# Patient Record
Sex: MALE | Race: WHITE | URBAN - METROPOLITAN AREA
[De-identification: names, ages, dates, MRNs, and addresses within clinical notes are randomized per-mention and may not be internally consistent; named-entity substitution may affect disease eponyms.]

---

## 2018-04-14 ENCOUNTER — INPATIENT (INPATIENT)
Facility: HOSPITAL | Age: 63
LOS: 5 days | Discharge: HOME | End: 2018-04-20
Attending: FAMILY MEDICINE | Admitting: FAMILY MEDICINE
Payer: COMMERCIAL

## 2018-04-14 VITALS — WEIGHT: 175.05 LBS | HEIGHT: 68 IN

## 2018-04-14 DIAGNOSIS — E78.5 HYPERLIPIDEMIA, UNSPECIFIED: ICD-10-CM

## 2018-04-14 DIAGNOSIS — I00 RHEUMATIC FEVER WITHOUT HEART INVOLVEMENT: ICD-10-CM

## 2018-04-14 DIAGNOSIS — E51.9 THIAMINE DEFICIENCY, UNSPECIFIED: ICD-10-CM

## 2018-04-14 DIAGNOSIS — F10.10 ALCOHOL ABUSE, UNCOMPLICATED: ICD-10-CM

## 2018-04-14 DIAGNOSIS — G25.81 RESTLESS LEGS SYNDROME: ICD-10-CM

## 2018-04-14 LAB
ALBUMIN SERPL ELPH-MCNC: 3.9 G/DL — SIGNIFICANT CHANGE UP (ref 3.3–5)
ALP SERPL-CCNC: 46 U/L — SIGNIFICANT CHANGE UP (ref 40–120)
ALT FLD-CCNC: 51 U/L — SIGNIFICANT CHANGE UP (ref 12–78)
ANION GAP SERPL CALC-SCNC: 13 MMOL/L — SIGNIFICANT CHANGE UP (ref 5–17)
APTT BLD: 35.1 SEC — SIGNIFICANT CHANGE UP (ref 27.5–37.4)
AST SERPL-CCNC: 95 U/L — HIGH (ref 15–37)
BASOPHILS # BLD AUTO: 0.03 K/UL — SIGNIFICANT CHANGE UP (ref 0–0.2)
BASOPHILS NFR BLD AUTO: 0.6 % — SIGNIFICANT CHANGE UP (ref 0–2)
BILIRUB SERPL-MCNC: 0.7 MG/DL — SIGNIFICANT CHANGE UP (ref 0.2–1.2)
BUN SERPL-MCNC: 10 MG/DL — SIGNIFICANT CHANGE UP (ref 7–23)
CALCIUM SERPL-MCNC: 8.6 MG/DL — SIGNIFICANT CHANGE UP (ref 8.5–10.1)
CHLORIDE SERPL-SCNC: 97 MMOL/L — SIGNIFICANT CHANGE UP (ref 96–108)
CO2 SERPL-SCNC: 26 MMOL/L — SIGNIFICANT CHANGE UP (ref 22–31)
CREAT SERPL-MCNC: 0.92 MG/DL — SIGNIFICANT CHANGE UP (ref 0.5–1.3)
EOSINOPHIL # BLD AUTO: 0.05 K/UL — SIGNIFICANT CHANGE UP (ref 0–0.5)
EOSINOPHIL NFR BLD AUTO: 1.1 % — SIGNIFICANT CHANGE UP (ref 0–6)
ETHANOL SERPL-MCNC: 199 MG/DL — HIGH (ref 0–10)
GLUCOSE SERPL-MCNC: 85 MG/DL — SIGNIFICANT CHANGE UP (ref 70–99)
HCT VFR BLD CALC: 42.1 % — SIGNIFICANT CHANGE UP (ref 39–50)
HGB BLD-MCNC: 15.3 G/DL — SIGNIFICANT CHANGE UP (ref 13–17)
IMM GRANULOCYTES NFR BLD AUTO: 0.4 % — SIGNIFICANT CHANGE UP (ref 0–1.5)
INR BLD: 1.08 RATIO — SIGNIFICANT CHANGE UP (ref 0.88–1.16)
LYMPHOCYTES # BLD AUTO: 1.65 K/UL — SIGNIFICANT CHANGE UP (ref 1–3.3)
LYMPHOCYTES # BLD AUTO: 34.7 % — SIGNIFICANT CHANGE UP (ref 13–44)
MCHC RBC-ENTMCNC: 33.4 PG — SIGNIFICANT CHANGE UP (ref 27–34)
MCHC RBC-ENTMCNC: 36.3 GM/DL — HIGH (ref 32–36)
MCV RBC AUTO: 91.9 FL — SIGNIFICANT CHANGE UP (ref 80–100)
MONOCYTES # BLD AUTO: 0.57 K/UL — SIGNIFICANT CHANGE UP (ref 0–0.9)
MONOCYTES NFR BLD AUTO: 12 % — SIGNIFICANT CHANGE UP (ref 2–14)
NEUTROPHILS # BLD AUTO: 2.43 K/UL — SIGNIFICANT CHANGE UP (ref 1.8–7.4)
NEUTROPHILS NFR BLD AUTO: 51.2 % — SIGNIFICANT CHANGE UP (ref 43–77)
NRBC # BLD: 0 /100 WBCS — SIGNIFICANT CHANGE UP (ref 0–0)
PLATELET # BLD AUTO: 107 K/UL — LOW (ref 150–400)
POTASSIUM SERPL-MCNC: 3.5 MMOL/L — SIGNIFICANT CHANGE UP (ref 3.5–5.3)
POTASSIUM SERPL-SCNC: 3.5 MMOL/L — SIGNIFICANT CHANGE UP (ref 3.5–5.3)
PROT SERPL-MCNC: 8.6 GM/DL — HIGH (ref 6–8.3)
PROTHROM AB SERPL-ACNC: 11.7 SEC — SIGNIFICANT CHANGE UP (ref 9.8–12.7)
RBC # BLD: 4.58 M/UL — SIGNIFICANT CHANGE UP (ref 4.2–5.8)
RBC # FLD: 14 % — SIGNIFICANT CHANGE UP (ref 10.3–14.5)
SODIUM SERPL-SCNC: 136 MMOL/L — SIGNIFICANT CHANGE UP (ref 135–145)
TROPONIN I SERPL-MCNC: <0.015 NG/ML — SIGNIFICANT CHANGE UP (ref 0.01–0.04)
WBC # BLD: 4.75 K/UL — SIGNIFICANT CHANGE UP (ref 3.8–10.5)
WBC # FLD AUTO: 4.75 K/UL — SIGNIFICANT CHANGE UP (ref 3.8–10.5)

## 2018-04-14 PROCEDURE — 71045 X-RAY EXAM CHEST 1 VIEW: CPT | Mod: 26

## 2018-04-14 PROCEDURE — 99285 EMERGENCY DEPT VISIT HI MDM: CPT

## 2018-04-14 RX ORDER — FOLIC ACID 0.8 MG
1 TABLET ORAL DAILY
Qty: 0 | Refills: 0 | Status: DISCONTINUED | OUTPATIENT
Start: 2018-04-14 | End: 2018-04-16

## 2018-04-14 RX ORDER — SODIUM CHLORIDE 9 MG/ML
3 INJECTION INTRAMUSCULAR; INTRAVENOUS; SUBCUTANEOUS ONCE
Qty: 0 | Refills: 0 | Status: COMPLETED | OUTPATIENT
Start: 2018-04-14 | End: 2018-04-14

## 2018-04-14 RX ORDER — PRAMIPEXOLE DIHYDROCHLORIDE 0.12 MG/1
0.12 TABLET ORAL DAILY
Qty: 0 | Refills: 0 | Status: DISCONTINUED | OUTPATIENT
Start: 2018-04-14 | End: 2018-04-18

## 2018-04-14 RX ORDER — METHOTREXATE 2.5 MG/1
70 TABLET ORAL
Qty: 0 | Refills: 0 | Status: DISCONTINUED | OUTPATIENT
Start: 2018-04-14 | End: 2018-04-16

## 2018-04-14 RX ORDER — ATORVASTATIN CALCIUM 80 MG/1
20 TABLET, FILM COATED ORAL AT BEDTIME
Qty: 0 | Refills: 0 | Status: DISCONTINUED | OUTPATIENT
Start: 2018-04-14 | End: 2018-04-20

## 2018-04-14 RX ORDER — THIAMINE MONONITRATE (VIT B1) 100 MG
100 TABLET ORAL DAILY
Qty: 0 | Refills: 0 | Status: DISCONTINUED | OUTPATIENT
Start: 2018-04-14 | End: 2018-04-16

## 2018-04-14 RX ORDER — ONDANSETRON 8 MG/1
8 TABLET, FILM COATED ORAL ONCE
Qty: 0 | Refills: 0 | Status: COMPLETED | OUTPATIENT
Start: 2018-04-14 | End: 2018-04-14

## 2018-04-14 RX ORDER — INFLUENZA VIRUS VACCINE 15; 15; 15; 15 UG/.5ML; UG/.5ML; UG/.5ML; UG/.5ML
0.5 SUSPENSION INTRAMUSCULAR ONCE
Qty: 0 | Refills: 0 | Status: COMPLETED | OUTPATIENT
Start: 2018-04-14 | End: 2018-04-14

## 2018-04-14 RX ORDER — HYDRALAZINE HCL 50 MG
5 TABLET ORAL ONCE
Qty: 0 | Refills: 0 | Status: COMPLETED | OUTPATIENT
Start: 2018-04-14 | End: 2018-04-14

## 2018-04-14 RX ORDER — ENOXAPARIN SODIUM 100 MG/ML
40 INJECTION SUBCUTANEOUS EVERY 24 HOURS
Qty: 0 | Refills: 0 | Status: DISCONTINUED | OUTPATIENT
Start: 2018-04-14 | End: 2018-04-20

## 2018-04-14 RX ORDER — HYDRALAZINE HCL 50 MG
5 TABLET ORAL EVERY 6 HOURS
Qty: 0 | Refills: 0 | Status: DISCONTINUED | OUTPATIENT
Start: 2018-04-14 | End: 2018-04-15

## 2018-04-14 RX ORDER — SODIUM CHLORIDE 9 MG/ML
1000 INJECTION INTRAMUSCULAR; INTRAVENOUS; SUBCUTANEOUS ONCE
Qty: 0 | Refills: 0 | Status: COMPLETED | OUTPATIENT
Start: 2018-04-14 | End: 2018-04-14

## 2018-04-14 RX ADMIN — SODIUM CHLORIDE 3 MILLILITER(S): 9 INJECTION INTRAMUSCULAR; INTRAVENOUS; SUBCUTANEOUS at 13:18

## 2018-04-14 RX ADMIN — Medication 100 MILLIGRAM(S): at 18:35

## 2018-04-14 RX ADMIN — Medication 50 MILLIGRAM(S): at 18:35

## 2018-04-14 RX ADMIN — Medication 2 MILLIGRAM(S): at 20:47

## 2018-04-14 RX ADMIN — ATORVASTATIN CALCIUM 20 MILLIGRAM(S): 80 TABLET, FILM COATED ORAL at 23:25

## 2018-04-14 RX ADMIN — Medication 0.1 MILLIGRAM(S): at 20:42

## 2018-04-14 RX ADMIN — Medication 2 MILLIGRAM(S): at 23:26

## 2018-04-14 RX ADMIN — Medication 1 MILLIGRAM(S): at 16:16

## 2018-04-14 RX ADMIN — ONDANSETRON 8 MILLIGRAM(S): 8 TABLET, FILM COATED ORAL at 15:48

## 2018-04-14 RX ADMIN — Medication 5 MILLIGRAM(S): at 19:48

## 2018-04-14 RX ADMIN — SODIUM CHLORIDE 1000 MILLILITER(S): 9 INJECTION INTRAMUSCULAR; INTRAVENOUS; SUBCUTANEOUS at 13:18

## 2018-04-14 NOTE — ED PROVIDER NOTE - OBJECTIVE STATEMENT
63 y/o M with a PMHx of EtOH abuse, RA, HLD, DVT RLE presents to the ED requesting eval for detox. Pt has been sober for 28 years, relapsed x2 years ago, has intermittently drank since then. Pt states he started drinking again x4-5 days ago, consuming what he believes x1 liter vodka and multiple beers. Pt BIB family for eval of possible withdrawal sx including resting tremors, NV. Pt states that he currently feels okay, not experiencing tremors, would like to speak with social for possible detox program and denies fever, cough, chills, CP, diarrhea or any other acute c/o at this time.

## 2018-04-14 NOTE — ED PROVIDER NOTE - MEDICAL DECISION MAKING DETAILS
61 y/o M requesting EtOH detox, recent relapse with PMHx of EtOH abuse, has been drinking heavily over the past x4-5 days, EtOH level here 199 here in ED with plans to receive social consult

## 2018-04-14 NOTE — H&P ADULT - NSHPPHYSICALEXAM_GEN_ALL_CORE
T(C): 36.9 (04-14-18 @ 18:20), Max: 37.1 (04-14-18 @ 18:01)  HR: 86 (04-14-18 @ 18:20) (78 - 89)  BP: 177/92 (04-14-18 @ 18:20) (116/96 - 181/107)  RR: 19 (04-14-18 @ 18:20) (17 - 19)  SpO2: 95% (04-14-18 @ 18:20) (95% - 97%)

## 2018-04-14 NOTE — ED ADULT NURSE NOTE - OBJECTIVE STATEMENT
c/o etoh withdrawal, last drink was vodka at 0930 this am (3 hours PTA). deneis pmhx seizures. drinks "almost daily". unable to keep food down x 3 days

## 2018-04-14 NOTE — ED STATDOCS - PROGRESS NOTE DETAILS
63 y/o M with a PMHx of EtOH abuse, RA, HLD s/p DVT in the R leg presents to the ED c/o evalv for detox. Pt has been sober for 28 years, relapsed two years ago. Pt states he started drinking 4-5 days ago of vodka and beer. Pt brought by family for eval of withdrawal (sx of tremors, nausea, vomiting, and diff breathing), last drink was this morning to prevent sx of withdrawal.  Pt states it takes about 2 days to go through withdrawal Sx. Will send pt to the main ED for evaluation.

## 2018-04-14 NOTE — ED ADULT TRIAGE NOTE - CHIEF COMPLAINT QUOTE
pt brought by family for eval of withdrawal (sx of tremors, nausea, vomiting, and diff breathing), last drink was this morning to prevent sx of withdrawal. states hx of ETOH abuse.

## 2018-04-14 NOTE — ED ADULT NURSE REASSESSMENT NOTE - NS ED NURSE REASSESS COMMENT FT1
BP improved. Vitals stable. Pt denies headache. CIWA 5 noted at this time. Will cont to monitor for safety and comfort.
Bp 175/115 noted at this time. No improvement of BP noted. Dr. Rockwell called and she left for today. Night senior called and made aware. 0.1mg Clonidine telephone ordered obtained and given to pt as per md order. Pt awake alert and oriented x4 pt resting comfortably in bed with no acute distress noted. Will cont to monitor for safety and comfort.
CIWA 8 noted at this time. pt c/o moderate headache. Ativan 2mg PO given as per md order. pt tolerated well. Will cont to monitor for safety and comfort.
Case discussed with social work for possible detox referral. Pending dispo. Will continue monitoring patient.
Dinner provided. Pt tolerated po intake. No improvement of BP noted. denies any pain or complaints at this time. Will cont to monitor for safety and comfort.
Report taken at the change of shift at bedside. Pt awake alert and oriented x4 resting comfortably in bed with no acute distress noted. HTN noted. Dr. Rockwell made aware. Plan of care updated to pt and family at bedside. c/o mild nausea. (+) mild tremors noted. Will cont to monitor for safety and comfort.
Dr. Rockwell made aware of HTN. Hydralzaine 5mg IV ordered and given as per md order. Will cont to monitor for safety and comfort.

## 2018-04-14 NOTE — H&P ADULT - NSHPREVIEWOFSYSTEMS_GEN_ALL_CORE
61 y/o male with PMHx of RA, HLD, and RLS that presents after an episode of nausea and vomiting after quitting alcohol.  Patient states that he was previously sober for 20 years but that since he has retired, he has resumed daily drinking 1 pt of vodka daily.  He states that his last drink was this morning.  Patient states that he has not had much appetite over the last 2 days and that he had 1 episode of vomiting yesterday.  Would like to seek treatment for ETOH dependence.

## 2018-04-14 NOTE — PROVIDER CONTACT NOTE (OTHER) - SITUATION
no improvement in BP noted s/p hydralazine given IVP as per Dr. Rockwell's order. Dr. Rockwell left, so night senior called and made aware. /115 at this time.

## 2018-04-14 NOTE — H&P ADULT - PROBLEM SELECTOR PLAN 1
- CIWA protocol and Librium Taper  - daily cbc, mg, phos, bmp  - aggressively replete electrolytes  - Social work consult

## 2018-04-14 NOTE — ED STATDOCS - NS_ ATTENDINGSCRIBEDETAILS _ED_A_ED_FT
I, Hernán Avila MD,  performed the initial face to face bedside interview with this patient regarding history of present illness, review of symptoms and relevant past medical, social and family history.  I completed an independent physical examination.    The history, relevant review of systems, past medical and surgical history, medical decision making, and physical examination was documented by the scribe in my presence and I attest to the accuracy of the documentation.

## 2018-04-15 LAB
ALBUMIN SERPL ELPH-MCNC: 3.4 G/DL — SIGNIFICANT CHANGE UP (ref 3.3–5)
ALP SERPL-CCNC: 40 U/L — SIGNIFICANT CHANGE UP (ref 40–120)
ALT FLD-CCNC: 47 U/L — SIGNIFICANT CHANGE UP (ref 12–78)
ANION GAP SERPL CALC-SCNC: 8 MMOL/L — SIGNIFICANT CHANGE UP (ref 5–17)
ANISOCYTOSIS BLD QL: SLIGHT — SIGNIFICANT CHANGE UP
AST SERPL-CCNC: 83 U/L — HIGH (ref 15–37)
BASOPHILS # BLD AUTO: 0.04 K/UL — SIGNIFICANT CHANGE UP (ref 0–0.2)
BASOPHILS NFR BLD AUTO: 1.1 % — SIGNIFICANT CHANGE UP (ref 0–2)
BILIRUB DIRECT SERPL-MCNC: 0.3 MG/DL — HIGH (ref 0–0.2)
BILIRUB INDIRECT FLD-MCNC: 0.5 MG/DL — SIGNIFICANT CHANGE UP (ref 0.2–1)
BILIRUB SERPL-MCNC: 0.8 MG/DL — SIGNIFICANT CHANGE UP (ref 0.2–1.2)
BUN SERPL-MCNC: 10 MG/DL — SIGNIFICANT CHANGE UP (ref 7–23)
CALCIUM SERPL-MCNC: 8.6 MG/DL — SIGNIFICANT CHANGE UP (ref 8.5–10.1)
CHLORIDE SERPL-SCNC: 98 MMOL/L — SIGNIFICANT CHANGE UP (ref 96–108)
CO2 SERPL-SCNC: 31 MMOL/L — SIGNIFICANT CHANGE UP (ref 22–31)
CREAT SERPL-MCNC: 0.91 MG/DL — SIGNIFICANT CHANGE UP (ref 0.5–1.3)
EOSINOPHIL # BLD AUTO: 0.07 K/UL — SIGNIFICANT CHANGE UP (ref 0–0.5)
EOSINOPHIL NFR BLD AUTO: 1.9 % — SIGNIFICANT CHANGE UP (ref 0–6)
GLUCOSE SERPL-MCNC: 96 MG/DL — SIGNIFICANT CHANGE UP (ref 70–99)
HCT VFR BLD CALC: 40.4 % — SIGNIFICANT CHANGE UP (ref 39–50)
HGB BLD-MCNC: 14.1 G/DL — SIGNIFICANT CHANGE UP (ref 13–17)
IMM GRANULOCYTES NFR BLD AUTO: 0.5 % — SIGNIFICANT CHANGE UP (ref 0–1.5)
LYMPHOCYTES # BLD AUTO: 1.45 K/UL — SIGNIFICANT CHANGE UP (ref 1–3.3)
LYMPHOCYTES # BLD AUTO: 39.2 % — SIGNIFICANT CHANGE UP (ref 13–44)
MACROCYTES BLD QL: SLIGHT — SIGNIFICANT CHANGE UP
MAGNESIUM SERPL-MCNC: 2.1 MG/DL — SIGNIFICANT CHANGE UP (ref 1.6–2.6)
MANUAL SMEAR VERIFICATION: SIGNIFICANT CHANGE UP
MCHC RBC-ENTMCNC: 33.2 PG — SIGNIFICANT CHANGE UP (ref 27–34)
MCHC RBC-ENTMCNC: 34.9 GM/DL — SIGNIFICANT CHANGE UP (ref 32–36)
MCV RBC AUTO: 95.1 FL — SIGNIFICANT CHANGE UP (ref 80–100)
MONOCYTES # BLD AUTO: 0.5 K/UL — SIGNIFICANT CHANGE UP (ref 0–0.9)
MONOCYTES NFR BLD AUTO: 13.5 % — SIGNIFICANT CHANGE UP (ref 2–14)
NEUTROPHILS # BLD AUTO: 1.62 K/UL — LOW (ref 1.8–7.4)
NEUTROPHILS NFR BLD AUTO: 43.8 % — SIGNIFICANT CHANGE UP (ref 43–77)
NRBC # BLD: 0 /100 WBCS — SIGNIFICANT CHANGE UP (ref 0–0)
PHOSPHATE SERPL-MCNC: 2.6 MG/DL — SIGNIFICANT CHANGE UP (ref 2.5–4.5)
PLAT MORPH BLD: NORMAL — SIGNIFICANT CHANGE UP
PLATELET # BLD AUTO: 69 K/UL — LOW (ref 150–400)
POLYCHROMASIA BLD QL SMEAR: SLIGHT — SIGNIFICANT CHANGE UP
POTASSIUM SERPL-MCNC: 3.5 MMOL/L — SIGNIFICANT CHANGE UP (ref 3.5–5.3)
POTASSIUM SERPL-SCNC: 3.5 MMOL/L — SIGNIFICANT CHANGE UP (ref 3.5–5.3)
PROT SERPL-MCNC: 7.5 GM/DL — SIGNIFICANT CHANGE UP (ref 6–8.3)
RBC # BLD: 4.25 M/UL — SIGNIFICANT CHANGE UP (ref 4.2–5.8)
RBC # FLD: 14.2 % — SIGNIFICANT CHANGE UP (ref 10.3–14.5)
RBC BLD AUTO: (no result)
SODIUM SERPL-SCNC: 137 MMOL/L — SIGNIFICANT CHANGE UP (ref 135–145)
WBC # BLD: 3.7 K/UL — LOW (ref 3.8–10.5)
WBC # FLD AUTO: 3.7 K/UL — LOW (ref 3.8–10.5)

## 2018-04-15 RX ADMIN — Medication 2 MILLIGRAM(S): at 22:20

## 2018-04-15 RX ADMIN — Medication 2 MILLIGRAM(S): at 17:59

## 2018-04-15 RX ADMIN — Medication 2 MILLIGRAM(S): at 10:28

## 2018-04-15 RX ADMIN — Medication 5 MILLIGRAM(S): at 02:58

## 2018-04-15 RX ADMIN — Medication 2 MILLIGRAM(S): at 23:21

## 2018-04-15 RX ADMIN — ATORVASTATIN CALCIUM 20 MILLIGRAM(S): 80 TABLET, FILM COATED ORAL at 20:38

## 2018-04-15 RX ADMIN — Medication 50 MILLIGRAM(S): at 00:30

## 2018-04-15 RX ADMIN — PRAMIPEXOLE DIHYDROCHLORIDE 0.12 MILLIGRAM(S): 0.12 TABLET ORAL at 00:39

## 2018-04-15 RX ADMIN — Medication 1 TABLET(S): at 12:06

## 2018-04-15 RX ADMIN — Medication 2 MILLIGRAM(S): at 20:38

## 2018-04-15 RX ADMIN — ENOXAPARIN SODIUM 40 MILLIGRAM(S): 100 INJECTION SUBCUTANEOUS at 05:38

## 2018-04-15 RX ADMIN — Medication 100 MILLIGRAM(S): at 12:06

## 2018-04-15 RX ADMIN — Medication 1 MILLIGRAM(S): at 12:06

## 2018-04-15 RX ADMIN — PRAMIPEXOLE DIHYDROCHLORIDE 0.12 MILLIGRAM(S): 0.12 TABLET ORAL at 12:06

## 2018-04-15 RX ADMIN — Medication 2 MILLIGRAM(S): at 03:53

## 2018-04-15 RX ADMIN — Medication 50 MILLIGRAM(S): at 05:39

## 2018-04-15 RX ADMIN — Medication 1 MILLIGRAM(S): at 05:39

## 2018-04-15 RX ADMIN — Medication 1 PATCH: at 10:29

## 2018-04-15 NOTE — PROGRESS NOTE ADULT - SUBJECTIVE AND OBJECTIVE BOX
HOSPITAL COURSE AND ASSESSMENT  63 y/o male with PMHx of RA, HLD, and RLS that presents after an episode of nausea and vomiting after quitting alcohol.  Patient states that he was previously sober for 20 years but that since he has retired, he has resumed daily drinking 1 pt of vodka daily.  He states that his last drink was this morning.  Patient states that he has not had much appetite over the last 2 days and that he had 1 episode of vomiting yesterday.  Would like to seek treatment for ETOH dependence.    Pt was admitted to the medical floor for assisted detox.     Anticipate discharge in 1-2 days depending on CIWA scoring.        *Acute Alcohol Intoxication with Alcohol Dependence and acute alcohol withdrawal and presumed thiamine deficiency  -ETOH 199 on arrival, last drink 1 day ago  -previous sober for up to 28 years with AA, started drinking with son's destination wedding  -no history of prior withdrawal seizure (nausea only in AMs which he starts drinking to help with)  -CIWA for symptoms triggered psychomotor agitation. Hold standing taper  -Clonidine for hemodynamic instability  -MVI, thiamine, folate  -Encourage cessation. Pt reports he may be interested in hearing about outpatient rehab.    *RA on chronic immunosuppresion  -MTX, folic acid q week    *RLS  -pramipexole      ----------------------------------------------------------------------------------------------  CHIEF COMPLAINT:  etoh dependence    SUBJECTIVE:     tolerating PO. OOB. feels ok  per nursing, tried to leave last night    REVIEW OF SYSTEMS:  All other review of systems is negative unless indicated above    Vital Signs Last 24 Hrs  T(C): 37.1 (15 Apr 2018 11:31), Max: 37.3 (14 Apr 2018 19:22)  T(F): 98.7 (15 Apr 2018 11:31), Max: 99.1 (14 Apr 2018 19:22)  HR: 88 (15 Apr 2018 11:31) (78 - 109)  BP: 165/104 (15 Apr 2018 11:31) (116/96 - 181/107)  BP(mean): --  RR: 18 (15 Apr 2018 11:31) (17 - 19)  SpO2: 96% (15 Apr 2018 11:31) (95% - 97%)  CAPILLARY BLOOD GLUCOSE          PHYSICAL EXAM:  Constitutional: NAD, NCAT  HEENT: EOMI, Normal Hearing, MMM, fair dentition, red eyes  Neck: trachea midline, No JVD  Respiratory: Breath sounds are clear, unlabored respiration  Cardiovascular: S1 and S2, regular rate   Gastrointestinal: Bowel Sounds present, soft, nontender, nondistended  Extremities: No peripheral edema  Vascular: 2+ radial pulse  Neurological: awake, alert, follows commands, sensation grossly intact  Psych: appropriate affect,  insight  Musculoskeletal: moves all extremities  Skin: No rashes    ALLERGIES  Aloe Lotion (Rash)      MEDICATIONS  (STANDING):  atorvastatin 20 milliGRAM(s) Oral at bedtime  cloNIDine Patch 0.2 mG/24Hr(s) 1 patch Topical every 7 days  enoxaparin Injectable 40 milliGRAM(s) SubCutaneous every 24 hours  folic acid 1 milliGRAM(s) Oral daily  influenza   Vaccine 0.5 milliLiter(s) IntraMuscular once  methotrexate 70 milliGRAM(s) Oral <User Schedule>  multivitamin 1 Tablet(s) Oral daily  pramipexole 0.125 milliGRAM(s) Oral daily  thiamine 100 milliGRAM(s) Oral daily      LABS: All Labs Reviewed:                        14.1   3.70  )-----------( 69       ( 15 Apr 2018 07:55 )             40.4     04-15    137  |  98  |  10  ----------------------------<  96  3.5   |  31  |  0.91    Ca    8.6      15 Apr 2018 07:55  Phos  2.6     04-15  Mg     2.1     04-15    TPro  7.5  /  Alb  3.4  /  TBili  0.8  /  DBili  0.3<H>  /  AST  83<H>  /  ALT  47  /  AlkPhos  40  04-15    PT/INR - ( 14 Apr 2018 13:02 )   PT: 11.7 sec;   INR: 1.08 ratio         PTT - ( 14 Apr 2018 13:02 )  PTT:35.1 sec  CARDIAC MARKERS ( 14 Apr 2018 13:02 )  <0.015 ng/mL / x     / x     / x     / x          Blood Culture:

## 2018-04-16 LAB
ANION GAP SERPL CALC-SCNC: 9 MMOL/L — SIGNIFICANT CHANGE UP (ref 5–17)
BUN SERPL-MCNC: 13 MG/DL — SIGNIFICANT CHANGE UP (ref 7–23)
CALCIUM SERPL-MCNC: 8.6 MG/DL — SIGNIFICANT CHANGE UP (ref 8.5–10.1)
CHLORIDE SERPL-SCNC: 106 MMOL/L — SIGNIFICANT CHANGE UP (ref 96–108)
CO2 SERPL-SCNC: 27 MMOL/L — SIGNIFICANT CHANGE UP (ref 22–31)
CREAT SERPL-MCNC: 0.96 MG/DL — SIGNIFICANT CHANGE UP (ref 0.5–1.3)
GLUCOSE SERPL-MCNC: 101 MG/DL — HIGH (ref 70–99)
HCT VFR BLD CALC: 39.6 % — SIGNIFICANT CHANGE UP (ref 39–50)
HGB BLD-MCNC: 14 G/DL — SIGNIFICANT CHANGE UP (ref 13–17)
MAGNESIUM SERPL-MCNC: 2 MG/DL — SIGNIFICANT CHANGE UP (ref 1.6–2.6)
MCHC RBC-ENTMCNC: 33.3 PG — SIGNIFICANT CHANGE UP (ref 27–34)
MCHC RBC-ENTMCNC: 35.4 GM/DL — SIGNIFICANT CHANGE UP (ref 32–36)
MCV RBC AUTO: 94.1 FL — SIGNIFICANT CHANGE UP (ref 80–100)
NRBC # BLD: 0 /100 WBCS — SIGNIFICANT CHANGE UP (ref 0–0)
PHOSPHATE SERPL-MCNC: 3.1 MG/DL — SIGNIFICANT CHANGE UP (ref 2.5–4.5)
PLATELET # BLD AUTO: 55 K/UL — LOW (ref 150–400)
POTASSIUM SERPL-MCNC: 3.1 MMOL/L — LOW (ref 3.5–5.3)
POTASSIUM SERPL-SCNC: 3.1 MMOL/L — LOW (ref 3.5–5.3)
RBC # BLD: 4.21 M/UL — SIGNIFICANT CHANGE UP (ref 4.2–5.8)
RBC # FLD: 13.8 % — SIGNIFICANT CHANGE UP (ref 10.3–14.5)
SODIUM SERPL-SCNC: 142 MMOL/L — SIGNIFICANT CHANGE UP (ref 135–145)
WBC # BLD: 4.06 K/UL — SIGNIFICANT CHANGE UP (ref 3.8–10.5)
WBC # FLD AUTO: 4.06 K/UL — SIGNIFICANT CHANGE UP (ref 3.8–10.5)

## 2018-04-16 RX ORDER — HYDRALAZINE HCL 50 MG
10 TABLET ORAL EVERY 6 HOURS
Qty: 0 | Refills: 0 | Status: DISCONTINUED | OUTPATIENT
Start: 2018-04-16 | End: 2018-04-16

## 2018-04-16 RX ORDER — HYDRALAZINE HCL 50 MG
15 TABLET ORAL EVERY 6 HOURS
Qty: 0 | Refills: 0 | Status: DISCONTINUED | OUTPATIENT
Start: 2018-04-16 | End: 2018-04-16

## 2018-04-16 RX ORDER — SODIUM CHLORIDE 9 MG/ML
1000 INJECTION INTRAMUSCULAR; INTRAVENOUS; SUBCUTANEOUS
Qty: 0 | Refills: 0 | Status: DISCONTINUED | OUTPATIENT
Start: 2018-04-16 | End: 2018-04-18

## 2018-04-16 RX ORDER — DEXMEDETOMIDINE HYDROCHLORIDE IN 0.9% SODIUM CHLORIDE 4 UG/ML
80 INJECTION INTRAVENOUS ONCE
Qty: 0 | Refills: 0 | Status: COMPLETED | OUTPATIENT
Start: 2018-04-16 | End: 2018-04-16

## 2018-04-16 RX ORDER — DEXMEDETOMIDINE HYDROCHLORIDE IN 0.9% SODIUM CHLORIDE 4 UG/ML
0.4 INJECTION INTRAVENOUS
Qty: 200 | Refills: 0 | Status: DISCONTINUED | OUTPATIENT
Start: 2018-04-16 | End: 2018-04-18

## 2018-04-16 RX ORDER — SODIUM CHLORIDE 9 MG/ML
1000 INJECTION, SOLUTION INTRAVENOUS
Qty: 0 | Refills: 0 | Status: DISCONTINUED | OUTPATIENT
Start: 2018-04-16 | End: 2018-04-18

## 2018-04-16 RX ORDER — PITAVASTATIN CALCIUM 1.04 MG/1
2 TABLET, FILM COATED ORAL
Qty: 0 | Refills: 0 | COMMUNITY

## 2018-04-16 RX ORDER — PRAMIPEXOLE DIHYDROCHLORIDE 0.12 MG/1
0.25 TABLET ORAL
Qty: 0 | Refills: 0 | COMMUNITY

## 2018-04-16 RX ORDER — METHOTREXATE 2.5 MG/1
7 TABLET ORAL
Qty: 0 | Refills: 0 | COMMUNITY

## 2018-04-16 RX ORDER — PITAVASTATIN CALCIUM 1.04 MG/1
0 TABLET, FILM COATED ORAL
Qty: 0 | Refills: 0 | COMMUNITY

## 2018-04-16 RX ORDER — POTASSIUM CHLORIDE 20 MEQ
10 PACKET (EA) ORAL
Qty: 0 | Refills: 0 | Status: COMPLETED | OUTPATIENT
Start: 2018-04-16 | End: 2018-04-16

## 2018-04-16 RX ORDER — PRAMIPEXOLE DIHYDROCHLORIDE 0.12 MG/1
0 TABLET ORAL
Qty: 0 | Refills: 0 | COMMUNITY

## 2018-04-16 RX ADMIN — Medication 2 MILLIGRAM(S): at 22:19

## 2018-04-16 RX ADMIN — Medication 100 MILLIEQUIVALENT(S): at 10:06

## 2018-04-16 RX ADMIN — DEXMEDETOMIDINE HYDROCHLORIDE IN 0.9% SODIUM CHLORIDE 304.8 MICROGRAM(S): 4 INJECTION INTRAVENOUS at 04:42

## 2018-04-16 RX ADMIN — DEXMEDETOMIDINE HYDROCHLORIDE IN 0.9% SODIUM CHLORIDE 7.94 MICROGRAM(S)/KG/HR: 4 INJECTION INTRAVENOUS at 13:20

## 2018-04-16 RX ADMIN — ENOXAPARIN SODIUM 40 MILLIGRAM(S): 100 INJECTION SUBCUTANEOUS at 05:30

## 2018-04-16 RX ADMIN — DEXMEDETOMIDINE HYDROCHLORIDE IN 0.9% SODIUM CHLORIDE 7.94 MICROGRAM(S)/KG/HR: 4 INJECTION INTRAVENOUS at 04:43

## 2018-04-16 RX ADMIN — SODIUM CHLORIDE 150 MILLILITER(S): 9 INJECTION, SOLUTION INTRAVENOUS at 05:30

## 2018-04-16 RX ADMIN — Medication 4 MILLIGRAM(S): at 10:06

## 2018-04-16 RX ADMIN — SODIUM CHLORIDE 150 MILLILITER(S): 9 INJECTION INTRAMUSCULAR; INTRAVENOUS; SUBCUTANEOUS at 15:14

## 2018-04-16 RX ADMIN — Medication 2 MILLIGRAM(S): at 22:45

## 2018-04-16 RX ADMIN — Medication 2 MILLIGRAM(S): at 01:17

## 2018-04-16 RX ADMIN — Medication 2 MILLIGRAM(S): at 02:35

## 2018-04-16 RX ADMIN — Medication 2 MILLIGRAM(S): at 20:31

## 2018-04-16 RX ADMIN — ATORVASTATIN CALCIUM 20 MILLIGRAM(S): 80 TABLET, FILM COATED ORAL at 21:03

## 2018-04-16 RX ADMIN — Medication 4 MILLIGRAM(S): at 14:57

## 2018-04-16 RX ADMIN — Medication 100 MILLIEQUIVALENT(S): at 12:53

## 2018-04-16 RX ADMIN — DEXMEDETOMIDINE HYDROCHLORIDE IN 0.9% SODIUM CHLORIDE 7.94 MICROGRAM(S)/KG/HR: 4 INJECTION INTRAVENOUS at 07:15

## 2018-04-16 RX ADMIN — Medication 100 MILLIEQUIVALENT(S): at 11:39

## 2018-04-16 NOTE — PROGRESS NOTE ADULT - SUBJECTIVE AND OBJECTIVE BOX
RN called to report  that pt is agitated, confused and trying to get out of bed. SBP: 160-170's, Tachycardic CIWA score 12. Will order CIWA high protocol and Ativan IV.     D/w Night RN.

## 2018-04-16 NOTE — PROGRESS NOTE ADULT - MENTAL STATUS
obvious delirium tremens, doesn't know where he is, or why he is here, fragmented and confused speech

## 2018-04-16 NOTE — PROGRESS NOTE ADULT - SUBJECTIVE AND OBJECTIVE BOX
asked to see pt on 5E by charge and bedside RN's as pt seen and treated by hospitalist PA earlier, but continues to deteriorate.    Per records (pt unable to provide hx) pt is 61 y/o male with PMHx of RA, HLD, and RLS admitted on 4/14 following episodes of nausea and vomiting after attempting to abstain from alcohol.  Was previously sober for 20 years, but has resumed daily drinking 1 pt of vodka daily.  Last drink was on morning of admission.      He was admitted due to his request for treatment for ETOH dependence....    At present pt is in clear DT's doesn't know where he is, or why he is here.  Tremulous and agitated/combative.    Transferred to ICU for further treatment.      PAST MEDICAL & SURGICAL HISTORY:  Rheumatoid arteritis  Restless leg syndrome  Hyperlipidemia LDL goal <100  ETOH abuse  No significant past surgical history      Allergies    Aloe Lotion (Rash)      ICU Vital Signs Last 24 Hrs  T(C): 37.1 (16 Apr 2018 03:14), Max: 37.4 (16 Apr 2018 01:12)  T(F): 98.7 (16 Apr 2018 03:14), Max: 99.4 (16 Apr 2018 01:12)  HR: 129 (16 Apr 2018 03:14) (78 - 129)  BP: 146/85 (16 Apr 2018 03:14) (146/85 - 188/119)  RR: 18 (16 Apr 2018 03:14) (18 - 20)  SpO2: 97% (16 Apr 2018 03:14) (93% - 97%)          I&O's Summary                            14.1   3.70  )-----------( 69       ( 15 Apr 2018 07:55 )             40.4       04-15    137  |  98  |  10  ----------------------------<  96  3.5   |  31  |  0.91    Ca    8.6      15 Apr 2018 07:55  Phos  2.6     04-15  Mg     2.1     04-15    TPro  7.5  /  Alb  3.4  /  TBili  0.8  /  DBili  0.3<H>  /  AST  83<H>  /  ALT  47  /  AlkPhos  40  04-15      CAPILLARY BLOOD GLUCOSE          LIVER FUNCTIONS - ( 15 Apr 2018 07:55 )  Alb: 3.4 g/dL / Pro: 7.5 gm/dL / ALK PHOS: 40 U/L / ALT: 47 U/L / AST: 83 U/L / GGT: x             CARDIAC MARKERS ( 14 Apr 2018 13:02 )  <0.015 ng/mL / x     / x     / x     / x            PT/INR - ( 14 Apr 2018 13:02 )   PT: 11.7 sec;   INR: 1.08 ratio         PTT - ( 14 Apr 2018 13:02 )  PTT:35.1 sec            MEDICATIONS  (STANDING):  atorvastatin 20 milliGRAM(s) Oral at bedtime  cloNIDine Patch 0.2 mG/24Hr(s) 1 patch Topical every 7 days  dexmedetomidine Bolus 80 MICROGram(s) IV Bolus once  dexmedetomidine Infusion 0.4 MICROgram(s)/kG/Hr (7.94 mL/Hr) IV Continuous <Continuous>  enoxaparin Injectable 40 milliGRAM(s) SubCutaneous every 24 hours  influenza   Vaccine 0.5 milliLiter(s) IntraMuscular once  LORazepam   Injectable 4 milliGRAM(s) IV Push every 4 hours  methotrexate 70 milliGRAM(s) Oral <User Schedule>  pramipexole 0.125 milliGRAM(s) Oral daily  sodium chloride 0.9% 1000 milliLiter(s) (150 mL/Hr) IV Continuous <Continuous>  sodium chloride 0.9%. 1000 milliLiter(s) (150 mL/Hr) IV Continuous <Continuous>    MEDICATIONS  (PRN):          DVT Prophylaxis: lovenox    Advanced Directives: FULL  Discussed with: per records/admission H&P - patient cannot discuss in current state of mind    Visit Information: Critical care time 45 min    ** Time is exclusive of billed procedures and/or teaching and/or routine family updates.

## 2018-04-17 LAB
ANION GAP SERPL CALC-SCNC: 8 MMOL/L — SIGNIFICANT CHANGE UP (ref 5–17)
BUN SERPL-MCNC: 9 MG/DL — SIGNIFICANT CHANGE UP (ref 7–23)
CALCIUM SERPL-MCNC: 8.3 MG/DL — LOW (ref 8.5–10.1)
CHLORIDE SERPL-SCNC: 108 MMOL/L — SIGNIFICANT CHANGE UP (ref 96–108)
CO2 SERPL-SCNC: 23 MMOL/L — SIGNIFICANT CHANGE UP (ref 22–31)
CREAT SERPL-MCNC: 0.75 MG/DL — SIGNIFICANT CHANGE UP (ref 0.5–1.3)
GLUCOSE SERPL-MCNC: 89 MG/DL — SIGNIFICANT CHANGE UP (ref 70–99)
HCT VFR BLD CALC: 40.6 % — SIGNIFICANT CHANGE UP (ref 39–50)
HGB BLD-MCNC: 14.5 G/DL — SIGNIFICANT CHANGE UP (ref 13–17)
MAGNESIUM SERPL-MCNC: 1.9 MG/DL — SIGNIFICANT CHANGE UP (ref 1.6–2.6)
MCHC RBC-ENTMCNC: 33.7 PG — SIGNIFICANT CHANGE UP (ref 27–34)
MCHC RBC-ENTMCNC: 35.7 GM/DL — SIGNIFICANT CHANGE UP (ref 32–36)
MCV RBC AUTO: 94.4 FL — SIGNIFICANT CHANGE UP (ref 80–100)
NRBC # BLD: 0 /100 WBCS — SIGNIFICANT CHANGE UP (ref 0–0)
PHOSPHATE SERPL-MCNC: 3 MG/DL — SIGNIFICANT CHANGE UP (ref 2.5–4.5)
PLATELET # BLD AUTO: 56 K/UL — LOW (ref 150–400)
POTASSIUM SERPL-MCNC: 3.7 MMOL/L — SIGNIFICANT CHANGE UP (ref 3.5–5.3)
POTASSIUM SERPL-SCNC: 3.7 MMOL/L — SIGNIFICANT CHANGE UP (ref 3.5–5.3)
RBC # BLD: 4.3 M/UL — SIGNIFICANT CHANGE UP (ref 4.2–5.8)
RBC # FLD: 14 % — SIGNIFICANT CHANGE UP (ref 10.3–14.5)
SODIUM SERPL-SCNC: 139 MMOL/L — SIGNIFICANT CHANGE UP (ref 135–145)
WBC # BLD: 3.75 K/UL — LOW (ref 3.8–10.5)
WBC # FLD AUTO: 3.75 K/UL — LOW (ref 3.8–10.5)

## 2018-04-17 RX ADMIN — Medication 2 MILLIGRAM(S): at 18:01

## 2018-04-17 RX ADMIN — Medication 2 MILLIGRAM(S): at 21:05

## 2018-04-17 RX ADMIN — SODIUM CHLORIDE 150 MILLILITER(S): 9 INJECTION, SOLUTION INTRAVENOUS at 06:06

## 2018-04-17 RX ADMIN — ENOXAPARIN SODIUM 40 MILLIGRAM(S): 100 INJECTION SUBCUTANEOUS at 05:49

## 2018-04-17 RX ADMIN — Medication 2 MILLIGRAM(S): at 14:18

## 2018-04-17 RX ADMIN — PRAMIPEXOLE DIHYDROCHLORIDE 0.12 MILLIGRAM(S): 0.12 TABLET ORAL at 11:50

## 2018-04-17 RX ADMIN — DEXMEDETOMIDINE HYDROCHLORIDE IN 0.9% SODIUM CHLORIDE 7.94 MICROGRAM(S)/KG/HR: 4 INJECTION INTRAVENOUS at 06:06

## 2018-04-17 RX ADMIN — SODIUM CHLORIDE 150 MILLILITER(S): 9 INJECTION INTRAMUSCULAR; INTRAVENOUS; SUBCUTANEOUS at 21:05

## 2018-04-17 RX ADMIN — ATORVASTATIN CALCIUM 20 MILLIGRAM(S): 80 TABLET, FILM COATED ORAL at 21:06

## 2018-04-17 RX ADMIN — DEXMEDETOMIDINE HYDROCHLORIDE IN 0.9% SODIUM CHLORIDE 7.94 MICROGRAM(S)/KG/HR: 4 INJECTION INTRAVENOUS at 10:29

## 2018-04-17 RX ADMIN — DEXMEDETOMIDINE HYDROCHLORIDE IN 0.9% SODIUM CHLORIDE 7.94 MICROGRAM(S)/KG/HR: 4 INJECTION INTRAVENOUS at 00:37

## 2018-04-17 RX ADMIN — SODIUM CHLORIDE 150 MILLILITER(S): 9 INJECTION INTRAMUSCULAR; INTRAVENOUS; SUBCUTANEOUS at 13:04

## 2018-04-17 RX ADMIN — Medication 2 MILLIGRAM(S): at 05:46

## 2018-04-17 RX ADMIN — SODIUM CHLORIDE 150 MILLILITER(S): 9 INJECTION INTRAMUSCULAR; INTRAVENOUS; SUBCUTANEOUS at 00:37

## 2018-04-17 RX ADMIN — Medication 2 MILLIGRAM(S): at 10:15

## 2018-04-17 NOTE — PROGRESS NOTE ADULT - SUBJECTIVE AND OBJECTIVE BOX
CC: Lethargic    Patient admitted for detox from alcohol.    4/17 - Events noted. Restarted on Precedex drip overnight.    PAST MEDICAL & SURGICAL HISTORY:  Rheumatoid arteritis  Restless leg syndrome  Hyperlipidemia   ETOH abuse  No significant past surgical history      FAMILY HISTORY:  No pertinent family history in first degree relatives      Social Hx:    Allergies    Aloe Lotion (Rash)    Intolerances        62y        ICU Vital Signs Last 24 Hrs  T(C): 36.3 (17 Apr 2018 11:00), Max: 37.2 (16 Apr 2018 20:00)  T(F): 97.4 (17 Apr 2018 11:00), Max: 99 (16 Apr 2018 20:00)  HR: 57 (17 Apr 2018 11:00) (55 - 89)  BP: 119/70 (17 Apr 2018 11:00) (119/70 - 182/105)  BP(mean): 82 (17 Apr 2018 11:00) (82 - 128)  ABP: --  ABP(mean): --  RR: 19 (17 Apr 2018 11:00) (13 - 25)  SpO2: 99% (17 Apr 2018 11:00) (92% - 99%)          I&O's Summary    16 Apr 2018 07:01  -  17 Apr 2018 07:00  --------------------------------------------------------  IN: 3887.9 mL / OUT: 1750 mL / NET: 2137.9 mL                              14.5   3.75  )-----------( 56       ( 17 Apr 2018 06:24 )             40.6       04-17    139  |  108  |  9   ----------------------------<  89  3.7   |  23  |  0.75    Ca    8.3<L>      17 Apr 2018 06:24  Phos  3.0     04-17  Mg     1.9     04-17        CAPILLARY BLOOD GLUCOSE                                MEDICATIONS  (STANDING):  atorvastatin 20 milliGRAM(s) Oral at bedtime  cloNIDine Patch 0.2 mG/24Hr(s) 1 patch Topical every 7 days  dexmedetomidine Infusion 0.4 MICROgram(s)/kG/Hr (7.94 mL/Hr) IV Continuous <Continuous>  enoxaparin Injectable 40 milliGRAM(s) SubCutaneous every 24 hours  influenza   Vaccine 0.5 milliLiter(s) IntraMuscular once  LORazepam   Injectable 2 milliGRAM(s) IV Push every 4 hours  pramipexole 0.125 milliGRAM(s) Oral daily  sodium chloride 0.9% 1000 milliLiter(s) (150 mL/Hr) IV Continuous <Continuous>  sodium chloride 0.9%. 1000 milliLiter(s) (150 mL/Hr) IV Continuous <Continuous>    MEDICATIONS  (PRN):  hydralazine 15 milliGRAM(s),sodium chloride 0.9% 50 milliLiter(s) 15 milliGRAM(s) IV Intermittent every 6 hours PRN for SBP >150          DVT Prophylaxis:    Advanced Directives:  Discussed with:    Visit Information:    ** Time is exclusive of billed procedures and/or teaching and/or routine family updates.

## 2018-04-18 LAB
ANION GAP SERPL CALC-SCNC: 10 MMOL/L — SIGNIFICANT CHANGE UP (ref 5–17)
BUN SERPL-MCNC: 8 MG/DL — SIGNIFICANT CHANGE UP (ref 7–23)
CALCIUM SERPL-MCNC: 8.2 MG/DL — LOW (ref 8.5–10.1)
CHLORIDE SERPL-SCNC: 107 MMOL/L — SIGNIFICANT CHANGE UP (ref 96–108)
CO2 SERPL-SCNC: 23 MMOL/L — SIGNIFICANT CHANGE UP (ref 22–31)
CREAT SERPL-MCNC: 0.84 MG/DL — SIGNIFICANT CHANGE UP (ref 0.5–1.3)
GLUCOSE SERPL-MCNC: 87 MG/DL — SIGNIFICANT CHANGE UP (ref 70–99)
HCT VFR BLD CALC: 37.6 % — LOW (ref 39–50)
HGB BLD-MCNC: 13.5 G/DL — SIGNIFICANT CHANGE UP (ref 13–17)
MAGNESIUM SERPL-MCNC: 1.7 MG/DL — SIGNIFICANT CHANGE UP (ref 1.6–2.6)
MCHC RBC-ENTMCNC: 33.8 PG — SIGNIFICANT CHANGE UP (ref 27–34)
MCHC RBC-ENTMCNC: 35.9 GM/DL — SIGNIFICANT CHANGE UP (ref 32–36)
MCV RBC AUTO: 94.2 FL — SIGNIFICANT CHANGE UP (ref 80–100)
NRBC # BLD: 0 /100 WBCS — SIGNIFICANT CHANGE UP (ref 0–0)
PHOSPHATE SERPL-MCNC: 2.6 MG/DL — SIGNIFICANT CHANGE UP (ref 2.5–4.5)
PLATELET # BLD AUTO: 71 K/UL — LOW (ref 150–400)
POTASSIUM SERPL-MCNC: 3.5 MMOL/L — SIGNIFICANT CHANGE UP (ref 3.5–5.3)
POTASSIUM SERPL-SCNC: 3.5 MMOL/L — SIGNIFICANT CHANGE UP (ref 3.5–5.3)
RBC # BLD: 3.99 M/UL — LOW (ref 4.2–5.8)
RBC # FLD: 14.4 % — SIGNIFICANT CHANGE UP (ref 10.3–14.5)
SODIUM SERPL-SCNC: 140 MMOL/L — SIGNIFICANT CHANGE UP (ref 135–145)
WBC # BLD: 4.65 K/UL — SIGNIFICANT CHANGE UP (ref 3.8–10.5)
WBC # FLD AUTO: 4.65 K/UL — SIGNIFICANT CHANGE UP (ref 3.8–10.5)

## 2018-04-18 RX ORDER — THIAMINE MONONITRATE (VIT B1) 100 MG
100 TABLET ORAL DAILY
Qty: 0 | Refills: 0 | Status: DISCONTINUED | OUTPATIENT
Start: 2018-04-18 | End: 2018-04-20

## 2018-04-18 RX ORDER — METOPROLOL TARTRATE 50 MG
25 TABLET ORAL ONCE
Qty: 0 | Refills: 0 | Status: COMPLETED | OUTPATIENT
Start: 2018-04-18 | End: 2018-04-18

## 2018-04-18 RX ORDER — HYDRALAZINE HCL 50 MG
10 TABLET ORAL EVERY 6 HOURS
Qty: 0 | Refills: 0 | Status: DISCONTINUED | OUTPATIENT
Start: 2018-04-18 | End: 2018-04-20

## 2018-04-18 RX ORDER — FOLIC ACID 0.8 MG
1 TABLET ORAL DAILY
Qty: 0 | Refills: 0 | Status: DISCONTINUED | OUTPATIENT
Start: 2018-04-18 | End: 2018-04-20

## 2018-04-18 RX ORDER — METOPROLOL TARTRATE 50 MG
25 TABLET ORAL
Qty: 0 | Refills: 0 | Status: DISCONTINUED | OUTPATIENT
Start: 2018-04-18 | End: 2018-04-20

## 2018-04-18 RX ORDER — METHOTREXATE 2.5 MG/1
17.5 TABLET ORAL
Qty: 0 | Refills: 0 | Status: DISCONTINUED | OUTPATIENT
Start: 2018-04-18 | End: 2018-04-20

## 2018-04-18 RX ORDER — PRAMIPEXOLE DIHYDROCHLORIDE 0.12 MG/1
0.25 TABLET ORAL
Qty: 0 | Refills: 0 | Status: DISCONTINUED | OUTPATIENT
Start: 2018-04-18 | End: 2018-04-20

## 2018-04-18 RX ORDER — HYDROXYCHLOROQUINE SULFATE 200 MG
200 TABLET ORAL
Qty: 0 | Refills: 0 | Status: DISCONTINUED | OUTPATIENT
Start: 2018-04-18 | End: 2018-04-20

## 2018-04-18 RX ORDER — BENZOCAINE AND MENTHOL 5; 1 G/100ML; G/100ML
1 LIQUID ORAL EVERY 4 HOURS
Qty: 0 | Refills: 0 | Status: DISCONTINUED | OUTPATIENT
Start: 2018-04-18 | End: 2018-04-20

## 2018-04-18 RX ADMIN — BENZOCAINE AND MENTHOL 1 LOZENGE: 5; 1 LIQUID ORAL at 01:30

## 2018-04-18 RX ADMIN — Medication 1 TABLET(S): at 12:03

## 2018-04-18 RX ADMIN — Medication 1 MILLIGRAM(S): at 12:03

## 2018-04-18 RX ADMIN — BENZOCAINE AND MENTHOL 1 LOZENGE: 5; 1 LIQUID ORAL at 10:35

## 2018-04-18 RX ADMIN — Medication 100 MILLIGRAM(S): at 12:03

## 2018-04-18 RX ADMIN — SODIUM CHLORIDE 150 MILLILITER(S): 9 INJECTION INTRAMUSCULAR; INTRAVENOUS; SUBCUTANEOUS at 04:06

## 2018-04-18 RX ADMIN — Medication 25 MILLIGRAM(S): at 18:15

## 2018-04-18 RX ADMIN — Medication 1 MILLIGRAM(S): at 23:08

## 2018-04-18 RX ADMIN — METHOTREXATE 17.5 MILLIGRAM(S): 2.5 TABLET ORAL at 12:19

## 2018-04-18 RX ADMIN — ATORVASTATIN CALCIUM 20 MILLIGRAM(S): 80 TABLET, FILM COATED ORAL at 23:08

## 2018-04-18 RX ADMIN — Medication 10 MILLIGRAM(S): at 08:02

## 2018-04-18 RX ADMIN — ENOXAPARIN SODIUM 40 MILLIGRAM(S): 100 INJECTION SUBCUTANEOUS at 06:43

## 2018-04-18 RX ADMIN — Medication 200 MILLIGRAM(S): at 18:15

## 2018-04-18 RX ADMIN — Medication 10 MILLIGRAM(S): at 16:32

## 2018-04-18 RX ADMIN — Medication 2 MILLIGRAM(S): at 01:06

## 2018-04-18 RX ADMIN — PRAMIPEXOLE DIHYDROCHLORIDE 0.25 MILLIGRAM(S): 0.12 TABLET ORAL at 12:18

## 2018-04-18 RX ADMIN — Medication 2 MILLIGRAM(S): at 12:03

## 2018-04-18 RX ADMIN — Medication 25 MILLIGRAM(S): at 12:02

## 2018-04-18 RX ADMIN — Medication 2 MILLIGRAM(S): at 06:43

## 2018-04-18 RX ADMIN — PRAMIPEXOLE DIHYDROCHLORIDE 0.25 MILLIGRAM(S): 0.12 TABLET ORAL at 18:15

## 2018-04-18 NOTE — PROGRESS NOTE ADULT - SUBJECTIVE AND OBJECTIVE BOX
CC: Feels better    4/18 - Sitting in a chair this AM. Feels better.      PAST MEDICAL & SURGICAL HISTORY:  Rheumatoid arteritis  Restless leg syndrome  Hyperlipidemia LDL goal <100  ETOH abuse  No significant past surgical history      FAMILY HISTORY:  No pertinent family history in first degree relatives      Social Hx:    Allergies    Aloe Lotion (Rash)    Intolerances        62y        ICU Vital Signs Last 24 Hrs  T(C): 36.9 (18 Apr 2018 10:00), Max: 37.3 (17 Apr 2018 21:00)  T(F): 98.4 (18 Apr 2018 10:00), Max: 99.1 (17 Apr 2018 21:00)  HR: 105 (18 Apr 2018 09:00) (56 - 110)  BP: 145/88 (18 Apr 2018 09:00) (109/62 - 183/106)  BP(mean): 100 (18 Apr 2018 09:00) (71 - 126)  ABP: --  ABP(mean): --  RR: 21 (18 Apr 2018 09:00) (17 - 26)  SpO2: 96% (18 Apr 2018 09:00) (94% - 100%)          I&O's Summary    17 Apr 2018 07:01  -  18 Apr 2018 07:00  --------------------------------------------------------  IN: 2071.7 mL / OUT: 700 mL / NET: 1371.7 mL    18 Apr 2018 07:01  -  18 Apr 2018 10:55  --------------------------------------------------------  IN: 910 mL / OUT: 0 mL / NET: 910 mL                              13.5   4.65  )-----------( 71       ( 18 Apr 2018 06:23 )             37.6       04-18    140  |  107  |  8   ----------------------------<  87  3.5   |  23  |  0.84    Ca    8.2<L>      18 Apr 2018 06:23  Phos  2.6     04-18  Mg     1.7     04-18        CAPILLARY BLOOD GLUCOSE                                MEDICATIONS  (STANDING):  atorvastatin 20 milliGRAM(s) Oral at bedtime  cloNIDine Patch 0.2 mG/24Hr(s) 1 patch Topical every 7 days  enoxaparin Injectable 40 milliGRAM(s) SubCutaneous every 24 hours  folic acid 1 milliGRAM(s) Oral daily  influenza   Vaccine 0.5 milliLiter(s) IntraMuscular once  LORazepam     Tablet 2 milliGRAM(s) Oral every 6 hours  metoprolol tartrate 25 milliGRAM(s) Oral two times a day  multivitamin 1 Tablet(s) Oral daily  pramipexole 0.125 milliGRAM(s) Oral daily  sodium chloride 0.9%. 1000 milliLiter(s) (150 mL/Hr) IV Continuous <Continuous>  thiamine 100 milliGRAM(s) Oral daily    MEDICATIONS  (PRN):  benzocaine 15 mG/menthol 3.6 mG Lozenge 1 Lozenge Oral every 4 hours PRN Sore Throat  hydrALAZINE Injectable 10 milliGRAM(s) IV Push every 6 hours PRN SBP>150              Advanced Directives:  Discussed with:    Visit Information:    ** Time is exclusive of billed procedures and/or teaching and/or routine family updates.

## 2018-04-18 NOTE — PROGRESS NOTE ADULT - ASSESSMENT
61yo M with:  Acute DT's  EtOh Abuse  High risk for Thiamine deficiency      Plan:  Cont ICU Care  Critically Ill  Serial Neuro Exams  Cont Dexmetomidine gtt  Ativan ATC and prn  BP Stable  No Acute Resp issues  PO diet as tolerated  DVT prophylaxis as appropriate
63yo M with:  Acute DT's - Resolved  EtOh Abuse  High risk for Thiamine deficiency      Plan:  Clinically improved  Serial Neuro Exams  Remains off Dexmetomidine gtt  Will cont Ativan prn  BP Stable  Restart Rheumatology meds   No Acute Resp issues  PO diet as tolerated  DVT prophylaxis - Hep SQ
63yo M alcoholic, 1 pint of vodka daily, suffering from worsening alcohol WD and delirium tremens despite ativan based CIWA protocol.  presumed thiamine deficiency due to alcoholism  Hemodynamics and respiratory function reasonable.    Plan for transfer to ICU  HOB 30  GI and DVT prophylaxis as appropriate  IV ativan 4mg IVP x 1 on arrival to ICU and then q4h RTC  precedex bolus and infusion - titrate to RASS -1  IVF with thiamine, folate, MVI  supportive care

## 2018-04-18 NOTE — PROGRESS NOTE ADULT - RESPIRATORY
detailed exam
Breath Sounds equal & clear to percussion & auscultation, no accessory muscle use
Breath Sounds equal & clear to percussion & auscultation, no accessory muscle use

## 2018-04-19 LAB
ANION GAP SERPL CALC-SCNC: 9 MMOL/L — SIGNIFICANT CHANGE UP (ref 5–17)
BUN SERPL-MCNC: 8 MG/DL — SIGNIFICANT CHANGE UP (ref 7–23)
CALCIUM SERPL-MCNC: 8.7 MG/DL — SIGNIFICANT CHANGE UP (ref 8.5–10.1)
CHLORIDE SERPL-SCNC: 106 MMOL/L — SIGNIFICANT CHANGE UP (ref 96–108)
CO2 SERPL-SCNC: 23 MMOL/L — SIGNIFICANT CHANGE UP (ref 22–31)
CREAT SERPL-MCNC: 0.75 MG/DL — SIGNIFICANT CHANGE UP (ref 0.5–1.3)
GLUCOSE SERPL-MCNC: 91 MG/DL — SIGNIFICANT CHANGE UP (ref 70–99)
HCT VFR BLD CALC: 36.5 % — LOW (ref 39–50)
HGB BLD-MCNC: 12.7 G/DL — LOW (ref 13–17)
MAGNESIUM SERPL-MCNC: 1.7 MG/DL — SIGNIFICANT CHANGE UP (ref 1.6–2.6)
MCHC RBC-ENTMCNC: 33 PG — SIGNIFICANT CHANGE UP (ref 27–34)
MCHC RBC-ENTMCNC: 34.8 GM/DL — SIGNIFICANT CHANGE UP (ref 32–36)
MCV RBC AUTO: 94.8 FL — SIGNIFICANT CHANGE UP (ref 80–100)
NRBC # BLD: 0 /100 WBCS — SIGNIFICANT CHANGE UP (ref 0–0)
PHOSPHATE SERPL-MCNC: 3.7 MG/DL — SIGNIFICANT CHANGE UP (ref 2.5–4.5)
PLATELET # BLD AUTO: 86 K/UL — LOW (ref 150–400)
POTASSIUM SERPL-MCNC: 3.4 MMOL/L — LOW (ref 3.5–5.3)
POTASSIUM SERPL-SCNC: 3.4 MMOL/L — LOW (ref 3.5–5.3)
RBC # BLD: 3.85 M/UL — LOW (ref 4.2–5.8)
RBC # FLD: 14.2 % — SIGNIFICANT CHANGE UP (ref 10.3–14.5)
SODIUM SERPL-SCNC: 138 MMOL/L — SIGNIFICANT CHANGE UP (ref 135–145)
WBC # BLD: 3.43 K/UL — LOW (ref 3.8–10.5)
WBC # FLD AUTO: 3.43 K/UL — LOW (ref 3.8–10.5)

## 2018-04-19 RX ORDER — POTASSIUM CHLORIDE 20 MEQ
40 PACKET (EA) ORAL ONCE
Qty: 0 | Refills: 0 | Status: COMPLETED | OUTPATIENT
Start: 2018-04-19 | End: 2018-04-19

## 2018-04-19 RX ADMIN — Medication 25 MILLIGRAM(S): at 05:42

## 2018-04-19 RX ADMIN — Medication 0.5 MILLIGRAM(S): at 13:28

## 2018-04-19 RX ADMIN — ENOXAPARIN SODIUM 40 MILLIGRAM(S): 100 INJECTION SUBCUTANEOUS at 05:42

## 2018-04-19 RX ADMIN — Medication 200 MILLIGRAM(S): at 18:33

## 2018-04-19 RX ADMIN — PRAMIPEXOLE DIHYDROCHLORIDE 0.25 MILLIGRAM(S): 0.12 TABLET ORAL at 18:32

## 2018-04-19 RX ADMIN — Medication 0.5 MILLIGRAM(S): at 21:33

## 2018-04-19 RX ADMIN — Medication 100 MILLIGRAM(S): at 12:34

## 2018-04-19 RX ADMIN — Medication 25 MILLIGRAM(S): at 18:33

## 2018-04-19 RX ADMIN — Medication 1 MILLIGRAM(S): at 12:34

## 2018-04-19 RX ADMIN — Medication 1 TABLET(S): at 12:34

## 2018-04-19 RX ADMIN — ATORVASTATIN CALCIUM 20 MILLIGRAM(S): 80 TABLET, FILM COATED ORAL at 21:33

## 2018-04-19 RX ADMIN — Medication 1 MILLIGRAM(S): at 05:42

## 2018-04-19 RX ADMIN — Medication 200 MILLIGRAM(S): at 05:42

## 2018-04-19 RX ADMIN — PRAMIPEXOLE DIHYDROCHLORIDE 0.25 MILLIGRAM(S): 0.12 TABLET ORAL at 05:43

## 2018-04-19 RX ADMIN — Medication 40 MILLIEQUIVALENT(S): at 08:28

## 2018-04-19 NOTE — DIETITIAN INITIAL EVALUATION ADULT. - NS AS NUTRI INTERV ED CONTENT
Purpose of the nutrition education/Nutrition relationship to health/disease/Recommended modifications/Priority modifications/healthy meal pattern

## 2018-04-19 NOTE — DIETITIAN INITIAL EVALUATION ADULT. - ENERGY NEEDS
Ht.      68"        Wt.    174#            26.5  BMI                  IBW    154#               Pt is at    113%  IBW

## 2018-04-19 NOTE — PHYSICAL THERAPY INITIAL EVALUATION ADULT - GENERAL OBSERVATIONS, REHAB EVAL
HM; BP cuff; pt rec'd seated in recliner; HR 64; /81; O2 Sat 100%; RR 17; denied pain; RN Arturo present

## 2018-04-19 NOTE — PROGRESS NOTE ADULT - SUBJECTIVE AND OBJECTIVE BOX
Assessment	     63 y/o male with PMHx of RA, HLD, and RLS admitted on 4/14 following episodes of nausea and vomiting after attempting to abstain from alcohol.  Was previously sober for 20 years, but has resumed daily drinking 1 pt of vodka daily.  Last drink was on morning of admission.      He was admitted due to his request for treatment for ETOH dependence....    At present pt is in clear DT's doesn't know where he is, or why he is here.  Tremulous and agitated/combative.    Transferred to ICU for further treatment.        61yo M with:  Acute DT's - Resolved  EtOh Abuse  High risk for Thiamine deficiency      Plan:  Clinically improved  Serial Neuro Exams  Remains off Dexmetomidine gtt  Will cont Ativan prn  BP Stable  Restart Rheumatology meds   No Acute Resp issues  PO diet as tolerated  DVT prophylaxis - Hep SQ CHIEF COMPLAINT:etoh detox        PAST MEDICAL & SURGICAL HISTORY:  Rheumatoid arteritis  Restless leg syndrome  Hyperlipidemia LDL goal <100  ETOH abuse  No significant past surgical history    FAMILY HISTORY:  No pertinent family history in first degree relatives    Social History:    REVIEW OF SYSTEMS:    CONSTITUTIONAL: No weakness, fevers or chills  EYES/ENT: No visual changes;  No vertigo or throat pain   NECK: No pain or stiffness  RESPIRATORY: No cough, wheezing, hemoptysis; No shortness of breath  CARDIOVASCULAR: No chest pain or palpitations  GASTROINTESTINAL: No abdominal or epigastric pain. No nausea, vomiting, or hematemesis; No diarrhea or constipation. No melena or hematochezia.  GENITOURINARY: No dysuria, frequency or hematuria  NEUROLOGICAL: No numbness or weakness  SKIN: No itching, burning, rashes, or lesions   All other review of systems is negative unless indicated above      Vital Signs Last 24 Hrs  T(C): 36.8 (20 Apr 2018 09:00), Max: 36.8 (19 Apr 2018 23:00)  T(F): 98.2 (20 Apr 2018 09:00), Max: 98.2 (19 Apr 2018 23:00)  HR: 71 (20 Apr 2018 09:00) (60 - 79)  BP: 134/79 (20 Apr 2018 09:00) (122/69 - 169/96)  BP(mean): 93 (20 Apr 2018 09:00) (81 - 115)  RR: 14 (20 Apr 2018 09:00) (14 - 20)  SpO2: 98% (20 Apr 2018 09:00) (93% - 99%)    I&O's Summary    19 Apr 2018 07:01  -  20 Apr 2018 07:00  --------------------------------------------------------  IN: 1350 mL / OUT: 0 mL / NET: 1350 mL    20 Apr 2018 07:01  -  20 Apr 2018 09:31  --------------------------------------------------------  IN: 420 mL / OUT: 0 mL / NET: 420 mL        CAPILLARY BLOOD GLUCOSE          PHYSICAL EXAM:    Constitutional: NAD, awake and alert, well-developed  HEENT: PERR, EOMI, Normal Hearing, MMM  Neck: Soft and supple, No LAD, No JVD  Respiratory: Breath sounds are clear bilaterally, No wheezing, rales or rhonchi  Cardiovascular: S1 and S2, regular rate and rhythm, no Murmurs, gallops or rubs  Gastrointestinal: Bowel Sounds present, soft, nontender, nondistended, no guarding, no rebound  Extremities: No peripheral edema  Vascular: 2+ peripheral pulses  Neurological: A/O x 3, no focal deficits  Musculoskeletal: 5/5 strength b/l upper and lower extremities  Skin: No rashes    MEDICATIONS:  MEDICATIONS  (STANDING):  atorvastatin 20 milliGRAM(s) Oral at bedtime  cloNIDine Patch 0.2 mG/24Hr(s) 1 patch Topical every 7 days  enoxaparin Injectable 40 milliGRAM(s) SubCutaneous every 24 hours  folic acid 1 milliGRAM(s) Oral daily  hydroxychloroquine 200 milliGRAM(s) Oral two times a day  LORazepam     Tablet 0.5 milliGRAM(s) Oral every 8 hours  methotrexate 17.5 milliGRAM(s) Oral <User Schedule>  metoprolol tartrate 25 milliGRAM(s) Oral two times a day  multivitamin 1 Tablet(s) Oral daily  pramipexole 0.25 milliGRAM(s) Oral two times a day  thiamine 100 milliGRAM(s) Oral daily      LABS: All Labs Reviewed:                        13.0   3.70  )-----------( 89       ( 20 Apr 2018 05:28 )             37.4     04-20    140  |  106  |  8   ----------------------------<  86  3.8   |  25  |  0.92    Ca    9.1      20 Apr 2018 05:28  Phos  4.1     04-20  Mg     1.8     04-20                Assessment	     63 y/o male with PMHx of RA, HLD, and RLS admitted on 4/14 following episodes of nausea and vomiting after attempting to abstain from alcohol.  Was previously sober for 20 years, but has resumed daily drinking 1 pt of vodka daily.  Last drink was on morning of admission.      He was admitted due to his request for treatment for ETOH dependence....    At present pt is in clear DT's doesn't know where he is, or why he is here.  Tremulous and agitated/combative.    Transferred to ICU for further treatment.        61yo M with:  Acute DT's - Resolved  EtOh Abuse  High risk for Thiamine deficiency      Plan:  Clinically improved  Serial Neuro Exams  Remains off Dexmetomidine gtt  Will cont Ativan prn  BP Stable  Restart Rheumatology meds   No Acute Resp issues  PO diet as tolerated  DVT prophylaxis - Hep SQ

## 2018-04-19 NOTE — PHYSICAL THERAPY INITIAL EVALUATION ADULT - MODALITIES TREATMENT COMMENTS
pt left seated in recliner post Eval; chair alarm donned; HM, BP cuff in place; callbell in reach; pt instructed not to get up alone; call nursing for assist; jma well; denied pain

## 2018-04-19 NOTE — DIETITIAN INITIAL EVALUATION ADULT. - OTHER INFO
Nutrition Assessment for LOS: 62yoM admitted for alcohol withdrawal. PMH includes: alcohol abuse, rheumatoid arthritis, HLD. Pt is A&Ox4 at time of assessment and answers questions appropriately. Pt reports fair po intake PTA, meeting >80% estimated nutrition needs x6mo. Trying to lose weight, eating healthier and smaller portions as a result. Increased physcial activty as well. His regular excess alcohol intake does curb his appetite, and makes him nauseous at times. Pt diet recall reveals diet with adequate protein and vegetables, low in grains. Pt with adequate calorie intake due to excess alcohol in diet, however not obtaining adequate micronutrients from diet. Pt motivated to live healthier lifestyle. d/w pt healthy meal pattern, lean proteins, healthy fats. Pt avoids refined grains, but d/w pt including whole grains in diet to meet energy needs. Education materials provided. Nutrition Assessment for LOS: 62yoM admitted for alcohol withdrawal. PMH includes: alcohol abuse, rheumatoid arthritis, HLD. Pt is A&Ox4 at time of assessment and answers questions appropriately. Pt reports fair po intake PTA, meeting >80% estimated nutrition needs x6mo. His regular excess alcohol intake curbs his appetite, and makes him nauseous at times. Pt diet recall reveals diet with adequate protein and vegetables, low in grains. Pt with adequate calorie intake due to excess alcohol in diet, but not obtaining adequate micronutrients from diet. Pt wants to get in control of alcohol intake. Pt trying to lose weight, eating healthier foods and smaller portions as a result. Increased physical activty as well. Pt reports #, wt in EMR is inaccurate. Pt with 25# (12.6%) intentional wt loss x6mo. d/w pt healthy meal pattern, lean proteins, healthy fats, whole grains, importance of not over-restricting po intake or replacing meals with alcohol. Education materials provided. No edema noted. [place] 22. Pt appears overweight, BMI 26.5. Recommendations: 1) continue to encourage po intake meeting % estimated nutr needs, healthy eating pattern 2) Recommend SW consult 3) Obtain current wt. Will continue to monitor po intake, wt, labs.

## 2018-04-20 ENCOUNTER — TRANSCRIPTION ENCOUNTER (OUTPATIENT)
Age: 63
End: 2018-04-20

## 2018-04-20 VITALS
SYSTOLIC BLOOD PRESSURE: 120 MMHG | DIASTOLIC BLOOD PRESSURE: 87 MMHG | HEART RATE: 63 BPM | RESPIRATION RATE: 17 BRPM | OXYGEN SATURATION: 99 %

## 2018-04-20 LAB
ANION GAP SERPL CALC-SCNC: 9 MMOL/L — SIGNIFICANT CHANGE UP (ref 5–17)
BUN SERPL-MCNC: 8 MG/DL — SIGNIFICANT CHANGE UP (ref 7–23)
CALCIUM SERPL-MCNC: 9.1 MG/DL — SIGNIFICANT CHANGE UP (ref 8.5–10.1)
CHLORIDE SERPL-SCNC: 106 MMOL/L — SIGNIFICANT CHANGE UP (ref 96–108)
CO2 SERPL-SCNC: 25 MMOL/L — SIGNIFICANT CHANGE UP (ref 22–31)
CREAT SERPL-MCNC: 0.92 MG/DL — SIGNIFICANT CHANGE UP (ref 0.5–1.3)
GLUCOSE SERPL-MCNC: 86 MG/DL — SIGNIFICANT CHANGE UP (ref 70–99)
HCT VFR BLD CALC: 37.4 % — LOW (ref 39–50)
HGB BLD-MCNC: 13 G/DL — SIGNIFICANT CHANGE UP (ref 13–17)
MAGNESIUM SERPL-MCNC: 1.8 MG/DL — SIGNIFICANT CHANGE UP (ref 1.6–2.6)
MCHC RBC-ENTMCNC: 32.9 PG — SIGNIFICANT CHANGE UP (ref 27–34)
MCHC RBC-ENTMCNC: 34.8 GM/DL — SIGNIFICANT CHANGE UP (ref 32–36)
MCV RBC AUTO: 94.7 FL — SIGNIFICANT CHANGE UP (ref 80–100)
NRBC # BLD: 0 /100 WBCS — SIGNIFICANT CHANGE UP (ref 0–0)
PHOSPHATE SERPL-MCNC: 4.1 MG/DL — SIGNIFICANT CHANGE UP (ref 2.5–4.5)
PLATELET # BLD AUTO: 89 K/UL — LOW (ref 150–400)
POTASSIUM SERPL-MCNC: 3.8 MMOL/L — SIGNIFICANT CHANGE UP (ref 3.5–5.3)
POTASSIUM SERPL-SCNC: 3.8 MMOL/L — SIGNIFICANT CHANGE UP (ref 3.5–5.3)
RBC # BLD: 3.95 M/UL — LOW (ref 4.2–5.8)
RBC # FLD: 13.9 % — SIGNIFICANT CHANGE UP (ref 10.3–14.5)
SODIUM SERPL-SCNC: 140 MMOL/L — SIGNIFICANT CHANGE UP (ref 135–145)
WBC # BLD: 3.7 K/UL — LOW (ref 3.8–10.5)
WBC # FLD AUTO: 3.7 K/UL — LOW (ref 3.8–10.5)

## 2018-04-20 RX ORDER — METOPROLOL TARTRATE 50 MG
1 TABLET ORAL
Qty: 60 | Refills: 1
Start: 2018-04-20 | End: 2018-06-18

## 2018-04-20 RX ADMIN — Medication 200 MILLIGRAM(S): at 05:50

## 2018-04-20 RX ADMIN — ENOXAPARIN SODIUM 40 MILLIGRAM(S): 100 INJECTION SUBCUTANEOUS at 05:50

## 2018-04-20 RX ADMIN — PRAMIPEXOLE DIHYDROCHLORIDE 0.25 MILLIGRAM(S): 0.12 TABLET ORAL at 05:50

## 2018-04-20 RX ADMIN — Medication 25 MILLIGRAM(S): at 05:50

## 2018-04-20 RX ADMIN — Medication 0.5 MILLIGRAM(S): at 05:50

## 2018-04-20 NOTE — DISCHARGE NOTE ADULT - PATIENT PORTAL LINK FT
You can access the [facility], offered by [facility], by registering with the following website: [email or web address]

## 2018-04-20 NOTE — DISCHARGE NOTE ADULT - HOSPITAL COURSE
Vital Signs Last 24 Hrs  T(C): 36.8 (20 Apr 2018 09:00), Max: 36.8 (19 Apr 2018 23:00)  T(F): 98.2 (20 Apr 2018 09:00), Max: 98.2 (19 Apr 2018 23:00)  HR: 71 (20 Apr 2018 09:00) (60 - 79)  BP: 134/79 (20 Apr 2018 09:00) (122/69 - 169/96)  BP(mean): 93 (20 Apr 2018 09:00) (81 - 115)  RR: 14 (20 Apr 2018 09:00) (14 - 20)  SpO2: 98% (20 Apr 2018 09:00) (93% - 99%)    HEENT:   pupils equal and reactive, EOMI, no oropharyngeal lesions, erythema, exudates, oral thrush    NECK:   supple, no carotid bruits, no palpable lymph nodes, no thyromegaly    CV:  +S1, +S2, regular, no murmurs or rubs    RESP:   lungs clear to auscultation bilaterally, no wheezing, rales, rhonchi, good air entry bilaterally    BREAST:  not examined    GI:  abdomen soft, non-tender, non-distended, normal BS, no bruits, no abdominal masses, no palpable masses    RECTAL:  not examined    :  not examined    MSK:   normal muscle tone, no atrophy, no rigidity, no contractions    EXT:   no clubbing, no cyanosis, no edema, no calf pain, swelling or erythema    VASCULAR:  pulses equal and symmetric in the upper and lower extremities    NEURO:  AAOX3, no focal neurological deficits, follows all commands, able to move extremities spontaneously    SKIN:  no ulcers, lesions or rashes    20 Apr 2018 05:28    140    |  106    |  8      ----------------------------<  86     3.8     |  25     |  0.92     Ca    9.1        20 Apr 2018 05:28  Phos  4.1       20 Apr 2018 05:28  Mg     1.8       20 Apr 2018 05:28    CBC Full  -  ( 20 Apr 2018 05:28 )  WBC Count : 3.70 K/uL  Hemoglobin : 13.0 g/dL  Hematocrit : 37.4 %  Platelet Count - Automated : 89 K/uL  Mean Cell Volume : 94.7 fl  Mean Cell Hemoglobin : 32.9 pg  Mean Cell Hemoglobin Concentration : 34.8 gm/dL      Hospital Course:	     63 y/o male with PMHx of RA, HLD, and RLS admitted on 4/14 following episodes of nausea and vomiting after attempting to abstain from alcohol.  Was previously sober for 20 years, but has resumed daily drinking 1 pt of vodka daily.  Last drink was on morning of admission.   Patient admitted with severe withdrawl and was admitted to the intensive care unit for presedex infusion. Was eventually tapered to oral ativan, and is now complete with ativan taper. He is now medically cleared for discharge to home with outpatient PT.

## 2018-04-20 NOTE — DISCHARGE NOTE ADULT - CARE PROVIDER_API CALL
Madi Monreal), Family Medicine  [address] [place] [address]  Phone: [phone]  Fax: [phone]

## 2018-04-20 NOTE — DISCHARGE NOTE ADULT - PLAN OF CARE
c/w abstinence from alchohol; f/u outpatient for etoh resources f/u outpateint with your primary care doctor start Metoprolol 25mg twice a day

## 2018-04-20 NOTE — DISCHARGE NOTE ADULT - MEDICATION SUMMARY - MEDICATIONS TO TAKE
I will START or STAY ON the medications listed below when I get home from the hospital:    Livalo  -- 2 milligram(s) by mouth once a day  -- Indication: For Hld    hydroxychloroquine 200 mg oral tablet  -- 1 tab(s) by mouth 2 times a day  -- Indication: For Rheumatoid arteritis    methotrexate 2.5 mg oral tablet  -- 7 tab(s) by mouth every 7 days  -- Indication: For Rheumatoid arteritis    pramipexole  -- 0.25 milligram(s) by mouth 2 times a day  -- Indication: For parkinsons    metoprolol tartrate 25 mg oral tablet  -- 1 tab(s) by mouth 2 times a day  -- Indication: For Hypertension    folic acid 1 mg oral tablet  -- 1 tab(s) by mouth once a day  -- Indication: For prophylaxis

## 2018-04-20 NOTE — DISCHARGE NOTE ADULT - CARE PLAN
Principal Discharge DX:	ETOH abuse  Goal:	c/w abstinence from alchohol; f/u outpatient for etoh resources  Assessment and plan of treatment:	f/u outpateint with your primary care doctor  Secondary Diagnosis:	Hypertension, unspecified type  Goal:	start Metoprolol 25mg twice a day

## 2018-04-24 DIAGNOSIS — F10.231 ALCOHOL DEPENDENCE WITH WITHDRAWAL DELIRIUM: ICD-10-CM

## 2018-04-24 DIAGNOSIS — M06.9 RHEUMATOID ARTHRITIS, UNSPECIFIED: ICD-10-CM

## 2018-04-24 DIAGNOSIS — G25.81 RESTLESS LEGS SYNDROME: ICD-10-CM

## 2018-04-24 DIAGNOSIS — E51.9 THIAMINE DEFICIENCY, UNSPECIFIED: ICD-10-CM

## 2018-04-24 DIAGNOSIS — E78.5 HYPERLIPIDEMIA, UNSPECIFIED: ICD-10-CM

## 2018-04-24 DIAGNOSIS — F10.221 ALCOHOL DEPENDENCE WITH INTOXICATION DELIRIUM: ICD-10-CM

## 2018-12-29 ENCOUNTER — EMERGENCY (EMERGENCY)
Facility: HOSPITAL | Age: 63
LOS: 0 days | Discharge: HOME | End: 2018-12-29
Attending: EMERGENCY MEDICINE | Admitting: EMERGENCY MEDICINE
Payer: COMMERCIAL

## 2018-12-29 VITALS
SYSTOLIC BLOOD PRESSURE: 146 MMHG | OXYGEN SATURATION: 96 % | WEIGHT: 175.05 LBS | RESPIRATION RATE: 16 BRPM | HEART RATE: 82 BPM | TEMPERATURE: 98 F | HEIGHT: 68 IN | DIASTOLIC BLOOD PRESSURE: 95 MMHG

## 2018-12-29 DIAGNOSIS — M06.9 RHEUMATOID ARTHRITIS, UNSPECIFIED: ICD-10-CM

## 2018-12-29 DIAGNOSIS — S01.01XA LACERATION WITHOUT FOREIGN BODY OF SCALP, INITIAL ENCOUNTER: ICD-10-CM
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
## 2018-12-29 DIAGNOSIS — E78.5 HYPERLIPIDEMIA, UNSPECIFIED: ICD-10-CM

## 2018-12-29 DIAGNOSIS — Z79.899 OTHER LONG TERM (CURRENT) DRUG THERAPY: ICD-10-CM

## 2018-12-29 DIAGNOSIS — G25.81 RESTLESS LEGS SYNDROME: ICD-10-CM

## 2018-12-29 DIAGNOSIS — F10.129 ALCOHOL ABUSE WITH INTOXICATION, UNSPECIFIED: ICD-10-CM

## 2018-12-29 DIAGNOSIS — S09.90XA UNSPECIFIED INJURY OF HEAD, INITIAL ENCOUNTER: ICD-10-CM
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
## 2018-12-29 DIAGNOSIS — Z86.59 PERSONAL HISTORY OF OTHER MENTAL AND BEHAVIORAL DISORDERS: ICD-10-CM
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
## 2018-12-29 PROCEDURE — 12001 RPR S/N/AX/GEN/TRNK 2.5CM/<: CPT

## 2018-12-29 PROCEDURE — 70450 CT HEAD/BRAIN W/O DYE: CPT | Mod: 26

## 2018-12-29 PROCEDURE — 99285 EMERGENCY DEPT VISIT HI MDM: CPT

## 2018-12-29 PROCEDURE — 72125 CT NECK SPINE W/O DYE: CPT | Mod: 26

## 2018-12-29 RX ORDER — ONDANSETRON 8 MG/1
4 TABLET, FILM COATED ORAL ONCE
Qty: 0 | Refills: 0 | Status: COMPLETED | OUTPATIENT
Start: 2018-12-29 | End: 2018-12-29

## 2018-12-29 NOTE — ED PROVIDER NOTE - CARE PLAN
Principal Discharge DX:	Scalp laceration, initial encounter  Secondary Diagnosis:	Alcohol intoxication

## 2018-12-29 NOTE — ED PROVIDER NOTE - CONSTITUTIONAL, MLM
normal... Clinically intoxicated, well nourished, awake, alert, oriented to person, place, time/situation and in no apparent distress.

## 2018-12-29 NOTE — ED PROVIDER NOTE - OBJECTIVE STATEMENT
64 y/o male with a PMHx of EtOH abuse, HLD, rheumatoid arthritis presents to the ED BIBA s/p fall. Pt was leaving the bar and fell. Witnesses stated that pt fell backwards and hit his head. No LOC. +laceration. Denies HA and dizziness. Pt is currently intoxicated. No other complaints at this time.

## 2018-12-29 NOTE — ED PROVIDER NOTE - MUSCULOSKELETAL, MLM
Spine appears normal, range of motion is not limited, no muscle or joint tenderness. No skull fracture. No neck tenderness.

## 2018-12-29 NOTE — ED ADULT NURSE NOTE - CHPI ED NUR SYMPTOMS NEG
no vomiting/no weakness/no confusion/no deformity/no tingling/no fever/no loss of consciousness/no numbness/no abrasion

## 2018-12-29 NOTE — ED ADULT NURSE NOTE - OBJECTIVE STATEMENT
Patient BIBA s/p fall while intoxicated tonight. Pt was leaving the bar and stumbled & fell. Witnesses stated that pt fell backwards and hit his head. No LOC. +laceration. Denies HA and dizziness. Pt is currently intoxicated. No other complaints at this time.

## 2018-12-29 NOTE — ED PROVIDER NOTE - MEDICAL DECISION MAKING DETAILS
Scalp laceration repaired by RODOLFO Sy.  CT head and CT C spine WNL.  Pt able to ambulate without assistance.  Family driving him home.

## 2018-12-29 NOTE — ED PROVIDER NOTE - PHYSICAL EXAMINATION
GEN: AOX3, intoxicated. HEENT: Throat clear. Head: +0.5cm superficial occipital linear LAC noted. Minimal bleeding. NO FBs. NECK: Supple, No JVD. FROM. C-spine non-tender. CV:S1S2, RRR, LUNGS: CTA b/l, no w/r/r. ABD: Soft, NT/ND, no rebound, no guarding. No CVAT. EXT: No e/c/c. 2+ distal pulses. SKIN: No rashes. NEURO: No focal deficits. CN II-XII intact. FROM. 5/5 motor and sensory. RODOLFO Betts

## 2018-12-29 NOTE — ED PROVIDER NOTE - NSFOLLOWUPINSTRUCTIONS_ED_ALL_ED_FT
PROCEDURE NOTE:   · Procedure Name: Laceration Repair  · Informed Consent: Benefits, risks, and possible complications of procedure explained to patient/caregiver who verbalized understanding and gave verbal consent.  · TIME OUT: “Patient's name, , procedure and correct site were confirmed during the Universal Timeout.”  · Procedure Date/Time: 2018  · Laceration Number (#): 1  · Procedure Performed By: RODOLFO Betts  · Procedure was Supervised and/or Assisted?: The procedure was performed independently  · Length (cm): 2 cm  · Caused By: fall injury  · Anatomic Location: 2 cm laceration to left posterior parietal scalp area. Hemastatic. Abrasion to forehead.  · Cleansed: cleansed, extensive cleaning  · Site Prep: normal saline  · Local Anesthesia: 1% lidocaine  · Wound Closure/Sutures: staples  · Number of Staples: 6  · Laceration Repair Procedure Details: The wound was explored to base in bloodless field.  No foreign body  · Tolerance: Patient tolerated procedure well.  · Complications: no  · EBL: minimal  · Post-Procedure Care: Verbal/written post procedure instructions were given to patient/caregiver.  Instructed patient/caregiver to follow-up with primary care physician.  Instructed patient/caregiver regarding signs and symptoms of infection.  Keep the cast/splint/dressing clean and dry.  Electronic Signatures:  Arnaldo Sy (PA)  (Signed 2019 18:25)  	Authored: PROCEDURE  Last Updated: 2019 18:25 by Arnaldo Sy (PA) PROCEDURE NOTE:   · Procedure Name: Laceration Repair  · Informed Consent: Benefits, risks, and possible complications of procedure explained to patient/caregiver who verbalized understanding and gave verbal consent.  · TIME OUT: “Patient's name, , procedure and correct site were confirmed during the Universal Timeout.”  · Procedure Date/Time: 2018  · Laceration Number (#): 1  · Procedure Performed By: RODOLFO Betts  · Procedure was Supervised and/or Assisted?: The procedure was performed independently  · Length (cm): 2 cm  · Caused By: fall injury  · Anatomic Location: 0.5cm laceration to left occipital scalp area. Hemastatic. Abrasion to forehead.  · Cleansed: cleansed, extensive cleaning  · Site Prep: normal saline  · Local Anesthesia: 1% lidocaine  · Wound Closure/Sutures: staples  · Number of Staples: 4  · Laceration Repair Procedure Details: The wound was explored to base in bloodless field.  No foreign body  · Tolerance: Patient tolerated procedure well.  · Complications: no  · EBL: minimal  · Post-Procedure Care: Verbal/written post procedure instructions were given to patient/caregiver.  Instructed patient/caregiver to follow-up with primary care physician.  Instructed patient/caregiver regarding signs and symptoms of infection.  Keep the cast/splint/dressing clean and dry.  Electronic Signatures:  Arnaldo Sy (PA)  (Signed 2019 18:25)  	Authored: PROCEDURE  Last Updated: 2019 18:25 by Arnaldo Sy (PA)

## 2018-12-29 NOTE — ED PROVIDER NOTE - SKIN, MLM
Skin normal color for race, warm, dry and intact. No evidence of rash. 2 cm laceration to left posterior parietal. Hemastatic. Abrasion to forehead. Skin normal color for race, warm, dry and intact. No evidence of rash. 0.5 cm laceration to left posterior parietal. Hemastatic. Abrasion to forehead.

## 2018-12-29 NOTE — ED ADULT NURSE NOTE - NSIMPLEMENTINTERV_GEN_ALL_ED
Implemented All Fall Risk Interventions:  [place] to call system. Call bell, personal items and telephone within reach. Instruct patient to call for assistance. Room bathroom lighting operational. Non-slip footwear when patient is off stretcher. Physically safe environment: no spills, clutter or unnecessary equipment. Stretcher in lowest position, wheels locked, appropriate side rails in place. Provide visual cue, wrist band, yellow gown, etc. Monitor gait and stability. Monitor for mental status changes and reorient to person, place, and time. Review medications for side effects contributing to fall risk. Reinforce activity limits and safety measures with patient and family.

## 2018-12-30 PROBLEM — G25.81 RESTLESS LEGS SYNDROME: Chronic | Status: ACTIVE | Noted: 2018-04-14

## 2018-12-30 PROBLEM — E78.5 HYPERLIPIDEMIA, UNSPECIFIED: Chronic | Status: ACTIVE | Noted: 2018-04-14

## 2018-12-30 PROBLEM — I00 RHEUMATIC FEVER WITHOUT HEART INVOLVEMENT: Chronic | Status: ACTIVE | Noted: 2018-04-14

## 2018-12-30 PROBLEM — F10.10 ALCOHOL ABUSE, UNCOMPLICATED: Chronic | Status: ACTIVE | Noted: 2018-04-14

## 2019-03-08 ENCOUNTER — EMERGENCY (EMERGENCY)
Facility: HOSPITAL | Age: 64
LOS: 0 days | Discharge: HOME | End: 2019-03-08
Attending: EMERGENCY MEDICINE | Admitting: EMERGENCY MEDICINE
Payer: COMMERCIAL

## 2019-03-08 VITALS
TEMPERATURE: 98 F | OXYGEN SATURATION: 99 % | DIASTOLIC BLOOD PRESSURE: 72 MMHG | HEART RATE: 76 BPM | SYSTOLIC BLOOD PRESSURE: 147 MMHG | RESPIRATION RATE: 17 BRPM

## 2019-03-08 VITALS — WEIGHT: 188.05 LBS

## 2019-03-08 DIAGNOSIS — R60.0 LOCALIZED EDEMA: ICD-10-CM

## 2019-03-08 DIAGNOSIS — E78.5 HYPERLIPIDEMIA, UNSPECIFIED: ICD-10-CM

## 2019-03-08 DIAGNOSIS — M06.9 RHEUMATOID ARTHRITIS, UNSPECIFIED: ICD-10-CM

## 2019-03-08 DIAGNOSIS — I80.11 PHLEBITIS AND THROMBOPHLEBITIS OF RIGHT FEMORAL VEIN: ICD-10-CM

## 2019-03-08 DIAGNOSIS — G25.81 RESTLESS LEGS SYNDROME: ICD-10-CM

## 2019-03-08 DIAGNOSIS — Z86.718 PERSONAL HISTORY OF OTHER VENOUS THROMBOSIS AND EMBOLISM: ICD-10-CM

## 2019-03-08 PROCEDURE — 93970 EXTREMITY STUDY: CPT | Mod: 26

## 2019-03-08 PROCEDURE — 99284 EMERGENCY DEPT VISIT MOD MDM: CPT

## 2019-03-08 NOTE — ED STATDOCS - PROGRESS NOTE DETAILS
PA NOTE: Pt seen by intake physician and orders/plan reviewed. 64 y/o M pmhx RLE dvt x1 year ago, was on a/c only x6 mo no longer on, presenting with b/l le edema R >L sent from cardiologist office. Pt noted this swelling yesterday but had appt scheduled with cardiologist for today as routine. Had EKG in office that was normal. Denies any pain in legs or calves. Denies any chest pain, shortness of breath, exertional symptoms, recent long immobilizations with surgery or travel.   PE: GEN: Well Appearing, Nontoxic, NAD. HEENT: NC/AT, Symm Facies. PERRL, EOMI, MMM, posterior pharynx clear. CV: No JVD/Bruits or stridor;  +S1S2, RRR w/o m/g/r. RESP: CTAB w/o w/r/r. ABD: Soft, nt/nd, +BS. No guarding/rebound. No RUQ tender, no CVAT. EXT/MSK: b/l LE edema R>L with minimal pitting. NO calf tenderness. WWP, palpable pulses. FROMx4. SKIN: No erythema, lesions or rash. Neuro: Grossly intact, AOX3 with normal speech, CN II-XII intact; Sensation intact, motor 5/5 throughout. Gait normal  A/P: 64 y/o asymptomatic male sent from cardiologist office for r/o dvt with b/l LE edema R>L noted x1 day. DVT x1yr ago on a/c x6mo no longer taking. No travel, hospitalizations or surgeries. No chest pain, sob. will obtain b/l dvt study and reassess. -Ximena Pisano PA-C Pt signed out to me by RODOLFO Pisano. US reports findings likely from prior DVT. Pt has no acute symptoms at this time including swelling, pain, skin discoloration. Based on pt's clinical presentation and discussion with Dr. Ashton, will not treat as DVT at this time. Advised to follow up with PCP/cardiology. - Herve Alvarez PA-C

## 2019-03-08 NOTE — ED STATDOCS - ENMT, MLM
Applied Nasal mucosa clear.  Mouth with normal mucosa  Throat has no vesicles, no oropharyngeal exudates and uvula is midline.

## 2019-03-08 NOTE — ED STATDOCS - ATTENDING CONTRIBUTION TO CARE
I, Matt Faustin, performed the initial face to face bedside interview with this patient regarding history of present illness, review of symptoms and relevant past medical, social and family history.  I completed an independent physical examination.  I was the initial provider who evaluated this patient. I have signed out the follow up of any pending tests (i.e. labs, radiological studies) to the ACP.  I have communicated the patient’s plan of care and disposition with the ACP.  The history, relevant review of systems, past medical and surgical history, medical decision making, and physical examination was documented by the scribe in my presence and I attest to the accuracy of the documentation.

## 2019-03-08 NOTE — ED STATDOCS - MUSCULOSKELETAL, MLM
range of motion is not limited and there is no muscle tenderness. +bilateral pitting edema worse on the RLE.

## 2019-03-08 NOTE — ED STATDOCS - CLINICAL SUMMARY MEDICAL DECISION MAKING FREE TEXT BOX
Plan for bilateral doppler of lower extremities to r/o DVT. Plan for bilateral doppler of lower extremities to r/o DVT. US reveals findings likely consistent with prior DVT. Advised warm compresses and ibuprofen as needed. Follow up with cardiology.

## 2019-03-08 NOTE — ED STATDOCS - OBJECTIVE STATEMENT
62 y/o male with a PMHx of HLD, Restless leg syndrome, RA, EtOH abuse presents to the ED c/o RLE edema since yesterday. Pt was at his cardiologist office today when his doctor noticed the pt to have RLE edema. Pt states he noticed the swelling yesterday. EKG done at his cardiologists office PTA. Pt had RLE 1 year ago with a +DVT. Pt is here to r/o DVT. Denies SOB, chest pain. PMD: Dr. Porter Cardi: [place].

## 2019-03-08 NOTE — ED ADULT NURSE NOTE - NSIMPLEMENTINTERV_GEN_ALL_ED
Implemented All Universal Safety Interventions:  [place] to call system. Call bell, personal items and telephone within reach. Instruct patient to call for assistance. Room bathroom lighting operational. Non-slip footwear when patient is off stretcher. Physically safe environment: no spills, clutter or unnecessary equipment. Stretcher in lowest position, wheels locked, appropriate side rails in place.

## 2019-06-11 NOTE — PATIENT PROFILE ADULT. - PAIN CHRONIC, PROFILE
"I have pain on the left side of my stomach" c/o LLQ abd pain starting this morning worsening while laying down. Hx diverticulitis. denies n/v denies fevers denies cp denies sob. RR even and unlabored, able to ambulate with steady gait noted no

## 2019-06-27 NOTE — ED ADULT NURSE NOTE - SKIN INTEGRITY
Patient presents with one day of nausea, vomiting and fever.  Patient is playful and active in triage.    During the administration of the ordered medication, Zofran the potential side effects were discussed with the patient/guardian.      scalp laceration

## 2020-01-28 ENCOUNTER — EMERGENCY (EMERGENCY)
Facility: HOSPITAL | Age: 65
LOS: 0 days | Discharge: HOME | End: 2020-01-28
Attending: EMERGENCY MEDICINE
Payer: COMMERCIAL

## 2020-01-28 VITALS
OXYGEN SATURATION: 97 % | HEART RATE: 60 BPM | RESPIRATION RATE: 18 BRPM | HEIGHT: 68 IN | SYSTOLIC BLOOD PRESSURE: 179 MMHG | TEMPERATURE: 98 F | DIASTOLIC BLOOD PRESSURE: 103 MMHG | WEIGHT: 182.98 LBS

## 2020-01-28 DIAGNOSIS — R11.2 NAUSEA WITH VOMITING, UNSPECIFIED: ICD-10-CM

## 2020-01-28 DIAGNOSIS — E78.5 HYPERLIPIDEMIA, UNSPECIFIED: ICD-10-CM

## 2020-01-28 DIAGNOSIS — K80.20 CALCULUS OF GALLBLADDER WITHOUT CHOLECYSTITIS WITHOUT OBSTRUCTION: ICD-10-CM

## 2020-01-28 DIAGNOSIS — G25.81 RESTLESS LEGS SYNDROME: ICD-10-CM

## 2020-01-28 DIAGNOSIS — F10.10 ALCOHOL ABUSE, UNCOMPLICATED: ICD-10-CM

## 2020-01-28 DIAGNOSIS — M06.9 RHEUMATOID ARTHRITIS, UNSPECIFIED: ICD-10-CM

## 2020-01-28 DIAGNOSIS — R10.11 RIGHT UPPER QUADRANT PAIN: ICD-10-CM

## 2020-01-28 LAB
ALBUMIN SERPL ELPH-MCNC: 4.2 G/DL — SIGNIFICANT CHANGE UP (ref 3.3–5)
ALP SERPL-CCNC: 44 U/L — SIGNIFICANT CHANGE UP (ref 40–120)
ALT FLD-CCNC: 37 U/L — SIGNIFICANT CHANGE UP (ref 12–78)
ANION GAP SERPL CALC-SCNC: 4 MMOL/L — LOW (ref 5–17)
APPEARANCE UR: CLEAR — SIGNIFICANT CHANGE UP
AST SERPL-CCNC: 40 U/L — HIGH (ref 15–37)
BASOPHILS # BLD AUTO: 0.03 K/UL — SIGNIFICANT CHANGE UP (ref 0–0.2)
BASOPHILS NFR BLD AUTO: 0.5 % — SIGNIFICANT CHANGE UP (ref 0–2)
BILIRUB SERPL-MCNC: 0.6 MG/DL — SIGNIFICANT CHANGE UP (ref 0.2–1.2)
BILIRUB UR-MCNC: NEGATIVE — SIGNIFICANT CHANGE UP
BUN SERPL-MCNC: 18 MG/DL — SIGNIFICANT CHANGE UP (ref 7–23)
CALCIUM SERPL-MCNC: 9.3 MG/DL — SIGNIFICANT CHANGE UP (ref 8.5–10.1)
CHLORIDE SERPL-SCNC: 106 MMOL/L — SIGNIFICANT CHANGE UP (ref 96–108)
CO2 SERPL-SCNC: 29 MMOL/L — SIGNIFICANT CHANGE UP (ref 22–31)
COLOR SPEC: YELLOW — SIGNIFICANT CHANGE UP
CREAT SERPL-MCNC: 1.34 MG/DL — HIGH (ref 0.5–1.3)
DIFF PNL FLD: ABNORMAL
EOSINOPHIL # BLD AUTO: 0.26 K/UL — SIGNIFICANT CHANGE UP (ref 0–0.5)
EOSINOPHIL NFR BLD AUTO: 4.5 % — SIGNIFICANT CHANGE UP (ref 0–6)
GLUCOSE SERPL-MCNC: 100 MG/DL — HIGH (ref 70–99)
GLUCOSE UR QL: NEGATIVE MG/DL — SIGNIFICANT CHANGE UP
HCT VFR BLD CALC: 42.9 % — SIGNIFICANT CHANGE UP (ref 39–50)
HGB BLD-MCNC: 14.6 G/DL — SIGNIFICANT CHANGE UP (ref 13–17)
IMM GRANULOCYTES NFR BLD AUTO: 0.2 % — SIGNIFICANT CHANGE UP (ref 0–1.5)
KETONES UR-MCNC: ABNORMAL
LEUKOCYTE ESTERASE UR-ACNC: NEGATIVE — SIGNIFICANT CHANGE UP
LIDOCAIN IGE QN: 108 U/L — SIGNIFICANT CHANGE UP (ref 73–393)
LYMPHOCYTES # BLD AUTO: 2.22 K/UL — SIGNIFICANT CHANGE UP (ref 1–3.3)
LYMPHOCYTES # BLD AUTO: 38.2 % — SIGNIFICANT CHANGE UP (ref 13–44)
MCHC RBC-ENTMCNC: 31.5 PG — SIGNIFICANT CHANGE UP (ref 27–34)
MCHC RBC-ENTMCNC: 34 GM/DL — SIGNIFICANT CHANGE UP (ref 32–36)
MCV RBC AUTO: 92.5 FL — SIGNIFICANT CHANGE UP (ref 80–100)
MONOCYTES # BLD AUTO: 0.82 K/UL — SIGNIFICANT CHANGE UP (ref 0–0.9)
MONOCYTES NFR BLD AUTO: 14.1 % — HIGH (ref 2–14)
NEUTROPHILS # BLD AUTO: 2.47 K/UL — SIGNIFICANT CHANGE UP (ref 1.8–7.4)
NEUTROPHILS NFR BLD AUTO: 42.5 % — LOW (ref 43–77)
NITRITE UR-MCNC: NEGATIVE — SIGNIFICANT CHANGE UP
PH UR: 6 — SIGNIFICANT CHANGE UP (ref 5–8)
PLATELET # BLD AUTO: 182 K/UL — SIGNIFICANT CHANGE UP (ref 150–400)
POTASSIUM SERPL-MCNC: 3.6 MMOL/L — SIGNIFICANT CHANGE UP (ref 3.5–5.3)
POTASSIUM SERPL-SCNC: 3.6 MMOL/L — SIGNIFICANT CHANGE UP (ref 3.5–5.3)
PROT SERPL-MCNC: 7.5 GM/DL — SIGNIFICANT CHANGE UP (ref 6–8.3)
PROT UR-MCNC: 15 MG/DL
RBC # BLD: 4.64 M/UL — SIGNIFICANT CHANGE UP (ref 4.2–5.8)
RBC # FLD: 13.9 % — SIGNIFICANT CHANGE UP (ref 10.3–14.5)
SODIUM SERPL-SCNC: 139 MMOL/L — SIGNIFICANT CHANGE UP (ref 135–145)
SP GR SPEC: 1.02 — SIGNIFICANT CHANGE UP (ref 1.01–1.02)
UROBILINOGEN FLD QL: NEGATIVE MG/DL — SIGNIFICANT CHANGE UP
WBC # BLD: 5.81 K/UL — SIGNIFICANT CHANGE UP (ref 3.8–10.5)
WBC # FLD AUTO: 5.81 K/UL — SIGNIFICANT CHANGE UP (ref 3.8–10.5)

## 2020-01-28 PROCEDURE — 76705 ECHO EXAM OF ABDOMEN: CPT

## 2020-01-28 PROCEDURE — 99284 EMERGENCY DEPT VISIT MOD MDM: CPT

## 2020-01-28 PROCEDURE — 96375 TX/PRO/DX INJ NEW DRUG ADDON: CPT

## 2020-01-28 PROCEDURE — 85025 COMPLETE CBC W/AUTO DIFF WBC: CPT

## 2020-01-28 PROCEDURE — 81001 URINALYSIS AUTO W/SCOPE: CPT

## 2020-01-28 PROCEDURE — 96374 THER/PROPH/DIAG INJ IV PUSH: CPT

## 2020-01-28 PROCEDURE — 36415 COLL VENOUS BLD VENIPUNCTURE: CPT

## 2020-01-28 PROCEDURE — 80053 COMPREHEN METABOLIC PANEL: CPT

## 2020-01-28 PROCEDURE — 76705 ECHO EXAM OF ABDOMEN: CPT | Mod: 26

## 2020-01-28 PROCEDURE — 83690 ASSAY OF LIPASE: CPT

## 2020-01-28 PROCEDURE — 99284 EMERGENCY DEPT VISIT MOD MDM: CPT | Mod: 25

## 2020-01-28 RX ORDER — ONDANSETRON 8 MG/1
4 TABLET, FILM COATED ORAL ONCE
Refills: 0 | Status: COMPLETED | OUTPATIENT
Start: 2020-01-28 | End: 2020-01-28

## 2020-01-28 RX ORDER — SODIUM CHLORIDE 9 MG/ML
1000 INJECTION INTRAMUSCULAR; INTRAVENOUS; SUBCUTANEOUS ONCE
Refills: 0 | Status: COMPLETED | OUTPATIENT
Start: 2020-01-28 | End: 2020-01-28

## 2020-01-28 RX ORDER — KETOROLAC TROMETHAMINE 30 MG/ML
30 SYRINGE (ML) INJECTION ONCE
Refills: 0 | Status: DISCONTINUED | OUTPATIENT
Start: 2020-01-28 | End: 2020-01-28

## 2020-01-28 RX ADMIN — Medication 30 MILLIGRAM(S): at 02:38

## 2020-01-28 RX ADMIN — ONDANSETRON 4 MILLIGRAM(S): 8 TABLET, FILM COATED ORAL at 02:38

## 2020-01-28 RX ADMIN — SODIUM CHLORIDE 1000 MILLILITER(S): 9 INJECTION INTRAMUSCULAR; INTRAVENOUS; SUBCUTANEOUS at 02:25

## 2020-01-28 NOTE — ED PROVIDER NOTE - CARE PLAN
07-Sep-2018 10:35 Principal Discharge DX:	Calculus of gallbladder without cholecystitis without obstruction

## 2020-01-28 NOTE — ED PROVIDER NOTE - OBJECTIVE STATEMENT
63 y/o male in ED c/o n/v/RUQ pain x 2 days.   no meds taken for pain.   tolerating PO.    no sick contacts or recent travel.   pt denies any fever, HA, cp, sob, diarrhea.    states no previous episodes.   denies any urinary symptoms.

## 2020-01-28 NOTE — ED PROVIDER NOTE - PATIENT PORTAL LINK FT
You can access the FollowMyHealth Patient Portal offered by [facility] by registering at the following website: [email or web address]. By joining [facility]’s FollowMyHealth portal, you will also be able to view your health information using other applications (apps) compatible with our system.

## 2020-01-28 NOTE — ED PROVIDER NOTE - NSFOLLOWUPINSTRUCTIONS_ED_ALL_ED_FT
please follow up with surgery .    call for appointment time.   take motrin for pain.   drink plenty of fluids.   do not eat spicy, fatty or greasy foods.   return to ED for any concerns

## 2020-01-28 NOTE — ED PROVIDER NOTE - CARE PROVIDER_API CALL
Daryl Ventura)  Surgery  [address], [address]  [place] [address]  Phone: [phone]  Fax: [phone]  Follow Up Time:

## 2021-01-12 ENCOUNTER — APPOINTMENT (OUTPATIENT)
Dept: GASTROENTEROLOGY | Facility: CLINIC | Age: 66
End: 2021-01-12
Payer: MEDICARE

## 2021-01-12 VITALS
HEIGHT: 68 IN | SYSTOLIC BLOOD PRESSURE: 152 MMHG | DIASTOLIC BLOOD PRESSURE: 89 MMHG | BODY MASS INDEX: 25.01 KG/M2 | WEIGHT: 165 LBS | HEART RATE: 56 BPM

## 2021-01-12 DIAGNOSIS — Z86.010 PERSONAL HISTORY OF COLONIC POLYPS: ICD-10-CM

## 2021-01-12 PROCEDURE — 99202 OFFICE O/P NEW SF 15 MIN: CPT

## 2021-01-12 RX ORDER — SODIUM SULFATE, POTASSIUM SULFATE, MAGNESIUM SULFATE 17.5; 3.13; 1.6 G/ML; G/ML; G/ML
17.5-3.13-1.6 SOLUTION, CONCENTRATE ORAL
Qty: 1 | Refills: 0 | Status: ACTIVE | COMMUNITY
Start: 2021-01-12 | End: 1900-01-01

## 2021-01-12 NOTE — REVIEW OF SYSTEMS
[Joint Pain] : joint pain [Joint Swelling] : joint swelling [Negative] : Heme/Lymph [FreeTextEntry9] : EBEN

## 2021-01-12 NOTE — HISTORY OF PRESENT ILLNESS
[de-identified] : 66yo male with hx colon polyps\par Patient with hx colon polyps on prior colonoscopy and due for surveillance colonoscopy\par Patient is asymptomatic without bleeding or change in bowel habits\par

## 2021-01-12 NOTE — ASSESSMENT
[FreeTextEntry1] : 66yo male with personal hx polyps\par check colonoscopy\par Risks and benefits of procedure(s) discussed with patient in detail, including but not limited to, perforation, bleeding, reaction to anesthesia, missed lesions.\par

## 2021-01-26 ENCOUNTER — APPOINTMENT (OUTPATIENT)
Dept: DISASTER EMERGENCY | Facility: CLINIC | Age: 66
End: 2021-01-26

## 2021-01-26 DIAGNOSIS — Z01.818 ENCOUNTER FOR OTHER PREPROCEDURAL EXAMINATION: ICD-10-CM

## 2021-01-27 LAB — SARS-COV-2 N GENE NPH QL NAA+PROBE: NOT DETECTED

## 2021-01-29 ENCOUNTER — APPOINTMENT (OUTPATIENT)
Dept: GASTROENTEROLOGY | Facility: AMBULATORY MEDICAL SERVICES | Age: 66
End: 2021-01-29
Payer: MEDICARE

## 2021-01-29 PROCEDURE — 45378 DIAGNOSTIC COLONOSCOPY: CPT

## 2022-04-26 NOTE — H&P ADULT - PROBLEM SELECTOR PROBLEM 1
Patient needs fasting labs for her Wellness for work. Dr FANY wood with ordering basic labs.  Advised that previous visit and chart notes will be the reference for the biometric screening. Pt aware.    Pt will make appt to est care with MD.  
ETOH abuse

## 2022-05-26 ENCOUNTER — INPATIENT (INPATIENT)
Facility: HOSPITAL | Age: 67
LOS: 3 days | Discharge: HOME | DRG: 392 | End: 2022-05-30
Attending: FAMILY MEDICINE | Admitting: INTERNAL MEDICINE
Payer: MEDICARE

## 2022-05-26 VITALS — HEIGHT: 68 IN

## 2022-05-26 DIAGNOSIS — R50.9 FEVER, UNSPECIFIED: ICD-10-CM

## 2022-05-26 LAB
ADD ON TEST-SPECIMEN IN LAB: SIGNIFICANT CHANGE UP
ALBUMIN SERPL ELPH-MCNC: 3.2 G/DL — LOW (ref 3.3–5)
ALP SERPL-CCNC: 93 U/L — SIGNIFICANT CHANGE UP (ref 40–120)
ALT FLD-CCNC: 43 U/L — SIGNIFICANT CHANGE UP (ref 12–78)
ANION GAP SERPL CALC-SCNC: 10 MMOL/L — SIGNIFICANT CHANGE UP (ref 5–17)
ANION GAP SERPL CALC-SCNC: 10 MMOL/L — SIGNIFICANT CHANGE UP (ref 5–17)
APPEARANCE UR: CLEAR — SIGNIFICANT CHANGE UP
APTT BLD: 33.9 SEC — SIGNIFICANT CHANGE UP (ref 27.5–35.5)
AST SERPL-CCNC: 107 U/L — HIGH (ref 15–37)
BASOPHILS # BLD AUTO: 0 K/UL — SIGNIFICANT CHANGE UP (ref 0–0.2)
BASOPHILS NFR BLD AUTO: 0 % — SIGNIFICANT CHANGE UP (ref 0–2)
BILIRUB SERPL-MCNC: 1.9 MG/DL — HIGH (ref 0.2–1.2)
BILIRUB UR-MCNC: NEGATIVE — SIGNIFICANT CHANGE UP
BUN SERPL-MCNC: 23 MG/DL — SIGNIFICANT CHANGE UP (ref 7–23)
BUN SERPL-MCNC: 24 MG/DL — HIGH (ref 7–23)
CALCIUM SERPL-MCNC: 7.6 MG/DL — LOW (ref 8.5–10.1)
CALCIUM SERPL-MCNC: 8.3 MG/DL — LOW (ref 8.5–10.1)
CHLORIDE SERPL-SCNC: 92 MMOL/L — LOW (ref 96–108)
CHLORIDE SERPL-SCNC: 97 MMOL/L — SIGNIFICANT CHANGE UP (ref 96–108)
CO2 SERPL-SCNC: 25 MMOL/L — SIGNIFICANT CHANGE UP (ref 22–31)
CO2 SERPL-SCNC: 25 MMOL/L — SIGNIFICANT CHANGE UP (ref 22–31)
COLOR SPEC: YELLOW — SIGNIFICANT CHANGE UP
CREAT SERPL-MCNC: 1.3 MG/DL — SIGNIFICANT CHANGE UP (ref 0.5–1.3)
CREAT SERPL-MCNC: 1.53 MG/DL — HIGH (ref 0.5–1.3)
DIFF PNL FLD: ABNORMAL
EGFR: 50 ML/MIN/1.73M2 — LOW
EGFR: 61 ML/MIN/1.73M2 — SIGNIFICANT CHANGE UP
EOSINOPHIL # BLD AUTO: 0 K/UL — SIGNIFICANT CHANGE UP (ref 0–0.5)
EOSINOPHIL NFR BLD AUTO: 0 % — SIGNIFICANT CHANGE UP (ref 0–6)
ERYTHROCYTE [SEDIMENTATION RATE] IN BLOOD: 32 MM/HR — HIGH (ref 0–20)
GLUCOSE SERPL-MCNC: 106 MG/DL — HIGH (ref 70–99)
GLUCOSE SERPL-MCNC: 93 MG/DL — SIGNIFICANT CHANGE UP (ref 70–99)
GLUCOSE UR QL: NEGATIVE — SIGNIFICANT CHANGE UP
HCT VFR BLD CALC: 40.7 % — SIGNIFICANT CHANGE UP (ref 39–50)
HGB BLD-MCNC: 14.1 G/DL — SIGNIFICANT CHANGE UP (ref 13–17)
HIV 1 & 2 AB SERPL IA.RAPID: SIGNIFICANT CHANGE UP
INR BLD: 1.2 RATIO — HIGH (ref 0.88–1.16)
KETONES UR-MCNC: ABNORMAL
LACTATE SERPL-SCNC: 2 MMOL/L — SIGNIFICANT CHANGE UP (ref 0.7–2)
LEUKOCYTE ESTERASE UR-ACNC: NEGATIVE — SIGNIFICANT CHANGE UP
LIDOCAIN IGE QN: 238 U/L — SIGNIFICANT CHANGE UP (ref 73–393)
LYMPHOCYTES # BLD AUTO: 10.92 K/UL — HIGH (ref 1–3.3)
LYMPHOCYTES # BLD AUTO: 80 % — HIGH (ref 13–44)
MCHC RBC-ENTMCNC: 33.3 PG — SIGNIFICANT CHANGE UP (ref 27–34)
MCHC RBC-ENTMCNC: 34.6 GM/DL — SIGNIFICANT CHANGE UP (ref 32–36)
MCV RBC AUTO: 96 FL — SIGNIFICANT CHANGE UP (ref 80–100)
MONOCYTES # BLD AUTO: 0.82 K/UL — SIGNIFICANT CHANGE UP (ref 0–0.9)
MONOCYTES NFR BLD AUTO: 6 % — SIGNIFICANT CHANGE UP (ref 2–14)
NEUTROPHILS # BLD AUTO: 1.91 K/UL — SIGNIFICANT CHANGE UP (ref 1.8–7.4)
NEUTROPHILS NFR BLD AUTO: 14 % — LOW (ref 43–77)
NITRITE UR-MCNC: NEGATIVE — SIGNIFICANT CHANGE UP
NRBC # BLD: SIGNIFICANT CHANGE UP /100 WBCS (ref 0–0)
PH UR: 7 — SIGNIFICANT CHANGE UP (ref 5–8)
PLATELET # BLD AUTO: 71 K/UL — LOW (ref 150–400)
POTASSIUM SERPL-MCNC: 3.2 MMOL/L — LOW (ref 3.5–5.3)
POTASSIUM SERPL-MCNC: 3.5 MMOL/L — SIGNIFICANT CHANGE UP (ref 3.5–5.3)
POTASSIUM SERPL-SCNC: 3.2 MMOL/L — LOW (ref 3.5–5.3)
POTASSIUM SERPL-SCNC: 3.5 MMOL/L — SIGNIFICANT CHANGE UP (ref 3.5–5.3)
PROT SERPL-MCNC: 8.1 GM/DL — SIGNIFICANT CHANGE UP (ref 6–8.3)
PROT UR-MCNC: NEGATIVE — SIGNIFICANT CHANGE UP
PROTHROM AB SERPL-ACNC: 14 SEC — HIGH (ref 10.5–13.4)
RAPID RVP RESULT: SIGNIFICANT CHANGE UP
RBC # BLD: 4.24 M/UL — SIGNIFICANT CHANGE UP (ref 4.2–5.8)
RBC # FLD: 17.6 % — HIGH (ref 10.3–14.5)
SARS-COV-2 RNA SPEC QL NAA+PROBE: SIGNIFICANT CHANGE UP
SODIUM SERPL-SCNC: 127 MMOL/L — LOW (ref 135–145)
SODIUM SERPL-SCNC: 132 MMOL/L — LOW (ref 135–145)
SP GR SPEC: 1.01 — SIGNIFICANT CHANGE UP (ref 1.01–1.02)
UROBILINOGEN FLD QL: 4
WBC # BLD: 13.65 K/UL — HIGH (ref 3.8–10.5)
WBC # FLD AUTO: 13.65 K/UL — HIGH (ref 3.8–10.5)

## 2022-05-26 PROCEDURE — 84132 ASSAY OF SERUM POTASSIUM: CPT

## 2022-05-26 PROCEDURE — 93005 ELECTROCARDIOGRAM TRACING: CPT

## 2022-05-26 PROCEDURE — 80074 ACUTE HEPATITIS PANEL: CPT

## 2022-05-26 PROCEDURE — 85652 RBC SED RATE AUTOMATED: CPT

## 2022-05-26 PROCEDURE — 88341 IMHCHEM/IMCYTCHM EA ADD ANTB: CPT

## 2022-05-26 PROCEDURE — 81340 TRB@ GENE REARRANGE AMPLIFY: CPT

## 2022-05-26 PROCEDURE — 88264 CHROMOSOME ANALYSIS 20-25: CPT

## 2022-05-26 PROCEDURE — 88313 SPECIAL STAINS GROUP 2: CPT

## 2022-05-26 PROCEDURE — 74177 CT ABD & PELVIS W/CONTRAST: CPT | Mod: 26,MA

## 2022-05-26 PROCEDURE — 88285 CHROMOSOME COUNT ADDITIONAL: CPT

## 2022-05-26 PROCEDURE — 81342 TRG GENE REARRANGEMENT ANAL: CPT

## 2022-05-26 PROCEDURE — 86703 HIV-1/HIV-2 1 RESULT ANTBDY: CPT

## 2022-05-26 PROCEDURE — 81261 IGH GENE REARRANGE AMP METH: CPT

## 2022-05-26 PROCEDURE — 36415 COLL VENOUS BLD VENIPUNCTURE: CPT

## 2022-05-26 PROCEDURE — 88185 FLOWCYTOMETRY/TC ADD-ON: CPT

## 2022-05-26 PROCEDURE — 77012 CT SCAN FOR NEEDLE BIOPSY: CPT

## 2022-05-26 PROCEDURE — 81264 IGK REARRANGEABN CLONAL POP: CPT

## 2022-05-26 PROCEDURE — 88305 TISSUE EXAM BY PATHOLOGIST: CPT

## 2022-05-26 PROCEDURE — 87205 SMEAR GRAM STAIN: CPT

## 2022-05-26 PROCEDURE — 87507 IADNA-DNA/RNA PROBE TQ 12-25: CPT

## 2022-05-26 PROCEDURE — 71045 X-RAY EXAM CHEST 1 VIEW: CPT | Mod: 26

## 2022-05-26 PROCEDURE — 86039 ANTINUCLEAR ANTIBODIES (ANA): CPT

## 2022-05-26 PROCEDURE — 88280 CHROMOSOME KARYOTYPE STUDY: CPT

## 2022-05-26 PROCEDURE — 38222 DX BONE MARROW BX & ASPIR: CPT | Mod: RT

## 2022-05-26 PROCEDURE — 85097 BONE MARROW INTERPRETATION: CPT

## 2022-05-26 PROCEDURE — 88342 IMHCHEM/IMCYTCHM 1ST ANTB: CPT

## 2022-05-26 PROCEDURE — 85025 COMPLETE CBC W/AUTO DIFF WBC: CPT

## 2022-05-26 PROCEDURE — 99285 EMERGENCY DEPT VISIT HI MDM: CPT | Mod: CS

## 2022-05-26 PROCEDURE — 87493 C DIFF AMPLIFIED PROBE: CPT

## 2022-05-26 PROCEDURE — 80048 BASIC METABOLIC PNL TOTAL CA: CPT

## 2022-05-26 PROCEDURE — 88237 TISSUE CULTURE BONE MARROW: CPT

## 2022-05-26 PROCEDURE — 99223 1ST HOSP IP/OBS HIGH 75: CPT

## 2022-05-26 PROCEDURE — 86803 HEPATITIS C AB TEST: CPT

## 2022-05-26 PROCEDURE — 85027 COMPLETE CBC AUTOMATED: CPT

## 2022-05-26 RX ORDER — METHOTREXATE 2.5 MG/1
7 TABLET ORAL
Qty: 0 | Refills: 0 | DISCHARGE

## 2022-05-26 RX ORDER — FOLIC ACID 0.8 MG
1 TABLET ORAL DAILY
Refills: 0 | Status: DISCONTINUED | OUTPATIENT
Start: 2022-05-26 | End: 2022-05-30

## 2022-05-26 RX ORDER — CEFEPIME 1 G/1
2000 INJECTION, POWDER, FOR SOLUTION INTRAMUSCULAR; INTRAVENOUS EVERY 8 HOURS
Refills: 0 | Status: DISCONTINUED | OUTPATIENT
Start: 2022-05-26 | End: 2022-05-26

## 2022-05-26 RX ORDER — ONDANSETRON 8 MG/1
4 TABLET, FILM COATED ORAL ONCE
Refills: 0 | Status: COMPLETED | OUTPATIENT
Start: 2022-05-26 | End: 2022-05-26

## 2022-05-26 RX ORDER — ACETAMINOPHEN 500 MG
650 TABLET ORAL ONCE
Refills: 0 | Status: COMPLETED | OUTPATIENT
Start: 2022-05-26 | End: 2022-05-26

## 2022-05-26 RX ORDER — ACETAMINOPHEN 500 MG
975 TABLET ORAL ONCE
Refills: 0 | Status: COMPLETED | OUTPATIENT
Start: 2022-05-26 | End: 2022-05-26

## 2022-05-26 RX ORDER — ONDANSETRON 8 MG/1
4 TABLET, FILM COATED ORAL EVERY 8 HOURS
Refills: 0 | Status: DISCONTINUED | OUTPATIENT
Start: 2022-05-26 | End: 2022-05-30

## 2022-05-26 RX ORDER — ENOXAPARIN SODIUM 100 MG/ML
40 INJECTION SUBCUTANEOUS EVERY 24 HOURS
Refills: 0 | Status: DISCONTINUED | OUTPATIENT
Start: 2022-05-26 | End: 2022-05-28

## 2022-05-26 RX ORDER — SODIUM CHLORIDE 9 MG/ML
2200 INJECTION INTRAMUSCULAR; INTRAVENOUS; SUBCUTANEOUS ONCE
Refills: 0 | Status: COMPLETED | OUTPATIENT
Start: 2022-05-26 | End: 2022-05-26

## 2022-05-26 RX ORDER — PRAMIPEXOLE DIHYDROCHLORIDE 0.12 MG/1
0.25 TABLET ORAL AT BEDTIME
Refills: 0 | Status: DISCONTINUED | OUTPATIENT
Start: 2022-05-26 | End: 2022-05-30

## 2022-05-26 RX ORDER — CEFEPIME 1 G/1
1000 INJECTION, POWDER, FOR SOLUTION INTRAMUSCULAR; INTRAVENOUS EVERY 12 HOURS
Refills: 0 | Status: DISCONTINUED | OUTPATIENT
Start: 2022-05-26 | End: 2022-05-27

## 2022-05-26 RX ORDER — CEFEPIME 1 G/1
1000 INJECTION, POWDER, FOR SOLUTION INTRAMUSCULAR; INTRAVENOUS EVERY 12 HOURS
Refills: 0 | Status: DISCONTINUED | OUTPATIENT
Start: 2022-05-26 | End: 2022-05-26

## 2022-05-26 RX ORDER — ACETAMINOPHEN 500 MG
650 TABLET ORAL EVERY 6 HOURS
Refills: 0 | Status: DISCONTINUED | OUTPATIENT
Start: 2022-05-26 | End: 2022-05-30

## 2022-05-26 RX ORDER — HYDROXYCHLOROQUINE SULFATE 200 MG
1 TABLET ORAL
Qty: 0 | Refills: 0 | DISCHARGE

## 2022-05-26 RX ORDER — LANOLIN ALCOHOL/MO/W.PET/CERES
3 CREAM (GRAM) TOPICAL AT BEDTIME
Refills: 0 | Status: DISCONTINUED | OUTPATIENT
Start: 2022-05-26 | End: 2022-05-30

## 2022-05-26 RX ORDER — ATORVASTATIN CALCIUM 80 MG/1
10 TABLET, FILM COATED ORAL AT BEDTIME
Refills: 0 | Status: DISCONTINUED | OUTPATIENT
Start: 2022-05-26 | End: 2022-05-28

## 2022-05-26 RX ORDER — PITAVASTATIN CALCIUM 1.04 MG/1
2 TABLET, FILM COATED ORAL
Qty: 0 | Refills: 0 | DISCHARGE

## 2022-05-26 RX ORDER — FOLIC ACID 0.8 MG
1 TABLET ORAL
Qty: 0 | Refills: 0 | DISCHARGE

## 2022-05-26 RX ORDER — METHOTREXATE 2.5 MG/1
7.5 TABLET ORAL
Refills: 0 | Status: DISCONTINUED | OUTPATIENT
Start: 2022-05-26 | End: 2022-05-30

## 2022-05-26 RX ORDER — PRAMIPEXOLE DIHYDROCHLORIDE 0.12 MG/1
0.25 TABLET ORAL
Qty: 0 | Refills: 0 | DISCHARGE

## 2022-05-26 RX ORDER — SODIUM CHLORIDE 9 MG/ML
1000 INJECTION INTRAMUSCULAR; INTRAVENOUS; SUBCUTANEOUS
Refills: 0 | Status: DISCONTINUED | OUTPATIENT
Start: 2022-05-26 | End: 2022-05-28

## 2022-05-26 RX ADMIN — Medication 975 MILLIGRAM(S): at 15:37

## 2022-05-26 RX ADMIN — Medication 975 MILLIGRAM(S): at 11:02

## 2022-05-26 RX ADMIN — ONDANSETRON 4 MILLIGRAM(S): 8 TABLET, FILM COATED ORAL at 11:02

## 2022-05-26 RX ADMIN — SODIUM CHLORIDE 2200 MILLILITER(S): 9 INJECTION INTRAMUSCULAR; INTRAVENOUS; SUBCUTANEOUS at 15:37

## 2022-05-26 RX ADMIN — PRAMIPEXOLE DIHYDROCHLORIDE 0.25 MILLIGRAM(S): 0.12 TABLET ORAL at 22:55

## 2022-05-26 RX ADMIN — CEFEPIME 100 MILLIGRAM(S): 1 INJECTION, POWDER, FOR SOLUTION INTRAMUSCULAR; INTRAVENOUS at 16:10

## 2022-05-26 RX ADMIN — CEFEPIME 2000 MILLIGRAM(S): 1 INJECTION, POWDER, FOR SOLUTION INTRAMUSCULAR; INTRAVENOUS at 17:27

## 2022-05-26 RX ADMIN — Medication 650 MILLIGRAM(S): at 20:32

## 2022-05-26 RX ADMIN — SODIUM CHLORIDE 2200 MILLILITER(S): 9 INJECTION INTRAMUSCULAR; INTRAVENOUS; SUBCUTANEOUS at 11:03

## 2022-05-26 NOTE — ED PROVIDER NOTE - NS ED ROS FT
Constitutional: No fever or chills  Eyes: No visual changes  HEENT: +sore throat  CV: No chest pain  Resp: No SOB no cough  GI: No abd pain, +nausea/vomiting/diarrhea  : No dysuria  MSK: No musculoskeletal pain  Skin: No rash  Neuro: No headache

## 2022-05-26 NOTE — PHARMACOTHERAPY INTERVENTION NOTE - COMMENTS
Medication reconciliation completed.  Reviewed Medication list and confirmed med allergies with patient; confirmed with Dr. First Medisrael.

## 2022-05-26 NOTE — ED PROVIDER NOTE - CARE PLAN
1 Principal Discharge DX:	Fever  Secondary Diagnosis:	Thrombocytopenia  Secondary Diagnosis:	Splenomegaly

## 2022-05-26 NOTE — ED PROVIDER NOTE - PROGRESS NOTE DETAILS
paged patients oncologist. Daryl Dallas M.D., Attending Physician Micah Sung D.O., PGY3 (Resident)  Discussed with Dr. Sanchez # [phone]  Patient has large granulocytic lymphocytic leukemia. Would like patient admitted for BM biopsy to be done by IR, additional labs of hep panel, HIV, lobo, TTE, abxs. Discussed with patient. Amenable to plan. Hospitalist paged.

## 2022-05-26 NOTE — PATIENT PROFILE ADULT - FALL HARM RISK - HARM RISK INTERVENTIONS

## 2022-05-26 NOTE — ED PROVIDER NOTE - CLINICAL SUMMARY MEDICAL DECISION MAKING FREE TEXT BOX
66 year old male w/PMH of RA, restless leg syndrome, HLD, HTN, EtOH abuse presents to the ED for fever, dehydration and nausea/vomiting. Exam tachycardic, otherwise non focal. Will check labs, septic workup and reassess. 66 year old male w/PMH of RA on methotrexate restless leg syndrome, HLD, HTN, EtOH abuse presents to the ED for fever, dehydration and nausea/vomiting. Exam tachycardic, otherwise non focal. Will check labs, septic workup and reassess.

## 2022-05-26 NOTE — H&P ADULT - NSHPLABSRESULTS_GEN_ALL_CORE
14.1   13.65 )-----------( 71       ( 26 May 2022 10:32 )             40.7     05-    127<L>  |  92<L>  |  24<H>  ----------------------------<  93  3.5   |  25  |  1.53<H>    Ca    8.3<L>      26 May 2022 10:32    TPro  8.1  /  Alb  3.2<L>  /  TBili  1.9<H>  /  DBili  x   /  AST  107<H>  /  ALT  43  /  AlkPhos  93  05-26    PT/INR - ( 26 May 2022 10:32 )   PT: 14.0 sec;   INR: 1.20 ratio      PTT - ( 26 May 2022 10:32 )  PTT:33.9 sec  Urinalysis Basic - ( 26 May 2022 14:37 )    Color: Yellow / Appearance: Clear / S.010 / pH: x  Gluc: x / Ketone: Moderate  / Bili: Negative / Urobili: 4   Blood: x / Protein: Negative / Nitrite: Negative   Leuk Esterase: Negative / RBC: 0-2 /HPF / WBC 0-2   Sq Epi: x / Non Sq Epi: Negative / Bacteria: Occasional 14.1   13.65 )-----------( 71       ( 26 May 2022 10:32 )             40.7         127<L>  |  92<L>  |  24<H>  ----------------------------<  93  3.5   |  25  |  1.53<H>    Ca    8.3<L>      26 May 2022 10:32    TPro  8.1  /  Alb  3.2<L>  /  TBili  1.9<H>  /  DBili  x   /  AST  107<H>  /  ALT  43  /  AlkPhos  93      PT/INR - ( 26 May 2022 10:32 )   PT: 14.0 sec;   INR: 1.20 ratio      PTT - ( 26 May 2022 10:32 )  PTT:33.9 sec    Urinalysis Basic - ( 26 May 2022 14:37 )  Color: Yellow / Appearance: Clear / S.010 / pH: x  Gluc: x / Ketone: Moderate  / Bili: Negative / Urobili: 4   Blood: x / Protein: Negative / Nitrite: Negative   Leuk Esterase: Negative / RBC: 0-2 /HPF / WBC 0-2   Sq Epi: x / Non Sq Epi: Negative / Bacteria: Occasional    c< from: CT Abdomen and Pelvis w/ IV Cont (22 @ 12:28) >      IMPRESSION:  Cirrhosis with splenomegaly. Hepatic steatosis.    No bowel obstruction or gross bowel wall thickening. Normal appendix.   Sigmoid diverticulosis without definite evidence of acute diverticulitis.    < end of copied text >

## 2022-05-26 NOTE — ED PROVIDER NOTE - NS_ ATTENDINGSCRIBEDETAILS _ED_A_ED_FT
I, Daryl Dallas MD,  performed the initial face to face bedside interview with this patient regarding history of present illness, review of symptoms and relevant past medical, social and family history.  I completed an independent physical examination.  I was the initial provider who evaluated this patient.   I personally saw the patient and performed a substantive portion of the visit including all aspects of the medical decision making.  The history, relevant review of systems, past medical and surgical history, medical decision making, and physical examination was documented by the scribe in my presence and I attest to the accuracy of the documentation.

## 2022-05-26 NOTE — H&P ADULT - ASSESSMENT
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  Patient notes he hasn't been feeling well for the last 5 days.  Patient notes he started with vomting on sunday.  He ate some old chicken wings from the fridge-- he's not sure if they were bad.  Vomiting continued through to tuesday.  He also had diarrhea-- multiple episodes per day for the last 5 days.  No blood in stool.  Patient also developed fever at home to 101.  Patient presented to the ER today Thursday for evaluation.  He was concerned he may have COVID or flu or other viral infection.  RVP testing in the ER was negative.  WBC elevated @ 13 but elevated at baseline in mid teens.  Patient pancultured.  Lactate 2.  Started on IVF and IV ABX.   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 RVP testing in the ER was negative.  WBC elevated @ 13 but elevated at baseline in mid teens.  Patient pancultured.  Lactate 2.  Started on IVF and IV ABX.      #Fever with nausea/ vomiting/ diarrhea:    Admit to medsurg.    F/u pancultures.    Suspect more bacterial / viral gastroenteritis/ food poisoning.    Check GI PCR, CDIFF.    R/o Norovirus with n/v/d/ for 5 days.    Patient immunocompromised as has underlying leukemia as well as on methotrexate and humera for RA.    IVF until oral intake improves.    DASH diet as tolerated.      #Hyponatremia:    Na 127.    Suspect all prerenal / hypovolemia related.    S/p 2 L in ER.    Repeat labs.    Cont NSS and follow.      #ANTOINE:    Prerenal as above.    NSS @ 75.    Trend labs.      #Large Lymphocytic Leukemia:    New dx as per patient's oncologist Dr. Sanchez.    AS per ER discussion with Dr. Sanchez-- would like patient to have fever work up/ IV ABX/ bone marrow bx/ HIV/ Hepatitis screening.    IR eval for bone marrow eval.    Cirrhosis/ Splenomegaly noted on CT.  Unclear if new/ old.    ONC eval.      #Thrombocytopenia:    Unclear if related to liver dx.  Trend.      #ETOH use:    Monitor for withdrawal sx.      #RA:    Cont home meds.  MTX/ Humera/ Folic acid.      #HTN:    Cont home meds.      #HLD:    Cont statin.      #DVT proph:  Lovenox SQ.

## 2022-05-26 NOTE — H&P ADULT - NSHPREVIEWOFSYSTEMS_GEN_ALL_CORE
ROS:  General:  No fevers, chills, or unexplained weight loss  Skin: No rash or bothersome skin lesions  Musculoskeletal: No arthalgias, myalgias or joint swelling  Eyes: No visual changes or eye pain  Ears: No hearing loss , otorrhea or ear pain  Nose, Mouth, Throat: No nasal congestion, rhinorrhea, oral lesions, postnasal drip or sore throat  Cardio: No chest pain or palpitations. no lower extremity edema. no syncope. no claudication.   Respiratory: No cough, shortness of breath or wheezing   GI: No diarrhea, constipation, blood in stools, abdominal pain, vomiting or heartburn  : No urinary frequency, hematuria, incontinence, or dysuria  Neurologic: No headaches, parasthesias, confusion, dysarthria or gait instability  Psychiatric:  No anxiety or depression  Lymphatic:  No easy bruising, easy bleeding or swollen glands  Allergic: No itching, sneezing , watery eyes, clear rhinorrhea or recurrent infections ROS:  General:  fevers  Skin: No rash or bothersome skin lesions  Musculoskeletal: No arthalgias, myalgias or joint swelling  Eyes: No visual changes or eye pain  Ears: No hearing loss , otorrhea or ear pain  Nose, Mouth, Throat: No nasal congestion, rhinorrhea, oral lesions, postnasal drip or sore throat  Cardio: No chest pain or palpitations. no lower extremity edema. no syncope. no claudication.   Respiratory: No cough, shortness of breath or wheezing   GI: see hpi  : No urinary frequency, hematuria, incontinence, or dysuria  Neurologic: No headaches, parasthesias, confusion, dysarthria or gait instability  Psychiatric:  No anxiety or depression  Lymphatic:  No easy bruising, easy bleeding or swollen glands  Allergic: No itching, sneezing , watery eyes, clear rhinorrhea or recurrent infections

## 2022-05-26 NOTE — H&P ADULT - NSHPPHYSICALEXAM_GEN_ALL_CORE
PEx  T(C): 36.7 (05-26-22 @ 21:15), Max: 37.7 (05-26-22 @ 09:55)  HR: 86 (05-26-22 @ 21:15) (86 - 122)  BP: 157/93 (05-26-22 @ 21:15) (142/92 - 157/93)  RR: 18 (05-26-22 @ 21:15) (16 - 20)  SpO2: 96% (05-26-22 @ 21:15) (94% - 98%)  Wt(kg): --  General:     Well appearing, well nourished in no distress, no identifying marks , scars, or tattoos.  Skin: no rash or prominent lesions  Head: normocephalic, atraumatic     Sinuses: non-tender  Nose: no external lesions, mucosa non-inflamed, septum and turbinates normal  Throat: no erythema, exudates or lesions.  Neck: Supple without lymphadenopathy. Thyroid no thyromegaly, no palpable thyroid nodules, no palpable nodules or masses, carotid arteries no bruits.   Breasts: No palpable masses or lesions.  Heart: RRR, no murmur or gallop.  Normal S1, S2.  No S3, S4.   Lungs: CTA bilaterally, no wheezes, rhonchi, rales.  Breathing unlabored.   Chest wall: Normal insp   Abdomen:  Soft, NT/ND, normal bowel sounds, no HSM, no masses.  No peritoneal signs.   Back: spine normal without deformity or tenderness.  Normal ROM   : Exam normal.  no inguinal hernias.  Extremities: No deformities, clubbing, cyanosis, or edema.  Musculoskeletal: Normal gait and station. No decreased range of motion, instability, atrophy or abnormal strength or tone in the head, neck, spine, ribs, pelvis or extremities.   Neurologic: CN 2-12 normal. Sensation to pain, touch and proprioception normal. DTRs normal in upper and lower extremities. No pathologic reflexes.  Motor normal.  Psychiatric: Oriented X3, intact recent and remote memory, judgement and insight, normal mood and affect. PEx  T(C): 36.7 (05-26-22 @ 21:15), Max: 37.7 (05-26-22 @ 09:55)  HR: 86 (05-26-22 @ 21:15) (86 - 122)  BP: 157/93 (05-26-22 @ 21:15) (142/92 - 157/93)  RR: 18 (05-26-22 @ 21:15) (16 - 20)  SpO2: 96% (05-26-22 @ 21:15) (94% - 98%)    General:     Well appearing, no identifying marks , scars, or tattoos.  Skin: no rash or prominent lesions  Head: normocephalic, atraumatic     Sinuses: non-tender  Nose: no external lesions, mucosa non-inflamed, septum and turbinates normal  Throat: no erythema, exudates or lesions.  Neck: Supple without lymphadenopathy. Thyroid no thyromegaly, no palpable thyroid nodules, no palpable nodules or masses, carotid arteries no bruits.   Breasts: No palpable masses or lesions.  Heart: RRR, no murmur or gallop.  Normal S1, S2.  No S3, S4.   Lungs: CTA bilaterally, no wheezes, rhonchi, rales.  Breathing unlabored.   Chest wall: Normal insp   Abdomen:  Soft, NT/ND, normal bowel sounds, no HSM, no masses.  No peritoneal signs.   Back: spine normal without deformity or tenderness.  Normal ROM   : Exam normal.  no inguinal hernias.  Extremities: No deformities, clubbing, cyanosis, or edema.  Musculoskeletal: Normal gait and station. No decreased range of motion, instability, atrophy or abnormal strength or tone in the head, neck, spine, ribs, pelvis or extremities.   Neurologic: CN 2-12 normal. Sensation to pain, touch and proprioception normal. DTRs normal in upper and lower extremities. No pathologic reflexes.  Motor normal.  Psychiatric: Oriented X3, intact recent and remote memory, judgement and insight, normal mood and affect.

## 2022-05-26 NOTE — ED PROVIDER NOTE - NSICDXPASTMEDICALHX_GEN_ALL_CORE_FT
PAST MEDICAL HISTORY:  ETOH abuse     Hyperlipidemia LDL goal <100     Restless leg syndrome     Rheumatoid arteritis

## 2022-05-26 NOTE — ED ADULT NURSE REASSESSMENT NOTE - NS ED NURSE REASSESS COMMENT FT1
Pt tolerated dinner well. Remains alert and oriented. Awaiting admission bed availability and verbalizes understanding of POC.

## 2022-05-26 NOTE — ED ADULT NURSE NOTE - OBJECTIVE STATEMENT
Pt reports poor po tolerance for past week.  Pt reports vomiting with no visible bleeding. Pt denies any known sick contacts. Pt reports fever today.  Pt has recent dx of leukemia with no current treatment at this time.

## 2022-05-26 NOTE — H&P ADULT - HISTORY OF PRESENT ILLNESS
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  Patient notes he hasn't been feeling well for the last 5 days.  Patient notes he started with vomting on sunday.  He ate some old chicken wings from the fridge-- he's not sure if they were bad.  Vomiting continued through to tuesday.  He also had diarrhea-- multiple episodes per day for the last 5 days.  No blood in stool.  Patient also developed fever at home to 101.  Patient presented to the ER today Thursday for evaluation.  He was concerned he may have COVID or flu or other viral infection.  RVP testing in the ER was negative.  WBC elevated @ 13 but elevated at baseline in mid teens.  Patient pancultured.  Lactate 2.  Started on IVF and IV ABX.        PAST MEDICAL & SURGICAL HISTORY:   ETOH abuse  Hyperlipidemia   Restless leg syndrome  Rheumatoid arteritis      FAMILY HISTORY:  No pertinent family history in first degree relatives      Social History:    +ETOH use.   No tob/ drug use.    -- lives at home with wife and daughter.      Allergies:  Aloe Lotion (Rash)       67 y/o male with PMHx of rheumatoid arthritis on methotrexate and humera, recent diagnosis of large lymphocytic leukemia (not on treatment), thrombocytopenia, ETOH abuse, RLS, HTN, HLD who presented to  with CC of N/V/D and fever.  Patient notes he hasn't been feeling well for the last 5 days.  Patient notes he started with vomting on sunday.  He ate some old chicken wings from the fridge-- he's not sure if they were bad.  Vomiting continued through to tuesday.  He also had diarrhea-- multiple episodes per day for the last 5 days.  No blood in stool.  Patient also developed fever at home to 101.  Patient presented to the ER today Thursday for evaluation.  He was concerned he may have COVID or flu or other viral infection.  RVP testing in the ER was negative.  WBC elevated @ 13 but elevated at baseline in mid teens.  Patient pancultured.  Lactate 2.  Started on IVF and IV ABX.        PAST MEDICAL & SURGICAL HISTORY:   ETOH abuse  Hyperlipidemia   Restless leg syndrome  Rheumatoid arteritis      FAMILY HISTORY:  No pertinent family history in first degree relatives      Social History:    +ETOH use.   No tob/ drug use.    -- lives at home with wife and daughter.      Allergies:  Aloe Lotion (Rash)

## 2022-05-26 NOTE — ED ADULT NURSE REASSESSMENT NOTE - NS ED NURSE REASSESS COMMENT FT1
care assumed from BRIANNE Tapia. pt is A&O x4, sitting in bed, finished eating rice pudding. VS as charted. awaiting inpatient bed. up to date on plan of care.

## 2022-05-26 NOTE — ED PROVIDER NOTE - PHYSICAL EXAMINATION
Constitutional: NAD AAOx3  Eyes: PERRLA EOMI  Head: Normocephalic atraumatic  Mouth: MMM  Cardiac: Tachycardic   Resp: Lungs CTAB  GI: Abd s/nt/nd  Neuro: CN2-12 intact  Skin: No visible rashes

## 2022-05-26 NOTE — ED ADULT TRIAGE NOTE - CHIEF COMPLAINT QUOTE
Pt arrives to ED complaining of nausea, vomiting, fever for three days with poor PO intake. Pt with recent diagnosis of leukemia.

## 2022-05-26 NOTE — ED ADULT NURSE REASSESSMENT NOTE - NS ED NURSE REASSESS COMMENT FT1
Pt remains alert and oriented awaiting further dispo at this time. Pt with some hypertension noted. Per pt, he was prescribed htn medication, but was having some difficulty with orthostatic hypotension. Per pt, he has not been taking htn medication for months.  MD notified with no further orders at this time.

## 2022-05-26 NOTE — ED PROVIDER NOTE - OBJECTIVE STATEMENT
65 yo male w/PMH of RA, restless leg syndrome, HLD, HTN, EtOH abuse, presents to the ED for fever, nausea/vomiting and poor PO intake x3 days. Pt states on Sunday night started throwing up, next day with diarrhea and sore throat. Fever this morning up to 101.3, pt took Tylenol yesterday, none today. Pt concerned he has flu/COVID. No rash, neck stiffness, CP, SOB or abdominal pain. Pt with recent diagnosis of leukemia. Non-smoker, +EtOH use. On methotrexate.

## 2022-05-27 ENCOUNTER — RESULT REVIEW (OUTPATIENT)
Age: 67
End: 2022-05-27

## 2022-05-27 LAB
ANION GAP SERPL CALC-SCNC: 9 MMOL/L — SIGNIFICANT CHANGE UP (ref 5–17)
BUN SERPL-MCNC: 19 MG/DL — SIGNIFICANT CHANGE UP (ref 7–23)
CALCIUM SERPL-MCNC: 7.8 MG/DL — LOW (ref 8.5–10.1)
CHLORIDE SERPL-SCNC: 99 MMOL/L — SIGNIFICANT CHANGE UP (ref 96–108)
CO2 SERPL-SCNC: 25 MMOL/L — SIGNIFICANT CHANGE UP (ref 22–31)
CREAT SERPL-MCNC: 1.03 MG/DL — SIGNIFICANT CHANGE UP (ref 0.5–1.3)
CULTURE RESULTS: SIGNIFICANT CHANGE UP
CULTURE RESULTS: SIGNIFICANT CHANGE UP
EGFR: 80 ML/MIN/1.73M2 — SIGNIFICANT CHANGE UP
GLUCOSE SERPL-MCNC: 89 MG/DL — SIGNIFICANT CHANGE UP (ref 70–99)
HCT VFR BLD CALC: 35.3 % — LOW (ref 39–50)
HCV AB S/CO SERPL IA: 0.18 S/CO — SIGNIFICANT CHANGE UP (ref 0–0.99)
HCV AB SERPL-IMP: SIGNIFICANT CHANGE UP
HGB BLD-MCNC: 12 G/DL — LOW (ref 13–17)
MCHC RBC-ENTMCNC: 33 PG — SIGNIFICANT CHANGE UP (ref 27–34)
MCHC RBC-ENTMCNC: 34 GM/DL — SIGNIFICANT CHANGE UP (ref 32–36)
MCV RBC AUTO: 97 FL — SIGNIFICANT CHANGE UP (ref 80–100)
PLATELET # BLD AUTO: 55 K/UL — LOW (ref 150–400)
POTASSIUM SERPL-MCNC: 2.8 MMOL/L — CRITICAL LOW (ref 3.5–5.3)
POTASSIUM SERPL-MCNC: 3.3 MMOL/L — LOW (ref 3.5–5.3)
POTASSIUM SERPL-SCNC: 2.8 MMOL/L — CRITICAL LOW (ref 3.5–5.3)
POTASSIUM SERPL-SCNC: 3.3 MMOL/L — LOW (ref 3.5–5.3)
RBC # BLD: 3.64 M/UL — LOW (ref 4.2–5.8)
RBC # FLD: 17.6 % — HIGH (ref 10.3–14.5)
SODIUM SERPL-SCNC: 133 MMOL/L — LOW (ref 135–145)
SPECIMEN SOURCE: SIGNIFICANT CHANGE UP
SPECIMEN SOURCE: SIGNIFICANT CHANGE UP
WBC # BLD: 9.9 K/UL — SIGNIFICANT CHANGE UP (ref 3.8–10.5)
WBC # FLD AUTO: 9.9 K/UL — SIGNIFICANT CHANGE UP (ref 3.8–10.5)

## 2022-05-27 PROCEDURE — 88342 IMHCHEM/IMCYTCHM 1ST ANTB: CPT | Mod: 26,59

## 2022-05-27 PROCEDURE — 38222 DX BONE MARROW BX & ASPIR: CPT | Mod: RT

## 2022-05-27 PROCEDURE — 88313 SPECIAL STAINS GROUP 2: CPT | Mod: 26

## 2022-05-27 PROCEDURE — 99232 SBSQ HOSP IP/OBS MODERATE 35: CPT

## 2022-05-27 PROCEDURE — 77012 CT SCAN FOR NEEDLE BIOPSY: CPT | Mod: 26

## 2022-05-27 PROCEDURE — 88341 IMHCHEM/IMCYTCHM EA ADD ANTB: CPT | Mod: 26,59

## 2022-05-27 PROCEDURE — 88305 TISSUE EXAM BY PATHOLOGIST: CPT | Mod: 26

## 2022-05-27 PROCEDURE — 88189 FLOWCYTOMETRY/READ 16 & >: CPT

## 2022-05-27 PROCEDURE — 85097 BONE MARROW INTERPRETATION: CPT

## 2022-05-27 RX ORDER — CEFEPIME 1 G/1
1000 INJECTION, POWDER, FOR SOLUTION INTRAMUSCULAR; INTRAVENOUS ONCE
Refills: 0 | Status: COMPLETED | OUTPATIENT
Start: 2022-05-27 | End: 2022-05-27

## 2022-05-27 RX ORDER — OXYCODONE HYDROCHLORIDE 5 MG/1
5 TABLET ORAL ONCE
Refills: 0 | Status: DISCONTINUED | OUTPATIENT
Start: 2022-05-27 | End: 2022-05-27

## 2022-05-27 RX ORDER — AMLODIPINE BESYLATE 2.5 MG/1
5 TABLET ORAL ONCE
Refills: 0 | Status: COMPLETED | OUTPATIENT
Start: 2022-05-27 | End: 2022-05-27

## 2022-05-27 RX ORDER — CEFEPIME 1 G/1
1000 INJECTION, POWDER, FOR SOLUTION INTRAMUSCULAR; INTRAVENOUS ONCE
Refills: 0 | Status: DISCONTINUED | OUTPATIENT
Start: 2022-05-27 | End: 2022-05-27

## 2022-05-27 RX ORDER — POTASSIUM CHLORIDE 20 MEQ
40 PACKET (EA) ORAL ONCE
Refills: 0 | Status: COMPLETED | OUTPATIENT
Start: 2022-05-27 | End: 2022-05-27

## 2022-05-27 RX ORDER — ACETAMINOPHEN 500 MG
1000 TABLET ORAL ONCE
Refills: 0 | Status: DISCONTINUED | OUTPATIENT
Start: 2022-05-27 | End: 2022-05-27

## 2022-05-27 RX ORDER — ONDANSETRON 8 MG/1
4 TABLET, FILM COATED ORAL ONCE
Refills: 0 | Status: DISCONTINUED | OUTPATIENT
Start: 2022-05-27 | End: 2022-05-27

## 2022-05-27 RX ORDER — SODIUM CHLORIDE 9 MG/ML
1000 INJECTION, SOLUTION INTRAVENOUS
Refills: 0 | Status: DISCONTINUED | OUTPATIENT
Start: 2022-05-27 | End: 2022-05-27

## 2022-05-27 RX ORDER — INFLUENZA VIRUS VACCINE 15; 15; 15; 15 UG/.5ML; UG/.5ML; UG/.5ML; UG/.5ML
0.7 SUSPENSION INTRAMUSCULAR ONCE
Refills: 0 | Status: DISCONTINUED | OUTPATIENT
Start: 2022-05-27 | End: 2022-05-30

## 2022-05-27 RX ORDER — LOSARTAN POTASSIUM 100 MG/1
25 TABLET, FILM COATED ORAL ONCE
Refills: 0 | Status: COMPLETED | OUTPATIENT
Start: 2022-05-27 | End: 2022-05-27

## 2022-05-27 RX ORDER — FENTANYL CITRATE 50 UG/ML
50 INJECTION INTRAVENOUS
Refills: 0 | Status: DISCONTINUED | OUTPATIENT
Start: 2022-05-27 | End: 2022-05-27

## 2022-05-27 RX ADMIN — SODIUM CHLORIDE 75 MILLILITER(S): 9 INJECTION INTRAMUSCULAR; INTRAVENOUS; SUBCUTANEOUS at 17:38

## 2022-05-27 RX ADMIN — ENOXAPARIN SODIUM 40 MILLIGRAM(S): 100 INJECTION SUBCUTANEOUS at 11:03

## 2022-05-27 RX ADMIN — ATORVASTATIN CALCIUM 10 MILLIGRAM(S): 80 TABLET, FILM COATED ORAL at 22:46

## 2022-05-27 RX ADMIN — Medication 40 MILLIEQUIVALENT(S): at 08:49

## 2022-05-27 RX ADMIN — Medication 40 MILLIEQUIVALENT(S): at 17:36

## 2022-05-27 RX ADMIN — SODIUM CHLORIDE 75 MILLILITER(S): 9 INJECTION INTRAMUSCULAR; INTRAVENOUS; SUBCUTANEOUS at 04:52

## 2022-05-27 RX ADMIN — Medication 1 MILLIGRAM(S): at 11:02

## 2022-05-27 RX ADMIN — Medication 650 MILLIGRAM(S): at 22:46

## 2022-05-27 RX ADMIN — LOSARTAN POTASSIUM 25 MILLIGRAM(S): 100 TABLET, FILM COATED ORAL at 11:02

## 2022-05-27 RX ADMIN — CEFEPIME 1000 MILLIGRAM(S): 1 INJECTION, POWDER, FOR SOLUTION INTRAMUSCULAR; INTRAVENOUS at 11:03

## 2022-05-27 RX ADMIN — AMLODIPINE BESYLATE 5 MILLIGRAM(S): 2.5 TABLET ORAL at 08:49

## 2022-05-27 RX ADMIN — CEFEPIME 1000 MILLIGRAM(S): 1 INJECTION, POWDER, FOR SOLUTION INTRAMUSCULAR; INTRAVENOUS at 22:47

## 2022-05-27 RX ADMIN — CEFEPIME 1000 MILLIGRAM(S): 1 INJECTION, POWDER, FOR SOLUTION INTRAMUSCULAR; INTRAVENOUS at 04:52

## 2022-05-27 RX ADMIN — Medication 3 MILLIGRAM(S): at 22:46

## 2022-05-27 RX ADMIN — PRAMIPEXOLE DIHYDROCHLORIDE 0.25 MILLIGRAM(S): 0.12 TABLET ORAL at 22:46

## 2022-05-27 NOTE — CONSULT NOTE ADULT - ASSESSMENT
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  - T cell gamma gene rearrangement sent and was positive to detect a clonal T cell population highly suggestive of T cell malignancy.  - bone marrow biopsy scheduled for today - worksheet placed in chart- IHC panel,   - WBC 13, Hb 14.1, plt 71, neutrophila 14%, anc 1.9  - Cr normalized with IVF   - CT a/p - Cirrhosis with splenomegaly. Hepatic steatosis. No bowel obstruction or gross bowel wall thickening. Normal appendix.  Sigmoid diverticulosis without definite evidence of acute diverticulitis.  - send hiv, hepatitis panel,       Dr. 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  - T cell gamma gene rearrangement sent and was positive to detect a clonal T cell population highly suggestive of T cell malignancy.  - bone marrow biopsy scheduled for today - worksheet placed in chart- IHC panel, T cell lymphoma panel   - WBC 13, Hb 14.1, plt 71, neutrophila 14%, anc 1.9  - Cr normalized with IVF   - CT a/p - Cirrhosis with splenomegaly. Hepatic steatosis. No bowel obstruction or gross bowel wall thickening. Normal appendix.  Sigmoid diverticulosis without definite evidence of acute diverticulitis.  - send hiv, hepatitis panel,     # HTN  - /110 at bedside this am   - takes amlodipine- given bp meds today    Pt will f/u with me in clinic on 6/8 to review pathology and discuss treatment options if warranted  Discussed with patient and Dr. Hdz  If bp improved, and pt stable can be discharged today after bm bx       Dr. Kehinde Sanchez  cell: [phone]  Weekends and nights please call [phone] for MD on call  [facility]  Hematology/Oncology

## 2022-05-27 NOTE — CONSULT NOTE ADULT - SUBJECTIVE AND OBJECTIVE BOX
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  Patient notes he hasn't been feeling well for the last 5 days.  Patient notes he started with vomiting on sunday.  He ate some old chicken wings from the fridge-- he's not sure if they were bad.  Vomiting continued through to tuesday.  He also had diarrhea-- multiple episodes per day for the last 5 days.  No blood in stool.  Patient also developed fever at home to 101.  Patient presented to the ER today Thursday for evaluation.  He was concerned he may have COVID or flu or other viral infection.  RVP testing in the ER was negative.  WBC elevated @ 13 but elevated at baseline in mid teens.  Patient pancultured.  Lactate 2.  Started on IVF and IV ABX.      In outpatient clinic, pt originally presented to mon 5/18 w leukocytosis with lymphocyte predominance.  Pt follows with Dr. Frank for RA for which he was started on Humira 3­4 months ago, q 2 weeks with methotrexate once weekly. Reports  mild improvement with joint pain but with deformities of hands. Pt doesn't believe he is in a flare­up.  Last Humira injection was last Saturday. Pt reports symptoms today of orthostatic  HoTN, when pt stands up, he starts feeling dizzy or any change in position and is now off therapy.     5/9/22 Blood work revealed WBC 17.5, Hb 17.1, mcv 101, plt 123k, lymph 84.3%, alc 14.7, Cr 1.4, , ALT 54, bili 1.7, Na 131. Has never seen hematologist before. Outpatient flow cytometry showed relative increase in T cell large granular lymphocytes which can occur in lymphoproliferative disorder as well as some reactive conditions. 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          PAST MEDICAL & SURGICAL HISTORY:   ETOH abuse  Hyperlipidemia   Restless leg syndrome  Rheumatoid arteritis      FAMILY HISTORY:  No pertinent family history in first degree relatives      Social History:    +ETOH use.   No tob/ drug use.    -- lives at home with wife and daughter.      Allergies:  Aloe Lotion (Rash)       (26 May 2022 22:07)      PAST MEDICAL & SURGICAL HISTORY:  ETOH abuse      Hyperlipidemia LDL goal &lt;100      Restless leg syndrome      Rheumatoid arteritis      No significant past surgical history          Allergies    Aloe Lotion (Rash)    Intolerances        MEDICATIONS  (STANDING):  atorvastatin 10 milliGRAM(s) Oral at bedtime  cefepime  Injectable. 1000 milliGRAM(s) IV Push every 12 hours  enoxaparin Injectable 40 milliGRAM(s) SubCutaneous every 24 hours  folic acid 1 milliGRAM(s) Oral daily  influenza  Vaccine (HIGH DOSE) 0.7 milliLiter(s) IntraMuscular once  methotrexate 7.5 milliGRAM(s) Oral <User Schedule>  pramipexole 0.25 milliGRAM(s) Oral at bedtime  sodium chloride 0.9%. 1000 milliLiter(s) (75 mL/Hr) IV Continuous <Continuous>    MEDICATIONS  (PRN):  acetaminophen     Tablet .. 650 milliGRAM(s) Oral every 6 hours PRN Temp greater or equal to 38C (100.4F), Mild Pain (1 - 3)  aluminum hydroxide/magnesium hydroxide/simethicone Suspension 30 milliLiter(s) Oral every 4 hours PRN Dyspepsia  melatonin 3 milliGRAM(s) Oral at bedtime PRN Insomnia  ondansetron Injectable 4 milliGRAM(s) IV Push every 8 hours PRN Nausea and/or Vomiting  polyvinyl alcohol 1.4%/povidone 0.6% Ophthalmic Solution - Peds 1 Drop(s) Both EYES two times a day PRN Dry Eyes      FAMILY HISTORY:  No pertinent family history in first degree relatives        SOCIAL HISTORY: No EtOH, no tobacco    REVIEW OF SYSTEMS:    CONSTITUTIONAL: No weakness, fevers or chills  EYES/ENT: No visual changes;  No vertigo or throat pain   NECK: No pain or stiffness  RESPIRATORY: No cough, wheezing, hemoptysis; No shortness of breath  CARDIOVASCULAR: No chest pain or palpitations  GASTROINTESTINAL: No abdominal or epigastric pain. No nausea, vomiting, or hematemesis; No diarrhea or constipation. No melena or hematochezia.  GENITOURINARY: No dysuria, frequency or hematuria  NEUROLOGICAL: No numbness or weakness  SKIN: No itching, burning, rashes, or lesions   All other review of systems is negative unless indicated above.    Height (cm): 172.7 (05-26 @ 09:46)    T(F): 98.2 (05-26-22 @ 22:20), Max: 99.8 (05-26-22 @ 09:55)  HR: 78 (05-26-22 @ 22:20)  BP: 156/94 (05-26-22 @ 22:20)  RR: 18 (05-26-22 @ 22:20)  SpO2: 95% (05-26-22 @ 22:20)  Wt(kg): --    GENERAL: NAD, well-developed  HEAD:  Atraumatic, Normocephalic  EYES: EOMI, PERRLA, conjunctiva and sclera clear  NECK: Supple, No JVD  CHEST/LUNG: Clear to auscultation bilaterally; No wheeze  HEART: Regular rate and rhythm; No murmurs, rubs, or gallops  ABDOMEN: Soft, Nontender, Nondistended; Bowel sounds present  EXTREMITIES:  2+ Peripheral Pulses, No clubbing, cyanosis, or edema  NEUROLOGY: non-focal  SKIN: No rashes or lesions                          14.1   13.65 )-----------( 71       ( 26 May 2022 10:32 )             40.7       05-26    132<L>  |  97  |  23  ----------------------------<  106<H>  3.2<L>   |  25  |  1.30    Ca    7.6<L>      26 May 2022 22:31    TPro  8.1  /  Alb  3.2<L>  /  TBili  1.9<H>  /  DBili  x   /  AST  107<H>  /  ALT  43  /  AlkPhos  93  05-26          PT/INR - ( 26 May 2022 10:32 )   PT: 14.0 sec;   INR: 1.20 ratio         PTT - ( 26 May 2022 10:32 )  PTT:33.9 sec     HPI:  67 y/o male with PMHx of rheumatoid arthritis on methotrexate q weekly and humira q 2 weeks, recent suspicion of possible large lymphocytic leukemia,  ETOH abuse, RLS, HTN, HLD who presented to  with CC of N/V/D and fever.  Patient notes he hasn't been feeling well for the last 5 days.  Patient notes he started with vomiting on sunday.  He ate some old chicken wings from the fridge-- he's not sure if they were bad.  Vomiting continued through to tuesday.  He also had diarrhea-- multiple episodes per day for the last 5 days.  No blood in stool.  Patient also developed fever at home to 101.  Patient presented to the ER today Thursday for evaluation.  He was concerned he may have COVID or flu or other viral infection.  RVP testing in the ER was negative.  WBC elevated @ 13 but elevated at baseline in mid teens.  Patient pancultured.  Lactate 2.  Started on IVF and IV ABX.      In outpatient clinic, pt originally presented to mon 5/18 w leukocytosis with lymphocyte predominance.  Pt follows with Dr. Frank for RA for which he was started on Humira 3­4 months ago, q 2 weeks with methotrexate once weekly. Reports  mild improvement with joint pain but with deformities of hands. Pt doesn't believe he is in a flare­up.  Last Humira injection was last Saturday. Pt reports symptoms today of orthostatic  HoTN, when pt stands up, he starts feeling dizzy or any change in position and is now off therapy.     5/9/22 Blood work revealed WBC 17.5, Hb 17.1, mcv 101, plt 123k, lymph 84.3%, alc 14.7, Cr 1.4, , ALT 54, bili 1.7, Na 131. Has never seen hematologist before. Outpatient flow cytometry showed relative increase in T cell large granular lymphocytes which can occur in lymphoproliferative disorder as well as some reactive conditions. 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          PAST MEDICAL & SURGICAL HISTORY:   ETOH abuse  Hyperlipidemia   Restless leg syndrome  Rheumatoid arteritis      FAMILY HISTORY:  No pertinent family history in first degree relatives      Social History:    +ETOH use.   No tob/ drug use.    -- lives at home with wife and daughter.      Allergies:  Aloe Lotion (Rash)       (26 May 2022 22:07)      PAST MEDICAL & SURGICAL HISTORY:  ETOH abuse      Hyperlipidemia LDL goal &lt;100      Restless leg syndrome      Rheumatoid arteritis      No significant past surgical history          Allergies    Aloe Lotion (Rash)    Intolerances        MEDICATIONS  (STANDING):  atorvastatin 10 milliGRAM(s) Oral at bedtime  cefepime  Injectable. 1000 milliGRAM(s) IV Push every 12 hours  enoxaparin Injectable 40 milliGRAM(s) SubCutaneous every 24 hours  folic acid 1 milliGRAM(s) Oral daily  influenza  Vaccine (HIGH DOSE) 0.7 milliLiter(s) IntraMuscular once  methotrexate 7.5 milliGRAM(s) Oral <User Schedule>  pramipexole 0.25 milliGRAM(s) Oral at bedtime  sodium chloride 0.9%. 1000 milliLiter(s) (75 mL/Hr) IV Continuous <Continuous>    MEDICATIONS  (PRN):  acetaminophen     Tablet .. 650 milliGRAM(s) Oral every 6 hours PRN Temp greater or equal to 38C (100.4F), Mild Pain (1 - 3)  aluminum hydroxide/magnesium hydroxide/simethicone Suspension 30 milliLiter(s) Oral every 4 hours PRN Dyspepsia  melatonin 3 milliGRAM(s) Oral at bedtime PRN Insomnia  ondansetron Injectable 4 milliGRAM(s) IV Push every 8 hours PRN Nausea and/or Vomiting  polyvinyl alcohol 1.4%/povidone 0.6% Ophthalmic Solution - Peds 1 Drop(s) Both EYES two times a day PRN Dry Eyes      FAMILY HISTORY:  No pertinent family history in first degree relatives        SOCIAL HISTORY: No EtOH, no tobacco    REVIEW OF SYSTEMS:    CONSTITUTIONAL: No weakness, fevers or chills  EYES/ENT: No visual changes;  No vertigo or throat pain   NECK: No pain or stiffness  RESPIRATORY: No cough, wheezing, hemoptysis; No shortness of breath  CARDIOVASCULAR: No chest pain or palpitations  GASTROINTESTINAL: No abdominal or epigastric pain.+ nausea, vomiting, no hematemesis; +diarrhea no constipation. No melena or hematochezia.  GENITOURINARY: No dysuria, frequency or hematuria  NEUROLOGICAL: No numbness or weakness  SKIN: No itching, burning, rashes, or lesions   All other review of systems is negative unless indicated above.    Height (cm): 172.7 (05-26 @ 09:46)    T(F): 98.2 (05-26-22 @ 22:20), Max: 99.8 (05-26-22 @ 09:55)  HR: 78 (05-26-22 @ 22:20)  BP: 156/94 (05-26-22 @ 22:20)  RR: 18 (05-26-22 @ 22:20)  SpO2: 95% (05-26-22 @ 22:20)  Wt(kg): --    GENERAL: NAD,  HEAD:  Atraumatic, Normocephalic  EYES: EOMI,   CHEST/LUNG: Clear to auscultation bilaterally; No wheeze  HEART: Regular rate and rhythm; No murmurs, rubs, or gallops  ABDOMEN: Soft, mild tenderness in epigastrum, Nondistended; Bowel sounds present  EXTREMITIES:  2+ Peripheral Pulses, No clubbing, cyanosis, or edema  NEUROLOGY: non-focal  SKIN: No rashes or lesions                          14.1   13.65 )-----------( 71       ( 26 May 2022 10:32 )             40.7       05-26    132<L>  |  97  |  23  ----------------------------<  106<H>  3.2<L>   |  25  |  1.30    Ca    7.6<L>      26 May 2022 22:31    TPro  8.1  /  Alb  3.2<L>  /  TBili  1.9<H>  /  DBili  x   /  AST  107<H>  /  ALT  43  /  AlkPhos  93  05-26          PT/INR - ( 26 May 2022 10:32 )   PT: 14.0 sec;   INR: 1.20 ratio         PTT - ( 26 May 2022 10:32 )  PTT:33.9 sec

## 2022-05-28 LAB
ANION GAP SERPL CALC-SCNC: 5 MMOL/L — SIGNIFICANT CHANGE UP (ref 5–17)
BASOPHILS # BLD AUTO: 0.11 K/UL — SIGNIFICANT CHANGE UP (ref 0–0.2)
BASOPHILS NFR BLD AUTO: 1 % — SIGNIFICANT CHANGE UP (ref 0–2)
BUN SERPL-MCNC: 19 MG/DL — SIGNIFICANT CHANGE UP (ref 7–23)
C DIFF BY PCR RESULT: SIGNIFICANT CHANGE UP
C DIFF TOX GENS STL QL NAA+PROBE: SIGNIFICANT CHANGE UP
CALCIUM SERPL-MCNC: 8.2 MG/DL — LOW (ref 8.5–10.1)
CHLORIDE SERPL-SCNC: 105 MMOL/L — SIGNIFICANT CHANGE UP (ref 96–108)
CO2 SERPL-SCNC: 26 MMOL/L — SIGNIFICANT CHANGE UP (ref 22–31)
CREAT SERPL-MCNC: 1.17 MG/DL — SIGNIFICANT CHANGE UP (ref 0.5–1.3)
EGFR: 69 ML/MIN/1.73M2 — SIGNIFICANT CHANGE UP
EOSINOPHIL # BLD AUTO: 0 K/UL — SIGNIFICANT CHANGE UP (ref 0–0.5)
EOSINOPHIL NFR BLD AUTO: 0 % — SIGNIFICANT CHANGE UP (ref 0–6)
GLUCOSE SERPL-MCNC: 106 MG/DL — HIGH (ref 70–99)
HCT VFR BLD CALC: 33.4 % — LOW (ref 39–50)
HGB BLD-MCNC: 11.3 G/DL — LOW (ref 13–17)
LYMPHOCYTES # BLD AUTO: 88 % — HIGH (ref 13–44)
LYMPHOCYTES # BLD AUTO: 9.3 K/UL — HIGH (ref 1–3.3)
MCHC RBC-ENTMCNC: 33.3 PG — SIGNIFICANT CHANGE UP (ref 27–34)
MCHC RBC-ENTMCNC: 33.8 GM/DL — SIGNIFICANT CHANGE UP (ref 32–36)
MCV RBC AUTO: 98.5 FL — SIGNIFICANT CHANGE UP (ref 80–100)
MONOCYTES # BLD AUTO: 0.21 K/UL — SIGNIFICANT CHANGE UP (ref 0–0.9)
MONOCYTES NFR BLD AUTO: 2 % — SIGNIFICANT CHANGE UP (ref 2–14)
NEUTROPHILS # BLD AUTO: 0.21 K/UL — LOW (ref 1.8–7.4)
NEUTROPHILS NFR BLD AUTO: 2 % — LOW (ref 43–77)
NRBC # BLD: SIGNIFICANT CHANGE UP /100 WBCS (ref 0–0)
PLATELET # BLD AUTO: 53 K/UL — LOW (ref 150–400)
POTASSIUM SERPL-MCNC: 3.9 MMOL/L — SIGNIFICANT CHANGE UP (ref 3.5–5.3)
POTASSIUM SERPL-SCNC: 3.9 MMOL/L — SIGNIFICANT CHANGE UP (ref 3.5–5.3)
RBC # BLD: 3.39 M/UL — LOW (ref 4.2–5.8)
RBC # FLD: 18 % — HIGH (ref 10.3–14.5)
SODIUM SERPL-SCNC: 136 MMOL/L — SIGNIFICANT CHANGE UP (ref 135–145)
WBC # BLD: 10.57 K/UL — HIGH (ref 3.8–10.5)
WBC # FLD AUTO: 10.57 K/UL — HIGH (ref 3.8–10.5)

## 2022-05-28 PROCEDURE — 99232 SBSQ HOSP IP/OBS MODERATE 35: CPT

## 2022-05-28 RX ORDER — AMLODIPINE BESYLATE 2.5 MG/1
5 TABLET ORAL ONCE
Refills: 0 | Status: COMPLETED | OUTPATIENT
Start: 2022-05-28 | End: 2022-05-28

## 2022-05-28 RX ORDER — LOSARTAN POTASSIUM 100 MG/1
25 TABLET, FILM COATED ORAL DAILY
Refills: 0 | Status: DISCONTINUED | OUTPATIENT
Start: 2022-05-28 | End: 2022-05-30

## 2022-05-28 RX ORDER — AMLODIPINE BESYLATE 2.5 MG/1
5 TABLET ORAL DAILY
Refills: 0 | Status: DISCONTINUED | OUTPATIENT
Start: 2022-05-29 | End: 2022-05-29

## 2022-05-28 RX ORDER — CEFEPIME 1 G/1
1000 INJECTION, POWDER, FOR SOLUTION INTRAMUSCULAR; INTRAVENOUS EVERY 12 HOURS
Refills: 0 | Status: DISCONTINUED | OUTPATIENT
Start: 2022-05-28 | End: 2022-05-28

## 2022-05-28 RX ORDER — PITAVASTATIN CALCIUM 1.04 MG/1
2 TABLET, FILM COATED ORAL DAILY
Refills: 0 | Status: DISCONTINUED | OUTPATIENT
Start: 2022-05-28 | End: 2022-05-30

## 2022-05-28 RX ORDER — AMLODIPINE BESYLATE 2.5 MG/1
5 TABLET ORAL DAILY
Refills: 0 | Status: DISCONTINUED | OUTPATIENT
Start: 2022-05-28 | End: 2022-05-30

## 2022-05-28 RX ORDER — CEFTRIAXONE 500 MG/1
1000 INJECTION, POWDER, FOR SOLUTION INTRAMUSCULAR; INTRAVENOUS EVERY 24 HOURS
Refills: 0 | Status: DISCONTINUED | OUTPATIENT
Start: 2022-05-28 | End: 2022-05-28

## 2022-05-28 RX ORDER — CEFTRIAXONE 500 MG/1
1000 INJECTION, POWDER, FOR SOLUTION INTRAMUSCULAR; INTRAVENOUS EVERY 24 HOURS
Refills: 0 | Status: DISCONTINUED | OUTPATIENT
Start: 2022-05-28 | End: 2022-05-29

## 2022-05-28 RX ADMIN — METHOTREXATE 7.5 MILLIGRAM(S): 2.5 TABLET ORAL at 10:44

## 2022-05-28 RX ADMIN — PITAVASTATIN CALCIUM 2 MILLIGRAM(S): 1.04 TABLET, FILM COATED ORAL at 17:09

## 2022-05-28 RX ADMIN — AMLODIPINE BESYLATE 5 MILLIGRAM(S): 2.5 TABLET ORAL at 12:11

## 2022-05-28 RX ADMIN — LOSARTAN POTASSIUM 25 MILLIGRAM(S): 100 TABLET, FILM COATED ORAL at 12:11

## 2022-05-28 RX ADMIN — SODIUM CHLORIDE 75 MILLILITER(S): 9 INJECTION INTRAMUSCULAR; INTRAVENOUS; SUBCUTANEOUS at 06:03

## 2022-05-28 RX ADMIN — CEFTRIAXONE 100 MILLIGRAM(S): 500 INJECTION, POWDER, FOR SOLUTION INTRAMUSCULAR; INTRAVENOUS at 21:09

## 2022-05-28 RX ADMIN — PRAMIPEXOLE DIHYDROCHLORIDE 0.25 MILLIGRAM(S): 0.12 TABLET ORAL at 21:09

## 2022-05-28 RX ADMIN — AMLODIPINE BESYLATE 5 MILLIGRAM(S): 2.5 TABLET ORAL at 23:22

## 2022-05-28 RX ADMIN — Medication 1 MILLIGRAM(S): at 10:43

## 2022-05-28 NOTE — CONSULT NOTE ADULT - SUBJECTIVE AND OBJECTIVE BOX
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  Patient notes he hasn't been feeling well for the last 5 days PTA. Patient notes he started with vomting after he some old chicken wings from the fridge-- he's not sure if they were bad. He also had diarrhea-- multiple episodes per day for the last 5 days. No blood in stool. Patient also developed fever at home to 101F.  Patient presented to the ER for evaluation. He received cefepime.       PAST MEDICAL & SURGICAL HISTORY:   ETOH abuse  Hyperlipidemia   Restless leg syndrome  Rheumatoid arteritis    Meds: per reconciliation sheet, noted below  MEDICATIONS  (STANDING):  amLODIPine   Tablet 5 milliGRAM(s) Oral daily  cefepime  Injectable. 1000 milliGRAM(s) IV Push every 12 hours  enoxaparin Injectable 40 milliGRAM(s) SubCutaneous every 24 hours  folic acid 1 milliGRAM(s) Oral daily  influenza  Vaccine (HIGH DOSE) 0.7 milliLiter(s) IntraMuscular once  losartan 25 milliGRAM(s) Oral daily  methotrexate 7.5 milliGRAM(s) Oral <User Schedule>  pitavastatin 2 milliGRAM(s) Oral daily  pramipexole 0.25 milliGRAM(s) Oral at bedtime  sodium chloride 0.9%. 1000 milliLiter(s) (75 mL/Hr) IV Continuous <Continuous>    MEDICATIONS  (PRN):  acetaminophen     Tablet .. 650 milliGRAM(s) Oral every 6 hours PRN Temp greater or equal to 38C (100.4F), Mild Pain (1 - 3)  aluminum hydroxide/magnesium hydroxide/simethicone Suspension 30 milliLiter(s) Oral every 4 hours PRN Dyspepsia  melatonin 3 milliGRAM(s) Oral at bedtime PRN Insomnia  ondansetron Injectable 4 milliGRAM(s) IV Push every 8 hours PRN Nausea and/or Vomiting  polyvinyl alcohol 1.4%/povidone 0.6% Ophthalmic Solution - Peds 1 Drop(s) Both EYES two times a day PRN Dry Eyes    Allergies    Aloe Lotion (Rash)    Intolerances    Social: no smoking, ETOH abuse, , no illegal drugs; no recent travel, no exposure to TB  FAMILY HISTORY:  No pertinent family history in first degree relatives      no history of premature cardiovascular disease in first degree relatives    ROS: the patient denies fever, no chills, no HA, no seizures, no dizziness, no sore throat, no nasal congestion, no blurry vision, no CP, no palpitations, no SOB, no cough, no abdominal pain, had diarrhea, no N/V, no dysuria, no leg pain, no claudication, no rash, no joint aches, no rectal pain or bleeding, no night sweats  All other systems reviewed and are negative    Vital Signs Last 24 Hrs  T(C): 36.7 (28 May 2022 08:29), Max: 38.9 (27 May 2022 21:56)  T(F): 98.1 (28 May 2022 08:29), Max: 102 (27 May 2022 21:56)  HR: 85 (28 May 2022 12:08) (68 - 90)  BP: 162/104 (28 May 2022 12:08) (131/86 - 162/104)  BP(mean): --  RR: 18 (28 May 2022 08:29) (18 - 18)  SpO2: 97% (28 May 2022 08:29) (97% - 100%)  Daily     Daily     PE:    Constitutional:  No acute distress  HEENT: NC/AT, EOMI, PERRLA, conjunctivae clear; ears and nose atraumatic; pharynx benign  Neck: supple; thyroid not palpable  Back: no tenderness  Respiratory: respiratory effort normal; clear to auscultation  Cardiovascular: S1S2 regular, no murmurs  Abdomen: soft, not tender, not distended, positive BS; no liver or spleen organomegaly  Genitourinary: no suprapubic tenderness  Lymphatic: no LN palpable  Musculoskeletal: no muscle tenderness, no joint swelling or tenderness  Extremities: no pedal edema  Neurological/ Psychiatric: AxOx3, judgement and insight normal; moving all extremities  Skin: no rashes; no palpable lesions    Labs: all available labs reviewed                        11.3   10.57 )-----------( 53       ( 28 May 2022 07:25 )             33.4     05-28    136  |  105  |  19  ----------------------------<  106<H>  3.9   |  26  |  1.17    Ca    8.2<L>      28 May 2022 07:25    (05-26 @ 10:32)  [facility]    GI PCR Panel, Stool (collected 27 May 2022 09:20)  Source: .Stool Feces, benny mcdaniel  Final Report (27 May 2022 16:59):    GI PCR Results: NOT detected    *******Please Note:*******    GI panel PCR evaluates for:    Campylobacter, Plesiomonas shigelloides, Salmonella,    Vibrio, Yersinia enterocolitica, Enteroaggregative    Escherichia coli (EAEC), Enteropathogenic E.coli (EPEC),    Enterotoxigenic E. coli (ETEC) lt/st, Shiga-like    toxin-producing E. coli (STEC) stx1/stx2,    Shigella/ Enteroinvasive E. coli (EIEC), Cryptosporidium,    Cyclospora cayetanensis, Entamoeba histolytica,    Giardia lamblia, Adenovirus F 40/41, Astrovirus,    Norovirus GI/GII, Rotavirus A, Sapovirus    Culture - Urine (collected 26 May 2022 14:37)  Source: Clean Catch None  Final Report (27 May 2022 19:20):    <10,000 CFU/mL Normal Urogenital Leanne    Culture - Blood (collected 26 May 2022 10:32)  Source: .Blood None  Preliminary Report (27 May 2022 17:02):    No growth to date.    Culture - Blood (collected 26 May 2022 10:32)  Source: .Blood None  Preliminary Report (27 May 2022 17:02):    No growth to date.    Radiology: all available radiological tests reviewed    < from: CT Abdomen and Pelvis w/ IV Cont (05.26.22 @ 12:28) >  Cirrhosis with splenomegaly. Hepatic steatosis.  No bowel obstruction or gross bowel wall thickening. Normal appendix.   Sigmoid diverticulosis without definite evidence of acute diverticulitis.  < end of copied text >      Advanced directives addressed: full resuscitation

## 2022-05-28 NOTE — CONSULT NOTE ADULT - ASSESSMENT
67 y/o male with h/o rheumatoid arthritis on methotrexate and humira, recent diagnosis of large lymphocytic leukemia (not on treatment), thrombocytopenia, RLS, HTN, HLD was admitted on 5/26 for N/V/D and fever.  Patient notes he hasn't been feeling well for the last 5 days PTA. Patient notes he started with vomting after he some old chicken wings from the fridge-- he's not sure if they were bad. He also had diarrhea-- multiple episodes per day for the last 5 days. No blood in stool. Patient also developed fever at home to 101F.  Patient presented to the ER for evaluation. He received cefepime.     1. Febrile syndrome. Acute enterocolitis. RA on humira, Immunocompromised host. Cirrhosis.   -obtain BC x 2, urine c/s   67 y/o male with h/o rheumatoid arthritis on methotrexate and humira, recent diagnosis of large lymphocytic leukemia (not on treatment), thrombocytopenia, RLS, HTN, HLD was admitted on 5/26 for N/V/D and fever.  Patient notes he hasn't been feeling well for the last 5 days PTA. Patient notes he started with vomting after he some old chicken wings from the fridge-- he's not sure if they were bad. He also had diarrhea-- multiple episodes per day for the last 5 days. No blood in stool. Patient also developed fever at home to 101F.  Patient presented to the ER for evaluation. He received cefepime.     1. Febrile syndrome. Acute enterocolitis. RA on humira, Immunocompromised host. Cirrhosis.   -fever ?related to GI infection ?Large cell lymphoma  -obtain BC x 2, urine c/s  -stool for CDT is pending  -continue on ceftriaxone 1 gm IV qd  -reason for abx use and side effects reviewed with patient; monitor BMP   -contact isolation  -monitor BM's  -hematology evaluation appreciated s/p BM biopsy  -old chart reviewed to assess prior cultures  -monitor temps  -f/u CBC  -supportive care  2. Other issues:   -care per medicine

## 2022-05-29 ENCOUNTER — TRANSCRIPTION ENCOUNTER (OUTPATIENT)
Age: 67
End: 2022-05-29

## 2022-05-29 LAB
ANION GAP SERPL CALC-SCNC: 9 MMOL/L — SIGNIFICANT CHANGE UP (ref 5–17)
BASOPHILS # BLD AUTO: 0 K/UL — SIGNIFICANT CHANGE UP (ref 0–0.2)
BASOPHILS NFR BLD AUTO: 0 % — SIGNIFICANT CHANGE UP (ref 0–2)
BUN SERPL-MCNC: 8 MG/DL — SIGNIFICANT CHANGE UP (ref 7–23)
CALCIUM SERPL-MCNC: 8.6 MG/DL — SIGNIFICANT CHANGE UP (ref 8.5–10.1)
CHLORIDE SERPL-SCNC: 103 MMOL/L — SIGNIFICANT CHANGE UP (ref 96–108)
CO2 SERPL-SCNC: 24 MMOL/L — SIGNIFICANT CHANGE UP (ref 22–31)
CREAT SERPL-MCNC: 0.82 MG/DL — SIGNIFICANT CHANGE UP (ref 0.5–1.3)
EGFR: 97 ML/MIN/1.73M2 — SIGNIFICANT CHANGE UP
EOSINOPHIL # BLD AUTO: 0 K/UL — SIGNIFICANT CHANGE UP (ref 0–0.5)
EOSINOPHIL NFR BLD AUTO: 0 % — SIGNIFICANT CHANGE UP (ref 0–6)
GLUCOSE SERPL-MCNC: 93 MG/DL — SIGNIFICANT CHANGE UP (ref 70–99)
HCT VFR BLD CALC: 35.6 % — LOW (ref 39–50)
HGB BLD-MCNC: 12.2 G/DL — LOW (ref 13–17)
LYMPHOCYTES # BLD AUTO: 10.32 K/UL — HIGH (ref 1–3.3)
LYMPHOCYTES # BLD AUTO: 89 % — HIGH (ref 13–44)
MCHC RBC-ENTMCNC: 33.1 PG — SIGNIFICANT CHANGE UP (ref 27–34)
MCHC RBC-ENTMCNC: 34.3 GM/DL — SIGNIFICANT CHANGE UP (ref 32–36)
MCV RBC AUTO: 96.5 FL — SIGNIFICANT CHANGE UP (ref 80–100)
MONOCYTES # BLD AUTO: 0.58 K/UL — SIGNIFICANT CHANGE UP (ref 0–0.9)
MONOCYTES NFR BLD AUTO: 5 % — SIGNIFICANT CHANGE UP (ref 2–14)
NEUTROPHILS # BLD AUTO: 0.23 K/UL — LOW (ref 1.8–7.4)
NEUTROPHILS NFR BLD AUTO: 2 % — LOW (ref 43–77)
NRBC # BLD: SIGNIFICANT CHANGE UP /100 WBCS (ref 0–0)
PLATELET # BLD AUTO: 75 K/UL — LOW (ref 150–400)
POTASSIUM SERPL-MCNC: 3.3 MMOL/L — LOW (ref 3.5–5.3)
POTASSIUM SERPL-SCNC: 3.3 MMOL/L — LOW (ref 3.5–5.3)
RBC # BLD: 3.69 M/UL — LOW (ref 4.2–5.8)
RBC # FLD: 17.9 % — HIGH (ref 10.3–14.5)
SODIUM SERPL-SCNC: 136 MMOL/L — SIGNIFICANT CHANGE UP (ref 135–145)
WBC # BLD: 11.6 K/UL — HIGH (ref 3.8–10.5)
WBC # FLD AUTO: 11.6 K/UL — HIGH (ref 3.8–10.5)

## 2022-05-29 PROCEDURE — 99232 SBSQ HOSP IP/OBS MODERATE 35: CPT

## 2022-05-29 RX ORDER — LABETALOL HCL 100 MG
100 TABLET ORAL
Refills: 0 | Status: DISCONTINUED | OUTPATIENT
Start: 2022-05-29 | End: 2022-05-30

## 2022-05-29 RX ORDER — AMLODIPINE BESYLATE 2.5 MG/1
5 TABLET ORAL ONCE
Refills: 0 | Status: COMPLETED | OUTPATIENT
Start: 2022-05-29 | End: 2022-05-29

## 2022-05-29 RX ORDER — POTASSIUM CHLORIDE 20 MEQ
40 PACKET (EA) ORAL ONCE
Refills: 0 | Status: COMPLETED | OUTPATIENT
Start: 2022-05-29 | End: 2022-05-29

## 2022-05-29 RX ORDER — IBUPROFEN 200 MG
1 TABLET ORAL
Qty: 0 | Refills: 0 | DISCHARGE

## 2022-05-29 RX ORDER — ENOXAPARIN SODIUM 100 MG/ML
40 INJECTION SUBCUTANEOUS EVERY 24 HOURS
Refills: 0 | Status: DISCONTINUED | OUTPATIENT
Start: 2022-05-29 | End: 2022-05-30

## 2022-05-29 RX ADMIN — AMLODIPINE BESYLATE 5 MILLIGRAM(S): 2.5 TABLET ORAL at 08:25

## 2022-05-29 RX ADMIN — PITAVASTATIN CALCIUM 2 MILLIGRAM(S): 1.04 TABLET, FILM COATED ORAL at 08:26

## 2022-05-29 RX ADMIN — Medication 40 MILLIEQUIVALENT(S): at 15:26

## 2022-05-29 RX ADMIN — Medication 1 MILLIGRAM(S): at 08:25

## 2022-05-29 RX ADMIN — LOSARTAN POTASSIUM 25 MILLIGRAM(S): 100 TABLET, FILM COATED ORAL at 08:25

## 2022-05-29 RX ADMIN — PRAMIPEXOLE DIHYDROCHLORIDE 0.25 MILLIGRAM(S): 0.12 TABLET ORAL at 21:59

## 2022-05-29 RX ADMIN — AMLODIPINE BESYLATE 5 MILLIGRAM(S): 2.5 TABLET ORAL at 15:27

## 2022-05-29 RX ADMIN — Medication 100 MILLIGRAM(S): at 16:59

## 2022-05-29 NOTE — DISCHARGE NOTE PROVIDER - HOSPITAL COURSE
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 RVP testing in the ER was negative.  WBC elevated @ 13 but elevated at baseline in mid teens.  Patient pancultured.  Lactate 2.  Started on IVF and IV ABX.      #Fever with nausea/ vomiting/ diarrhea resolved :  likely viral gastroenteritis resolved   #Immunocompromised on humira and methotrexate for RA  #large cell lymphoma  d/c further abx therapy  blood cx and ucx neg   c diff neg, GI PCR neg  remians afebrile for 24 hrs  advised to follow up with rheumatology to see if ok to resume humira this week,   humira was due 5/28 -not admisnitered as had fever yesterday       #Hyponatremia:  from hypovolemia resolved   s/p IVF     #ANTOINE:    Prerenal as above.    resolved s/p IVF       #leukocytosis from Large Lymphocytic Leukemia:    # thrombocytopenia   New dx as per patient's oncologist Dr. Sanchez.    s/p bone marrow biopsy 5/27   OK for d/c home from heme/onc   Pt will f/u in clinic on 6/8 to review pathology and discuss treatment options if warranted      #hepatic steatosis, liver cirrhosis, splenomegaly     hx etoh use   viral hepatitis panel neg   f/u GI as outpatient         #ETOH use:    Monitor for withdrawal sx.   no sign sof withdrawal      #RA:    Cont home meds.  MTX/ Humira/ Folic acid.      #HTN:    admisnitered norvasc losartan better controlled howver pt does not want to start norvasc or losrtan and now says that he was supposed to be on labetalol at home and says he will go back and resume it - pt had stopped it on his own afew weeks ago  f/u PMD     #HLD:    Cont statin.      #DVT proph:  Lovenox SQ.      5/27- afebrile , nausea, vomiting improved , diarhhea improving , no abd pain  5/28 had fever overnight no other c/o, BM bx site bleeding overnight now stopped-I zariawtnayan diallo IR apply gauze pressure dressing   5/29 fever resolved, no complaints   Physical exam   General:     Well appearing, no identifying marks , scars, or tattoos.  Skin: no rash or prominent lesions  Head: normocephalic, atraumatic     Sinuses: non-tender  Nose: no external lesions, mucosa non-inflamed, septum and turbinates normal  Throat: no erythema, exudates or lesions.  Neck: Supple without lymphadenopathy. Thyroid no thyromegaly, no palpable thyroid nodules, no palpable nodules or masses, carotid arteries no bruits.   Breasts: No palpable masses or lesions.  Heart: RRR, no murmur or gallop.  Normal S1, S2.  No S3, S4.   Lungs: CTA bilaterally, no wheezes, rhonchi, rales.  Breathing unlabored.   Chest wall: Normal insp   Abdomen:  Soft, NT/ND, normal bowel sounds, no HSM, no masses.  No peritoneal signs.   Back: spine normal without deformity or tenderness.  Normal ROM , sacral bone marrow biopsy site clean , no further bleeding   : Exam normal.  no inguinal hernias.  Extremities: No deformities, clubbing, cyanosis, or edema.  Musculoskeletal: Normal gait and station. No decreased range of motion, instability, atrophy or abnormal strength or tone in the head, neck, spine, ribs, pelvis or extremities.   Neurologic: CN 2-12 normal. Sensation to pain, touch and proprioception normal. DTRs normal in upper and lower extremities. No pathologic reflexes.  Motor normal.    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 RVP testing in the ER was negative.  WBC elevated @ 13 but elevated at baseline in mid teens.  Patient pancultured.  Lactate 2.  Started on IVF and IV ABX.      #Fever with nausea/ vomiting/ diarrhea resolved :  likely viral gastroenteritis resolved   #Immunocompromised on humira and methotrexate for RA  #large cell lymphocytic leukemia  d/c further abx therapy  blood cx and ucx neg   c diff neg, GI PCR neg  remians afebrile for 24 hrs  advised to follow up with rheumatology to see if ok to resume humira this week,   humira was due 5/28 -not admisnitered as had fever yesterday       #Hyponatremia:  from hypovolemia resolved   s/p IVF     #ANTOINE:    Prerenal as above.    resolved s/p IVF       #leukocytosis from Large Lymphocytic Leukemia:    # thrombocytopenia   New dx as per patient's oncologist Dr. Sanchez.    s/p bone marrow biopsy 5/27   OK for d/c home from heme/onc   Pt will f/u in clinic on 6/8 to review pathology and discuss treatment options if warranted      #hepatic steatosis, liver cirrhosis, splenomegaly     hx etoh use   viral hepatitis panel neg   f/u GI as outpatient         #ETOH use:    Monitor for withdrawal sx.   no sign sof withdrawal      #RA:    Cont home meds.  MTX/ Humira/ Folic acid.      #HTN:    admisnitered norvasc losartan better controlled howver pt does not want to start norvasc or losrtan and now says that he was supposed to be on labetalol at home and says he will go back and resume it - pt had stopped it on his own afew weeks ago  f/u PMD     #HLD:    Cont statin.      #DVT proph:  Lovenox SQ.      5/27- afebrile , nausea, vomiting improved , diarhhea improving , no abd pain  5/28 had fever overnight no other c/o, BM bx site bleeding overnight now stopped-I zariawtnayan diallo IR apply gauze pressure dressing   5/29 fever resolved, no complaints   Physical exam   General:     Well appearing, no identifying marks , scars, or tattoos.  Skin: no rash or prominent lesions  Head: normocephalic, atraumatic     Sinuses: non-tender  Nose: no external lesions, mucosa non-inflamed, septum and turbinates normal  Throat: no erythema, exudates or lesions.  Neck: Supple without lymphadenopathy. Thyroid no thyromegaly, no palpable thyroid nodules, no palpable nodules or masses, carotid arteries no bruits.   Breasts: No palpable masses or lesions.  Heart: RRR, no murmur or gallop.  Normal S1, S2.  No S3, S4.   Lungs: CTA bilaterally, no wheezes, rhonchi, rales.  Breathing unlabored.   Chest wall: Normal insp   Abdomen:  Soft, NT/ND, normal bowel sounds, no HSM, no masses.  No peritoneal signs.   Back: spine normal without deformity or tenderness.  Normal ROM , sacral bone marrow biopsy site clean , no further bleeding   : Exam normal.  no inguinal hernias.  Extremities: No deformities, clubbing, cyanosis, or edema.  Musculoskeletal: Normal gait and station. No decreased range of motion, instability, atrophy or abnormal strength or tone in the head, neck, spine, ribs, pelvis or extremities.   Neurologic: CN 2-12 normal. Sensation to pain, touch and proprioception normal. DTRs normal in upper and lower extremities. No pathologic reflexes.  Motor normal.    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 RVP testing in the ER was negative.  WBC elevated @ 13 but elevated at baseline in mid teens.  Patient pancultured.  Lactate 2.  Started on IVF and IV ABX.      #Fever with nausea/ vomiting/ diarrhea resolved :  likely viral gastroenteritis resolved   #Immunocompromised on humira and methotrexate for RA  #large cell lymphocytic leukemia  d/c further abx therapy  blood cx and ucx neg   c diff neg, GI PCR neg  remians afebrile for 48 hrs  advised to follow up with rheumatology to see if ok to resume humira this week,   humira was due 5/28 -not admisnitered as had fever        #Hyponatremia:  from hypovolemia resolved   s/p IVF     #ANTOINE:    Prerenal as above.    resolved s/p IVF       #leukocytosis from Large Lymphocytic Leukemia:    # thrombocytopenia   New dx as per patient's oncologist Dr. Sanchez.    s/p bone marrow biopsy 5/27   OK for d/c home from heme/onc   Pt will f/u in clinic on 6/8 to review pathology and discuss treatment options if warranted      #hepatic steatosis, liver cirrhosis, splenomegaly     hx etoh use   viral hepatitis panel neg   f/u GI as outpatient         #ETOH use:    Monitor for withdrawal sx.   no sign sof withdrawal      #RA:    Cont home meds.  MTX/ Humira/ Folic acid.      #HTN:    admisnitered norvasc losartan better controlled  do not resume home labetalol as pt feels dizzy with labetalol  He will need more fine tuning of the bp based on hx and Dr Doshi  will followup as outpt.   followup Dr doshi in office       #HLD:    Cont statin.      #DVT proph:  Lovenox SQ.      5/27- afebrile , nausea, vomiting improved , diarhhea improving , no abd pain  5/28 had fever overnight no other c/o, BM bx site bleeding overnight now stopped-I dwiwth dr diallo IR apply gauze pressure dressing   5/29 fever resolved, no complaints   5/30 no complaints, pt eager to go home   Physical exam   General:     Well appearing, no identifying marks , scars, or tattoos.  Skin: no rash or prominent lesions  Head: normocephalic, atraumatic     Sinuses: non-tender  Nose: no external lesions, mucosa non-inflamed, septum and turbinates normal  Throat: no erythema, exudates or lesions.  Neck: Supple without lymphadenopathy. Thyroid no thyromegaly, no palpable thyroid nodules, no palpable nodules or masses, carotid arteries no bruits.   Breasts: No palpable masses or lesions.  Heart: RRR, no murmur or gallop.  Normal S1, S2.  No S3, S4.   Lungs: CTA bilaterally, no wheezes, rhonchi, rales.  Breathing unlabored.   Chest wall: Normal insp   Abdomen:  Soft, NT/ND, normal bowel sounds, no HSM, no masses.  No peritoneal signs.   Back: spine normal without deformity or tenderness.  Normal ROM , sacral bone marrow biopsy site clean , no further bleeding   : Exam normal.  no inguinal hernias.  Extremities: No deformities, clubbing, cyanosis, or edema.  Musculoskeletal: Normal gait and station. No decreased range of motion, instability, atrophy or abnormal strength or tone in the head, neck, spine, ribs, pelvis or extremities.   Neurologic: CN 2-12 normal. Sensation to pain, touch and proprioception normal. DTRs normal in upper and lower extremities. No pathologic reflexes.  Motor normal.    discharge time 47mins   i dw with ID and onc and pt

## 2022-05-29 NOTE — DISCHARGE NOTE PROVIDER - NSDCMRMEDTOKEN_GEN_ALL_CORE_FT
Artificial Tears ophthalmic solution: 1 drop(s) to each affected eye 2 times a day, As Needed  folic acid 1 mg oral tablet: 1 tab(s) orally 6 times a week Sunday - Friday ***do not take on Saturday when taking Methotrexate***  Humira 40 mg/0.8 mL subcutaneous kit: subcutaneous every other week on Saturdays  ***next dose due 5-28-22***  labetalol 100 mg oral tablet: 1 tab(s) orally 2 times a day- please continue your home dose as recommended by your cardiologist  Livalo 2 mg oral tablet: 1 tab(s) orally once a day  methotrexate 2.5 mg oral tablet: 3 tab(s) orally once a week on Saturdays  pramipexole 0.25 mg oral tablet: 1 tab(s) orally once a day (at bedtime)  Tylenol 325 mg oral tablet: 2 tab(s) orally every 4 hours, As Needed   amLODIPine 10 mg oral tablet: 1 tab(s) orally once a day  Artificial Tears ophthalmic solution: 1 drop(s) to each affected eye 2 times a day, As Needed  folic acid 1 mg oral tablet: 1 tab(s) orally 6 times a week Sunday - Friday ***do not take on Saturday when taking Methotrexate***  Humira 40 mg/0.8 mL subcutaneous kit: subcutaneous every other week on Saturdays  ***next dose due 5-28-22***  Livalo 2 mg oral tablet: 1 tab(s) orally once a day  losartan 25 mg oral tablet: 1 tab(s) orally once a day  methotrexate 2.5 mg oral tablet: 3 tab(s) orally once a week on Saturdays  pramipexole 0.25 mg oral tablet: 1 tab(s) orally once a day (at bedtime)  Tylenol 325 mg oral tablet: 2 tab(s) orally every 4 hours, As Needed

## 2022-05-29 NOTE — DISCHARGE NOTE NURSING/CASE MANAGEMENT/SOCIAL WORK - PATIENT PORTAL LINK FT
You can access the FollowMyHealth Patient Portal offered by [facility] by registering at the following website: [email or web address]. By joining [employer]’s FollowMyHealth portal, you will also be able to view your health information using other applications (apps) compatible with our system.

## 2022-05-29 NOTE — DISCHARGE NOTE PROVIDER - NSDCCPCAREPLAN_GEN_ALL_CORE_FT
PRINCIPAL DISCHARGE DIAGNOSIS  Diagnosis: Fever  Assessment and Plan of Treatment: your fever was likely from viral gastroenteritis which has resolved   please call your rheumatologist to find out if ok to resume humira this week  please follow up with oncology for bone marrow biopsy results and further management of large cell lymphocytic leukemia  please follow up with your cardiologist for blood pressure management and resume labetalol as prescribed by your doctor  you were found to have liver cirrhosis and fatty liver and large spleen - follow up with your primary doctor for outpatient gastroenterology consultation and management of liver cirrhosis- abstain from alcohol use  if you have recurrence of fever or diarrhea or abdominal pain or dizziness of chest pain return to ER or call 911      SECONDARY DISCHARGE DIAGNOSES  Diagnosis: Thrombocytopenia  Assessment and Plan of Treatment:     Diagnosis: Splenomegaly  Assessment and Plan of Treatment:      PRINCIPAL DISCHARGE DIAGNOSIS  Diagnosis: Fever  Assessment and Plan of Treatment: your fever was likely from viral gastroenteritis which has resolved   please call your rheumatologist to find out if ok to resume humira this week  please follow up with oncology for bone marrow biopsy results and further management of large cell lymphocytic leukemia  please follow up with Dr Ham cardiologist for blood pressure management and continue losartan and amlodipine as newly prescribed for your high blood pressure , stop labetalol as you feel dizzy with labetalol  you were found to have liver cirrhosis and fatty liver and large spleen - follow up with your primary doctor for outpatient gastroenterology consultation and management of liver cirrhosis- abstain from alcohol use  if you have recurrence of fever or diarrhea or abdominal pain or dizziness of chest pain return to ER or call 911      SECONDARY DISCHARGE DIAGNOSES  Diagnosis: Thrombocytopenia  Assessment and Plan of Treatment:     Diagnosis: Splenomegaly  Assessment and Plan of Treatment:

## 2022-05-29 NOTE — DISCHARGE NOTE PROVIDER - PROVIDER TOKENS
PROVIDER:[TOKEN:[[id number]]],FREE:[LAST:[rheumatology],PHONE:[(   )    -],FAX:[(   )    -]],FREE:[LAST:[gastroenterology],PHONE:[(   )    -],FAX:[(   )    -]],FREE:[LAST:[cardiology],PHONE:[(   )    -],FAX:[(   )    -]] PROVIDER:[TOKEN:[[id number]]],FREE:[LAST:[rheumatology],PHONE:[(   )    -],FAX:[(   )    -]],FREE:[LAST:[gastroenterology],PHONE:[(   )    -],FAX:[(   )    -]],FREE:[LAST:[cardiology],PHONE:[(   )    -],FAX:[(   )    -]],PROVIDER:[TOKEN:[[id number]]]

## 2022-05-29 NOTE — PROVIDER CONTACT NOTE (OTHER) - RECOMMENDATIONS
Medicate and cancel discharge
Medicate BP prior to discharge. Pt takes unknown dose of labetolol at home.

## 2022-05-29 NOTE — DISCHARGE NOTE PROVIDER - CARE PROVIDER_API CALL
Kehinde Sanchez)  Medicine  [address], [place] [address]  Phone: [phone]  Fax: [phone]  Follow Up Time:     rheumatology,   Phone: (   )    -  Fax: (   )    -  Follow Up Time:     gastroenterology,   Phone: (   )    -  Fax: (   )    -  Follow Up Time:     cardiology,   Phone: (   )    -  Fax: (   )    -  Follow Up Time:    Kehinde Sanchez)  Medicine  [address], [place] [address]  Phone: [phone]  Fax: [phone]  Follow Up Time:     rheumatology,   Phone: (   )    -  Fax: (   )    -  Follow Up Time:     gastroenterology,   Phone: (   )    -  Fax: (   )    -  Follow Up Time:     cardiology,   Phone: (   )    -  Fax: (   )    -  Follow Up Time:     Charmaine Dallas)  [facility]; Cardiovascular Disease  [address] [place] [address]  Phone: [phone]  Fax: [phone]  Follow Up Time:

## 2022-05-29 NOTE — DISCHARGE NOTE NURSING/CASE MANAGEMENT/SOCIAL WORK - NSDCPEFALRISK_GEN_ALL_CORE
For information on Fall & Injury Prevention, visit: [email or web address] OR  [email or web address] OR  https://www.cdc.gov/steadi/patient.html

## 2022-05-29 NOTE — PROVIDER CONTACT NOTE (OTHER) - ASSESSMENT
Pt resting comfortably in bed with no complaints of headache or blurry vision.
Pt resting comfortably in chair with no complaints. Pt received 5mg amlodipine one hour prior. BP done with manual machine.

## 2022-05-29 NOTE — PROVIDER CONTACT NOTE (OTHER) - BACKGROUND
Pt admitted with nausea, vomiting, diarrhea. Hx HTN.
Pt admitted with N/V/D. Difficult to control BP.

## 2022-05-29 NOTE — PROVIDER CONTACT NOTE (CRITICAL VALUE NOTIFICATION) - ACTION/TREATMENT ORDERED:
"                                                    Physical Therapy Daily Note     Name: Moshe [facility] Number: [id number]  Diagnosis:   Encounter Diagnoses   Name Primary?    Pain of right hip joint Yes    Weakness of right hip      Physician: Gael Lyn Jr., *  Precautions: HTN, he had a R ROSIBEL (anterior approach) on 2/6/18   Visit #: 6 of 12  PTA Visit #: -  Time In: 1100  Time Out: 1200  Total Treatment Time 1:1: 30    Evaluation Date: 3/7/2018  Visit # authorized: 20  Authorization period: 12/31/2018  Plan of care Expiration: 4/18/2018  MD referral: Dr. Lyn    Subjective     Pt reports: he's doing great with no pain, but he feels tired right now because he just came from the pool. He swam for 45 minutes pre-tx.    Objective     Moshe received individual therapeutic exercises to develop strength, endurance, ROM and core stabilization for 30 minutes including:  Bike 5 minutes  Bridges on SB 3x10  SL bridges 20x  Clams 3x10 GTB  SL hip abd 3x10  Supine HSS 3x30" R  Scooter hip flexor stretch off edge of mat 2x1 min R NP  Shuttle 3 C 3x10  Step ups L3 3x10  Lateral step ups L3 3x10  Wall squats with SB 2 x20  Steam boats 3x10  RTB B  Crab walks RTB  3 laps  Step downs lvl 3 x30  Forward/lat walks w/ OSC 20x  SLS and tandem stance (RLE back) on airex 2x 30"  PPT 3x10 NP  Anti-rotation walk outs Gcord 3x10 B NP    The patient received the following supervised modalities after being cleared for contradictions: refused ice    Written Home Exercises Provided: at eval  Pt demo good understanding of the education provided. Moshe demonstrated good return demonstration of activities.     Education provided re: to keep up with HEP and not to do too much. Pt educated on anterior versus posterior approach precautions and that he is allowed to progress hip extension ROM. Pt informed he should not perform ballistic stretching/rotation such as in golf at this time.   Moshe verbalized good understanding of " education provided.   No spiritual or educational barriers to learning provided    PT reviewed FOTO scores for Moshe Murphy on 3/27/18  FOTO score: 76    Functional Limitations Reports - G Codes  Category: mobility  Tool: FOTO      Current ():    Goal (): CJ        Assessment     Patient tolerated all treatment well. Lowered step up level to level 3 because last session patient unable to step up without momentum from LLE. Pt demos improved strength with all exercises. According to FOTO, the patient has made great improvements in mobility since beginning therapy and is between 20% and 40% limited in mobility. Pt making good progress towards goals and has met all STGs and 1/3 LTGs. Pt demos improved strength with all exercises and is making good progress toward goals. Pt will continue to benefit from skilled PT in order to increase strength and ROM and improve functional mobility.    Goals as follows:  Short Term GOALS: 3 weeks. Pt agrees with goals set.  1. Patient demonstrates independence with HEP. GOAL MET 3/23/218  2. Patient demonstrates independence with Postural Awareness. GOAL MET 3/23/18  3. Patient demonstrates independence with body mechanics. GOAL MET 3/23/18     Long Term GOALS: 6 weeks. Pt agrees with goals set.  1. Patient demonstrates increased R hip AROM to 10 degrees to improve tolerance to functional activities. (progressing, not met)  2. Patient demonstrates increased strength BLE's to 5/5 or greater to improve tolerance to functional activities. (progressing, not met)  3. Patient demonstrates improved overall function per FOTO Lumbar Survey to 33% or less. GOAL MET 3/27/18  Plan     Continue with established Plan of Care towards PT goals.    Therapist: Mia Bashir, PT  3/27/2018   Administer amlodipine and losartan now

## 2022-05-29 NOTE — PROVIDER CONTACT NOTE (OTHER) - ACTION/TREATMENT ORDERED:
Give 5mg amlodipine stat. Re-check BP one hour after administration. Okay for discharge if SBP < 160 and DBP < 100.

## 2022-05-30 VITALS — SYSTOLIC BLOOD PRESSURE: 147 MMHG | DIASTOLIC BLOOD PRESSURE: 96 MMHG

## 2022-05-30 PROCEDURE — 93010 ELECTROCARDIOGRAM REPORT: CPT

## 2022-05-30 PROCEDURE — 99239 HOSP IP/OBS DSCHRG MGMT >30: CPT

## 2022-05-30 RX ORDER — LOSARTAN POTASSIUM 100 MG/1
1 TABLET, FILM COATED ORAL
Qty: 30 | Refills: 0
Start: 2022-05-30 | End: 2022-06-28

## 2022-05-30 RX ORDER — AMLODIPINE BESYLATE 2.5 MG/1
1 TABLET ORAL
Qty: 30 | Refills: 0
Start: 2022-05-30 | End: 2022-06-28

## 2022-05-30 RX ORDER — AMLODIPINE BESYLATE 2.5 MG/1
5 TABLET ORAL ONCE
Refills: 0 | Status: COMPLETED | OUTPATIENT
Start: 2022-05-30 | End: 2022-05-30

## 2022-05-30 RX ORDER — LABETALOL HCL 100 MG
1 TABLET ORAL
Qty: 0 | Refills: 0 | DISCHARGE

## 2022-05-30 RX ADMIN — ENOXAPARIN SODIUM 40 MILLIGRAM(S): 100 INJECTION SUBCUTANEOUS at 10:12

## 2022-05-30 RX ADMIN — AMLODIPINE BESYLATE 5 MILLIGRAM(S): 2.5 TABLET ORAL at 12:36

## 2022-05-30 RX ADMIN — PITAVASTATIN CALCIUM 2 MILLIGRAM(S): 1.04 TABLET, FILM COATED ORAL at 10:44

## 2022-05-30 RX ADMIN — LOSARTAN POTASSIUM 25 MILLIGRAM(S): 100 TABLET, FILM COATED ORAL at 10:13

## 2022-05-30 RX ADMIN — Medication 1 MILLIGRAM(S): at 10:13

## 2022-05-30 RX ADMIN — AMLODIPINE BESYLATE 5 MILLIGRAM(S): 2.5 TABLET ORAL at 10:12

## 2022-05-30 NOTE — CONSULT NOTE ADULT - REASON FOR ADMISSION
nausea, vomiting, diarrhea and fever

## 2022-05-30 NOTE — CONSULT NOTE ADULT - ASSESSMENT
Poorly controlled HTN- on low dose labetalol and he is intolerant to it and continuing it will only lead to more noncompliance.  Recheck BP today after am amlodipine   IF BP still elevated after then consider increasing to 10mg.  Low sodium diet.  He will need more fine tuning of the bp based on hx and I will followup as outpt.  He can followup see me in office     Charmaine Dallas MD  [address] [place], [address]  Ph [phone] ext [phone]  Thursday 6/2/22 1 pm.    Fever, nause- Management per primary team.     Hyperlipidemia- FLP check and f/u outpt.    Other medical issues- Management per primary team.   Thank you for allowing me to participate in the care of this patient. Please feel free to contact me with any questions.

## 2022-05-30 NOTE — PROGRESS NOTE ADULT - REASON FOR ADMISSION
nausea, vomiting, diarrhea and fever

## 2022-05-30 NOTE — PROGRESS NOTE ADULT - ASSESSMENT
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 RVP testing in the ER was negative.  WBC elevated @ 13 but elevated at baseline in mid teens.  Patient pancultured.  Lactate 2.  Started on IVF and IV ABX.      #Fever with nausea/ vomiting/ diarrhea:  likely viral gastroenetritis  Admit to [facility].    F/u pancultures.    Suspect viral gastroenteritis/ food poisoning.    Check GI PCR, CDIFF.    R/o Norovirus with n/v/d/ for 5 days.    Patient immunocompromised as has underlying leukemia as well as on methotrexate and humera for RA.    IVF until oral intake improves.    DASH diet as tolerated.    dc abx for now    #Hyponatremia:  improved   Na 127.    Suspect all prerenal / hypovolemia related.  S/p 2 L in ER.    Repeat labs.    Cont NSS and follow.      #ANTOINE:    Prerenal as above.    NSS @ 75.    Trend labs.      #Large Lymphocytic Leukemia:    New dx as per patient's oncologist Dr. Sanchez.    AS per ER discussion with Dr. Sanchez-- would like patient to have fever work up/  bone marrow bx/ HIV/ Hepatitis screening.    IR eval for bone marrow eval.    Cirrhosis/ Splenomegaly noted on CT.  Unclear if new/ old.    ONC eval.    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  Trend.      #ETOH use:    Monitor for withdrawal sx.      #RA:    Cont home meds.  MTX/ Humera/ Folic acid.      #HTN:    Cont home meds.      #HLD:    Cont statin.      #DVT proph:  Lovenox SQ.      
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  Patient notes he hasn't been feeling well for the last 5 days PTA. Patient notes he started with vomting after he some old chicken wings from the fridge-- he's not sure if they were bad. He also had diarrhea-- multiple episodes per day for the last 5 days. No blood in stool. Patient also developed fever at home to 101F.  Patient presented to the ER for evaluation. He received cefepime.     1. Febrile syndrome resolving. Acute enterocolitis resolving. RA on humira, Immunocompromised host. Cirrhosis.   -fever ?related to GI infection ?Large cell lymphoma  -BC x 2, urine c/s noted  -stool for CDT is negative  -on ceftriaxone 1 gm IV qd  -reason for abx use and side effects reviewed with patient; monitor BMP   -d/c contact isolation  -monitor BM's  -hematology evaluation appreciated s/p BM biopsy  -d/c further abx therapy  -monitor temps  -f/u CBC  -supportive care  2. Other issues:   -care per medicine    
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  - T cell gamma gene rearrangement sent and was positive to detect a clonal T cell population highly suggestive of T cell malignancy.  - Cr normalized with IVF   - CT a/p - Cirrhosis with splenomegaly. Hepatic steatosis. No bowel obstruction or gross bowel wall thickening. Normal appendix.  Sigmoid diverticulosis without definite evidence of acute diverticulitis.    s/p BM bx yesterday   clinically stable   CBC reviewed     OK for d/c home from heme/onc   Pt will f/u in clinic on 6/8 to review pathology and discuss treatment options if warranted    Lonnie Donnelyl MD  [facility]  [phone] 
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 RVP testing in the ER was negative.  WBC elevated @ 13 but elevated at baseline in mid teens.  Patient pancultured.  Lactate 2.  Started on IVF and IV ABX.      #Fever with nausea/ vomiting/ diarrhea:  likely viral gastroenetritis  Admit to [facility].    F/u pancultures.    Suspect viral gastroenteritis/ food poisoning.    Check GI PCR, CDIFF.    R/o Norovirus with n/v/d/ for 5 days.    Patient immunocompromised as has underlying leukemia as well as on methotrexate and humera for RA.    IVF until oral intake improves.    DASH diet as tolerated.    dc abx for now    #Hyponatremia:  improved   Na 127.    Suspect all prerenal / hypovolemia related.  S/p 2 L in ER.    Repeat labs.    Cont NSS and follow.      #ANTOINE:    Prerenal as above.    NSS @ 75.    Trend labs.      #Large Lymphocytic Leukemia:    New dx as per patient's oncologist Dr. Sanchez.    AS per ER discussion with Dr. Sanchez-- would like patient to have fever work up/  bone marrow bx/ HIV/ Hepatitis screening.    IR eval for bone marrow eval.    Cirrhosis/ Splenomegaly noted on CT.  Unclear if new/ old.    ONC eval.    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  Trend.      #ETOH use:    Monitor for withdrawal sx.      #RA:    Cont home meds.  MTX/ Humera/ Folic acid.      #HTN:    Cont home meds.      #HLD:    Cont statin.      #DVT proph:  Lovenox SQ.          
#Fever with nausea/ vomiting/ diarrhea resolved :  likely viral gastroenteritis resolved   #Immunocompromised on humira and methotrexate for RA  #large cell lymphocytic leukemia  d/c further abx therapy  blood cx and ucx neg   c diff neg, GI PCR neg  remains afebrile for 48 hrs  advised to follow up with rheumatology to see if ok to resume humira this week,   humira was due 5/28 -not admisnitered as had fever        #Hyponatremia:  from hypovolemia resolved   s/p IVF     #ANTOINE:    Prerenal as above.    resolved s/p IVF       #leukocytosis from Large Lymphocytic Leukemia:    # thrombocytopenia   New dx as per patient's oncologist Dr. Sanchez.    s/p bone marrow biopsy 5/27   OK for d/c home from heme/onc   Pt will f/u in clinic on 6/8 to review pathology and discuss treatment options if warranted      #hepatic steatosis, liver cirrhosis, splenomegaly     hx etoh use   viral hepatitis panel neg   f/u GI as outpatient         #ETOH use:    Monitor for withdrawal sx.   no sign sof withdrawal      #RA:    Cont home meds.  MTX/ Humira/ Folic acid.      #HTN:    admisnitered norvasc losartan better controlled  do not resume home labetalol as pt feels dizzy with labetalol  He will need more fine tuning of the bp based on hx and Dr Doshi  will followup as outpt.   followup Dr doshi in office       #HLD:    Cont statin.      #DVT proph:  Lovenox SQ.    discharge time 47mins   i dw with ID and onc and pt

## 2022-05-30 NOTE — CONSULT NOTE ADULT - SUBJECTIVE AND OBJECTIVE BOX
Patient is a 66y old  Male who presents with a chief complaint of nausea, vomiting, diarrhea and fever.      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      pt seen this am.  Pt denies any CP or SOB.  He is anxious to go home.  From his extensive hx about HTN he seems to have labile HTN and he is not liking labetalol as it is dropping bP more and he is not taking it at times.            PAST MEDICAL & SURGICAL HISTORY:   ETOH abuse  Hyperlipidemia   Restless leg syndrome  Rheumatoid arteritis      FAMILY HISTORY:  No pertinent family history in first degree relatives      Social History:    +ETOH use.   No tob/ drug use.    -- lives at home with wife and daughter.      Allergies:  Aloe Lotion (Rash)            PAST MEDICAL & SURGICAL HISTORY:  ETOH abuse      Hyperlipidemia LDL goal &lt;100      Restless leg syndrome      Rheumatoid arteritis      No significant past surgical history          MEDICATIONS  (STANDING):  amLODIPine   Tablet 5 milliGRAM(s) Oral daily  enoxaparin Injectable 40 milliGRAM(s) SubCutaneous every 24 hours  folic acid 1 milliGRAM(s) Oral daily  influenza  Vaccine (HIGH DOSE) 0.7 milliLiter(s) IntraMuscular once  losartan 25 milliGRAM(s) Oral daily  methotrexate 7.5 milliGRAM(s) Oral <User Schedule>  pitavastatin 2 milliGRAM(s) Oral daily  pramipexole 0.25 milliGRAM(s) Oral at bedtime    MEDICATIONS  (PRN):  acetaminophen     Tablet .. 650 milliGRAM(s) Oral every 6 hours PRN Temp greater or equal to 38C (100.4F), Mild Pain (1 - 3)  aluminum hydroxide/magnesium hydroxide/simethicone Suspension 30 milliLiter(s) Oral every 4 hours PRN Dyspepsia  melatonin 3 milliGRAM(s) Oral at bedtime PRN Insomnia  ondansetron Injectable 4 milliGRAM(s) IV Push every 8 hours PRN Nausea and/or Vomiting  polyvinyl alcohol 1.4%/povidone 0.6% Ophthalmic Solution - Peds 1 Drop(s) Both EYES two times a day PRN Dry Eyes      FAMILY HISTORY:  No pertinent family history in first degree relatives        SOCIAL HISTORY: no smoking     REVIEW OF SYSTEMS:  CONSTITUTIONAL:    No fatigue, malaise, lethargy.  No fever or chills.  RESPIRATORY:  No cough.  No wheeze.  No hemoptysis.  No shortness of breath.  CARDIOVASCULAR:  No chest pains.  No palpitations. No shortness of breath, No orthopnea or PND.  GASTROINTESTINAL:  No abdominal pain.  No nausea or vomiting.    GENITOURINARY:    No hematuria.    MUSCULOSKELETAL:  No musculoskeletal pain.  No joint swelling.  No arthritis.  NEUROLOGICAL:  No tingling or numbness or weakness.  PSYCHIATRIC:  No confusion  SKIN:  No rashes.          Vital Signs Last 24 Hrs  T(C): 36.8 (30 May 2022 07:17), Max: 37.4 (29 May 2022 20:29)  T(F): 98.2 (30 May 2022 07:17), Max: 99.3 (29 May 2022 20:29)  HR: 76 (30 May 2022 07:17) (68 - 85)  BP: 160/95 (30 May 2022 07:17) (124/80 - 192/98)  BP(mean): --  RR: 19 (30 May 2022 07:17) (18 - 19)  SpO2: 98% (30 May 2022 07:17) (98% - 99%)    PHYSICAL EXAM-    Constitutional: no acute distress     Head: Head is normocephalic and atraumatic.      Neck:  No JVD.     Cardiovascular: Regular rate and rhythm without S3, S4. No murmurs or rubs are appreciated.      Respiratory: Breath sounds are normal. No rales. No wheezing.    Abdomen: Soft, nontender, nondistended with positive bowel sounds.      Extremity: No tenderness. No  pitting edema     Neurologic: The patient is alert and oriented.      Skin: No rash, no obvious lesions noted.      Psychiatric: The patient appears to be emotionally stable.      INTERPRETATION OF TELEMETRY: not on     ECG: Sinus [facility]  I&O's Detail      LABS:                        12.2   11.60 )-----------( 75       ( 29 May 2022 07:42 )             35.6     05-29    136  |  103  |  8   ----------------------------<  93  3.3<L>   |  24  |  0.82    Ca    8.6      29 May 2022 07:42              I&O's Summary    BNP  RADIOLOGY & ADDITIONAL STUDIES:  < from: Xray Chest 1 View-PORTABLE IMMEDIATE (05.26.22 @ 11:48) >    ACC: [id number] EXAM:  XR CHEST PORTABLE IMMED 1V                          PROCEDURE DATE:  05/26/2022          INTERPRETATION:  INDICATION: Sepsis    COMPARISON: 4/14/2018    FINDINGS:  An upright view of the chest shows clear lungs bilaterally, aside from a   tiny calcified granuloma in the peripheral right upper lobe/apex,   unchanged in retrospect from the prior exam. No infiltrates are seen.   There is no pneumothorax. No pleural fluid is present. There is no hilar   or mediastinal widening.Heart size is normal, without CHF. The bony   thorax is grossly intact.    IMPRESSION: No acute cardiopulmonary abnormalities are seen.  Small calcified granuloma in the peripheral right upper lobe/apex,   unchanged.    --- End of Report ---            CADE GUAJARDO MD; Attending Radiologist  This document has been electronically signed. May 26 2022  2:13PM    < end of copied text >

## 2022-05-30 NOTE — PROGRESS NOTE ADULT - SUBJECTIVE AND OBJECTIVE BOX
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  Patient notes he hasn't been feeling well for the last 5 days.  Patient notes he started with vomting on sunday.  He ate some old chicken wings from the fridge-- he's not sure if they were bad.  Vomiting continued through to tuesday.  He also had diarrhea-- multiple episodes per day for the last 5 days.  No blood in stool.  Patient also developed fever at home to 101.  Patient presented to the ER today Thursday for evaluation.  He was concerned he may have COVID or flu or other viral infection.  RVP testing in the ER was negative.  WBC elevated @ 13 but elevated at baseline in mid teens.  Patient pancultured.  Lactate 2.  Started on IVF and IV ABX.   5/27- afebrile , nausea, vomiting improved , diarhhea improving , no abd pain  5/28 had fever overnight no other c/o, BM bx site bleeding overnight now stopped-I dwiwt dr diallo IR apply gauze pressure dressing     ROS:  General:  fevers  Skin: No rash or bothersome skin lesions  Musculoskeletal: No arthalgias, myalgias or joint swelling  Eyes: No visual changes or eye pain  Ears: No hearing loss , otorrhea or ear pain  Nose, Mouth, Throat: No nasal congestion, rhinorrhea, oral lesions, postnasal drip or sore throat  Cardio: No chest pain or palpitations. no lower extremity edema. no syncope. no claudication.   Respiratory: No cough, shortness of breath or wheezing   GI: see hpi  : No urinary frequency, hematuria, incontinence, or dysuria  Neurologic: No headaches, parasthesias, confusion, dysarthria or gait instability  Psychiatric:  No anxiety or depression  Lymphatic:  No easy bruising, easy bleeding or swollen glands  Allergic: No itching, sneezing , watery eyes, clear rhinorrhea or recurrent infections     Physical exam   General:     Well appearing, no identifying marks , scars, or tattoos.  Skin: no rash or prominent lesions  Head: normocephalic, atraumatic     Sinuses: non-tender  Nose: no external lesions, mucosa non-inflamed, septum and turbinates normal  Throat: no erythema, exudates or lesions.  Neck: Supple without lymphadenopathy. Thyroid no thyromegaly, no palpable thyroid nodules, no palpable nodules or masses, carotid arteries no bruits.   Breasts: No palpable masses or lesions.  Heart: RRR, no murmur or gallop.  Normal S1, S2.  No S3, S4.   Lungs: CTA bilaterally, no wheezes, rhonchi, rales.  Breathing unlabored.   Chest wall: Normal insp   Abdomen:  Soft, NT/ND, normal bowel sounds, no HSM, no masses.  No peritoneal signs.   Back: spine normal without deformity or tenderness.  Normal ROM   : Exam normal.  no inguinal hernias.  Extremities: No deformities, clubbing, cyanosis, or edema.  Musculoskeletal: Normal gait and station. No decreased range of motion, instability, atrophy or abnormal strength or tone in the head, neck, spine, ribs, pelvis or extremities.   Neurologic: CN 2-12 normal. Sensation to pain, touch and proprioception normal. DTRs normal in upper and lower extremities. No pathologic reflexes.  Motor normal.  Psychiatric: Oriented X3, intact recent and remote memory, judgement and insight, normal mood and affectc      PHYSICAL EXAM:    Daily     Daily     ICU Vital Signs Last 24 Hrs  T(C): 36.7 (28 May 2022 16:15), Max: 38.9 (27 May 2022 21:56)  T(F): 98.1 (28 May 2022 16:15), Max: 102 (27 May 2022 21:56)  HR: 79 (28 May 2022 16:15) (79 - 90)  BP: 156/91 (28 May 2022 16:15) (146/89 - 162/104)  BP(mean): --  ABP: --  ABP(mean): --  RR: 18 (28 May 2022 16:15) (18 - 18)  SpO2: 98% (28 May 2022 16:15) (97% - 98%)                            11.3   10.57 )-----------( 53       ( 28 May 2022 07:25 )             33.4       CBC Full  -  ( 28 May 2022 07:25 )  WBC Count : 10.57 K/uL  RBC Count : 3.39 M/uL  Hemoglobin : 11.3 g/dL  Hematocrit : 33.4 %  Platelet Count - Automated : 53 K/uL  Mean Cell Volume : 98.5 fl  Mean Cell Hemoglobin : 33.3 pg  Mean Cell Hemoglobin Concentration : 33.8 gm/dL  Auto Neutrophil # : 0.21 K/uL  Auto Lymphocyte # : 9.30 K/uL  Auto Monocyte # : 0.21 K/uL  Auto Eosinophil # : 0.00 K/uL  Auto Basophil # : 0.11 K/uL  Auto Neutrophil % : 2.0 %  Auto Lymphocyte % : 88.0 %  Auto Monocyte % : 2.0 %  Auto Eosinophil % : 0.0 %  Auto Basophil % : 1.0 %      05-28    136  |  105  |  19  ----------------------------<  106<H>  3.9   |  26  |  1.17    Ca    8.2<L>      28 May 2022 07:25                              MEDICATIONS  (STANDING):  amLODIPine   Tablet 5 milliGRAM(s) Oral daily  cefepime  Injectable. 1000 milliGRAM(s) IV Push every 12 hours  enoxaparin Injectable 40 milliGRAM(s) SubCutaneous every 24 hours  folic acid 1 milliGRAM(s) Oral daily  influenza  Vaccine (HIGH DOSE) 0.7 milliLiter(s) IntraMuscular once  losartan 25 milliGRAM(s) Oral daily  methotrexate 7.5 milliGRAM(s) Oral <User Schedule>  pitavastatin 2 milliGRAM(s) Oral daily  pramipexole 0.25 milliGRAM(s) Oral at bedtime  sodium chloride 0.9%. 1000 milliLiter(s) (75 mL/Hr) IV Continuous <Continuous>         from: CT Abdomen and Pelvis w/ IV Cont (05.26.22 @ 12:28) >      IMPRESSION:  Cirrhosis with splenomegaly. Hepatic steatosis.    No bowel obstruction or gross bowel wall thickening. Normal appendix.   Sigmoid diverticulosis without definite evidence of acute diverticulitis.
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 RVP testing in the ER was negative.  WBC elevated @ 13 but elevated at baseline in mid teens.  Patient pancultured.  Lactate 2.  Started on IVF and IV ABX.  5/27- afebrile , nausea, vomiting improved , diarhhea improving , no abd pain  5/28 had fever overnight no other c/o, BM bx site bleeding overnight now stopped-I dwiwt dr diallo IR apply gauze pressure dressing   5/29 fever resolved, no complaints   5/30 no complaints, pt eager to go home     Physical exam   General:     Well appearing, no identifying marks , scars, or tattoos.  Skin: no rash or prominent lesions  Head: normocephalic, atraumatic     Sinuses: non-tender  Nose: no external lesions, mucosa non-inflamed, septum and turbinates normal  Throat: no erythema, exudates or lesions.  Neck: Supple without lymphadenopathy. Thyroid no thyromegaly, no palpable thyroid nodules, no palpable nodules or masses, carotid arteries no bruits.   Breasts: No palpable masses or lesions.  Heart: RRR, no murmur or gallop.  Normal S1, S2.  No S3, S4.   Lungs: CTA bilaterally, no wheezes, rhonchi, rales.  Breathing unlabored.   Chest wall: Normal insp   Abdomen:  Soft, NT/ND, normal bowel sounds, no HSM, no masses.  No peritoneal signs.   Back: spine normal without deformity or tenderness.  Normal ROM , sacral bone marrow biopsy site clean , no further bleeding   : Exam normal.  no inguinal hernias.  Extremities: No deformities, clubbing, cyanosis, or edema.  Musculoskeletal: Normal gait and station. No decreased range of motion, instability, atrophy or abnormal strength or tone in the head, neck, spine, ribs, pelvis or extremities.   Neurologic: CN 2-12 normal. Sensation to pain, touch and proprioception normal. DTRs normal in upper and lower extremities. No pathologic reflexes.  Motor normal.      PHYSICAL EXAM:    Daily     Daily     ICU Vital Signs Last 24 Hrs  T(C): 36.8 (30 May 2022 07:17), Max: 37.4 (29 May 2022 20:29)  T(F): 98.2 (30 May 2022 07:17), Max: 99.3 (29 May 2022 20:29)  HR: 75 (30 May 2022 11:35) (68 - 85)  BP: 140/100 (30 May 2022 14:20) (124/80 - 192/98)  BP(mean): --  ABP: --  ABP(mean): --  RR: 19 (30 May 2022 07:17) (18 - 19)  SpO2: 98% (30 May 2022 07:17) (98% - 99%)                  12.2   11.60 )-----------( 75       ( 29 May 2022 07:42 )             35.6       CBC Full  -  ( 29 May 2022 07:42 )  WBC Count : 11.60 K/uL  RBC Count : 3.69 M/uL  Hemoglobin : 12.2 g/dL  Hematocrit : 35.6 %  Platelet Count - Automated : 75 K/uL  Mean Cell Volume : 96.5 fl  Mean Cell Hemoglobin : 33.1 pg  Mean Cell Hemoglobin Concentration : 34.3 gm/dL  Auto Neutrophil # : 0.23 K/uL  Auto Lymphocyte # : 10.32 K/uL  Auto Monocyte # : 0.58 K/uL  Auto Eosinophil # : 0.00 K/uL  Auto Basophil # : 0.00 K/uL  Auto Neutrophil % : 2.0 %  Auto Lymphocyte % : 89.0 %  Auto Monocyte % : 5.0 %  Auto Eosinophil % : 0.0 %  Auto Basophil % : 0.0 %      05-29    136  |  103  |  8   ----------------------------<  93  3.3<L>   |  24  |  0.82    Ca    8.6      29 May 2022 07:42                              MEDICATIONS  (STANDING):  amLODIPine   Tablet 5 milliGRAM(s) Oral daily  enoxaparin Injectable 40 milliGRAM(s) SubCutaneous every 24 hours  folic acid 1 milliGRAM(s) Oral daily  influenza  Vaccine (HIGH DOSE) 0.7 milliLiter(s) IntraMuscular once  losartan 25 milliGRAM(s) Oral daily  methotrexate 7.5 milliGRAM(s) Oral <User Schedule>  pitavastatin 2 milliGRAM(s) Oral daily  pramipexole 0.25 milliGRAM(s) Oral at bedtime            
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  Patient notes he hasn't been feeling well for the last 5 days.  Patient notes he started with vomting on .  He ate some old chicken wings from the fridge-- he's not sure if they were bad.  Vomiting continued through to tuesday.  He also had diarrhea-- multiple episodes per day for the last 5 days.  No blood in stool.  Patient also developed fever at home to 101.  Patient presented to the ER today Thursday for evaluation.  He was concerned he may have COVID or flu or other viral infection.  RVP testing in the ER was negative.  WBC elevated @ 13 but elevated at baseline in mid teens.  Patient pancultured.  Lactate 2.  Started on IVF and IV ABX.   - afebrile , nausea, vomiting improved , diarhhea improving , no abd pain     ROS:  General:  fevers  Skin: No rash or bothersome skin lesions  Musculoskeletal: No arthalgias, myalgias or joint swelling  Eyes: No visual changes or eye pain  Ears: No hearing loss , otorrhea or ear pain  Nose, Mouth, Throat: No nasal congestion, rhinorrhea, oral lesions, postnasal drip or sore throat  Cardio: No chest pain or palpitations. no lower extremity edema. no syncope. no claudication.   Respiratory: No cough, shortness of breath or wheezing   GI: see hpi  : No urinary frequency, hematuria, incontinence, or dysuria  Neurologic: No headaches, parasthesias, confusion, dysarthria or gait instability  Psychiatric:  No anxiety or depression  Lymphatic:  No easy bruising, easy bleeding or swollen glands  Allergic: No itching, sneezing , watery eyes, clear rhinorrhea or recurrent infections     Physical exam   General:     Well appearing, no identifying marks , scars, or tattoos.  Skin: no rash or prominent lesions  Head: normocephalic, atraumatic     Sinuses: non-tender  Nose: no external lesions, mucosa non-inflamed, septum and turbinates normal  Throat: no erythema, exudates or lesions.  Neck: Supple without lymphadenopathy. Thyroid no thyromegaly, no palpable thyroid nodules, no palpable nodules or masses, carotid arteries no bruits.   Breasts: No palpable masses or lesions.  Heart: RRR, no murmur or gallop.  Normal S1, S2.  No S3, S4.   Lungs: CTA bilaterally, no wheezes, rhonchi, rales.  Breathing unlabored.   Chest wall: Normal insp   Abdomen:  Soft, NT/ND, normal bowel sounds, no HSM, no masses.  No peritoneal signs.   Back: spine normal without deformity or tenderness.  Normal ROM   : Exam normal.  no inguinal hernias.  Extremities: No deformities, clubbing, cyanosis, or edema.  Musculoskeletal: Normal gait and station. No decreased range of motion, instability, atrophy or abnormal strength or tone in the head, neck, spine, ribs, pelvis or extremities.   Neurologic: CN 2-12 normal. Sensation to pain, touch and proprioception normal. DTRs normal in upper and lower extremities. No pathologic reflexes.  Motor normal.  Psychiatric: Oriented X3, intact recent and remote memory, judgement and insight, normal mood and affectc      PHYSICAL EXAM:    Daily     Daily Weight in k.3 (26 May 2022 22:20)    ICU Vital Signs Last 24 Hrs  T(C): 36.7 (27 May 2022 16:04), Max: 37.1 (26 May 2022 19:10)  T(F): 98.1 (27 May 2022 16:04), Max: 98.8 (26 May 2022 19:10)  HR: 68 (27 May 2022 16:04) (68 - 88)  BP: 131/86 (27 May 2022 16:04) (106/84 - 168/108)  BP(mean): 114 (26 May 2022 21:15) (114 - 114)  ABP: --  ABP(mean): --  RR: 18 (27 May 2022 16:04) (12 - 18)  SpO2: 100% (27 May 2022 16:04) (95% - 100%)                                12.0   9.90  )-----------( 55       ( 27 May 2022 07:10 )             35.3       CBC Full  -  ( 27 May 2022 07:10 )  WBC Count : 9.90 K/uL  RBC Count : 3.64 M/uL  Hemoglobin : 12.0 g/dL  Hematocrit : 35.3 %  Platelet Count - Automated : 55 K/uL  Mean Cell Volume : 97.0 fl  Mean Cell Hemoglobin : 33.0 pg  Mean Cell Hemoglobin Concentration : 34.0 gm/dL  Auto Neutrophil # : x  Auto Lymphocyte # : x  Auto Monocyte # : x  Auto Eosinophil # : x  Auto Basophil # : x  Auto Neutrophil % : x  Auto Lymphocyte % : x  Auto Monocyte % : x  Auto Eosinophil % : x  Auto Basophil % : x          x   |  x   |  x   ----------------------------<  x   3.3<L>   |  x   |  x     Ca    7.8<L>      27 May 2022 07:10    TPro  8.1  /  Alb  3.2<L>  /  TBili  1.9<H>  /  DBili  x   /  AST  107<H>  /  ALT  43  /  AlkPhos  93        LIVER FUNCTIONS - ( 26 May 2022 10:32 )  Alb: 3.2 g/dL / Pro: 8.1 gm/dL / ALK PHOS: 93 U/L / ALT: 43 U/L / AST: 107 U/L / GGT: x             PT/INR - ( 26 May 2022 10:32 )   PT: 14.0 sec;   INR: 1.20 ratio         PTT - ( 26 May 2022 10:32 )  PTT:33.9 sec          Urinalysis Basic - ( 26 May 2022 14:37 )    Color: Yellow / Appearance: Clear / S.010 / pH: x  Gluc: x / Ketone: Moderate  / Bili: Negative / Urobili: 4   Blood: x / Protein: Negative / Nitrite: Negative   Leuk Esterase: Negative / RBC: 0-2 /HPF / WBC 0-2   Sq Epi: x / Non Sq Epi: Negative / Bacteria: Occasional            MEDICATIONS  (STANDING):  atorvastatin 10 milliGRAM(s) Oral at bedtime  enoxaparin Injectable 40 milliGRAM(s) SubCutaneous every 24 hours  folic acid 1 milliGRAM(s) Oral daily  influenza  Vaccine (HIGH DOSE) 0.7 milliLiter(s) IntraMuscular once  methotrexate 7.5 milliGRAM(s) Oral <User Schedule>  pramipexole 0.25 milliGRAM(s) Oral at bedtime  sodium chloride 0.9%. 1000 milliLiter(s) (75 mL/Hr) IV Continuous <Continuous>            < from: CT Abdomen and Pelvis w/ IV Cont (05.26.22 @ 12:28) >      IMPRESSION:  Cirrhosis with splenomegaly. Hepatic steatosis.    No bowel obstruction or gross bowel wall thickening. Normal appendix.   Sigmoid diverticulosis without definite evidence of acute diverticulitis.
Date of service: 05-29-22 @ 13:57    Lying in bed in NAD  Diarrhea is resolving  No further fever    ROS: no fever or chills; denies dizziness, no HA, no SOB or cough, no abdominal pain, no dysuria, no legs pain, no rashes    MEDICATIONS  (STANDING):  amLODIPine   Tablet 5 milliGRAM(s) Oral daily  cefTRIAXone   IVPB 1000 milliGRAM(s) IV Intermittent every 24 hours  folic acid 1 milliGRAM(s) Oral daily  influenza  Vaccine (HIGH DOSE) 0.7 milliLiter(s) IntraMuscular once  losartan 25 milliGRAM(s) Oral daily  methotrexate 7.5 milliGRAM(s) Oral <User Schedule>  pitavastatin 2 milliGRAM(s) Oral daily  pramipexole 0.25 milliGRAM(s) Oral at bedtime    Vital Signs Last 24 Hrs  T(C): 36.7 (29 May 2022 07:09), Max: 36.7 (28 May 2022 16:15)  T(F): 98.1 (29 May 2022 07:09), Max: 98.1 (28 May 2022 16:15)  HR: 85 (29 May 2022 12:00) (76 - 93)  BP: 152/98 (29 May 2022 12:00) (152/98 - 170/106)  BP(mean): --  RR: 19 (29 May 2022 07:09) (18 - 19)  SpO2: 98% (29 May 2022 07:09) (98% - 98%)     Physical exam:    Constitutional:  No acute distress  HEENT: NC/AT, EOMI, PERRLA, conjunctivae clear; ears and nose atraumatic  Neck: supple; thyroid not palpable  Back: no tenderness  Respiratory: respiratory effort normal; clear to auscultation  Cardiovascular: S1S2 regular, no murmurs  Abdomen: soft, not tender, not distended, positive BS  Genitourinary: no suprapubic tenderness  Lymphatic: no LN palpable  Musculoskeletal: no muscle tenderness, no joint swelling or tenderness  Extremities: no pedal edema  Neurological/ Psychiatric: AxOx3, judgement and insight normal; moving all extremities  Skin: no rashes; no palpable lesions    Labs: all available labs reviewed                        11.3   10.57 )-----------( 53       ( 28 May 2022 07:25 )             33.4     05-28    136  |  105  |  19  ----------------------------<  106<H>  3.9   |  26  |  1.17    Ca    8.2<L>      28 May 2022 07:25    (05-26 @ 10:32)  [facility]    GI PCR Panel, Stool (collected 27 May 2022 09:20)  Source: .Stool Feces, benny mcdaniel  Final Report (27 May 2022 16:59):    GI PCR Results: NOT detected    *******Please Note:*******    GI panel PCR evaluates for:    Campylobacter, Plesiomonas shigelloides, Salmonella,    Vibrio, Yersinia enterocolitica, Enteroaggregative    Escherichia coli (EAEC), Enteropathogenic E.coli (EPEC),    Enterotoxigenic E. coli (ETEC) lt/st, Shiga-like    toxin-producing E. coli (STEC) stx1/stx2,    Shigella/ Enteroinvasive E. coli (EIEC), Cryptosporidium,    Cyclospora cayetanensis, Entamoeba histolytica,    Giardia lamblia, Adenovirus F 40/41, Astrovirus,    Norovirus GI/GII, Rotavirus A, Sapovirus    Culture - Urine (collected 26 May 2022 14:37)  Source: Clean Catch None  Final Report (27 May 2022 19:20):    <10,000 CFU/mL Normal Urogenital Leanne    Culture - Blood (collected 26 May 2022 10:32)  Source: .Blood None  Preliminary Report (27 May 2022 17:02):    No growth to date.    Culture - Blood (collected 26 May 2022 10:32)  Source: .Blood None  Preliminary Report (27 May 2022 17:02):    No growth to date.    Radiology: all available radiological tests reviewed    < from: CT Abdomen and Pelvis w/ IV Cont (05.26.22 @ 12:28) >  Cirrhosis with splenomegaly. Hepatic steatosis.  No bowel obstruction or gross bowel wall thickening. Normal appendix.   Sigmoid diverticulosis without definite evidence of acute diverticulitis.  < end of copied text >      Advanced directives addressed: full resuscitation
HPI:  65 y/o male with PMHx of rheumatoid arthritis on methotrexate q weekly and humira q 2 weeks, recent suspicion of possible large lymphocytic leukemia,  ETOH abuse, RLS, HTN, HLD who presented to  with CC of N/V/D and fever.  Patient notes he hasn't been feeling well for the last 5 days.  Patient notes he started with vomiting on sunday.  He ate some old chicken wings from the fridge-- he's not sure if they were bad.  Vomiting continued through to tuesday.  He also had diarrhea-- multiple episodes per day for the last 5 days.  No blood in stool.  Patient also developed fever at home to 101.  Patient presented to the ER today Thursday for evaluation.  He was concerned he may have COVID or flu or other viral infection.  RVP testing in the ER was negative.  WBC elevated @ 13 but elevated at baseline in mid teens.  Patient pancultured.  Lactate 2.  Started on IVF and IV ABX.      In outpatient clinic, pt originally presented to mon 5/18 w leukocytosis with lymphocyte predominance.  Pt follows with Dr. Frank for RA for which he was started on Humira 3­4 months ago, q 2 weeks with methotrexate once weekly. Reports  mild improvement with joint pain but with deformities of hands. Pt doesn't believe he is in a flare­up.  Last Humira injection was last Saturday. Pt reports symptoms today of orthostatic  HoTN, when pt stands up, he starts feeling dizzy or any change in position and is now off therapy.     5/9/22 Blood work revealed WBC 17.5, Hb 17.1, mcv 101, plt 123k, lymph 84.3%, alc 14.7, Cr 1.4, , ALT 54, bili 1.7, Na 131. Has never seen hematologist before. Outpatient flow cytometry showed relative increase in T cell large granular lymphocytes which can occur in lymphoproliferative disorder as well as some reactive conditions. 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    s/p BM bx yesterday   had bleeding from site requiring multiple dressing changes  now appears to have stopped   feels well   wants to go home         PAST MEDICAL & SURGICAL HISTORY:   ETOH abuse  Hyperlipidemia   Restless leg syndrome  Rheumatoid arteritis      FAMILY HISTORY:  No pertinent family history in first degree relatives      Social History:    +ETOH use.   No tob/ drug use.    -- lives at home with wife and daughter.      Allergies:  Aloe Lotion (Rash)       (26 May 2022 22:07)      PAST MEDICAL & SURGICAL HISTORY:  ETOH abuse      Hyperlipidemia LDL goal &lt;100      Restless leg syndrome      Rheumatoid arteritis      No significant past surgical history          Allergies    Aloe Lotion (Rash)    Intolerances        MEDICATIONS  (STANDING):  atorvastatin 10 milliGRAM(s) Oral at bedtime  cefepime  Injectable. 1000 milliGRAM(s) IV Push every 12 hours  enoxaparin Injectable 40 milliGRAM(s) SubCutaneous every 24 hours  folic acid 1 milliGRAM(s) Oral daily  influenza  Vaccine (HIGH DOSE) 0.7 milliLiter(s) IntraMuscular once  methotrexate 7.5 milliGRAM(s) Oral <User Schedule>  pramipexole 0.25 milliGRAM(s) Oral at bedtime  sodium chloride 0.9%. 1000 milliLiter(s) (75 mL/Hr) IV Continuous <Continuous>    MEDICATIONS  (PRN):  acetaminophen     Tablet .. 650 milliGRAM(s) Oral every 6 hours PRN Temp greater or equal to 38C (100.4F), Mild Pain (1 - 3)  aluminum hydroxide/magnesium hydroxide/simethicone Suspension 30 milliLiter(s) Oral every 4 hours PRN Dyspepsia  melatonin 3 milliGRAM(s) Oral at bedtime PRN Insomnia  ondansetron Injectable 4 milliGRAM(s) IV Push every 8 hours PRN Nausea and/or Vomiting  polyvinyl alcohol 1.4%/povidone 0.6% Ophthalmic Solution - Peds 1 Drop(s) Both EYES two times a day PRN Dry Eyes      FAMILY HISTORY:  No pertinent family history in first degree relatives        SOCIAL HISTORY: No EtOH, no tobacco    REVIEW OF SYSTEMS:    CONSTITUTIONAL: No weakness, fevers or chills  EYES/ENT: No visual changes;  No vertigo or throat pain   NECK: No pain or stiffness  RESPIRATORY: No cough, wheezing, hemoptysis; No shortness of breath  CARDIOVASCULAR: No chest pain or palpitations  GASTROINTESTINAL: No abdominal or epigastric pain.+ nausea, vomiting, no hematemesis; +diarrhea no constipation. No melena or hematochezia.  GENITOURINARY: No dysuria, frequency or hematuria  NEUROLOGICAL: No numbness or weakness  SKIN: No itching, burning, rashes, or lesions   All other review of systems is negative unless indicated above.    Vital Signs Last 24 Hrs  T(C): 36.7 (28 May 2022 08:29), Max: 38.9 (27 May 2022 21:56)  T(F): 98.1 (28 May 2022 08:29), Max: 102 (27 May 2022 21:56)  HR: 81 (28 May 2022 08:29) (68 - 90)  BP: 156/94 (28 May 2022 08:29) (106/84 - 156/94)  BP(mean): --  RR: 18 (28 May 2022 08:29) (12 - 18)  SpO2: 97% (28 May 2022 08:29) (95% - 100%)    GENERAL: NAD,  HEAD:  Atraumatic, Normocephalic  EYES: EOMI,   CHEST/LUNG: Clear to auscultation bilaterally; No wheeze  HEART: Regular rate and rhythm; No murmurs, rubs, or gallops  ABDOMEN: Soft, mild tenderness in epigastrum, Nondistended; Bowel sounds present  EXTREMITIES:  2+ Peripheral Pulses, No clubbing, cyanosis, or edema  NEUROLOGY: non-focal  SKIN: No rashes or lesions                                     11.3   10.57 )-----------( 53       ( 28 May 2022 07:25 )             33.4                  14.1   13.65 )-----------( 71       ( 26 May 2022 10:32 )             40.7       05-26    132<L>  |  97  |  23  ----------------------------<  106<H>  3.2<L>   |  25  |  1.30    Ca    7.6<L>      26 May 2022 22:31    TPro  8.1  /  Alb  3.2<L>  /  TBili  1.9<H>  /  DBili  x   /  AST  107<H>  /  ALT  43  /  AlkPhos  93  05-26          PT/INR - ( 26 May 2022 10:32 )   PT: 14.0 sec;   INR: 1.20 ratio         PTT - ( 26 May 2022 10:32 )  PTT:33.9 sec

## 2022-05-31 LAB
ANA PAT FLD IF-IMP: ABNORMAL
ANA TITR SER: ABNORMAL
CULTURE RESULTS: SIGNIFICANT CHANGE UP
CULTURE RESULTS: SIGNIFICANT CHANGE UP
SPECIMEN SOURCE: SIGNIFICANT CHANGE UP
SPECIMEN SOURCE: SIGNIFICANT CHANGE UP

## 2022-06-05 DIAGNOSIS — K76.0 FATTY (CHANGE OF) LIVER, NOT ELSEWHERE CLASSIFIED: ICD-10-CM

## 2022-06-05 DIAGNOSIS — D84.9 IMMUNODEFICIENCY, UNSPECIFIED: ICD-10-CM

## 2022-06-05 DIAGNOSIS — F10.10 ALCOHOL ABUSE, UNCOMPLICATED: ICD-10-CM

## 2022-06-05 DIAGNOSIS — M06.9 RHEUMATOID ARTHRITIS, UNSPECIFIED: ICD-10-CM

## 2022-06-05 DIAGNOSIS — I10 ESSENTIAL (PRIMARY) HYPERTENSION: ICD-10-CM

## 2022-06-05 DIAGNOSIS — E87.1 HYPO-OSMOLALITY AND HYPONATREMIA: ICD-10-CM

## 2022-06-05 DIAGNOSIS — E86.1 HYPOVOLEMIA: ICD-10-CM

## 2022-06-05 DIAGNOSIS — R16.1 SPLENOMEGALY, NOT ELSEWHERE CLASSIFIED: ICD-10-CM

## 2022-06-05 DIAGNOSIS — E78.5 HYPERLIPIDEMIA, UNSPECIFIED: ICD-10-CM

## 2022-06-05 DIAGNOSIS — D69.6 THROMBOCYTOPENIA, UNSPECIFIED: ICD-10-CM

## 2022-06-05 DIAGNOSIS — A08.4 VIRAL INTESTINAL INFECTION, UNSPECIFIED: ICD-10-CM

## 2022-06-05 DIAGNOSIS — C91.Z0 OTHER LYMPHOID LEUKEMIA NOT HAVING ACHIEVED REMISSION: ICD-10-CM

## 2022-06-05 DIAGNOSIS — Z79.811 LONG TERM (CURRENT) USE OF AROMATASE INHIBITORS: ICD-10-CM

## 2022-06-05 DIAGNOSIS — K74.60 UNSPECIFIED CIRRHOSIS OF LIVER: ICD-10-CM

## 2022-06-05 DIAGNOSIS — N17.9 ACUTE KIDNEY FAILURE, UNSPECIFIED: ICD-10-CM

## 2022-06-05 DIAGNOSIS — E86.0 DEHYDRATION: ICD-10-CM

## 2022-06-05 DIAGNOSIS — K57.30 DIVERTICULOSIS OF LARGE INTESTINE WITHOUT PERFORATION OR ABSCESS WITHOUT BLEEDING: ICD-10-CM

## 2022-06-05 DIAGNOSIS — G25.81 RESTLESS LEGS SYNDROME: ICD-10-CM

## 2022-06-05 DIAGNOSIS — Z79.899 OTHER LONG TERM (CURRENT) DRUG THERAPY: ICD-10-CM

## 2022-08-07 ENCOUNTER — INPATIENT (INPATIENT)
Facility: HOSPITAL | Age: 67
LOS: 3 days | Discharge: HOME | DRG: 853 | End: 2022-08-11
Attending: FAMILY MEDICINE | Admitting: HOSPITALIST
Payer: MEDICARE

## 2022-08-07 VITALS — WEIGHT: 190.04 LBS | HEIGHT: 68 IN

## 2022-08-07 DIAGNOSIS — L03.90 CELLULITIS, UNSPECIFIED: ICD-10-CM

## 2022-08-07 LAB
ALBUMIN SERPL ELPH-MCNC: 3.3 G/DL — SIGNIFICANT CHANGE UP (ref 3.3–5)
ALP SERPL-CCNC: 58 U/L — SIGNIFICANT CHANGE UP (ref 40–120)
ALT FLD-CCNC: 78 U/L — SIGNIFICANT CHANGE UP (ref 12–78)
ANION GAP SERPL CALC-SCNC: 6 MMOL/L — SIGNIFICANT CHANGE UP (ref 5–17)
APPEARANCE UR: CLEAR — SIGNIFICANT CHANGE UP
APTT BLD: 30 SEC — SIGNIFICANT CHANGE UP (ref 27.5–35.5)
AST SERPL-CCNC: 52 U/L — HIGH (ref 15–37)
BASOPHILS # BLD AUTO: 0.02 K/UL — SIGNIFICANT CHANGE UP (ref 0–0.2)
BASOPHILS NFR BLD AUTO: 0.3 % — SIGNIFICANT CHANGE UP (ref 0–2)
BILIRUB SERPL-MCNC: 0.6 MG/DL — SIGNIFICANT CHANGE UP (ref 0.2–1.2)
BILIRUB UR-MCNC: NEGATIVE — SIGNIFICANT CHANGE UP
BUN SERPL-MCNC: 30 MG/DL — HIGH (ref 7–23)
CALCIUM SERPL-MCNC: 9 MG/DL — SIGNIFICANT CHANGE UP (ref 8.5–10.1)
CHLORIDE SERPL-SCNC: 96 MMOL/L — SIGNIFICANT CHANGE UP (ref 96–108)
CO2 SERPL-SCNC: 29 MMOL/L — SIGNIFICANT CHANGE UP (ref 22–31)
COLOR SPEC: YELLOW — SIGNIFICANT CHANGE UP
CREAT SERPL-MCNC: 1.25 MG/DL — SIGNIFICANT CHANGE UP (ref 0.5–1.3)
CRP SERPL-MCNC: 24 MG/L — HIGH
DIFF PNL FLD: NEGATIVE — SIGNIFICANT CHANGE UP
EGFR: 64 ML/MIN/1.73M2 — SIGNIFICANT CHANGE UP
EOSINOPHIL # BLD AUTO: 0.01 K/UL — SIGNIFICANT CHANGE UP (ref 0–0.5)
EOSINOPHIL NFR BLD AUTO: 0.2 % — SIGNIFICANT CHANGE UP (ref 0–6)
ERYTHROCYTE [SEDIMENTATION RATE] IN BLOOD: 29 MM/HR — HIGH (ref 0–20)
GLUCOSE SERPL-MCNC: 138 MG/DL — HIGH (ref 70–99)
GLUCOSE UR QL: NEGATIVE — SIGNIFICANT CHANGE UP
HCT VFR BLD CALC: 37.9 % — LOW (ref 39–50)
HGB BLD-MCNC: 13.2 G/DL — SIGNIFICANT CHANGE UP (ref 13–17)
IMM GRANULOCYTES NFR BLD AUTO: 0.7 % — SIGNIFICANT CHANGE UP (ref 0–1.5)
INR BLD: 1.01 RATIO — SIGNIFICANT CHANGE UP (ref 0.88–1.16)
KETONES UR-MCNC: NEGATIVE — SIGNIFICANT CHANGE UP
LACTATE SERPL-SCNC: 2.6 MMOL/L — HIGH (ref 0.7–2)
LEUKOCYTE ESTERASE UR-ACNC: NEGATIVE — SIGNIFICANT CHANGE UP
LYMPHOCYTES # BLD AUTO: 3.42 K/UL — HIGH (ref 1–3.3)
LYMPHOCYTES # BLD AUTO: 57.5 % — HIGH (ref 13–44)
MCHC RBC-ENTMCNC: 33.2 PG — SIGNIFICANT CHANGE UP (ref 27–34)
MCHC RBC-ENTMCNC: 34.8 GM/DL — SIGNIFICANT CHANGE UP (ref 32–36)
MCV RBC AUTO: 95.2 FL — SIGNIFICANT CHANGE UP (ref 80–100)
MONOCYTES # BLD AUTO: 0.61 K/UL — SIGNIFICANT CHANGE UP (ref 0–0.9)
MONOCYTES NFR BLD AUTO: 10.3 % — SIGNIFICANT CHANGE UP (ref 2–14)
NEUTROPHILS # BLD AUTO: 1.85 K/UL — SIGNIFICANT CHANGE UP (ref 1.8–7.4)
NEUTROPHILS NFR BLD AUTO: 31 % — LOW (ref 43–77)
NITRITE UR-MCNC: NEGATIVE — SIGNIFICANT CHANGE UP
PH UR: 6 — SIGNIFICANT CHANGE UP (ref 5–8)
PLATELET # BLD AUTO: 117 K/UL — LOW (ref 150–400)
POTASSIUM SERPL-MCNC: 4.1 MMOL/L — SIGNIFICANT CHANGE UP (ref 3.5–5.3)
POTASSIUM SERPL-SCNC: 4.1 MMOL/L — SIGNIFICANT CHANGE UP (ref 3.5–5.3)
PROT SERPL-MCNC: 7.4 GM/DL — SIGNIFICANT CHANGE UP (ref 6–8.3)
PROT UR-MCNC: 15
PROTHROM AB SERPL-ACNC: 11.7 SEC — SIGNIFICANT CHANGE UP (ref 10.5–13.4)
RBC # BLD: 3.98 M/UL — LOW (ref 4.2–5.8)
RBC # FLD: 16.4 % — HIGH (ref 10.3–14.5)
SARS-COV-2 RNA SPEC QL NAA+PROBE: SIGNIFICANT CHANGE UP
SODIUM SERPL-SCNC: 131 MMOL/L — LOW (ref 135–145)
SP GR SPEC: 1.02 — SIGNIFICANT CHANGE UP (ref 1.01–1.02)
UROBILINOGEN FLD QL: NEGATIVE — SIGNIFICANT CHANGE UP
WBC # BLD: 5.95 K/UL — SIGNIFICANT CHANGE UP (ref 3.8–10.5)
WBC # FLD AUTO: 5.95 K/UL — SIGNIFICANT CHANGE UP (ref 3.8–10.5)

## 2022-08-07 PROCEDURE — 80053 COMPREHEN METABOLIC PANEL: CPT

## 2022-08-07 PROCEDURE — 80076 HEPATIC FUNCTION PANEL: CPT

## 2022-08-07 PROCEDURE — 87186 SC STD MICRODIL/AGAR DIL: CPT

## 2022-08-07 PROCEDURE — 93971 EXTREMITY STUDY: CPT | Mod: 26,RT

## 2022-08-07 PROCEDURE — 97116 GAIT TRAINING THERAPY: CPT | Mod: GP

## 2022-08-07 PROCEDURE — 83036 HEMOGLOBIN GLYCOSYLATED A1C: CPT

## 2022-08-07 PROCEDURE — 99285 EMERGENCY DEPT VISIT HI MDM: CPT

## 2022-08-07 PROCEDURE — 99223 1ST HOSP IP/OBS HIGH 75: CPT

## 2022-08-07 PROCEDURE — 83550 IRON BINDING TEST: CPT

## 2022-08-07 PROCEDURE — 97110 THERAPEUTIC EXERCISES: CPT | Mod: GP

## 2022-08-07 PROCEDURE — 82746 ASSAY OF FOLIC ACID SERUM: CPT

## 2022-08-07 PROCEDURE — 93010 ELECTROCARDIOGRAM REPORT: CPT

## 2022-08-07 PROCEDURE — 82607 VITAMIN B-12: CPT

## 2022-08-07 PROCEDURE — 97163 PT EVAL HIGH COMPLEX 45 MIN: CPT | Mod: GP

## 2022-08-07 PROCEDURE — 73630 X-RAY EXAM OF FOOT: CPT | Mod: 26,RT

## 2022-08-07 PROCEDURE — 85730 THROMBOPLASTIN TIME PARTIAL: CPT

## 2022-08-07 PROCEDURE — 85025 COMPLETE CBC W/AUTO DIFF WBC: CPT

## 2022-08-07 PROCEDURE — A9579: CPT

## 2022-08-07 PROCEDURE — 36415 COLL VENOUS BLD VENIPUNCTURE: CPT

## 2022-08-07 PROCEDURE — 80061 LIPID PANEL: CPT

## 2022-08-07 PROCEDURE — 84443 ASSAY THYROID STIM HORMONE: CPT

## 2022-08-07 PROCEDURE — 84439 ASSAY OF FREE THYROXINE: CPT

## 2022-08-07 PROCEDURE — 87070 CULTURE OTHR SPECIMN AEROBIC: CPT

## 2022-08-07 PROCEDURE — 83605 ASSAY OF LACTIC ACID: CPT

## 2022-08-07 PROCEDURE — 85027 COMPLETE CBC AUTOMATED: CPT

## 2022-08-07 PROCEDURE — 82306 VITAMIN D 25 HYDROXY: CPT

## 2022-08-07 PROCEDURE — 80048 BASIC METABOLIC PNL TOTAL CA: CPT

## 2022-08-07 PROCEDURE — 71045 X-RAY EXAM CHEST 1 VIEW: CPT | Mod: 26

## 2022-08-07 PROCEDURE — 83540 ASSAY OF IRON: CPT

## 2022-08-07 PROCEDURE — 73720 MRI LWR EXTREMITY W/O&W/DYE: CPT | Mod: RT

## 2022-08-07 RX ORDER — FOLIC ACID 0.8 MG
1 TABLET ORAL DAILY
Refills: 0 | Status: DISCONTINUED | OUTPATIENT
Start: 2022-08-07 | End: 2022-08-11

## 2022-08-07 RX ORDER — PIPERACILLIN AND TAZOBACTAM 4; .5 G/20ML; G/20ML
3.38 INJECTION, POWDER, LYOPHILIZED, FOR SOLUTION INTRAVENOUS ONCE
Refills: 0 | Status: COMPLETED | OUTPATIENT
Start: 2022-08-07 | End: 2022-08-07

## 2022-08-07 RX ORDER — LOSARTAN POTASSIUM 100 MG/1
25 TABLET, FILM COATED ORAL DAILY
Refills: 0 | Status: DISCONTINUED | OUTPATIENT
Start: 2022-08-07 | End: 2022-08-08

## 2022-08-07 RX ORDER — ACETAMINOPHEN 500 MG
650 TABLET ORAL EVERY 6 HOURS
Refills: 0 | Status: DISCONTINUED | OUTPATIENT
Start: 2022-08-07 | End: 2022-08-11

## 2022-08-07 RX ORDER — PRAMIPEXOLE DIHYDROCHLORIDE 0.12 MG/1
0.25 TABLET ORAL AT BEDTIME
Refills: 0 | Status: DISCONTINUED | OUTPATIENT
Start: 2022-08-07 | End: 2022-08-11

## 2022-08-07 RX ORDER — ATORVASTATIN CALCIUM 80 MG/1
10 TABLET, FILM COATED ORAL AT BEDTIME
Refills: 0 | Status: DISCONTINUED | OUTPATIENT
Start: 2022-08-07 | End: 2022-08-11

## 2022-08-07 RX ORDER — SODIUM CHLORIDE 9 MG/ML
1000 INJECTION INTRAMUSCULAR; INTRAVENOUS; SUBCUTANEOUS
Refills: 0 | Status: DISCONTINUED | OUTPATIENT
Start: 2022-08-07 | End: 2022-08-09

## 2022-08-07 RX ORDER — ONDANSETRON 8 MG/1
4 TABLET, FILM COATED ORAL EVERY 8 HOURS
Refills: 0 | Status: DISCONTINUED | OUTPATIENT
Start: 2022-08-07 | End: 2022-08-11

## 2022-08-07 RX ORDER — AMLODIPINE BESYLATE 2.5 MG/1
10 TABLET ORAL DAILY
Refills: 0 | Status: DISCONTINUED | OUTPATIENT
Start: 2022-08-07 | End: 2022-08-08

## 2022-08-07 RX ORDER — ACETAMINOPHEN 500 MG
650 TABLET ORAL ONCE
Refills: 0 | Status: COMPLETED | OUTPATIENT
Start: 2022-08-07 | End: 2022-08-07

## 2022-08-07 RX ORDER — PIPERACILLIN AND TAZOBACTAM 4; .5 G/20ML; G/20ML
3.38 INJECTION, POWDER, LYOPHILIZED, FOR SOLUTION INTRAVENOUS EVERY 8 HOURS
Refills: 0 | Status: DISCONTINUED | OUTPATIENT
Start: 2022-08-08 | End: 2022-08-11

## 2022-08-07 RX ORDER — VANCOMYCIN HCL 1 G
1250 VIAL (EA) INTRAVENOUS ONCE
Refills: 0 | Status: COMPLETED | OUTPATIENT
Start: 2022-08-07 | End: 2022-08-07

## 2022-08-07 RX ORDER — METHOTREXATE 2.5 MG/1
7.5 TABLET ORAL
Refills: 0 | Status: DISCONTINUED | OUTPATIENT
Start: 2022-08-07 | End: 2022-08-08

## 2022-08-07 RX ORDER — ENOXAPARIN SODIUM 100 MG/ML
80 INJECTION SUBCUTANEOUS ONCE
Refills: 0 | Status: COMPLETED | OUTPATIENT
Start: 2022-08-07 | End: 2022-08-07

## 2022-08-07 RX ORDER — SODIUM CHLORIDE 9 MG/ML
2700 INJECTION, SOLUTION INTRAVENOUS ONCE
Refills: 0 | Status: COMPLETED | OUTPATIENT
Start: 2022-08-07 | End: 2022-08-07

## 2022-08-07 RX ORDER — LANOLIN ALCOHOL/MO/W.PET/CERES
3 CREAM (GRAM) TOPICAL AT BEDTIME
Refills: 0 | Status: DISCONTINUED | OUTPATIENT
Start: 2022-08-07 | End: 2022-08-11

## 2022-08-07 RX ADMIN — Medication 650 MILLIGRAM(S): at 17:15

## 2022-08-07 RX ADMIN — Medication 150 MILLIGRAM(S): at 17:32

## 2022-08-07 RX ADMIN — SODIUM CHLORIDE 2700 MILLILITER(S): 9 INJECTION, SOLUTION INTRAVENOUS at 17:16

## 2022-08-07 RX ADMIN — PIPERACILLIN AND TAZOBACTAM 200 GRAM(S): 4; .5 INJECTION, POWDER, LYOPHILIZED, FOR SOLUTION INTRAVENOUS at 17:16

## 2022-08-07 NOTE — H&P ADULT - HISTORY OF PRESENT ILLNESS
66 year old male patient presented to the ED complaining of right foot pain and swelling. States he noticed redness to leg/ foot and had a hard time ambulating/ weight bearing due to discomfort. Denies any trauma, insect bites, chills or discharge from foot but endorsed subjective fevers        ent: 67 y/o male with a PMHx of HLD, HTN, leukemia on prednisone, on chemo presents to the ED c/o swollen ball on foot starting 3 days ago radiating up R leg. Pt states starting today with red bumps up entire R leg tenderness with palpation. No nausea, no fever, no chills. 66 year old male patient presented to the ED complaining of right foot pain and swelling. States he noticed redness to leg/ foot and had a hard time ambulating/ weight bearing due to discomfort. Denies any trauma, insect bites, chills or discharge from foot but endorsed subjective fevers.        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    No evidence of acute deep venous thrombosis in the right lower extremity.

## 2022-08-07 NOTE — ED PROVIDER NOTE - PHYSICAL EXAMINATION
Vital signs as available reviewed.  General:  No acute distress.  Head:  Normocephalic, atraumatic.  Eyes:  Conjunctiva pink, no icterus.  Cardiovascular:  Tachycardic, no obvious murmur.  Respiratory:  Clear to auscultation, good air entry bilaterally.  Abdomen:  Soft, non-tender.  Musculoskeletal:  Soul of R foot with 5mm lesion with purulent center, streaking of erythema and tenderness up R leg (does not involve groin/scrotum)  Neurologic: Alert and oriented, moving all extremities.  Skin:  Warm and dry.

## 2022-08-07 NOTE — ED ADULT NURSE NOTE - OBJECTIVE STATEMENT
Pt reports right foot swelling, and tenderness. pt reports currently on prednisone and tx for leukemia. pt denies all other complaints. pt febrile in HHED, tachycardia. hx of ETOH abuse.

## 2022-08-07 NOTE — H&P ADULT - NEUROLOGICAL
Writer informed patient of below, pt verbalizes understanding. She would like to see an neurologist but she will make an appointment to start a treatment plan since it may take some time for appointment with neurology.    negative normal/cranial nerves II-XII intact/sensation intact

## 2022-08-07 NOTE — ED ADULT TRIAGE NOTE - PAIN RATING/NUMBER SCALE (0-10): REST
NEUROLOGY ATTENDING: LORRI [phone]    CC:  DIZZINESS    PATIENT SEEN & EXAMINED WITH RESIDENTS ON 9/5/2020  SUNRISE RECORDS REVIEWED  LABS REVIEWED  IMAGING REVIEWED     72 yo RH woman with PMHx HYPOthyroidism, presented with severe positional dizziness and vomiting.  She states this AM she is "much better." All of her dizziness, where the room would spin (true vertigo) has resolved. She is ready to go home.  There are no complaints of headache, persistent nausea/vomiting, visual changes or any signs of increased ICP.   No seizures, nor any seizure-like symptomatology.  There are no cranial nerve complaints: no double-vision, no blurry vision, no facial asymmetry, no trouble swallowing, no hearing loss.    EXAMINATION IS NORMAL    IMPRESSION  VESTIBULITIS -- now resolved    CEREBRAL ANEURYSM -- This incidental finding of a 3mm aneurysm on neuroimaging is unlikely to cause her symptoms and may be followed with routine imaging annually over years. Would refer to neurology to coordinate (vascular neurology). I explained this to the patient in detail    DISPO -- No objeciton to d/c planning.
10

## 2022-08-07 NOTE — ED PROVIDER NOTE - OBJECTIVE STATEMENT
67 y/o male with a PMHx of HLD, HTN, leukemia on prednisone, on chemo presents to the ED c/o swollen ball on foot starting 3 days ago radiating up R leg. Pt states starting today with red bumps up entire R leg tenderness with palpation. No nausea, no fever, no chills.

## 2022-08-07 NOTE — ED PROVIDER NOTE - CLINICAL SUMMARY MEDICAL DECISION MAKING FREE TEXT BOX
here with sepsis, likely source R lower extremity empiric abx septic workup US for DVT XR for gas/osteo plan admission.

## 2022-08-07 NOTE — ED PROVIDER NOTE - NS ED ROS FT
Constitutional: No reported recent fever.  Neurological: No reported acute headache.  Eyes: No reported new vision changes.   Ears, Nose, Mouth, Throat: No reported acute sore throat.  Cardiovascular: No reported current chest pain.  Respiratory: No reported new shortness of breath.  Gastrointestinal: No reported vomiting.  Genitourinary: No reported new urinary problems.  Musculoskeletal: +Calf pain.  Integumentary (skin and/or breast): Swollen R foot with red bumps, radiates up R leg.

## 2022-08-07 NOTE — ED ADULT NURSE NOTE - CAS EDN DISCHARGE INTERVENTIONS
What Type Of Note Output Would You Prefer (Optional)?: Bullet Format
Hpi Title: Evaluation of a Skin Lesion
Have Your Spot(S) Been Treated In The Past?: has not been treated
IV intact

## 2022-08-07 NOTE — ED ADULT TRIAGE NOTE - CHIEF COMPLAINT QUOTE
PT C/O RIGHT LEG AND FOOT PAIN/REDNESS X 3 DAYS, +ERYTHEMA NOTED ON BACK OF LEG.  DENIES FEVER, CHILLS.  PT CURRENTLY ON CHEMO FOR LEUKEMIA. HX OF RA.

## 2022-08-07 NOTE — ED ADULT NURSE NOTE - NSIMPLEMENTINTERV_GEN_ALL_ED
no Implemented All Fall Risk Interventions:  [place] to call system. Call bell, personal items and telephone within reach. Instruct patient to call for assistance. Room bathroom lighting operational. Non-slip footwear when patient is off stretcher. Physically safe environment: no spills, clutter or unnecessary equipment. Stretcher in lowest position, wheels locked, appropriate side rails in place. Provide visual cue, wrist band, yellow gown, etc. Monitor gait and stability. Monitor for mental status changes and reorient to person, place, and time. Review medications for side effects contributing to fall risk. Reinforce activity limits and safety measures with patient and family.

## 2022-08-07 NOTE — H&P ADULT - ASSESSMENT
66 year old with cellulitis/ abscess to right foot          -Admit to Medicine        # Cellulitis/ Abscess to right foot  -on IV abx  -pain control  -weight bearing as tolerated  -get wound care evaluation  Podiatry evaluation    # Hyponatremia  -trial of IVF hydration  -trend sodium    # Lactic acidosis  -trial of IVF hydration    # HTN  - on amlodipine    # HLD  - on statin    # Non-occlusive thrombus  -sonogram noted for non-occlusive thrombus  -pt given lovenox  -no indication for anticoagulation    # Hx of leukemia  -takes prednisone    # DVT ppx  -given lovenox in the ED  -scds

## 2022-08-07 NOTE — H&P ADULT - NSHPSOCIALHISTORY_GEN_ALL_CORE
Vital Signs Last 24 Hrs  T(C): 38.6 (07 Aug 2022 14:35), Max: 38.6 (07 Aug 2022 14:35)  T(F): 101.5 (07 Aug 2022 14:35), Max: 101.5 (07 Aug 2022 14:35)  HR: 99 (07 Aug 2022 21:37) (99 - 114)  BP: 152/83 (07 Aug 2022 21:37) (151/68 - 177/84)  BP(mean): 100 (07 Aug 2022 21:37) (93 - 110)  RR: 24 (07 Aug 2022 21:37) (18 - 24)  SpO2: 100% (07 Aug 2022 21:37) (95% - 100%)    Parameters below as of 07 Aug 2022 21:37  Patient On (Oxygen Delivery Method): room air

## 2022-08-08 ENCOUNTER — TRANSCRIPTION ENCOUNTER (OUTPATIENT)
Age: 67
End: 2022-08-08

## 2022-08-08 LAB
ALBUMIN SERPL ELPH-MCNC: 2.8 G/DL — LOW (ref 3.3–5)
ALP SERPL-CCNC: 51 U/L — SIGNIFICANT CHANGE UP (ref 40–120)
ALT FLD-CCNC: 66 U/L — SIGNIFICANT CHANGE UP (ref 12–78)
ANION GAP SERPL CALC-SCNC: 7 MMOL/L — SIGNIFICANT CHANGE UP (ref 5–17)
APTT BLD: 44.3 SEC — HIGH (ref 27.5–35.5)
AST SERPL-CCNC: 49 U/L — HIGH (ref 15–37)
BASOPHILS # BLD AUTO: 0.02 K/UL — SIGNIFICANT CHANGE UP (ref 0–0.2)
BASOPHILS NFR BLD AUTO: 0.4 % — SIGNIFICANT CHANGE UP (ref 0–2)
BILIRUB SERPL-MCNC: 0.8 MG/DL — SIGNIFICANT CHANGE UP (ref 0.2–1.2)
BUN SERPL-MCNC: 19 MG/DL — SIGNIFICANT CHANGE UP (ref 7–23)
CALCIUM SERPL-MCNC: 8.4 MG/DL — LOW (ref 8.5–10.1)
CHLORIDE SERPL-SCNC: 97 MMOL/L — SIGNIFICANT CHANGE UP (ref 96–108)
CO2 SERPL-SCNC: 28 MMOL/L — SIGNIFICANT CHANGE UP (ref 22–31)
CREAT SERPL-MCNC: 1.04 MG/DL — SIGNIFICANT CHANGE UP (ref 0.5–1.3)
CULTURE RESULTS: NO GROWTH — SIGNIFICANT CHANGE UP
EGFR: 79 ML/MIN/1.73M2 — SIGNIFICANT CHANGE UP
EOSINOPHIL # BLD AUTO: 0.01 K/UL — SIGNIFICANT CHANGE UP (ref 0–0.5)
EOSINOPHIL NFR BLD AUTO: 0.2 % — SIGNIFICANT CHANGE UP (ref 0–6)
GLUCOSE SERPL-MCNC: 106 MG/DL — HIGH (ref 70–99)
HCT VFR BLD CALC: 36.4 % — LOW (ref 39–50)
HGB BLD-MCNC: 12.4 G/DL — LOW (ref 13–17)
IMM GRANULOCYTES NFR BLD AUTO: 0.6 % — SIGNIFICANT CHANGE UP (ref 0–1.5)
LACTATE SERPL-SCNC: 0.6 MMOL/L — LOW (ref 0.7–2)
LACTATE SERPL-SCNC: 2.3 MMOL/L — HIGH (ref 0.7–2)
LYMPHOCYTES # BLD AUTO: 2.8 K/UL — SIGNIFICANT CHANGE UP (ref 1–3.3)
LYMPHOCYTES # BLD AUTO: 56.5 % — HIGH (ref 13–44)
MCHC RBC-ENTMCNC: 32.5 PG — SIGNIFICANT CHANGE UP (ref 27–34)
MCHC RBC-ENTMCNC: 34.1 GM/DL — SIGNIFICANT CHANGE UP (ref 32–36)
MCV RBC AUTO: 95.5 FL — SIGNIFICANT CHANGE UP (ref 80–100)
MONOCYTES # BLD AUTO: 0.3 K/UL — SIGNIFICANT CHANGE UP (ref 0–0.9)
MONOCYTES NFR BLD AUTO: 6 % — SIGNIFICANT CHANGE UP (ref 2–14)
NEUTROPHILS # BLD AUTO: 1.8 K/UL — SIGNIFICANT CHANGE UP (ref 1.8–7.4)
NEUTROPHILS NFR BLD AUTO: 36.3 % — LOW (ref 43–77)
PLATELET # BLD AUTO: 119 K/UL — LOW (ref 150–400)
POTASSIUM SERPL-MCNC: 3.5 MMOL/L — SIGNIFICANT CHANGE UP (ref 3.5–5.3)
POTASSIUM SERPL-SCNC: 3.5 MMOL/L — SIGNIFICANT CHANGE UP (ref 3.5–5.3)
PROT SERPL-MCNC: 6.4 GM/DL — SIGNIFICANT CHANGE UP (ref 6–8.3)
RBC # BLD: 3.81 M/UL — LOW (ref 4.2–5.8)
RBC # FLD: 16.8 % — HIGH (ref 10.3–14.5)
SODIUM SERPL-SCNC: 132 MMOL/L — LOW (ref 135–145)
SPECIMEN SOURCE: SIGNIFICANT CHANGE UP
WBC # BLD: 4.96 K/UL — SIGNIFICANT CHANGE UP (ref 3.8–10.5)
WBC # FLD AUTO: 4.96 K/UL — SIGNIFICANT CHANGE UP (ref 3.8–10.5)

## 2022-08-08 PROCEDURE — 99232 SBSQ HOSP IP/OBS MODERATE 35: CPT

## 2022-08-08 PROCEDURE — 73720 MRI LWR EXTREMITY W/O&W/DYE: CPT | Mod: 26,RT

## 2022-08-08 RX ORDER — SODIUM CHLORIDE 9 MG/ML
1000 INJECTION INTRAMUSCULAR; INTRAVENOUS; SUBCUTANEOUS ONCE
Refills: 0 | Status: COMPLETED | OUTPATIENT
Start: 2022-08-08 | End: 2022-08-08

## 2022-08-08 RX ORDER — OXYCODONE HYDROCHLORIDE 5 MG/1
5 TABLET ORAL EVERY 6 HOURS
Refills: 0 | Status: DISCONTINUED | OUTPATIENT
Start: 2022-08-08 | End: 2022-08-11

## 2022-08-08 RX ORDER — PRAMIPEXOLE DIHYDROCHLORIDE 0.12 MG/1
1 TABLET ORAL
Qty: 0 | Refills: 0 | DISCHARGE

## 2022-08-08 RX ORDER — PITAVASTATIN CALCIUM 1.04 MG/1
1 TABLET, FILM COATED ORAL
Qty: 0 | Refills: 0 | DISCHARGE

## 2022-08-08 RX ORDER — AMLODIPINE BESYLATE 2.5 MG/1
10 TABLET ORAL DAILY
Refills: 0 | Status: DISCONTINUED | OUTPATIENT
Start: 2022-08-08 | End: 2022-08-11

## 2022-08-08 RX ORDER — ENOXAPARIN SODIUM 100 MG/ML
40 INJECTION SUBCUTANEOUS EVERY 24 HOURS
Refills: 0 | Status: DISCONTINUED | OUTPATIENT
Start: 2022-08-08 | End: 2022-08-10

## 2022-08-08 RX ORDER — ADALIMUMAB 40MG/0.8ML
0 KIT SUBCUTANEOUS
Qty: 0 | Refills: 0 | DISCHARGE

## 2022-08-08 RX ORDER — METHOTREXATE 2.5 MG/1
3 TABLET ORAL
Qty: 0 | Refills: 0 | DISCHARGE

## 2022-08-08 RX ORDER — ACETAMINOPHEN 500 MG
2 TABLET ORAL
Qty: 0 | Refills: 0 | DISCHARGE

## 2022-08-08 RX ORDER — POLYETHYLENE GLYCOL 3350 17 G/17G
17 POWDER, FOR SOLUTION ORAL DAILY
Refills: 0 | Status: DISCONTINUED | OUTPATIENT
Start: 2022-08-08 | End: 2022-08-11

## 2022-08-08 RX ORDER — FOLIC ACID 0.8 MG
1 TABLET ORAL
Qty: 0 | Refills: 0 | DISCHARGE

## 2022-08-08 RX ORDER — LOSARTAN POTASSIUM 100 MG/1
25 TABLET, FILM COATED ORAL DAILY
Refills: 0 | Status: DISCONTINUED | OUTPATIENT
Start: 2022-08-08 | End: 2022-08-11

## 2022-08-08 RX ADMIN — SODIUM CHLORIDE 100 MILLILITER(S): 9 INJECTION INTRAMUSCULAR; INTRAVENOUS; SUBCUTANEOUS at 18:07

## 2022-08-08 RX ADMIN — ATORVASTATIN CALCIUM 10 MILLIGRAM(S): 80 TABLET, FILM COATED ORAL at 22:18

## 2022-08-08 RX ADMIN — PIPERACILLIN AND TAZOBACTAM 25 GRAM(S): 4; .5 INJECTION, POWDER, LYOPHILIZED, FOR SOLUTION INTRAVENOUS at 05:44

## 2022-08-08 RX ADMIN — PRAMIPEXOLE DIHYDROCHLORIDE 0.25 MILLIGRAM(S): 0.12 TABLET ORAL at 22:18

## 2022-08-08 RX ADMIN — AMLODIPINE BESYLATE 10 MILLIGRAM(S): 2.5 TABLET ORAL at 04:05

## 2022-08-08 RX ADMIN — Medication 60 MILLIGRAM(S): at 13:11

## 2022-08-08 RX ADMIN — Medication 1 MILLIGRAM(S): at 09:18

## 2022-08-08 RX ADMIN — ENOXAPARIN SODIUM 40 MILLIGRAM(S): 100 INJECTION SUBCUTANEOUS at 18:12

## 2022-08-08 RX ADMIN — PIPERACILLIN AND TAZOBACTAM 25 GRAM(S): 4; .5 INJECTION, POWDER, LYOPHILIZED, FOR SOLUTION INTRAVENOUS at 22:18

## 2022-08-08 RX ADMIN — ENOXAPARIN SODIUM 80 MILLIGRAM(S): 100 INJECTION SUBCUTANEOUS at 02:01

## 2022-08-08 RX ADMIN — PIPERACILLIN AND TAZOBACTAM 25 GRAM(S): 4; .5 INJECTION, POWDER, LYOPHILIZED, FOR SOLUTION INTRAVENOUS at 14:11

## 2022-08-08 RX ADMIN — SODIUM CHLORIDE 100 MILLILITER(S): 9 INJECTION INTRAMUSCULAR; INTRAVENOUS; SUBCUTANEOUS at 04:07

## 2022-08-08 RX ADMIN — AMLODIPINE BESYLATE 10 MILLIGRAM(S): 2.5 TABLET ORAL at 18:09

## 2022-08-08 RX ADMIN — Medication 650 MILLIGRAM(S): at 04:07

## 2022-08-08 RX ADMIN — SODIUM CHLORIDE 500 MILLILITER(S): 9 INJECTION INTRAMUSCULAR; INTRAVENOUS; SUBCUTANEOUS at 11:52

## 2022-08-08 RX ADMIN — Medication 650 MILLIGRAM(S): at 04:50

## 2022-08-08 RX ADMIN — OXYCODONE HYDROCHLORIDE 5 MILLIGRAM(S): 5 TABLET ORAL at 12:40

## 2022-08-08 RX ADMIN — LOSARTAN POTASSIUM 25 MILLIGRAM(S): 100 TABLET, FILM COATED ORAL at 09:18

## 2022-08-08 NOTE — PATIENT PROFILE ADULT - FALL HARM RISK - HARM RISK INTERVENTIONS

## 2022-08-08 NOTE — PATIENT PROFILE ADULT - PATIENT REPRESENTATIVE: ( YOU CAN CHOOSE ANY PERSON THAT CAN ASSIST YOU WITH YOUR HEALTH CARE PREFERENCES, DOES NOT HAVE TO BE A SPOUSE, IMMEDIATE FAMILY OR SIGNIFICANT OTHER/PARTNER)
declines Metronidazole Counseling:  I discussed with the patient the risks of metronidazole including but not limited to seizures, nausea/vomiting, a metallic taste in the mouth, nausea/vomiting and severe allergy.

## 2022-08-08 NOTE — BRIEF OPERATIVE NOTE - NSICDXBRIEFPREOP_GEN_ALL_CORE_FT
PRE-OP DIAGNOSIS:  Foot abscess, right 08-Aug-2022 18:35:37  Regina Jolly   PRE-OP DIAGNOSIS:  Foot abscess, right 08-Aug-2022 18:35:37  Regina Jolly  Foot ulcer with fat layer exposed, right 09-Aug-2022 09:38:40  Dutch Carmona

## 2022-08-08 NOTE — BRIEF OPERATIVE NOTE - NSICDXBRIEFPROCEDURE_GEN_ALL_CORE_FT
PROCEDURES:  Debridement, sharp, muscle, foot 08-Aug-2022 18:34:24 Aseptic debridement sharp including soft tissue of R foot with #15 blade Regina Jolly   PROCEDURES:  Debridement of soft tissue of foot or ankle 09-Aug-2022 09:41:05 Aseptic excisional debridement sharp including soft tissue of Right foot with #15 blade. Dutch Carmona

## 2022-08-08 NOTE — PHYSICAL THERAPY INITIAL EVALUATION ADULT - MODALITIES TREATMENT COMMENTS
Pt left OOB in chair with IV Intact. CBIR. Pt instructed to not get up alone. 1N BRIANNE Diaz present and aware

## 2022-08-08 NOTE — PATIENT PROFILE ADULT - NSTRANSFERBELONGINGSRESP_GEN_A_NUR
05/23/22        [address] [place] [address]      Dear Hannah Burkett,    Please review the enclosed prep instructions for your procedure with Schuyler Valentin MD     Date of Procedure: July 12, 2022  Time of Procedure:  Someone from the facility will call you 2-3 days prior with your procedure and arrival times. IMPORTANT REQUIREMENTS  You are scheduled with Monitored Anesthesia Care (MAC) sedation and will need to have a legal adult (18 years or older) stay with you overnight the day of your procedure or your procedure will be canceled. Location:    [address] [place] [address]  [address] [place] (Please check in on the 2nd floor)  [place] [address] [place] [address]  [phone]      [facility]   Date of COVID testing  Bring ID   Date: Saturday, July 09, 2022  Time:  10:30am   Location: [address], [address]    Once you leave the testing area, Self-quarantine is recommended and minimizes your risk of exposure before your procedure. If you are unable to self-quarantine, please continue to wear a mask, practice social distancing and perform good hand hygiene. Please follow these  instructions until surgery or procedure has been completed. Â·  Immediately report any new symptoms or suspected/known exposures to COVID-19 cases to your surgeon office. Â· Maintain physical distancing (at least 6 feet) at all times     [facility] hands frequently and thoroughly with soap and water (lather at least 20 seconds) or disinfect with alcohol-based hand  before eating, before touching face/mouth/eyes, after touching shared objects or high touch surfaces. Â· Regularly clean cell phones, door handles, light switches, faucet and toilet handles, bathrooms, and kitchen surfaces using household disinfectant or hot soapy water. Important:   Â· You will need someone to drive you home and remain with you or check on you up to 24 hours after you go home.   Â· If you take blood thinners (i.e. Warfarin, Plavix, Xarelto, Eliquis, Aggrenox, Effient, Pradaxa) please call your Primary Care Provider/Cardiologist at least 10 days before your procedure for instructions on how many days before to hold your blood thinner. Â· If you are diabetic and take insulin, please call your Primary Care Provider for instructions. Usually, it is recommended that you take half of your insulin dose the evening before and half your insulin dose the morning of your procedure. Â· If you are taking the diet drug Phentermine, stop taking it 14 days before your procedure. Â· The Pre-admission nurse will tell you which medications to take the morning of your procedure with a small sip of water. Do not take any other medications until after your procedure. If you have any questions regarding these instructions or need to reschedule, please contact me at the telephone number and extension listed below. Thank you,    Augustin Guillen [phone]  Surgery Scheduler for Wesley Ramsay MD  [address] Department      Colonoscopy with Gatorade/Miralax/ Dulcolax  Pre-Procedure Instructions  Bring these instructions with you to your nearest pharmacy. All of this is over the counter, you do not need a prescription for any of the following medications. The pharmacist can help you determine that you have the correct medication. Please pick-up the following items at least 1 week before your scheduled procedure:     1. 4 Dulcolax Laxative Tablets- 5 mg each (available over the counter). 2. 1 bottle of Miralax/Glycolax powder- 238-255 grams (available over the counter). 3. 64 ounces of Gatorade or Powerade. You can buy any color except red, pink, orange or purple. Transportation:  Â· Make arrangements for someone (over 25years of age) to drive you home after the procedure because you will be very drowsy. Please make these arrangements in advance. A taxi is not acceptable transportation.  If you do not have transportation arranged, we will not be able to do the colonoscopy. Â· Make arrangements for someone to stay with you or check in on you frequently the rest of the day/evening until the drowsiness has completely worn off. Cancellation or Rescheduling:  Â· Due to everyone's busy schedules, it is important that you keep your appointment. If you must reschedule or cancel your appointment, please notify your physician's office at least 48 hours in advance. After your Colonoscopy: You will receive after-procedure information and instructions. In addition:  Â· Do not plan on going to work or doing anything for the rest of the day. Â· You may resume eating and drinking with no limitations following the procedure, unless otherwise stated. 10 days Before Your Procedure:  Â· If you take blood thinners (i.e. Warfarin, Plavix, Xarelto, Eliquis, Aggrenox, Effient, Pradaxa) please call your Primary Care Provider/Cardiologist at least 10 days before your procedure for instructions on how many days before to hold your blood thinner. 1 Week/ 7 days Before Your Procedure:  Â· Do not eat popcorn, seeds, nuts or whole kernel corn. Â· Do not take Carafate, iron supplements, Fish Oil, or Pepto-Bismol. Â· Have clear liquids available to drink at home. See âClear Liquid Diet Suggestionâ sheet. Â·  the prep from pharmacy. Do not mix it until the day you need to start the colon prep. 5 Days Before Your Procedure:   Â· Do not eat products with [place] (a fat substitute found in [employer] and Pringles Fat-Free chips) for 5 days before the procedure. Colon Preparation:  Â· To complete a successful colonoscopy, the bowel must be clean so that the physician can clearly view the colon. It is very important that you read all the instructions well in advance of the procedure. Without proper preparation, the colonoscopy will not be successful, and the test may have to be repeated.   1 Day Before Your Procedure:  Â· You may have only clear liquids to eat or drink all day long. You may not have any solid foods or dairy products, and you may not eat or drink anything that is red or purple. The more clear liquids you drink, the better off you will be. See attached âClear Liquid Diet Suggestionâ sheet. Â· Do not drink any alcoholic beverages for at least 24 hours before the procedure. Â· Do not take any other laxatives such as: Lomotil, Imodium, Effer-syllium, Metamucil, Citrucel, Konsyl, Hydrocil, or FiberCon. Â· In the morning, mix the entire bottle of Miralax with 64 ounces of Gatorade, shake well to mix, and refrigerate to chill. Â· At 3:00 pm, take 4 Dulcolax laxative tablets with 8-10 ounces of clear liquid. It is normal for this to cause some abdominal cramping. Â· At 6:00 pm, start drinking the Miralax/ Gatorade. Drink one large (8-10 oz.) glass every 10-15 minutes until it is all gone. Using a straw to finish the drink may be helpful. Â· In most cases, loose, liquidy bowel movements will begin within 30 minutes to one hour. You should remain within easy access of a bathroom. Continue to drink the prep regardless of stool frequency. It is normal for this to cause some chills. Â· Should you experience rectal irritation due to frequent stooling, you may apply Desitin, Balmex, Vitamin A&D ointment or other similar product to the irritated area. Â· Continue to drink clear liquids throughout the evening (see âClear Liquid Diet Suggestionâ sheet). Day of Your Procedure:  Â· Do not eat or drink anything other than a small sip of water with your instructed medications. Â· You must not have anything to drink 2 hours before your arrival.   Â· You will be informed by the Pre-admission Testing nurse which medications to take the morning of your procedure with a small sip of water only. Do not take any other medications until after your procedure.    Â· In the morning, your stools should have a clear to see-through cloudy yes yellow appearance with no formed substance. If your stools are darker or have formed substance, please call the location where your procedure is scheduled to be done and ask for the pre-op nurse, who will get further instructions from your physician. After your Colonoscopy:  Â· You will receive after-procedure information and instructions. In addition:  Â· Do not plan on going to work or doing anything for the rest of the day. Â· You may resume eating and drinking with no limitations following the procedure, unless   otherwise stated. Clear Liquid Diet Suggestion Sheet    Preparation:  You have received instructions that explain what you need to do to prepare for your colonoscopy. Your colon must be empty for the colonoscopy to be thorough and safe. To prepare for the procedure, you will have to follow a liquid diet for 1 to 3 days beforehand. The longer you do the clear liquid diet, the better off you will be. The liquid diet should be clear and not contain food colorings (Red, Orange, or Purple) and may include:  â¢ Bouillon or broth  â¢ Strained/ Pulp free fruit juices (lemonade, apple, or white grape)  â¢ Water  â¢ Plain Coffee (no dairy products, but you can add sugar)  â¢ Plain Tea (no milk or creamers or dairy products)   â¢ White Soda  â¢ Gelatin/ Jell-O (no red, orange, purple)  â¢ Popsicles  â¢ Gatorade, Powerade, etc.  â¢ Clear Hard Candy    No alcoholic beverages for at least 24 hours before your procedure.      An example of a typical menu on the Clear Liquid Diet may look like this:  Breakfast:  Snack:    Â· 1 glass of pulp-free fruit juice and/or water â¢ 1 popsicle   Â· 1 bowl gelatin/ Jell-O â¢ 1 cup of coffee or tea, no dairy products and/or water/ white soda   Â· 1 cup of coffee or tea, no dairy products  â¢ Sugar or honey is acceptable   Â· Sugar or honey is acceptable    Snack: Dinner:    Â· 1 glass of pulp-free fruit juice and/or water Â· 1 glass of pulp-free fruit juice and/or water   Â· 1 bowl gelatin/ Jell-O Â· 1 cup broth    Â· 1 bowl gelatin/ Jell-O   Lunch:     Â· 1 glass of pulp-free fruit juice and/or water    Â· 1 cup broth    Â· 1 bowl gelatin/ Jell-O      Thank you for choosing [address]

## 2022-08-08 NOTE — CONSULT NOTE ADULT - ASSESSMENT
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    # T cell Large Granular Lymphocytic leukemia   - seen by me in clinic at [facility]  ­ Flow cytometry with relative increase in T cell large granular lymphocytes­ TCR beta gene rearrangement positive, TCR gamma rearrangement positive, stat3 detected which helps to differentiate LGL from reactive processes and those with sTAT3 D661 tend to present with neutropenia and have a better prognosis.   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  IgK gene status negative, TCR gamma positive  ­ 5/18/22 WBC 30.3. Hb 14.3, plt 190k, lymph 91%, alc 27.6 ­­­> WBC 13.4, anc 400, ALC 12.5, Hb 12.7, plt 173k  ­ 5/26/22 CT a/p with splenomegaly up to 15 cm, cirrhosis seen and hepatic steatosis, no masses as well as sigmoid diverticulosis  ­ started cyclophosphamide 100 mg qd and prednisone 80 mg qd 7/8/22   - last seen in office on 8/3 WBC 4.1, Hb 12.5, plt 143, anc 1900  - will hold Cyclophosphamide while inpatient but to continue with steroid taper - can initiate 60 mg qd tomorrow     # cellulitis   - seen by podiatry  - MRI right foot: Plantar soft tissue wound at the level of the third metatarsophalangeal  joint and proximal phalanx containing phlegmon and likely foci of air.  Wound extends into the deep subcutaneous tissues without contact of the  adjacent bone. No osteomyelitis  - s/p drainage with purulent fluid- f/u cultures  - antibiotics on zosyn  - US LE doppler with Chronic post thrombotic changes in the right common femoral, femoral, and popliteal veins. No evidence of acute deep venous thrombosis in the right lower extremity.  - can c/w dvt ppx     Dr. Kehinde Sanchez  cell: [phone]  Weekends and nights please call [phone] for MD on call  [facility]  Hematology/Oncology

## 2022-08-08 NOTE — PHARMACOTHERAPY INTERVENTION NOTE - COMMENTS
Medication reconciliation performed with patient. All home medications, doses, and frequencies confirmed and checked against 10X10 Room. Patient did not remember the name or dose of chemo medication. Confirmed to be Cyclophosphamide 50mg, 2 caps once daily with oncologist Dr. Kehinde Sanchez's office. Patient's preferred pharmacy is [facility] in [place].     Home Medications:  acetaminophen 325 mg oral tablet: 2 tab(s) orally every 4 hours, As Needed (08 Aug 2022 11:34)  amLODIPine 10 mg oral tablet: 1 tab(s) orally once a day (08 Aug 2022 11:34)  cyclophosphamide 50 mg oral capsule: 2 cap(s) orally once a day (08 Aug 2022 11:34)  Livalo 2 mg oral tablet: 1 tab(s) orally once a day (08 Aug 2022 11:34)  losartan 25 mg oral tablet: 1 tab(s) orally once a day (08 Aug 2022 11:34)  methotrexate 2.5 mg oral tablet: 3 tab(s) orally once a week on Saturdays (08 Aug 2022 11:22)  Multiple Vitamins oral tablet: 1 tab(s) orally once a day (08 Aug 2022 11:34)  ondansetron 4 mg oral tablet: 1 tab(s) orally every 8 hours, As Needed - for nausea (08 Aug 2022 11:34)  pramipexole 0.25 mg oral tablet: 3 tab(s) orally 2 times a day (08 Aug 2022 11:34)  predniSONE 20 mg oral tablet: 4 tab(s) orally once a day (08 Aug 2022 11:34)     Medication reconciliation performed with patient. All home medications, doses, and frequencies confirmed and checked against [place]. Patient did not remember the name or dose of chemo medication. Confirmed to be Cyclophosphamide 50mg, 2 caps once daily with oncologist Dr. Kehinde Sanchez's office. Patient's preferred pharmacy is [facility] in [place].     Home Medications:  acetaminophen 325 mg oral tablet: 2 tab(s) orally every 4 hours, As Needed (08 Aug 2022 11:34)  amLODIPine 10 mg oral tablet: 1 tab(s) orally once a day (08 Aug 2022 11:34)  cyclophosphamide 50 mg oral capsule: 2 cap(s) orally once a day (08 Aug 2022 11:34)  Livalo 2 mg oral tablet: 1 tab(s) orally once a day (08 Aug 2022 11:34)  losartan 25 mg oral tablet: 1 tab(s) orally once a day (08 Aug 2022 11:34)  Multiple Vitamins oral tablet: 1 tab(s) orally once a day (08 Aug 2022 11:34)  ondansetron 4 mg oral tablet: 1 tab(s) orally every 8 hours, As Needed - for nausea (08 Aug 2022 11:34)  pramipexole 0.25 mg oral tablet: 3 tab(s) orally 2 times a day (08 Aug 2022 11:34)  predniSONE 20 mg oral tablet: 4 tab(s) orally once a day (08 Aug 2022 11:34)

## 2022-08-08 NOTE — PROGRESS NOTE ADULT - SUBJECTIVE AND OBJECTIVE BOX
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 States he noticed redness to leg/foot and had a hard time ambulating/weight bearing due to discomfort. Denies any trauma, insect bites, chills or discharge from foot but endorsed subjective fevers.    Interval History: 22 pt seen and examined at bedside, Tmax 100.5, he reports pain, tenderness, and swelling to RLE. Had bedside debridement of right foot with podiatry. He reports drinking 3-4 beers 2-3 times a week with hx of EtOH withdrawal in the past, no signs of acute withdrawal at this time. Denies CP, palps, sob, cough, HA, dizziness, lightheadedness, n/v/d, or dysuria.    REVIEW OF SYSTEMS: All other review of systems is negative unless indicated above.    PE:  Vital Signs Last 24 Hrs: all reviewed   T(C): 37 (08 Aug 2022 09:07), Max: 38.6 (07 Aug 2022 14:35)  T(F): 98.6 (08 Aug 2022 09:07), Max: 101.5 (07 Aug 2022 14:35)  HR: 87 (08 Aug 2022 09:07) (87 - 114)  BP: 140/75 (08 Aug 2022 09:07) (140/75 - 177/84)  BP(mean): 100 (07 Aug 2022 21:37) (93 - 110)  RR: 19 (08 Aug 2022 09:07) (18 - 24)  SpO2: 96% (08 Aug 2022 09:07) (95% - 100%)  Parameters below as of 08 Aug 2022 09:07  Patient On (Oxygen Delivery Method): room air    Constitutional: NAD, awake and alert  HEENT: EOMI  Neck: Soft and supple, No JVD  Respiratory: Breath sounds are clear bilaterally, No wheezing, rales or rhonchi  Cardiovascular: S1 and S2, regular rate and rhythm, no Murmurs  Gastrointestinal: Bowel Sounds present, soft, nontender, nondistended, round   Extremities: No peripheral edema  Vascular: 2+ peripheral pulses  Neurological: A/O x 3, no focal deficits  Musculoskeletal: 5/5 strength b/l upper and lower extremities  Skin: RLE erythema, swelling, s/p debridement to foot now wrapped in ACE    LABS: All Labs Reviewed:                        12.4   4.96  )-----------( 119      ( 08 Aug 2022 06:59 )             36.4     08-08    132<L>  |  97  |  19  ----------------------------<  106<H>  3.5   |  28  |  1.04    Ca    8.4<L>      08 Aug 2022 06:59    TPro  6.4  /  Alb  2.8<L>  /  TBili  0.8  /  DBili  x   /  AST  49<H>  /  ALT  66  /  AlkPhos  51  08-08    PT/INR - ( 07 Aug 2022 16:53 )   PT: 11.7 sec;   INR: 1.01 ratio    PTT - ( 08 Aug 2022 06:59 )  PTT:44.3 sec  Lactate, Blood (22 @ 09:51) 2.3 mmol/L   C-Reactive Protein, Serum (22 @ 16:53) 24 mg/L   Sedimentation Rate, Erythrocyte (22 @ 16:53)  29 mm/hr   Urinalysis Basic - ( 07 Aug 2022 18:51 )  Color: Yellow / Appearance: Clear / S.020 / pH: x  Gluc: x / Ketone: Negative  / Bili: Negative / Urobili: Negative   Blood: x / Protein: 15 / Nitrite: Negative   Leuk Esterase: Negative / RBC: Negative /HPF / WBC 0-2   Sq Epi: x / Non Sq Epi: Occasional / Bacteria: Occasional    Micro:    Right foot wound Culture- pending    Blood and Urine Cultures- pending     RADIOLOGY/EKG: all reviewed:    Xray Foot AP + Lateral + Oblique, Right (22 @ 16:13)   INTERPRETATION:  Chest and right foot. Patient has sepsis and a foot wound.  AP erect chest on 2022 at 3:25 PM.  Heart size normal.  Lungs are clear.  Chest is similar to 2018.  Right foot. 3 views.  COMPARISON: None available.  Small posterior and inferior calcaneal spurs.  Mild to moderate first MTP degeneration.  Mild first IP degeneration.  No bone destruction or fracture.  IMPRESSION: No acute finding. Incidental right foot findings.    US Duplex Venous Lower Ext Ltd, Right (22 @ 16:44)   FINDINGS:  Nonocclusive thrombus/chronic postthrombotic change in the right common femoral and proximal femoral, and popliteal veins. Otherwise, there is compressibility of the right common femoral, femoral and popliteal veins. 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 The contralateral common femoral vein is patent. Doppler examination shows normal spontaneous and phasic flow.  No calf vein thrombosis is detected.  Visualized portion of the right great saphenous vein is patent at the saphenofemoral junction and proximal and mid thigh without evidence of thrombophlebitis.  IMPRESSION:  Chronic post thrombotic changes in the right common femoral, femoral, and popliteal veins.  No evidence of acute deep venous thrombosis in the right lower extremity.    Meds:  MEDICATIONS  (STANDING):  amLODIPine   Tablet 10 milliGRAM(s) Oral daily  atorvastatin 10 milliGRAM(s) Oral at bedtime  enoxaparin Injectable 40 milliGRAM(s) SubCutaneous every 24 hours  folic acid 1 milliGRAM(s) Oral daily  losartan 25 milliGRAM(s) Oral daily  methotrexate 7.5 milliGRAM(s) Oral <User Schedule>  piperacillin/tazobactam IVPB.. 3.375 Gram(s) IV Intermittent every 8 hours  pramipexole 0.25 milliGRAM(s) Oral at bedtime  predniSONE   Tablet 60 milliGRAM(s) Oral daily  sodium chloride 0.9%. 1000 milliLiter(s) (100 mL/Hr) IV Continuous <Continuous>    MEDICATIONS  (PRN):  acetaminophen     Tablet .. 650 milliGRAM(s) Oral every 6 hours PRN Mild Pain (1 - 3)  aluminum hydroxide/magnesium hydroxide/simethicone Suspension 30 milliLiter(s) Oral every 4 hours PRN Dyspepsia  melatonin 3 milliGRAM(s) Oral at bedtime PRN Insomnia  ondansetron Injectable 4 milliGRAM(s) IV Push every 8 hours PRN Nausea and/or Vomiting  oxyCODONE    IR 5 milliGRAM(s) Oral every 6 hours PRN Moderate Pain (4 - 6)  polyethylene glycol 3350 17 Gram(s) Oral daily PRN Constipation  polyvinyl alcohol 1.4%/povidone 0.6% Ophthalmic Solution - Peds 1 Drop(s) Both EYES two times a day PRN Dry Eyes    Home Medications:  acetaminophen 325 mg oral tablet: 2 tab(s) orally every 4 hours, As Needed (08 Aug 2022 12:03)  amLODIPine 10 mg oral tablet: 1 tab(s) orally once a day (08 Aug 2022 12:03)  cyclophosphamide 50 mg oral capsule: 2 cap(s) orally once a day (08 Aug 2022 12:03)  Livalo 2 mg oral tablet: 1 tab(s) orally once a day (08 Aug 2022 12:)  losartan 25 mg oral tablet: 1 tab(s) orally once a day (08 Aug 2022 12:03)  Multiple Vitamins oral tablet: 1 tab(s) orally once a day (08 Aug 2022 12:03)  ondansetron 4 mg oral tablet: 1 tab(s) orally every 8 hours, As Needed - for nausea (08 Aug 2022 12:03)  pramipexole 0.25 mg oral tablet: 3 tab(s) orally 2 times a day (08 Aug 2022 12:03)  predniSONE 20 mg oral tablet: 4 tab(s) orally once a day (08 Aug 2022 12:03)

## 2022-08-08 NOTE — BRIEF OPERATIVE NOTE - NSICDXBRIEFPOSTOP_GEN_ALL_CORE_FT
POST-OP DIAGNOSIS:  Foot abscess, right 08-Aug-2022 18:35:42  Regina Jolly   POST-OP DIAGNOSIS:  Foot abscess, right 08-Aug-2022 18:35:42  Regina Jolly  Foot ulcer, right, with fat layer exposed 09-Aug-2022 09:39:15  Dutch Carmona

## 2022-08-08 NOTE — CONSULT NOTE ADULT - ASSESSMENT
A: 66M inpatient seen at bedside for the following:   -Right foot abscess  -Right foot cellulitis      P:   Chart reviewed and Patient evaluated  Discussed diagnosis and treatment with patient.   Consent obtained and witnessed for: Aseptic debridement sharp including soft tissue of R foot with #15 blade  Wound flush with normal saline and betadine mix  Obtained wound culture and sent to Pathology on 08/08/22  Applied iodoform packing with betadine soaked gauze and dry sterile dressing  X-rays reviewed : on wet read showing no cortical disruptions and no soft tissue gas. Will follow up on official read.   MRI ordered to r/o any abscess.   Continue with IV antibiotics As Per ID/MED  Weight bearing as tolerated to RLE  Discussed importance of daily foot examinations and proper shoe gear and to importance of lower Fasting Blood Glucose levels.   Patient demonstrated verbal understanding of all interventions and tolerated interventions well without any complications.   Podiatry will follow while in house.  Case D/W Dr. Carmona     A: 66M inpatient seen at bedside for the following:   -Right foot abscess  -Right foot cellulitis  -Right foot ulceration including soft tissue    P:   Chart reviewed and Patient evaluated  Discussed diagnosis and treatment with patient.   Consent obtained and witnessed for: Aseptic sharp excisonal debridement  including soft tissue of Right  foot with #15 blade  Wound flush with normal saline and betadine mix  Obtained wound culture and sent to Pathology on 08/08/22  Applied iodoform packing with betadine soaked gauze and dry sterile dressing  X-rays reviewed : on wet read showing no cortical disruptions and no soft tissue gas. Will follow up on official read.   MRI ordered to r/o any abscess.   Continue with IV antibiotics As Per ID/MED  Weight bearing as tolerated to RLE  Discussed importance of daily foot examinations and proper shoe gear and to importance of lower Fasting Blood Glucose levels.   Patient demonstrated verbal understanding of all interventions and tolerated interventions well without any complications.   Podiatry will follow while in house.  Case D/W Dr. Carmona

## 2022-08-08 NOTE — PROGRESS NOTE ADULT - ASSESSMENT
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, but dc'd as it caused elevation in LFTs , now on Cyclophosphamide and Steroid taper who is admitted to hospital medicine services with cellulitis/ abscess to right foot    Sepsis due to RLE Cellulitis and Right foot abscess s/p Bedside Debridement on 8/8/22  - XRay as above  - s/p bedside debridement with Podiatry  - F/U MRI to r/o OM   - F/U wound Culture  - F/U Blood and Urine Cultures  - Trend Lactate 2.6-->2.3-->2.3-->2.3, repeat lactate 4pm  - ESR elevated   - s/p 2700 LR bolus in ED--> Additional 1L NS bolus this am; Continue IVF, NS at 100 ml/hr  - s/p Vanco x 1 dose in ER, Continue Zosyn   - Tylenol for temps PRN  - Oxy for pain; Miralax PRN for constipation   - Podiatry Following     Hyponatremia  - Continue IVF  - Trend BMP     HTN  - Continue Amlodipine & Losartan with hold parameters     HLD  - Continue statin  - Check Lipid panel     RLE Non-occlusive thrombus  Hx of DVT  - US demonstrates Nonocclusive thrombus/chronic postthrombotic change in the right common femoral and proximal femoral, and popliteal veins; No evidence of DVT   - no indication for anticoagulation    RA  LGL Leukemia   Normocytic Anemia   - Formerly on methotrexate, but stopped 2/2 elevated LFTs  - Now on Cyclophosphamide (on hold while hospitalized per Heme/Onc)  - Continue Steroid Taper (60mg daily)  - Heme/Onc, Dr. Sanchez following  - Continue Folic Acid   - Check B12, folate, Iron studies     RLS  - Continue Mirapex     EtOH Abuse  - With Hx of EtOH withdrawal; reports drinking 3-4 beers, 2-3 x week  - No active signs/symptoms of withdrawal  - Monitor    DVT Prophylaxis: Lovenox     Code Status: Full Code     Dispo: Continue IV antibiotics, f/u MRI, cultures, f/u lactate and anemia labs, PT

## 2022-08-08 NOTE — PHYSICAL THERAPY INITIAL EVALUATION ADULT - LIVES WITH, PROFILE
no Patient lives at home with wife with 0 [address], 12 steps down to basement and 12 steps up to second floor bedroom

## 2022-08-09 LAB
24R-OH-CALCIDIOL SERPL-MCNC: 36.7 NG/ML — SIGNIFICANT CHANGE UP (ref 30–80)
A1C WITH ESTIMATED AVERAGE GLUCOSE RESULT: 6.1 % — HIGH (ref 4–5.6)
ADD ON TEST-SPECIMEN IN LAB: SIGNIFICANT CHANGE UP
ALBUMIN SERPL ELPH-MCNC: 2.5 G/DL — LOW (ref 3.3–5)
ALP SERPL-CCNC: 46 U/L — SIGNIFICANT CHANGE UP (ref 40–120)
ALT FLD-CCNC: 57 U/L — SIGNIFICANT CHANGE UP (ref 12–78)
ANION GAP SERPL CALC-SCNC: 6 MMOL/L — SIGNIFICANT CHANGE UP (ref 5–17)
AST SERPL-CCNC: 35 U/L — SIGNIFICANT CHANGE UP (ref 15–37)
BILIRUB DIRECT SERPL-MCNC: 0.3 MG/DL — SIGNIFICANT CHANGE UP (ref 0–0.3)
BILIRUB INDIRECT FLD-MCNC: 0.3 MG/DL — SIGNIFICANT CHANGE UP (ref 0.2–1)
BILIRUB SERPL-MCNC: 0.6 MG/DL — SIGNIFICANT CHANGE UP (ref 0.2–1.2)
BUN SERPL-MCNC: 16 MG/DL — SIGNIFICANT CHANGE UP (ref 7–23)
CALCIUM SERPL-MCNC: 8 MG/DL — LOW (ref 8.5–10.1)
CHLORIDE SERPL-SCNC: 102 MMOL/L — SIGNIFICANT CHANGE UP (ref 96–108)
CHOLEST SERPL-MCNC: 194 MG/DL — SIGNIFICANT CHANGE UP
CO2 SERPL-SCNC: 25 MMOL/L — SIGNIFICANT CHANGE UP (ref 22–31)
CREAT SERPL-MCNC: 0.84 MG/DL — SIGNIFICANT CHANGE UP (ref 0.5–1.3)
EGFR: 96 ML/MIN/1.73M2 — SIGNIFICANT CHANGE UP
ESTIMATED AVERAGE GLUCOSE: 128 MG/DL — HIGH (ref 68–114)
FOLATE SERPL-MCNC: >20 NG/ML — SIGNIFICANT CHANGE UP
GLUCOSE SERPL-MCNC: 130 MG/DL — HIGH (ref 70–99)
HCT VFR BLD CALC: 33 % — LOW (ref 39–50)
HDLC SERPL-MCNC: 86 MG/DL — SIGNIFICANT CHANGE UP
HGB BLD-MCNC: 11.2 G/DL — LOW (ref 13–17)
IRON SATN MFR SERPL: 25 % — SIGNIFICANT CHANGE UP (ref 16–55)
IRON SATN MFR SERPL: 69 UG/DL — SIGNIFICANT CHANGE UP (ref 45–165)
LIPID PNL WITH DIRECT LDL SERPL: 87 MG/DL — SIGNIFICANT CHANGE UP
MCHC RBC-ENTMCNC: 32.1 PG — SIGNIFICANT CHANGE UP (ref 27–34)
MCHC RBC-ENTMCNC: 33.9 GM/DL — SIGNIFICANT CHANGE UP (ref 32–36)
MCV RBC AUTO: 94.6 FL — SIGNIFICANT CHANGE UP (ref 80–100)
NON HDL CHOLESTEROL: 108 MG/DL — SIGNIFICANT CHANGE UP
PLATELET # BLD AUTO: 91 K/UL — LOW (ref 150–400)
POTASSIUM SERPL-MCNC: 3.5 MMOL/L — SIGNIFICANT CHANGE UP (ref 3.5–5.3)
POTASSIUM SERPL-SCNC: 3.5 MMOL/L — SIGNIFICANT CHANGE UP (ref 3.5–5.3)
PROT SERPL-MCNC: 6.4 GM/DL — SIGNIFICANT CHANGE UP (ref 6–8.3)
RBC # BLD: 3.49 M/UL — LOW (ref 4.2–5.8)
RBC # FLD: 16.3 % — HIGH (ref 10.3–14.5)
SODIUM SERPL-SCNC: 133 MMOL/L — LOW (ref 135–145)
TIBC SERPL-MCNC: 272 UG/DL — SIGNIFICANT CHANGE UP (ref 220–430)
TRIGL SERPL-MCNC: 101 MG/DL — SIGNIFICANT CHANGE UP
TSH SERPL-MCNC: 0.34 UU/ML — SIGNIFICANT CHANGE UP (ref 0.34–4.82)
UIBC SERPL-MCNC: 203 UG/DL — SIGNIFICANT CHANGE UP (ref 110–370)
VIT B12 SERPL-MCNC: 252 PG/ML — SIGNIFICANT CHANGE UP (ref 232–1245)
WBC # BLD: 3.42 K/UL — LOW (ref 3.8–10.5)
WBC # FLD AUTO: 3.42 K/UL — LOW (ref 3.8–10.5)

## 2022-08-09 PROCEDURE — 99232 SBSQ HOSP IP/OBS MODERATE 35: CPT

## 2022-08-09 RX ADMIN — LOSARTAN POTASSIUM 25 MILLIGRAM(S): 100 TABLET, FILM COATED ORAL at 10:08

## 2022-08-09 RX ADMIN — AMLODIPINE BESYLATE 10 MILLIGRAM(S): 2.5 TABLET ORAL at 10:09

## 2022-08-09 RX ADMIN — PRAMIPEXOLE DIHYDROCHLORIDE 0.25 MILLIGRAM(S): 0.12 TABLET ORAL at 21:44

## 2022-08-09 RX ADMIN — ENOXAPARIN SODIUM 40 MILLIGRAM(S): 100 INJECTION SUBCUTANEOUS at 17:27

## 2022-08-09 RX ADMIN — Medication 1 MILLIGRAM(S): at 10:08

## 2022-08-09 RX ADMIN — SODIUM CHLORIDE 100 MILLILITER(S): 9 INJECTION INTRAMUSCULAR; INTRAVENOUS; SUBCUTANEOUS at 05:27

## 2022-08-09 RX ADMIN — PIPERACILLIN AND TAZOBACTAM 25 GRAM(S): 4; .5 INJECTION, POWDER, LYOPHILIZED, FOR SOLUTION INTRAVENOUS at 21:44

## 2022-08-09 RX ADMIN — Medication 60 MILLIGRAM(S): at 10:08

## 2022-08-09 RX ADMIN — ATORVASTATIN CALCIUM 10 MILLIGRAM(S): 80 TABLET, FILM COATED ORAL at 21:40

## 2022-08-09 RX ADMIN — OXYCODONE HYDROCHLORIDE 5 MILLIGRAM(S): 5 TABLET ORAL at 18:52

## 2022-08-09 RX ADMIN — Medication 3 MILLIGRAM(S): at 21:46

## 2022-08-09 RX ADMIN — OXYCODONE HYDROCHLORIDE 5 MILLIGRAM(S): 5 TABLET ORAL at 18:22

## 2022-08-09 RX ADMIN — PIPERACILLIN AND TAZOBACTAM 25 GRAM(S): 4; .5 INJECTION, POWDER, LYOPHILIZED, FOR SOLUTION INTRAVENOUS at 13:06

## 2022-08-09 RX ADMIN — PIPERACILLIN AND TAZOBACTAM 25 GRAM(S): 4; .5 INJECTION, POWDER, LYOPHILIZED, FOR SOLUTION INTRAVENOUS at 05:27

## 2022-08-09 NOTE — PROGRESS NOTE ADULT - SUBJECTIVE AND OBJECTIVE BOX
INTERVAL HPI/OVERNIGHT EVENTS:  Patient S&E at bedside. Yesterday, pt now s/p debridement of right foot   VSS, afebrile, HR 78 yesterday evening on RA  Pt reports feeling better this morning     PAST MEDICAL & SURGICAL HISTORY:  ETOH abuse      Hyperlipidemia LDL goal &lt;100      Restless leg syndrome      Rheumatoid arteritis      No significant past surgical history          FAMILY HISTORY:  No pertinent family history in first degree relatives        VITAL SIGNS:  T(F): 97.5 (22 @ 22:38)  HR: 78 (22 @ 22:38)  BP: 141/70 (22 @ 22:38)  RR: 18 (22 @ 22:38)  SpO2: 100% (22 @ 22:38)  Wt(kg): --    PHYSICAL EXAM:    Constitutional: NAD, cushingoid appearance of face   Eyes: EOMI  Respiratory: CTA b/l, good air entry b/l  Cardiovascular: RRR, no M/R/G  Gastrointestinal: soft, NTND, no masses palpable, + BS, no hepatosplenomegaly  Extremities: no c/c/e, right foot wrapped from debridement   Neurological: AAOx3      MEDICATIONS  (STANDING):  amLODIPine   Tablet 10 milliGRAM(s) Oral daily  atorvastatin 10 milliGRAM(s) Oral at bedtime  enoxaparin Injectable 40 milliGRAM(s) SubCutaneous every 24 hours  folic acid 1 milliGRAM(s) Oral daily  losartan 25 milliGRAM(s) Oral daily  piperacillin/tazobactam IVPB.. 3.375 Gram(s) IV Intermittent every 8 hours  pramipexole 0.25 milliGRAM(s) Oral at bedtime  predniSONE   Tablet 60 milliGRAM(s) Oral daily  sodium chloride 0.9%. 1000 milliLiter(s) (100 mL/Hr) IV Continuous <Continuous>    MEDICATIONS  (PRN):  acetaminophen     Tablet .. 650 milliGRAM(s) Oral every 6 hours PRN Mild Pain (1 - 3)  aluminum hydroxide/magnesium hydroxide/simethicone Suspension 30 milliLiter(s) Oral every 4 hours PRN Dyspepsia  melatonin 3 milliGRAM(s) Oral at bedtime PRN Insomnia  ondansetron Injectable 4 milliGRAM(s) IV Push every 8 hours PRN Nausea and/or Vomiting  oxyCODONE    IR 5 milliGRAM(s) Oral every 6 hours PRN Moderate Pain (4 - 6)  polyethylene glycol 3350 17 Gram(s) Oral daily PRN Constipation  polyvinyl alcohol 1.4%/povidone 0.6% Ophthalmic Solution - Peds 1 Drop(s) Both EYES two times a day PRN Dry Eyes      Allergies    Aloe Lotion (Rash)    Intolerances        LABS:                        12.4   4.96  )-----------( 119      ( 08 Aug 2022 06:59 )             36.4     08-08    132<L>  |  97  |  19  ----------------------------<  106<H>  3.5   |  28  |  1.04    Ca    8.4<L>      08 Aug 2022 06:59    TPro  6.4  /  Alb  2.8<L>  /  TBili  0.8  /  DBili  x   /  AST  49<H>  /  ALT  66  /  AlkPhos  51  08-08    PT/INR - ( 07 Aug 2022 16:53 )   PT: 11.7 sec;   INR: 1.01 ratio         PTT - ( 08 Aug 2022 06:59 )  PTT:44.3 sec  Urinalysis Basic - ( 07 Aug 2022 18:51 )    Color: Yellow / Appearance: Clear / S.020 / pH: x  Gluc: x / Ketone: Negative  / Bili: Negative / Urobili: Negative   Blood: x / Protein: 15 / Nitrite: Negative   Leuk Esterase: Negative / RBC: Negative /HPF / WBC 0-2   Sq Epi: x / Non Sq Epi: Occasional / Bacteria: Occasional        RADIOLOGY & ADDITIONAL TESTS:  Studies reviewed.   INTERVAL HPI/OVERNIGHT EVENTS:  Patient S&E at bedside. Yesterday, pt now s/p debridement of right foot   VSS, afebrile, HR 78 yesterday evening on RA  Pt reports feeling better this morning   PT eval pending    PAST MEDICAL & SURGICAL HISTORY:  ETOH abuse      Hyperlipidemia LDL goal &lt;100      Restless leg syndrome      Rheumatoid arteritis      No significant past surgical history          FAMILY HISTORY:  No pertinent family history in first degree relatives        VITAL SIGNS:  T(F): 97.5 (22 @ 22:38)  HR: 78 (22 @ 22:38)  BP: 141/70 (22 @ 22:38)  RR: 18 (22 @ 22:38)  SpO2: 100% (22 @ 22:38)  Wt(kg): --    PHYSICAL EXAM:    Constitutional: NAD, cushingoid appearance of face   Eyes: EOMI  Respiratory: CTA b/l, good air entry b/l  Cardiovascular: RRR, no M/R/G  Gastrointestinal: soft, NTND, no masses palpable, + BS, no hepatosplenomegaly  Extremities: no c/c/e, right foot wrapped from debridement   Neurological: AAOx3      MEDICATIONS  (STANDING):  amLODIPine   Tablet 10 milliGRAM(s) Oral daily  atorvastatin 10 milliGRAM(s) Oral at bedtime  enoxaparin Injectable 40 milliGRAM(s) SubCutaneous every 24 hours  folic acid 1 milliGRAM(s) Oral daily  losartan 25 milliGRAM(s) Oral daily  piperacillin/tazobactam IVPB.. 3.375 Gram(s) IV Intermittent every 8 hours  pramipexole 0.25 milliGRAM(s) Oral at bedtime  predniSONE   Tablet 60 milliGRAM(s) Oral daily  sodium chloride 0.9%. 1000 milliLiter(s) (100 mL/Hr) IV Continuous <Continuous>    MEDICATIONS  (PRN):  acetaminophen     Tablet .. 650 milliGRAM(s) Oral every 6 hours PRN Mild Pain (1 - 3)  aluminum hydroxide/magnesium hydroxide/simethicone Suspension 30 milliLiter(s) Oral every 4 hours PRN Dyspepsia  melatonin 3 milliGRAM(s) Oral at bedtime PRN Insomnia  ondansetron Injectable 4 milliGRAM(s) IV Push every 8 hours PRN Nausea and/or Vomiting  oxyCODONE    IR 5 milliGRAM(s) Oral every 6 hours PRN Moderate Pain (4 - 6)  polyethylene glycol 3350 17 Gram(s) Oral daily PRN Constipation  polyvinyl alcohol 1.4%/povidone 0.6% Ophthalmic Solution - Peds 1 Drop(s) Both EYES two times a day PRN Dry Eyes      Allergies    Aloe Lotion (Rash)    Intolerances        LABS:                        12.4   4.96  )-----------( 119      ( 08 Aug 2022 06:59 )             36.4     08-08    132<L>  |  97  |  19  ----------------------------<  106<H>  3.5   |  28  |  1.04    Ca    8.4<L>      08 Aug 2022 06:59    TPro  6.4  /  Alb  2.8<L>  /  TBili  0.8  /  DBili  x   /  AST  49<H>  /  ALT  66  /  AlkPhos  51  08-08    PT/INR - ( 07 Aug 2022 16:53 )   PT: 11.7 sec;   INR: 1.01 ratio         PTT - ( 08 Aug 2022 06:59 )  PTT:44.3 sec  Urinalysis Basic - ( 07 Aug 2022 18:51 )    Color: Yellow / Appearance: Clear / S.020 / pH: x  Gluc: x / Ketone: Negative  / Bili: Negative / Urobili: Negative   Blood: x / Protein: 15 / Nitrite: Negative   Leuk Esterase: Negative / RBC: Negative /HPF / WBC 0-2   Sq Epi: x / Non Sq Epi: Occasional / Bacteria: Occasional        RADIOLOGY & ADDITIONAL TESTS:  Studies reviewed.

## 2022-08-09 NOTE — PROGRESS NOTE ADULT - ASSESSMENT
A: 66M inpatient seen at bedside for the following:   -Right foot abscess  -Right foot cellulitis  -Right foot ulceration including soft tissue    P:   Chart reviewed and Patient evaluated  Discussed diagnosis and treatment with patient.   Consent obtained and witnessed for: Aseptic sharp excisonal debridement including soft tissue of Right  foot with #15 blade  Wound flush with normal saline and betadine mix  Obtained wound culture and sent to Pathology on 08/08/22>> Prelim numerous S. Aureus.   Applied iodoform packing with betadine soaked gauze and dry sterile dressing on 8/09/22  X-rays reviewed : on wet read showing no cortical disruptions and no soft tissue gas. Official read noted above.   MRI ordered to r/o any abscess. Official read noted above.   Continue with IV antibiotics As Per ID/MED  Weight bearing as tolerated to RLE  Discussed importance of daily foot examinations and proper shoe gear and to importance of lower Fasting Blood Glucose levels.   Patient demonstrated verbal understanding of all interventions and tolerated interventions well without any complications.   Podiatry will follow while in house.  Case D/W Dr. Carmona

## 2022-08-09 NOTE — PROGRESS NOTE ADULT - SUBJECTIVE AND OBJECTIVE BOX
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 States he noticed redness to leg/foot and had a hard time ambulating/weight bearing due to discomfort. Denies any trauma, insect bites, chills or discharge from foot but endorsed subjective fevers.    Interval History: 22 pt seen and examined at bedside, afebrile, he reports pain, tenderness, and swelling to RLE that is improved. Denies CP, palps, sob, cough, HA, dizziness, lightheadedness, n/v/d, or dysuria.    REVIEW OF SYSTEMS: All other review of systems is negative unless indicated above.    PE:  Vital Signs Last 24 Hrs: all reviewed   T(C): 36.4 (09 Aug 2022 09:00), Max: 37.7 (08 Aug 2022 16:44)  T(F): 97.5 (09 Aug 2022 09:00), Max: 99.8 (08 Aug 2022 16:44)  HR: 55 (09 Aug 2022 09:00) (55 - 104)  BP: 146/79 (09 Aug 2022 09:00) (141/70 - 146/79)  RR: 18 (09 Aug 2022 09:00) (18 - 18)  SpO2: 100% (09 Aug 2022 09:00) (96% - 100%)  Parameters below as of 09 Aug 2022 09:00  Patient On (Oxygen Delivery Method): room air    Constitutional: NAD, awake and alert  HEENT: EOMI  Neck: Soft and supple, No JVD  Respiratory: Breath sounds are clear bilaterally, No wheezing, rales or rhonchi  Cardiovascular: S1 and S2, regular rate and rhythm, no Murmurs  Gastrointestinal: Bowel Sounds present, soft, nontender, nondistended, round   Extremities: No peripheral edema  Vascular: 2+ peripheral pulses  Neurological: A/O x 3, no focal deficits  Musculoskeletal: 5/5 strength b/l upper and lower extremities  Skin: RLE erythema, swelling, s/p debridement to foot now wrapped in ACE    LABS: All Labs Reviewed:                        11.2   3.42  )-----------( 91       ( 09 Aug 2022 07:10 )             33.0   08-    133<L>  |  102  |  16  ----------------------------<  130<H>  3.5   |  25  |  0.84    Ca    8.0<L>      09 Aug 2022 07:10    TPro  6.4  /  Alb  2.5<L>  /  TBili  0.6  /  DBili  0.3  /  AST  35  /  ALT  57  /  AlkPhos  46  08-09    PT/INR - ( 07 Aug 2022 16:53 )   PT: 11.7 sec;   INR: 1.01 ratio    PTT - ( 08 Aug 2022 06:59 )  PTT:44.3 sec  Lactate, Blood (22 @ 09:51) 2.3 mmol/L -->Lactate, Blood (22 @ 16:22) 0.6 mmol/L  C-Reactive Protein, Serum (22 @ 16:53) 24 mg/L   Sedimentation Rate, Erythrocyte (22 @ 16:53)  29 mm/hr   Thyroid Stimulating Hormone, Serum in AM (22 @ 07:10) 0.34 uU/mL   Free Thyroxine, Serum (22 @ 07:10) 1.45 ng/dL   Iron with Total Binding Capacity in AM (22 @ 07:10)   Iron - Total Binding Capacity.: 272 ug/dL   % Saturation, Iron: 25 %   Iron Total, Serum: 69 ug/dL   Unsaturated Iron Binding Capacity: 203 ug/dL   Lipid Profile in AM (22 @ 07:10)   Cholesterol, Serum: 194 mg/dL   Triglycerides, Serum: 101 mg/dL   HDL Cholesterol, Serum: 86 mg/dL   Non HDL Cholesterol: 108  Urinalysis Basic - ( 07 Aug 2022 18:51 )  Color: Yellow / Appearance: Clear / S.020 / pH: x  Gluc: x / Ketone: Negative  / Bili: Negative / Urobili: Negative   Blood: x / Protein: 15 / Nitrite: Negative   Leuk Esterase: Negative / RBC: Negative /HPF / WBC 0-2   Sq Epi: x / Non Sq Epi: Occasional / Bacteria: Occasional    Micro:    Right foot wound Culture- pending  Culture - Blood (22 @ 16:53) No growth to date.   Culture - Urine (22 @ 18:51) No growth   RADIOLOGY/EKG: all reviewed:    MR Foot w/wo IV Cont, Right (22 @ 19:15)   FINDINGS:  Soft tissue defect is present the plantar aspect of the foot at thelevel of the third metatarsophalangeal joint/third webspace extending to the level of the proximal phalanx containing small amount of fluid/phlegmon. Wound extends to into the deep subcutaneous tissues but remains superficial to the metatarsophalangeal joint. Multiple foci of susceptibility are present, favored to represent air. The marrow signal is preserved. Flexor and extensor tendons are intact. Edema is present within the larger is plantar without enhancement.  There is dorsal subcutaneous edema.  IMPRESSION:  Plantar soft tissue wound at the level of the third metatarsophalangeal joint and proximal phalanx containing phlegmon and likely foci of air. Wound extends into the deep subcutaneous tissues without contact of the adjacent bone.  No osteomyelitis.    Xray Foot AP + Lateral + Oblique, Right (22 @ 16:13)   INTERPRETATION:  Chest and right foot. Patient has sepsis and a foot wound.  AP erect chest on 2022 at 3:25 PM.  Heart size normal.  Lungs are clear.  Chest is similar to 2018.  Right foot. 3 views.  COMPARISON: None available.  Small posterior and inferior calcaneal spurs.  Mild to moderate first MTP degeneration.  Mild first IP degeneration.  No bone destruction or fracture.  IMPRESSION: No acute finding. Incidental right foot findings.    US Duplex Venous Lower Ext Ltd, Right (22 @ 16:44)   FINDINGS:  Nonocclusive thrombus/chronic postthrombotic change in the right common femoral and proximal femoral, and popliteal veins. Otherwise, there is compressibility of the right common femoral, femoral and popliteal veins. 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 The contralateral common femoral vein is patent. Doppler examination shows normal spontaneous and phasic flow.  No calf vein thrombosis is detected.  Visualized portion of the right great saphenous vein is patent at the saphenofemoral junction and proximal and mid thigh without evidence of thrombophlebitis.  IMPRESSION:  Chronic post thrombotic changes in the right common femoral, femoral, and popliteal veins.  No evidence of acute deep venous thrombosis in the right lower extremity.    Meds:  MEDICATIONS  (STANDING):  amLODIPine   Tablet 10 milliGRAM(s) Oral daily  atorvastatin 10 milliGRAM(s) Oral at bedtime  enoxaparin Injectable 40 milliGRAM(s) SubCutaneous every 24 hours  folic acid 1 milliGRAM(s) Oral daily  losartan 25 milliGRAM(s) Oral daily  piperacillin/tazobactam IVPB.. 3.375 Gram(s) IV Intermittent every 8 hours  pramipexole 0.25 milliGRAM(s) Oral at bedtime  predniSONE   Tablet 60 milliGRAM(s) Oral daily  sodium chloride 0.9%. 1000 milliLiter(s) (100 mL/Hr) IV Continuous <Continuous>    MEDICATIONS  (PRN):  acetaminophen     Tablet .. 650 milliGRAM(s) Oral every 6 hours PRN Mild Pain (1 - 3)  aluminum hydroxide/magnesium hydroxide/simethicone Suspension 30 milliLiter(s) Oral every 4 hours PRN Dyspepsia  melatonin 3 milliGRAM(s) Oral at bedtime PRN Insomnia  ondansetron Injectable 4 milliGRAM(s) IV Push every 8 hours PRN Nausea and/or Vomiting  oxyCODONE    IR 5 milliGRAM(s) Oral every 6 hours PRN Moderate Pain (4 - 6)  polyethylene glycol 3350 17 Gram(s) Oral daily PRN Constipation  polyvinyl alcohol 1.4%/povidone 0.6% Ophthalmic Solution - Peds 1 Drop(s) Both EYES two times a day PRN Dry Eyes    Home Medications:  acetaminophen 325 mg oral tablet: 2 tab(s) orally every 4 hours, As Needed (08 Aug 2022 12:03)  amLODIPine 10 mg oral tablet: 1 tab(s) orally once a day (08 Aug 2022 12:03)  cyclophosphamide 50 mg oral capsule: 2 cap(s) orally once a day (08 Aug 2022 12:03)  Livalo 2 mg oral tablet: 1 tab(s) orally once a day (08 Aug 2022 12:03)  losartan 25 mg oral tablet: 1 tab(s) orally once a day (08 Aug 2022 12:03)  Multiple Vitamins oral tablet: 1 tab(s) orally once a day (08 Aug 2022 12:03)  ondansetron 4 mg oral tablet: 1 tab(s) orally every 8 hours, As Needed - for nausea (08 Aug 2022 12:03)  pramipexole 0.25 mg oral tablet: 3 tab(s) orally 2 times a day (08 Aug 2022 12:03)  predniSONE 20 mg oral tablet: 4 tab(s) orally once a day (08 Aug 2022 12:03)

## 2022-08-09 NOTE — ADVANCED PRACTICE NURSE CONSULT - ASSESSMENT
Patient being followed by podiatry MD for foot wound. Per Podiatry they will continue to follow patient while in house. If you need any additional assistance please reconsult wound care. Thank you

## 2022-08-09 NOTE — PROGRESS NOTE ADULT - SUBJECTIVE AND OBJECTIVE BOX
Date of Service: 08/09/22  HPI: 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, but dc'd as it caused elevation in LFTs , now on Cyclophosphamide and Steroid taper who presented to the ED complaining of right foot pain and swelling. States he noticed redness to leg/foot and had a hard time ambulating/weight bearing due to discomfort. Denies any trauma, insect bites, or discharge from foot but endorsed subjective fevers. Podiatry was consulted for possible R foot abscess. Patient denies c/n/v/cp/sob. No other pedal complaints at this time.     08/09/22: Patient seen by Podiatry team with attending present. Patient resting comfortably at bedside. Pain well controlled. No other pedal complaints today.     REVIEW OF SYSTEMS:  All other review of systems is negative unless indicated above    PMH: ETOH abuse    Hyperlipidemia LDL goal &lt;100    Restless leg syndrome    Rheumatoid arteritis      PSH:No significant past surgical history    Allergies:Aloe Lotion (Rash)    MEDICATIONS  (STANDING):  amLODIPine   Tablet 10 milliGRAM(s) Oral daily  atorvastatin 10 milliGRAM(s) Oral at bedtime  enoxaparin Injectable 40 milliGRAM(s) SubCutaneous every 24 hours  folic acid 1 milliGRAM(s) Oral daily  losartan 25 milliGRAM(s) Oral daily  piperacillin/tazobactam IVPB.. 3.375 Gram(s) IV Intermittent every 8 hours  pramipexole 0.25 milliGRAM(s) Oral at bedtime  predniSONE   Tablet 60 milliGRAM(s) Oral daily    MEDICATIONS  (PRN):  acetaminophen     Tablet .. 650 milliGRAM(s) Oral every 6 hours PRN Mild Pain (1 - 3)  aluminum hydroxide/magnesium hydroxide/simethicone Suspension 30 milliLiter(s) Oral every 4 hours PRN Dyspepsia  melatonin 3 milliGRAM(s) Oral at bedtime PRN Insomnia  ondansetron Injectable 4 milliGRAM(s) IV Push every 8 hours PRN Nausea and/or Vomiting  oxyCODONE    IR 5 milliGRAM(s) Oral every 6 hours PRN Moderate Pain (4 - 6)  polyethylene glycol 3350 17 Gram(s) Oral daily PRN Constipation  polyvinyl alcohol 1.4%/povidone 0.6% Ophthalmic Solution - Peds 1 Drop(s) Both EYES two times a day PRN Dry Eyes      Labs:                        11.2   3.42  )-----------( 91       ( 09 Aug 2022 07:10 )             33.0     08-09    133<L>  |  102  |  16  ----------------------------<  130<H>  3.5   |  25  |  0.84    Ca    8.0<L>      09 Aug 2022 07:10    TPro  6.4  /  Alb  2.5<L>  /  TBili  0.6  /  DBili  0.3  /  AST  35  /  ALT  57  /  AlkPhos  46  08-09            Vital Signs Last 24 Hrs  T(C): 36.4 (09 Aug 2022 21:31), Max: 36.6 (09 Aug 2022 16:06)  T(F): 97.6 (09 Aug 2022 21:31), Max: 97.9 (09 Aug 2022 16:06)  HR: 64 (09 Aug 2022 21:31) (55 - 77)  BP: 136/71 (09 Aug 2022 21:31) (128/69 - 146/79)  BP(mean): --  RR: 18 (09 Aug 2022 21:31) (18 - 18)  SpO2: 99% (09 Aug 2022 21:31) (99% - 100%)    Parameters below as of 09 Aug 2022 21:31  Patient On (Oxygen Delivery Method): room air    Physical Exam:   Constitutiona: NAD, alert;  Derm:  Skin warm, dry and supple bilateral.    Circular lesion (approx 1cm x 1cm), generalized erythema on the dorsal aspect of R foot (improving), no active drainage, no PTB. TTP surrounding the lesion.   Vascular: Dorsalis Pedis and Posterior Tibial pulses +2/4.  Capillary re-fill time less then 3 seconds digits 1-5 bilateral.    Neuro: Protective sensation intact to the level of the digits bilateral.  MSK: Muscle strength 5/5 all major muscle groups bilateral.      < from: MR Foot w/wo IV Cont, Right (08.08.22 @ 19:15) >  IMPRESSION:  Plantar soft tissue wound at the level of the third metatarsophalangeal   joint and proximal phalanx containing phlegmon and likely foci of air.   Wound extends into the deep subcutaneous tissues without contactof the   adjacent bone.  No osteomyelitis.    < end of copied text >  < from: Xray Foot AP + Lateral + Oblique, Right (08.07.22 @ 16:13) >  Right foot. 3 views.    COMPARISON: None available.    Small posterior and inferior calcaneal spurs.    Mild to moderate first MTP degeneration.    Mild first IP degeneration.    No bone destruction or fracture.    IMPRESSION: No acute finding. Incidental right foot findings.    < end of copied text >

## 2022-08-09 NOTE — CDI QUERY NOTE - NSCDIOTHERTXTBX_GEN_ALL_CORE_HH
This patient was admitted with sepsis and underwent a debridement. Your clarification is needed regarding the type of debridement:    Brief Operative Note 8-8-22 @ 18:32  Debridement, sharp, muscle, foot 08-Aug-2022 18:34:24 Aseptic debridement sharp including soft tissue of R foot with #15 blade Regina Jolly.      Please clarify and include the following additional detail:  * The instrument alone will not dictate the classification of excisional debridement.     Type of debridement (excisional vs. non-excisional debridement)        Excisional:   the removal of necrotic devitalized tissue or slough by means of cutting away of tissue, slough, or necrosis.        Non-excisional:  non-operative brushing, irrigation, scrubbing, washing (minor removal of loose fragments).    Can the type of debridement be specified?  A) Excisional debridement of foot muscle.  B) Non-excisional debridement of foot muscle.  C) Other, please specify:  D) Unable to determine.

## 2022-08-09 NOTE — PROGRESS NOTE ADULT - ASSESSMENT
67 y/o M with PMHx RA on humira, MTX, DVT in right leg s/p 6 mo of xarelto, presents for leukocytosis with lymphocyte predominance found to have LGL T cell with TCR gamma currently on cyclophosphamide 100 mg qd and prednisone 80 mg qd for chronic neutropenia, RA and lymphocytosis presents with abscess of right foot s/p debridement.    # T cell Large Granular Lymphocytic leukemia   - seen by me in clinic at [facility]  ­ Flow cytometry with relative increase in T cell large granular lymphocytes­ TCR beta gene rearrangement positive, TCR gamma rearrangement positive, stat3 detected which helps to differentiate LGL from reactive processes and those with sTAT3 D661 tend to present with neutropenia and have a better prognosis.   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  IgK gene status negative, TCR gamma positive  ­ 5/18/22 WBC 30.3. Hb 14.3, plt 190k, lymph 91%, alc 27.6 ­­­> WBC 13.4, anc 400, ALC 12.5, Hb 12.7, plt 173k  ­ 5/26/22 CT a/p with splenomegaly up to 15 cm, cirrhosis seen and hepatic steatosis, no masses as well as sigmoid diverticulosis  ­ started cyclophosphamide 100 mg qd and prednisone 80 mg qd 7/8/22   - last seen in office on 8/3 WBC 4.1, Hb 12.5, plt 143, anc 1900  - will hold Cyclophosphamide while inpatient but to continue with steroid taper - can c/w prednisone 60 mg qd  - will restart cyclophosphamide on dc     # cellulitis   - seen by podiatry  - MRI right foot: Plantar soft tissue wound at the level of the third metatarsophalangeal  joint and proximal phalanx containing phlegmon and likely foci of air.  Wound extends into the deep subcutaneous tissues without contact of the  adjacent bone. No osteomyelitis  - s/p drainage/debridement 8/7 with purulent fluid- f/u cultures  - antibiotics on zosyn  - US LE doppler with Chronic post thrombotic changes in the right common femoral, femoral, and popliteal veins. No evidence of acute deep venous thrombosis in the right lower extremity.  - can c/w dvt ppx     Dr. Kehinde Sanchez  cell: [phone]  Weekends and nights please call [phone] for MD on call  [facility]  Hematology/Oncology  67 y/o M with PMHx RA on humira, MTX, DVT in right leg s/p 6 mo of xarelto, presents for leukocytosis with lymphocyte predominance found to have LGL T cell with TCR gamma currently on cyclophosphamide 100 mg qd and prednisone 80 mg qd for chronic neutropenia, RA and lymphocytosis presents with abscess of right foot s/p debridement.    # T cell Large Granular Lymphocytic leukemia   - seen by me in clinic at [facility]  ­ Flow cytometry with relative increase in T cell large granular lymphocytes­ TCR beta gene rearrangement positive, TCR gamma rearrangement positive, stat3 detected which helps to differentiate LGL from reactive processes and those with sTAT3 D661 tend to present with neutropenia and have a better prognosis.   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  IgK gene status negative, TCR gamma positive  ­ 5/18/22 WBC 30.3. Hb 14.3, plt 190k, lymph 91%, alc 27.6 ­­­> WBC 13.4, anc 400, ALC 12.5, Hb 12.7, plt 173k  ­ 5/26/22 CT a/p with splenomegaly up to 15 cm, cirrhosis seen and hepatic steatosis, no masses as well as sigmoid diverticulosis  ­ started cyclophosphamide 100 mg qd and prednisone 80 mg qd 7/8/22   - last seen in office on 8/3 WBC 4.1, Hb 12.5, plt 143, anc 1900  - will hold Cyclophosphamide while inpatient but to continue with steroid taper - can c/w prednisone 60 mg qd  - will restart cyclophosphamide on dc   - PT eval    # cellulitis   - seen by podiatry  - MRI right foot: Plantar soft tissue wound at the level of the third metatarsophalangeal  joint and proximal phalanx containing phlegmon and likely foci of air.  Wound extends into the deep subcutaneous tissues without contact of the  adjacent bone. No osteomyelitis  - s/p drainage/debridement 8/7 with purulent fluid- f/u cultures  - antibiotics on zosyn  - US LE doppler with Chronic post thrombotic changes in the right common femoral, femoral, and popliteal veins. No evidence of acute deep venous thrombosis in the right lower extremity.  - can c/w dvt ppx     Dr. Kehinde Sanchez  cell: [phone]  Weekends and nights please call [phone] for MD on call  [facility]  Hematology/Oncology

## 2022-08-09 NOTE — PROGRESS NOTE ADULT - ASSESSMENT
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, but dc'd as it caused elevation in LFTs , now on Cyclophosphamide and Steroid taper who is admitted to hospital medicine services with cellulitis/ abscess to right foot    Sepsis due to RLE Cellulitis and Right foot abscess s/p Bedside Debridement on 8/8/22  - MRI no OM  - XRay as above  - s/p bedside debridement with Podiatry on 8/8/22   - F/U wound Culture  - Blood and Urine Cultures NGTD  - Trend Lactate 2.6-->2.3-->2.3-->2.3-->0.6  - ESR elevated   - s/p 2700 LR bolus in ED--> Additional 1L NS bolus this am; Continue IVF, NS at 100 ml/hr; STOP IVF  - s/p Vanco x 1 dose in ER, Continue Zosyn   - Tylenol for temps PRN  - Oxy for pain; Miralax PRN for constipation   - Podiatry Following     Hyponatremia  - s/p IVF  - Trend BMP     HTN  - Continue Amlodipine & Losartan with hold parameters     HLD  - Continue statin  - Lipid panel WNL    RLE Non-occlusive thrombus  Hx of DVT  - US demonstrates Nonocclusive thrombus/chronic postthrombotic change in the right common femoral and proximal femoral, and popliteal veins; No evidence of DVT   - no indication for anticoagulation    RA  LGL Leukemia   Pancytopenia   - Formerly on methotrexate, but stopped 2/2 elevated LFTs  - Now on Cyclophosphamide (on hold while hospitalized per Heme/Onc)  - Monitor Platelets while on Lovenox, Hold for <50k  - Continue Steroid Taper (60mg daily)  - Continue Folic Acid   - Check B12, folate  - Iron studies wnl  - Heme/Onc, Dr. Sanchez following    RLS  - Continue Mirapex     EtOH Abuse  - With Hx of EtOH withdrawal; reports drinking 3-4 beers, 2-3 x week  - No active signs/symptoms of withdrawal  - Monitor    Borderline Low TSH   - TSH low end of normal, FT4 high end of normal   - Repeat testing OP in 4 weeks     DVT Prophylaxis: Lovenox     Code Status: Full Code     Dispo: Continue IV antibiotics, f/u wound culture, PT

## 2022-08-10 LAB
-  AMPICILLIN/SULBACTAM: SIGNIFICANT CHANGE UP
-  CEFAZOLIN: SIGNIFICANT CHANGE UP
-  CLINDAMYCIN: SIGNIFICANT CHANGE UP
-  ERYTHROMYCIN: SIGNIFICANT CHANGE UP
-  GENTAMICIN: SIGNIFICANT CHANGE UP
-  OXACILLIN: SIGNIFICANT CHANGE UP
-  RIFAMPIN: SIGNIFICANT CHANGE UP
-  TETRACYCLINE: SIGNIFICANT CHANGE UP
-  TRIMETHOPRIM/SULFAMETHOXAZOLE: SIGNIFICANT CHANGE UP
-  VANCOMYCIN: SIGNIFICANT CHANGE UP
ANION GAP SERPL CALC-SCNC: 9 MMOL/L — SIGNIFICANT CHANGE UP (ref 5–17)
BUN SERPL-MCNC: 24 MG/DL — HIGH (ref 7–23)
CALCIUM SERPL-MCNC: 8.8 MG/DL — SIGNIFICANT CHANGE UP (ref 8.5–10.1)
CHLORIDE SERPL-SCNC: 104 MMOL/L — SIGNIFICANT CHANGE UP (ref 96–108)
CO2 SERPL-SCNC: 23 MMOL/L — SIGNIFICANT CHANGE UP (ref 22–31)
CREAT SERPL-MCNC: 1.09 MG/DL — SIGNIFICANT CHANGE UP (ref 0.5–1.3)
EGFR: 75 ML/MIN/1.73M2 — SIGNIFICANT CHANGE UP
GLUCOSE SERPL-MCNC: 181 MG/DL — HIGH (ref 70–99)
HCT VFR BLD CALC: 34.8 % — LOW (ref 39–50)
HGB BLD-MCNC: 11.8 G/DL — LOW (ref 13–17)
MCHC RBC-ENTMCNC: 31.8 PG — SIGNIFICANT CHANGE UP (ref 27–34)
MCHC RBC-ENTMCNC: 33.9 GM/DL — SIGNIFICANT CHANGE UP (ref 32–36)
MCV RBC AUTO: 93.8 FL — SIGNIFICANT CHANGE UP (ref 80–100)
METHOD TYPE: SIGNIFICANT CHANGE UP
PLATELET # BLD AUTO: 117 K/UL — LOW (ref 150–400)
POTASSIUM SERPL-MCNC: 3.5 MMOL/L — SIGNIFICANT CHANGE UP (ref 3.5–5.3)
POTASSIUM SERPL-SCNC: 3.5 MMOL/L — SIGNIFICANT CHANGE UP (ref 3.5–5.3)
RBC # BLD: 3.71 M/UL — LOW (ref 4.2–5.8)
RBC # FLD: 15.9 % — HIGH (ref 10.3–14.5)
SODIUM SERPL-SCNC: 136 MMOL/L — SIGNIFICANT CHANGE UP (ref 135–145)
WBC # BLD: 3.52 K/UL — LOW (ref 3.8–10.5)
WBC # FLD AUTO: 3.52 K/UL — LOW (ref 3.8–10.5)

## 2022-08-10 PROCEDURE — 99232 SBSQ HOSP IP/OBS MODERATE 35: CPT

## 2022-08-10 RX ORDER — POTASSIUM CHLORIDE 20 MEQ
20 PACKET (EA) ORAL ONCE
Refills: 0 | Status: COMPLETED | OUTPATIENT
Start: 2022-08-10 | End: 2022-08-10

## 2022-08-10 RX ORDER — ENOXAPARIN SODIUM 100 MG/ML
40 INJECTION SUBCUTANEOUS EVERY 24 HOURS
Refills: 0 | Status: DISCONTINUED | OUTPATIENT
Start: 2022-08-10 | End: 2022-08-11

## 2022-08-10 RX ADMIN — Medication 60 MILLIGRAM(S): at 09:27

## 2022-08-10 RX ADMIN — ATORVASTATIN CALCIUM 10 MILLIGRAM(S): 80 TABLET, FILM COATED ORAL at 22:00

## 2022-08-10 RX ADMIN — Medication 100 MILLIGRAM(S): at 10:54

## 2022-08-10 RX ADMIN — PIPERACILLIN AND TAZOBACTAM 25 GRAM(S): 4; .5 INJECTION, POWDER, LYOPHILIZED, FOR SOLUTION INTRAVENOUS at 14:05

## 2022-08-10 RX ADMIN — AMLODIPINE BESYLATE 10 MILLIGRAM(S): 2.5 TABLET ORAL at 09:27

## 2022-08-10 RX ADMIN — Medication 20 MILLIEQUIVALENT(S): at 10:53

## 2022-08-10 RX ADMIN — LOSARTAN POTASSIUM 25 MILLIGRAM(S): 100 TABLET, FILM COATED ORAL at 09:27

## 2022-08-10 RX ADMIN — Medication 100 MILLIGRAM(S): at 22:00

## 2022-08-10 RX ADMIN — PIPERACILLIN AND TAZOBACTAM 25 GRAM(S): 4; .5 INJECTION, POWDER, LYOPHILIZED, FOR SOLUTION INTRAVENOUS at 22:00

## 2022-08-10 RX ADMIN — ENOXAPARIN SODIUM 40 MILLIGRAM(S): 100 INJECTION SUBCUTANEOUS at 17:21

## 2022-08-10 RX ADMIN — PIPERACILLIN AND TAZOBACTAM 25 GRAM(S): 4; .5 INJECTION, POWDER, LYOPHILIZED, FOR SOLUTION INTRAVENOUS at 05:27

## 2022-08-10 RX ADMIN — Medication 1 MILLIGRAM(S): at 09:27

## 2022-08-10 RX ADMIN — Medication 3 MILLIGRAM(S): at 22:12

## 2022-08-10 RX ADMIN — PRAMIPEXOLE DIHYDROCHLORIDE 0.25 MILLIGRAM(S): 0.12 TABLET ORAL at 22:00

## 2022-08-10 NOTE — PROGRESS NOTE ADULT - ASSESSMENT
67 y/o M with PMHx RA on humira, MTX, DVT in right leg s/p 6 mo of xarelto, presents for leukocytosis with lymphocyte predominance found to have LGL T cell with TCR gamma currently on cyclophosphamide 100 mg qd and prednisone 80 mg qd for chronic neutropenia, RA and lymphocytosis presents with abscess of right foot s/p debridement.    # T cell Large Granular Lymphocytic leukemia   - seen by me in clinic at [facility]  ­ Flow cytometry with relative increase in T cell large granular lymphocytes­ TCR beta gene rearrangement positive, TCR gamma rearrangement positive, stat3 detected which helps to differentiate LGL from reactive processes and those with sTAT3 D661 tend to present with neutropenia and have a better prognosis.   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  IgK gene status negative, TCR gamma positive  ­ 5/18/22 WBC 30.3. Hb 14.3, plt 190k, lymph 91%, alc 27.6 ­­­> WBC 13.4, anc 400, ALC 12.5, Hb 12.7, plt 173k  ­ 5/26/22 CT a/p with splenomegaly up to 15 cm, cirrhosis seen and hepatic steatosis, no masses as well as sigmoid diverticulosis  ­ started cyclophosphamide 100 mg qd and prednisone 80 mg qd 7/8/22   - last seen in office on 8/3 WBC 4.1, Hb 12.5, plt 143, anc 1900  - will hold Cyclophosphamide while inpatient but to continue with steroid taper - can c/w prednisone 60 mg qd  - will restart cyclophosphamide on dc   - PT eval    # cellulitis   - seen by podiatry  - MRI right foot: Plantar soft tissue wound at the level of the third metatarsophalangeal  joint and proximal phalanx containing phlegmon and likely foci of air.  Wound extends into the deep subcutaneous tissues without contact of the  adjacent bone. No osteomyelitis  - s/p drainage/debridement 8/7 with purulent fluid- f/u cultures- numerous staph aureus   - antibiotics on zosyn- ID to follow when sensitivities return   - US LE doppler with Chronic post thrombotic changes in the right common femoral, femoral, and popliteal veins. No evidence of acute deep venous thrombosis in the right lower extremity.  - can c/w dvt ppx     Dr. Kehinde Sanchez  cell: [phone]  Weekends and nights please call [phone] for MD on call  [facility]  Hematology/Oncology  65 y/o M with PMHx RA on humira, MTX, DVT in right leg s/p 6 mo of xarelto, presents for leukocytosis with lymphocyte predominance found to have LGL T cell with TCR gamma currently on cyclophosphamide 100 mg qd and prednisone 80 mg qd for chronic neutropenia, RA and lymphocytosis presents with abscess of right foot s/p debridement.    # T cell Large Granular Lymphocytic leukemia   - seen by me in clinic at [facility]  ­ Flow cytometry with relative increase in T cell large granular lymphocytes­ TCR beta gene rearrangement positive, TCR gamma rearrangement positive, stat3 detected which helps to differentiate LGL from reactive processes and those with sTAT3 D661 tend to present with neutropenia and have a better prognosis.   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  IgK gene status negative, TCR gamma positive  ­ 5/18/22 WBC 30.3. Hb 14.3, plt 190k, lymph 91%, alc 27.6 ­­­> WBC 13.4, anc 400, ALC 12.5, Hb 12.7, plt 173k  ­ 5/26/22 CT a/p with splenomegaly up to 15 cm, cirrhosis seen and hepatic steatosis, no masses as well as sigmoid diverticulosis  ­ started cyclophosphamide 100 mg qd and prednisone 80 mg qd 7/8/22   - last seen in office on 8/3 WBC 4.1, Hb 12.5, plt 143, anc 1900  - will hold Cyclophosphamide while inpatient but to continue with steroid taper - can c/w prednisone 60 mg qd  - will restart cyclophosphamide on dc   - PT eval    # cellulitis   - seen by podiatry  - MRI right foot: Plantar soft tissue wound at the level of the third metatarsophalangeal  joint and proximal phalanx containing phlegmon and likely foci of air.  Wound extends into the deep subcutaneous tissues without contact of the  adjacent bone. No osteomyelitis  - s/p drainage/debridement 8/7 with purulent fluid- f/u cultures- numerous staph aureus - pansensitive put on doxycycline  - US LE doppler with Chronic post thrombotic changes in the right common femoral, femoral, and popliteal veins. No evidence of acute deep venous thrombosis in the right lower extremity.  - can c/w dvt ppx     ok to dc from heme perspective and f/u with me in office already scheduled for 8//17    Dr. Kehinde Sanchez  cell: [phone]  Weekends and nights please call [phone] for MD on call  [facility]  Hematology/Oncology

## 2022-08-10 NOTE — CONSULT NOTE ADULT - ASSESSMENT
66 year old male patient with past medical history hyperlipidemia, RA, restless leg syndrome admitted on 8/7 for evaluation of right foot pain and swelling. About 3 weeks ago the patient recalls stepping on a shell, he did not think there was a piece in his foot or that there was a potential problem until his symptoms developed. The patient underwent debridement and cultures are growing Staph aureus. Is comfortable sitting in chair.     1. Patient admitted with  right foot abscess, secondary to Staph aureus; s/p debridement  - follow up cultures   - serial cbc and monitor temperature   - reviewed prior medical records to evaluate for resistant or atypical pathogens   - iv hydration and supportive care   - wound care per podiatry  - will continue zosyn as ordered  - will add doxycycline to cover potential resistant bacteria  2. other issues: per medicine

## 2022-08-10 NOTE — PROGRESS NOTE ADULT - SUBJECTIVE AND OBJECTIVE BOX
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 States he noticed redness to leg/foot and had a hard time ambulating/weight bearing due to discomfort. Denies any trauma, insect bites, chills or discharge from foot but endorsed subjective fevers.    Interval History: 8/10/22 pt seen and examined at bedside, afebrile, he reports improvement in pain, tenderness, and swelling to RLE. RLE no longer erythematous. Able to bear weight. s/p iodoform packing with betadine soaked gauze and dry sterile dressing on 22. Denies CP, palps, sob, cough, HA, dizziness, lightheadedness, n/v/d, or dysuria.    REVIEW OF SYSTEMS: All other review of systems is negative unless indicated above.    PE:  Vital Signs Last 24 Hrs: all reviewed   T(C): 36.7 (10 Aug 2022 09:09), Max: 36.7 (10 Aug 2022 09:09)  T(F): 98 (10 Aug 2022 09:09), Max: 98 (10 Aug 2022 09:09)  HR: 71 (10 Aug 2022 09:09) (64 - 77)  BP: 145/72 (10 Aug 2022 09:09) (126/89 - 145/72)  RR: 18 (10 Aug 2022 09:09) (18 - 18)  SpO2: 100% (10 Aug 2022 09:09) (99% - 100%)  Parameters below as of 10 Aug 2022 09:09  Patient On (Oxygen Delivery Method): room air    Constitutional: NAD, awake and alert  HEENT: EOMI  Neck: Soft and supple, No JVD  Respiratory: Breath sounds are clear bilaterally, No wheezing, rales or rhonchi  Cardiovascular: S1 and S2, regular rate and rhythm, no Murmurs  Gastrointestinal: Bowel Sounds present, soft, nontender, nondistended, round   Extremities: No peripheral edema  Vascular: 2+ peripheral pulses  Neurological: A/O x 3, no focal deficits  Musculoskeletal: 5/5 strength b/l upper and lower extremities  Skin: RLE erythema better still w slight edema, s/p debridement to foot now wrapped in ACE    LABS: All Labs Reviewed:                        11.8   3.52  )-----------( 117      ( 10 Aug 2022 08:37 )             34.8   08-10    136  |  104  |  24<H>  ----------------------------<  181<H>  3.5   |  23  |  1.09    Ca    8.8      10 Aug 2022 08:37    TPro  6.4  /  Alb  2.5<L>  /  TBili  0.6  /  DBili  0.3  /  AST  35  /  ALT  57  /  AlkPhos  46  08    PT/INR - ( 07 Aug 2022 16:53 )   PT: 11.7 sec;   INR: 1.01 ratio    PTT - ( 08 Aug 2022 06:59 )  PTT:44.3 sec  Lactate, Blood (22 @ 09:51) 2.3 mmol/L -->Lactate, Blood (22 @ 16:22) 0.6 mmol/L  C-Reactive Protein, Serum (22 @ 16:53) 24 mg/L   Sedimentation Rate, Erythrocyte (22 @ 16:53)  29 mm/hr   Thyroid Stimulating Hormone, Serum in AM (22 @ 07:10) 0.34 uU/mL   Free Thyroxine, Serum (22 @ 07:10) 1.45 ng/dL   Iron with Total Binding Capacity in AM (22 @ 07:10)   Iron - Total Binding Capacity.: 272 ug/dL   % Saturation, Iron: 25 %   Iron Total, Serum: 69 ug/dL   Unsaturated Iron Binding Capacity: 203 ug/dL   Lipid Profile in AM (22 @ 07:10)   Cholesterol, Serum: 194 mg/dL   Triglycerides, Serum: 101 mg/dL   HDL Cholesterol, Serum: 86 mg/dL   Non HDL Cholesterol: 108  A1C with Estimated Average Glucose in AM (22 @ 07:10) 6.1  Urinalysis Basic - ( 07 Aug 2022 18:51 )  Color: Yellow / Appearance: Clear / S.020 / pH: x  Gluc: x / Ketone: Negative  / Bili: Negative / Urobili: Negative   Blood: x / Protein: 15 / Nitrite: Negative   Leuk Esterase: Negative / RBC: Negative /HPF / WBC 0-2   Sq Epi: x / Non Sq Epi: Occasional / Bacteria: Occasional    Micro:  Culture - Abscess with Gram Stain (22 @ 13:05) Numerous Staphylococcus aureus   Culture - Blood (22 @ 16:53) No growth to date.   Culture - Urine (22 @ 18:51) No growth   RADIOLOGY/EKG: all reviewed:    12 Lead ECG (22 @ 17:37)   Ventricular Rate 106 BPM  Atrial Rate 106 BPM  P-R Interval 162 ms  QRS Duration 78 ms  Q-T Interval 316 ms  QTC Calculation(Bazett) 419 ms  P Axis 27 degrees  R Axis -13 degrees  T Axis 61 degrees  Diagnosis Line Sinus tachycardia  Minimal voltage criteria for LVH, may be normal variant  Abnormal ECG  When compared with ECG of 30-MAY-2022 07:12, QT has shortened    MR Foot w/wo IV Cont, Right (22 @ 19:15)   FINDINGS:  Soft tissue defect is present the plantar aspect of the foot at thelevel of the third metatarsophalangeal joint/third webspace extending to the level of the proximal phalanx containing small amount of fluid/phlegmon. Wound extends to into the deep subcutaneous tissues but remains superficial to the metatarsophalangeal joint. Multiple foci of susceptibility are present, favored to represent air. The marrow signal is preserved. Flexor and extensor tendons are intact. Edema is present within the larger is plantar without enhancement.  There is dorsal subcutaneous edema.  IMPRESSION:  Plantar soft tissue wound at the level of the third metatarsophalangeal joint and proximal phalanx containing phlegmon and likely foci of air. Wound extends into the deep subcutaneous tissues without contact of the adjacent bone.  No osteomyelitis.    Xray Foot AP + Lateral + Oblique, Right (22 @ 16:13)   INTERPRETATION:  Chest and right foot. Patient has sepsis and a foot wound.  AP erect chest on 2022 at 3:25 PM.  Heart size normal.  Lungs are clear.  Chest is similar to 2018.  Right foot. 3 views.  COMPARISON: None available.  Small posterior and inferior calcaneal spurs.  Mild to moderate first MTP degeneration.  Mild first IP degeneration.  No bone destruction or fracture.  IMPRESSION: No acute finding. Incidental right foot findings.    US Duplex Venous Lower Ext Ltd, Right (22 @ 16:44)   FINDINGS:  Nonocclusive thrombus/chronic postthrombotic change in the right common femoral and proximal femoral, and popliteal veins. Otherwise, there is compressibility of the right common femoral, femoral and popliteal veins. 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 The contralateral common femoral vein is patent. Doppler examination shows normal spontaneous and phasic flow.  No calf vein thrombosis is detected.  Visualized portion of the right great saphenous vein is patent at the saphenofemoral junction and proximal and mid thigh without evidence of thrombophlebitis.  IMPRESSION:  Chronic post thrombotic changes in the right common femoral, femoral, and popliteal veins.  No evidence of acute deep venous thrombosis in the right lower extremity.    Meds:  MEDICATIONS  (STANDING):  amLODIPine   Tablet 10 milliGRAM(s) Oral daily  atorvastatin 10 milliGRAM(s) Oral at bedtime  doxycycline monohydrate Capsule 100 milliGRAM(s) Oral every 12 hours  enoxaparin Injectable 40 milliGRAM(s) SubCutaneous every 24 hours  folic acid 1 milliGRAM(s) Oral daily  losartan 25 milliGRAM(s) Oral daily  piperacillin/tazobactam IVPB.. 3.375 Gram(s) IV Intermittent every 8 hours  pramipexole 0.25 milliGRAM(s) Oral at bedtime  predniSONE   Tablet 60 milliGRAM(s) Oral daily    MEDICATIONS  (PRN):  acetaminophen     Tablet .. 650 milliGRAM(s) Oral every 6 hours PRN Mild Pain (1 - 3)  aluminum hydroxide/magnesium hydroxide/simethicone Suspension 30 milliLiter(s) Oral every 4 hours PRN Dyspepsia  melatonin 3 milliGRAM(s) Oral at bedtime PRN Insomnia  ondansetron Injectable 4 milliGRAM(s) IV Push every 8 hours PRN Nausea and/or Vomiting  oxyCODONE    IR 5 milliGRAM(s) Oral every 6 hours PRN Moderate Pain (4 - 6)  polyethylene glycol 3350 17 Gram(s) Oral daily PRN Constipation  polyvinyl alcohol 1.4%/povidone 0.6% Ophthalmic Solution - Peds 1 Drop(s) Both EYES two times a day PRN Dry Eyes    Home Medications:  acetaminophen 325 mg oral tablet: 2 tab(s) orally every 4 hours, As Needed (08 Aug 2022 12:03)  amLODIPine 10 mg oral tablet: 1 tab(s) orally once a day (08 Aug 2022 12:03)  cyclophosphamide 50 mg oral capsule: 2 cap(s) orally once a day (08 Aug 2022 12:03)  Livalo 2 mg oral tablet: 1 tab(s) orally once a day (08 Aug 2022 12:03)  losartan 25 mg oral tablet: 1 tab(s) orally once a day (08 Aug 2022 12:03)  Multiple Vitamins oral tablet: 1 tab(s) orally once a day (08 Aug 2022 12:03)  ondansetron 4 mg oral tablet: 1 tab(s) orally every 8 hours, As Needed - for nausea (08 Aug 2022 12:03)  pramipexole 0.25 mg oral tablet: 3 tab(s) orally 2 times a day (08 Aug 2022 12:03)  predniSONE 20 mg oral tablet: 4 tab(s) orally once a day (08 Aug 2022 12:03)

## 2022-08-10 NOTE — PROGRESS NOTE ADULT - ASSESSMENT
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, but dc'd as it caused elevation in LFTs , now on Cyclophosphamide and Steroid taper who is admitted to hospital medicine services with cellulitis/ abscess to right foot    Sepsis due to RLE Cellulitis and Right foot abscess with soft tissue ulceration s/p Bedside Debridement on 8/8/22  - MRI no OM  - XRay as above  - s/p bedside debridement with Podiatry on 8/8/22   - Wound Culture S. aureus, awaiting sensitivities, START Doxycycline per ID  - Blood and Urine Cultures NGTD  - Trend Lactate 2.6-->2.3-->2.3-->2.3-->0.6  - ESR elevated   - s/p 2700 LR bolus in ED--> Additional 1L NS bolus this am; Continue IVF, NS at 100 ml/hr; STOP IVF  - s/p Vanco x 1 dose in ER, Continue Zosyn, Start Doxy  - Tylenol for temps PRN  - Oxy for pain; Miralax PRN for constipation   - Podiatry Following   - ID Following     Hyponatremia, resolved   - s/p IVF  - Trend BMP     HTN  - Continue Amlodipine & Losartan with hold parameters     HLD  - Continue statin  - Lipid panel WNL    RLE Non-occlusive thrombus  Hx of DVT  - US demonstrates Nonocclusive thrombus/chronic postthrombotic change in the right common femoral and proximal femoral, and popliteal veins; No evidence of DVT   - no indication for anticoagulation    RA  LGL Leukemia   Pancytopenia   - Formerly on methotrexate, but stopped 2/2 elevated LFTs  - Now on Cyclophosphamide (on hold while hospitalized per Heme/Onc)  - Monitor Platelets while on Lovenox, Hold for <50k  - Continue Steroid Taper (60mg daily)  - Continue Folic Acid   - B12, folate wnl   - Iron studies wnl  - Heme/Onc, Dr. Sanchez following    RLS  - Continue Mirapex     Hyperglycemia due to prediabetes  - A1c 6.1  - DM Diet   - Lifestyle modifications     EtOH Abuse  - With Hx of EtOH withdrawal; reports drinking 3-4 beers, 2-3 x week  - No active signs/symptoms of withdrawal  - Monitor    Borderline Low TSH   - TSH low end of normal, FT4 high end of normal   - Repeat testing OP in 4 weeks     DVT Prophylaxis: Lovenox (HOLD for Plts <50K)    Code Status: Full Code     Dispo: Continue IV antibiotics, f/u wound culture sensitivity, PT, DC planning home one more day

## 2022-08-10 NOTE — PROGRESS NOTE ADULT - SUBJECTIVE AND OBJECTIVE BOX
INTERVAL HPI/OVERNIGHT EVENTS:  Patient S&E at bedside. No o/n events, VSS on RA  labs reviewed, culture from abscess showed numerous staph aureus awaiting sensitivities   pt feels better today       PAST MEDICAL & SURGICAL HISTORY:  ETOH abuse      Hyperlipidemia LDL goal &lt;100      Restless leg syndrome      Rheumatoid arteritis      No significant past surgical history          FAMILY HISTORY:  No pertinent family history in first degree relatives        VITAL SIGNS:  T(F): 97.7 (08-10-22 @ 05:01)  HR: 68 (08-10-22 @ 05:01)  BP: 126/89 (08-10-22 @ 05:01)  RR: 18 (08-10-22 @ 05:01)  SpO2: 100% (08-10-22 @ 05:01)  Wt(kg): --    PHYSICAL EXAM:    Constitutional: NAD, cushingoid appearance of face   Eyes: EOMI  Respiratory: CTA b/l, good air entry b/l  Cardiovascular: RRR, no M/R/G  Gastrointestinal: soft, NTND, no masses palpable, + BS, no hepatosplenomegaly  Extremities: no c/c/e, right foot wrapped from debridement   Neurological: AAOx3      MEDICATIONS  (STANDING):  amLODIPine   Tablet 10 milliGRAM(s) Oral daily  atorvastatin 10 milliGRAM(s) Oral at bedtime  enoxaparin Injectable 40 milliGRAM(s) SubCutaneous every 24 hours  folic acid 1 milliGRAM(s) Oral daily  losartan 25 milliGRAM(s) Oral daily  piperacillin/tazobactam IVPB.. 3.375 Gram(s) IV Intermittent every 8 hours  pramipexole 0.25 milliGRAM(s) Oral at bedtime  predniSONE   Tablet 60 milliGRAM(s) Oral daily    MEDICATIONS  (PRN):  acetaminophen     Tablet .. 650 milliGRAM(s) Oral every 6 hours PRN Mild Pain (1 - 3)  aluminum hydroxide/magnesium hydroxide/simethicone Suspension 30 milliLiter(s) Oral every 4 hours PRN Dyspepsia  melatonin 3 milliGRAM(s) Oral at bedtime PRN Insomnia  ondansetron Injectable 4 milliGRAM(s) IV Push every 8 hours PRN Nausea and/or Vomiting  oxyCODONE    IR 5 milliGRAM(s) Oral every 6 hours PRN Moderate Pain (4 - 6)  polyethylene glycol 3350 17 Gram(s) Oral daily PRN Constipation  polyvinyl alcohol 1.4%/povidone 0.6% Ophthalmic Solution - Peds 1 Drop(s) Both EYES two times a day PRN Dry Eyes      Allergies    Aloe Lotion (Rash)    Intolerances        LABS:                        11.2   3.42  )-----------( 91       ( 09 Aug 2022 07:10 )             33.0     08-09    133<L>  |  102  |  16  ----------------------------<  130<H>  3.5   |  25  |  0.84    Ca    8.0<L>      09 Aug 2022 07:10    TPro  6.4  /  Alb  2.5<L>  /  TBili  0.6  /  DBili  0.3  /  AST  35  /  ALT  57  /  AlkPhos  46  08-09          RADIOLOGY & ADDITIONAL TESTS:  Studies reviewed.   10-year-old female INTERVAL HPI/OVERNIGHT EVENTS:  Patient S&E at bedside. No o/n events, VSS on RA  labs reviewed, culture from abscess showed numerous staph aureus awaiting sensitivities   pt feels better today - had another drainage yesterday by podiatry with some pus         PAST MEDICAL & SURGICAL HISTORY:  ETOH abuse      Hyperlipidemia LDL goal &lt;100      Restless leg syndrome      Rheumatoid arteritis      No significant past surgical history          FAMILY HISTORY:  No pertinent family history in first degree relatives        VITAL SIGNS:  T(F): 97.7 (08-10-22 @ 05:01)  HR: 68 (08-10-22 @ 05:01)  BP: 126/89 (08-10-22 @ 05:01)  RR: 18 (08-10-22 @ 05:01)  SpO2: 100% (08-10-22 @ 05:01)  Wt(kg): --    PHYSICAL EXAM:    Constitutional: NAD, cushingoid appearance of face   Eyes: EOMI  Respiratory: CTA b/l, good air entry b/l  Cardiovascular: RRR, no M/R/G  Gastrointestinal: soft, NTND, no masses palpable, + BS, no hepatosplenomegaly  Extremities: no c/c/e, right foot wrapped from debridement   Neurological: AAOx3      MEDICATIONS  (STANDING):  amLODIPine   Tablet 10 milliGRAM(s) Oral daily  atorvastatin 10 milliGRAM(s) Oral at bedtime  enoxaparin Injectable 40 milliGRAM(s) SubCutaneous every 24 hours  folic acid 1 milliGRAM(s) Oral daily  losartan 25 milliGRAM(s) Oral daily  piperacillin/tazobactam IVPB.. 3.375 Gram(s) IV Intermittent every 8 hours  pramipexole 0.25 milliGRAM(s) Oral at bedtime  predniSONE   Tablet 60 milliGRAM(s) Oral daily    MEDICATIONS  (PRN):  acetaminophen     Tablet .. 650 milliGRAM(s) Oral every 6 hours PRN Mild Pain (1 - 3)  aluminum hydroxide/magnesium hydroxide/simethicone Suspension 30 milliLiter(s) Oral every 4 hours PRN Dyspepsia  melatonin 3 milliGRAM(s) Oral at bedtime PRN Insomnia  ondansetron Injectable 4 milliGRAM(s) IV Push every 8 hours PRN Nausea and/or Vomiting  oxyCODONE    IR 5 milliGRAM(s) Oral every 6 hours PRN Moderate Pain (4 - 6)  polyethylene glycol 3350 17 Gram(s) Oral daily PRN Constipation  polyvinyl alcohol 1.4%/povidone 0.6% Ophthalmic Solution - Peds 1 Drop(s) Both EYES two times a day PRN Dry Eyes      Allergies    Aloe Lotion (Rash)    Intolerances        LABS:                        11.2   3.42  )-----------( 91       ( 09 Aug 2022 07:10 )             33.0     08-09    133<L>  |  102  |  16  ----------------------------<  130<H>  3.5   |  25  |  0.84    Ca    8.0<L>      09 Aug 2022 07:10    TPro  6.4  /  Alb  2.5<L>  /  TBili  0.6  /  DBili  0.3  /  AST  35  /  ALT  57  /  AlkPhos  46  08-09          RADIOLOGY & ADDITIONAL TESTS:  Studies reviewed.   10-year-old female with history of type 1 diabetes and persistent asthma presenting with difficulty breathing and cough for the past three days. Mother states that Tarah started to exhibit more coughing and symptoms of difficulty breathing as the weather became colder in the last few weeks. She noticed an acute change in the past three days where she required albuterol nebulizer treatments more than normal due to more frequent coughing and subjective perceptions of dyspnea. Denies fevers. + rhinorrhea, cough, sore throat. She took her to [facility] urgent care on Thursday evening after she started to complain of sore throat (+sick contact at school with strep). Mother began giving q4h albuterol treatments at home the same evening. On Friday morning, she continued to feel ill and began wheezing and also complaining of headache. Mother advanced treatments from q4h to q3h to q2h to q1h by 1900 on 10/27 and then decided to take her to [facility]. She was evaluated at Urgent Care, given three albuterol/atrovent treatments and orapred and sent to [facility] ED.     Asthma History:  At what age was your child diagnosed with asthma/reactive airway disease/wheezing: 3  Please list medications and dosages: albuterol nebulizer PRN    Assessing Severity and Control   RISK ASSESSMENT:   1.	In the past 12 months how many times has your child: (please enter number for each)   (a)	Been admitted to the hospital for asthma symptoms (sx)?  ____0___  (b)	Been to the Emergency Room or [facility] for asthma sx and not admitted?  __1__  (c)	Been treated by their PMD with oral steroids for asthma sx that did not require an ER visit? ____0___  Total number of exacerbations requiring OCS: (a+b+c)                   [x] 0 to 1/year                     [ ] >2/year                       2.	Has your child ever been admitted to the Pediatric Intensive Care Unit?    NO  •	If yes, how many times?  _____  3.	Has your child ever been intubated for asthma?      NO  •	If yes, how many times?  _____  4.	 (For children 0-4 years of age only):  •	How many episodes of wheezing lasting at least 1 day has your child had in the past 12 months? ___________	  •	Does your child have eczema?	YES	or 	NO  •	Does your child have allergies?	YES	or 	NO  •	Does the child’s parent or sibling have asthma, eczema or allergies?       YES	     or         NO    IMPAIRMENT ASSESSMENT:  Please have parent answer these questions based on the past 3 months (not including this episode).   1.	Frequency of symptoms:    [ ]  <2 days/week    [ ] >2 days/week but not daily  [x] Daily                      [ ] Throughout the day   2.	Nighttime awakenings:    [ ] <2x/month    [x] 3-4x/month    [ ] >1x/week but not nightly   [ ] often nightly  3.	Short-acting beta2-agonist use for symptoms control (not for pre- exercise):   [ ] <2 days/week   [ ] >2 days/ week but not daily and not more than 1x/day    [x] daily    [ ] several times per day  4.	Interference with normal activity (play, attending school):    [x ] none   [ ] minor limitation   [ ] some limitation  [ ] extremely limited    TRIGGERS:  1.	Do you know what starts or triggers your child’s asthma symptoms?  YES	    If yes, what are the triggers:    [x] colds    [ ] exercise     [ ] smoke     [x] weather changes    [ ] Other     [x] allergies (animal_________, dust, foods__________)      Overall Assessment: Please complete either section A or B depending on whether or not the patient is on ICS.     A.	If child has not been prescribed an inhaled corticosteroid prior to this admission:     Based on the answers to the above questions, it has been determined that the patient’s asthma severity   classification is:  [] intermittent  [] mild persistent  [x] moderate persistent  [] severe persistent     B.	If the child was admitted on an inhaled corticosteroid:      Based on the current dose of ICS, the severity classification is:   [] mild persistent			  [] moderate persistent  [] severe persistent    Based on the answers to the questions above, it has been determined that the patient is:   [] well controlled   [x] poorly controlled 	  [] very poorly controlled 10-year-old female with history of type 1 diabetes and persistent asthma presenting with difficulty breathing and cough for the past three days. Mother states that Tarah started to exhibit more coughing and symptoms of difficulty breathing as the weather became colder in the last few weeks. She noticed an acute change in the past three days where she required albuterol nebulizer treatments more than normal due to more frequent coughing and subjective perceptions of dyspnea. Denies fevers. + rhinorrhea, cough, sore throat. She took her to [facility] urgent care on Thursday evening after she started to complain of sore throat (+sick contact at school with strep). Mother began giving q4h albuterol treatments at home the same evening. On Friday morning, she continued to feel ill and began wheezing and also complaining of headache. Mother advanced treatments from q4h to q3h to q2h to q1h by 1900 on 10/27 and then decided to take her to [facility]. She was evaluated at Urgent Care, given three albuterol/atrovent treatments and orapred and sent to [facility] ED. In [facility] ED, noted to be tachypneic with diffuse crackles and increased work of breathing, given IM epi, magnesium and 2 albuterol/atrovent treatments before being started on continuous albuterol and admitted for status asthmaticus.     PMD: Dr. Hooper  PMH: T1DM, Asthma  PSH: adenoid shaving, tympanostomy tubes  Meds: insulin pump, albuterol  ALL: NKDA  FH: + asthma PGM, mother w/ T1DM  SH: lives with mother, father    Asthma History:  At what age was your child diagnosed with asthma/reactive airway disease/wheezing: 3  Please list medications and dosages: albuterol nebulizer PRN    Assessing Severity and Control   RISK ASSESSMENT:   1.	In the past 12 months how many times has your child: (please enter number for each)   (a)	Been admitted to the hospital for asthma symptoms (sx)?  ____0___  (b)	Been to the Emergency Room or [facility] for asthma sx and not admitted?  __1__  (c)	Been treated by their PMD with oral steroids for asthma sx that did not require an ER visit? ____0___  Total number of exacerbations requiring OCS: (a+b+c)                   [x] 0 to 1/year                     [ ] >2/year                       2.	Has your child ever been admitted to the Pediatric Intensive Care Unit?    NO  •	If yes, how many times?  _____  3.	Has your child ever been intubated for asthma?      NO  •	If yes, how many times?  _____  4.	 (For children 0-4 years of age only):  •	How many episodes of wheezing lasting at least 1 day has your child had in the past 12 months? ___________	  •	Does your child have eczema?	YES	or 	NO  •	Does your child have allergies?	YES	or 	NO  •	Does the child’s parent or sibling have asthma, eczema or allergies?       YES	     or         NO    IMPAIRMENT ASSESSMENT:  Please have parent answer these questions based on the past 3 months (not including this episode).   1.	Frequency of symptoms:    [ ]  <2 days/week    [ ] >2 days/week but not daily  [x] Daily                      [ ] Throughout the day   2.	Nighttime awakenings:    [ ] <2x/month    [x] 3-4x/month    [ ] >1x/week but not nightly   [ ] often nightly  3.	Short-acting beta2-agonist use for symptoms control (not for pre- exercise):   [ ] <2 days/week   [ ] >2 days/ week but not daily and not more than 1x/day    [x] daily    [ ] several times per day  4.	Interference with normal activity (play, attending school):    [x ] none   [ ] minor limitation   [ ] some limitation  [ ] extremely limited    TRIGGERS:  1.	Do you know what starts or triggers your child’s asthma symptoms?  YES	    If yes, what are the triggers:    [x] colds    [ ] exercise     [ ] smoke     [x] weather changes    [ ] Other     [x] allergies (animal_________, dust, foods__________)      Overall Assessment: Please complete either section A or B depending on whether or not the patient is on ICS.     A.	If child has not been prescribed an inhaled corticosteroid prior to this admission:     Based on the answers to the above questions, it has been determined that the patient’s asthma severity   classification is:  [] intermittent  [] mild persistent  [x] moderate persistent  [] severe persistent     B.	If the child was admitted on an inhaled corticosteroid:      Based on the current dose of ICS, the severity classification is:   [] mild persistent			  [] moderate persistent  [] severe persistent    Based on the answers to the questions above, it has been determined that the patient is:   [] well controlled   [x] poorly controlled 	  [] very poorly controlled

## 2022-08-10 NOTE — PROGRESS NOTE ADULT - SUBJECTIVE AND OBJECTIVE BOX
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 States he noticed redness to leg/foot and had a hard time ambulating/weight bearing due to discomfort. Denies any trauma, insect bites, or discharge from foot but endorsed subjective fevers. Podiatry was consulted for possible R foot abscess. Patient denies c/n/v/cp/sob. No other pedal complaints at this time.     08/10/22: Patient seen by Podiatry team. Patient resting comfortably at bedside. Reports feeling better and has been able to ambulate. Pain well controlled. No other pedal complaints today.     REVIEW OF SYSTEMS:  All other review of systems is negative unless indicated above    PMH: ETOH abuse    Hyperlipidemia LDL goal &lt;100    Restless leg syndrome    Rheumatoid arteritis      PSH:No significant past surgical history    Allergies:Aloe Lotion (Rash)    MEDICATIONS  (STANDING):  amLODIPine   Tablet 10 milliGRAM(s) Oral daily  atorvastatin 10 milliGRAM(s) Oral at bedtime  doxycycline monohydrate Capsule 100 milliGRAM(s) Oral every 12 hours  enoxaparin Injectable 40 milliGRAM(s) SubCutaneous every 24 hours  folic acid 1 milliGRAM(s) Oral daily  losartan 25 milliGRAM(s) Oral daily  piperacillin/tazobactam IVPB.. 3.375 Gram(s) IV Intermittent every 8 hours  pramipexole 0.25 milliGRAM(s) Oral at bedtime  predniSONE   Tablet 60 milliGRAM(s) Oral daily      MEDICATIONS  (PRN):  acetaminophen     Tablet .. 650 milliGRAM(s) Oral every 6 hours PRN Mild Pain (1 - 3)  aluminum hydroxide/magnesium hydroxide/simethicone Suspension 30 milliLiter(s) Oral every 4 hours PRN Dyspepsia  melatonin 3 milliGRAM(s) Oral at bedtime PRN Insomnia  ondansetron Injectable 4 milliGRAM(s) IV Push every 8 hours PRN Nausea and/or Vomiting  oxyCODONE    IR 5 milliGRAM(s) Oral every 6 hours PRN Moderate Pain (4 - 6)  polyethylene glycol 3350 17 Gram(s) Oral daily PRN Constipation  polyvinyl alcohol 1.4%/povidone 0.6% Ophthalmic Solution - Peds 1 Drop(s) Both EYES two times a day PRN Dry Eyes        Labs:                                   11.8   3.52  )-----------( 117      ( 10 Aug 2022 08:37 )             34.8     08-10    136  |  104  |  24<H>  ----------------------------<  181<H>  3.5   |  23  |  1.09    Ca    8.8      10 Aug 2022 08:37    TPro  6.4  /  Alb  2.5<L>  /  TBili  0.6  /  DBili  0.3  /  AST  35  /  ALT  57  /  AlkPhos  46  08-09      Vital Signs Last 24 Hrs  T(C): 36.9 (10 Aug 2022 15:18), Max: 36.9 (10 Aug 2022 15:18)  T(F): 98.5 (10 Aug 2022 15:18), Max: 98.5 (10 Aug 2022 15:18)  HR: 72 (10 Aug 2022 15:18) (64 - 72)  BP: 141/74 (10 Aug 2022 15:18) (126/89 - 145/72)  BP(mean): --  RR: 18 (10 Aug 2022 15:18) (18 - 18)  SpO2: 100% (10 Aug 2022 15:18) (99% - 100%)    Parameters below as of 10 Aug 2022 15:18  Patient On (Oxygen Delivery Method): room air      Physical Exam:   Constitutiona: NAD, alert;  Derm:  Skin warm, dry and supple bilateral.    Circular lesion (approx 1cm x 1cm), generalized erythema on the dorsal aspect of R foot (improved), no active drainage, no PTB. TTP surrounding the lesion (improving).   Vascular: Dorsalis Pedis and Posterior Tibial pulses +2/4.  Capillary re-fill time less then 3 seconds digits 1-5 bilateral.    Neuro: Protective sensation intact to the level of the digits bilateral.  MSK: Muscle strength 5/5 all major muscle groups bilateral.      < from: MR Foot w/wo IV Cont, Right (08.08.22 @ 19:15) >  IMPRESSION:  Plantar soft tissue wound at the level of the third metatarsophalangeal   joint and proximal phalanx containing phlegmon and likely foci of air.   Wound extends into the deep subcutaneous tissues without contactof the   adjacent bone.  No osteomyelitis.    < end of copied text >  < from: Xray Foot AP + Lateral + Oblique, Right (08.07.22 @ 16:13) >  Right foot. 3 views.    COMPARISON: None available.    Small posterior and inferior calcaneal spurs.    Mild to moderate first MTP degeneration.    Mild first IP degeneration.    No bone destruction or fracture.    IMPRESSION: No acute finding. Incidental right foot findings.    < end of copied text >

## 2022-08-10 NOTE — CONSULT NOTE ADULT - SUBJECTIVE AND OBJECTIVE BOX
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 States he noticed redness to leg/foot and had a hard time ambulating/weight bearing due to discomfort. Denies any trauma, insect bites, or discharge from foot but endorsed subjective fevers. Podiatry was consulted for possible R foot abscess. Patient denies c/n/v/cp/sob. No other pedal complaints at this time.     REVIEW OF SYSTEMS:  All other review of systems is negative unless indicated above    PMH: ETOH abuse    Hyperlipidemia LDL goal &lt;100    Restless leg syndrome    Rheumatoid arteritis      PSH:No significant past surgical history    Allergies:Aloe Lotion (Rash)      Labs:                          12.4   4.96  )-----------( 119      ( 08 Aug 2022 06:59 )             36.4     WBC Trend  4.96 Date (08-08 @ 06:59)  5.95 Date (08-07 @ 16:53)      Chem  08-08    132<L>  |  97  |  19  ----------------------------<  106<H>  3.5   |  28  |  1.04    Ca    8.4<L>      08 Aug 2022 06:59    TPro  6.4  /  Alb  2.8<L>  /  TBili  0.8  /  DBili  x   /  AST  49<H>  /  ALT  66  /  AlkPhos  51  08-08          T(F): 99.8 (08-08-22 @ 16:44), Max: 100.7 (08-08-22 @ 03:00)  HR: 104 (08-08-22 @ 16:44) (87 - 105)  BP: 142/88 (08-08-22 @ 16:44) (140/75 - 175/80)  RR: 18 (08-08-22 @ 16:44) (18 - 24)  SpO2: 96% (08-08-22 @ 16:44) (95% - 100%)  Wt(kg): --      Physical Exam:   Constitutiona: NAD, alert;  Derm:  Skin warm, dry and supple bilateral.    Circular protruding lesion covered with thin hyperkeratotic tissue containing possible purulence fluid, lesion size (1cm x 1cm), generalized erythema on the dorsal aspect of R foot, no active drainage, no PTB. TTP surrounding the lesion.   Vascular: Dorsalis Pedis and Posterior Tibial pulses +/4.  Capillary re-fill time less then 3 seconds digits 1-5 bilateral.    Neuro: Protective sensation intact to the level of the digits bilateral.  MSK: Muscle strength 5/5 all major muscle groups bilateral.  
HPI:  66 year old male patient presented to the ED complaining of right foot pain and swelling. States he noticed redness to leg/ foot and had a hard time ambulating/ weight bearing due to discomfort. Denies any trauma, insect bites, chills or discharge from foot but endorsed subjective fevers.    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  Pt follows with Dr. Frank for RA for which he was started on Humira 3­4 months ago, q 2 weeks with methotrexate once weekly, however pt with neutropenia with anc 400 and not responding to MTX with transaminitis as well thus was changed to Cyclophosphamide and prednisone 80 mg qd for treatment of LGL.   Flow cytometry with relative increase in T cell large granular lymphocytes  TCR beta gene rearrangement positive, TCR gamma rearrangement positive, stat3 detected which helps to differentiate  LGL from reactive processes and those with sTAT3 D661 tend to present with neutropenia and have a better prognosis.  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 IgK gene status negative, TCR gamma positive  5/26/22 CT a/p with splenomegaly up to 15 cm, cirrhosis seen and hepatic steatosis, no masses as well as sigmoid diverticulosis    In the ED patient with a ESR of 29, Na of 131, Lactate 2.6      Chronic post thrombotic changes in the right common femoral, femoral, and   popliteal veins.    No evidence of acute deep venous thrombosis in the right lower extremity.         (07 Aug 2022 22:16)      PAST MEDICAL & SURGICAL HISTORY:  ETOH abuse      Hyperlipidemia LDL goal &lt;100      Restless leg syndrome      Rheumatoid arteritis      No significant past surgical history          Allergies    Aloe Lotion (Rash)    Intolerances        MEDICATIONS  (STANDING):  amLODIPine   Tablet 10 milliGRAM(s) Oral daily  atorvastatin 10 milliGRAM(s) Oral at bedtime  enoxaparin Injectable 40 milliGRAM(s) SubCutaneous every 24 hours  folic acid 1 milliGRAM(s) Oral daily  losartan 25 milliGRAM(s) Oral daily  piperacillin/tazobactam IVPB.. 3.375 Gram(s) IV Intermittent every 8 hours  pramipexole 0.25 milliGRAM(s) Oral at bedtime  predniSONE   Tablet 60 milliGRAM(s) Oral daily  sodium chloride 0.9%. 1000 milliLiter(s) (100 mL/Hr) IV Continuous <Continuous>    MEDICATIONS  (PRN):  acetaminophen     Tablet .. 650 milliGRAM(s) Oral every 6 hours PRN Mild Pain (1 - 3)  aluminum hydroxide/magnesium hydroxide/simethicone Suspension 30 milliLiter(s) Oral every 4 hours PRN Dyspepsia  melatonin 3 milliGRAM(s) Oral at bedtime PRN Insomnia  ondansetron Injectable 4 milliGRAM(s) IV Push every 8 hours PRN Nausea and/or Vomiting  oxyCODONE    IR 5 milliGRAM(s) Oral every 6 hours PRN Moderate Pain (4 - 6)  polyethylene glycol 3350 17 Gram(s) Oral daily PRN Constipation  polyvinyl alcohol 1.4%/povidone 0.6% Ophthalmic Solution - Peds 1 Drop(s) Both EYES two times a day PRN Dry Eyes      FAMILY HISTORY:  No pertinent family history in first degree relatives        SOCIAL HISTORY: No EtOH, no tobacco    REVIEW OF SYSTEMS:    CONSTITUTIONAL: No weakness, fevers or chills  EYES/ENT: No visual changes;  No vertigo or throat pain   NECK: No pain or stiffness  RESPIRATORY: No cough, wheezing, hemoptysis; No shortness of breath  CARDIOVASCULAR: No chest pain or palpitations  GASTROINTESTINAL: No abdominal or epigastric pain. No nausea, vomiting, or hematemesis; No diarrhea or constipation. No melena or hematochezia.  GENITOURINARY: No dysuria, frequency or hematuria  NEUROLOGICAL: No numbness or weakness  SKIN: No itching, burning, rashes, or lesions   All other review of systems is negative unless indicated above.        T(F): 99.8 (08-08-22 @ 16:44), Max: 100.7 (08-08-22 @ 03:00)  HR: 104 (08-08-22 @ 16:44)  BP: 142/88 (08-08-22 @ 16:44)  RR: 18 (08-08-22 @ 16:44)  SpO2: 96% (08-08-22 @ 16:44)  Wt(kg): --    GENERAL: NAD, well-developed  HEAD:  Atraumatic, Normocephalic  EYES: EOMI, PERRLA, conjunctiva and sclera clear  NECK: Supple, No JVD  CHEST/LUNG: Clear to auscultation bilaterally; No wheeze  HEART: Regular rate and rhythm; No murmurs, rubs, or gallops  ABDOMEN: Soft, Nontender, Nondistended; Bowel sounds present  EXTREMITIES:  2+ Peripheral Pulses, No clubbing, cyanosis, or edema  NEUROLOGY: non-focal  SKIN: No rashes or lesions                          12.4   4.96  )-----------( 119      ( 08 Aug 2022 06:59 )             36.4       08-08    132<L>  |  97  |  19  ----------------------------<  106<H>  3.5   |  28  |  1.04    Ca    8.4<L>      08 Aug 2022 06:59    TPro  6.4  /  Alb  2.8<L>  /  TBili  0.8  /  DBili  x   /  AST  49<H>  /  ALT  66  /  AlkPhos  51  08-08          PT/INR - ( 07 Aug 2022 16:53 )   PT: 11.7 sec;   INR: 1.01 ratio         PTT - ( 08 Aug 2022 06:59 )  PTT:44.3 sec    .Stool Feces, benny mcdaniel  05-27 @ 09:20   GI PCR Results: NOT detected  *******Please Note:*******  GI panel PCR evaluates for:  Campylobacter, Plesiomonas shigelloides, Salmonella,  Vibrio, Yersinia enterocolitica, Enteroaggregative  Escherichia coli (EAEC), Enteropathogenic E.coli (EPEC),  Enterotoxigenic E. coli (ETEC) lt/st, Shiga-like  toxin-producing E. coli (STEC) stx1/stx2,  Shigella/ Enteroinvasive E. coli (EIEC), Cryptosporidium,  Cyclospora cayetanensis, Entamoeba histolytica,  Giardia lamblia, Adenovirus F 40/41, Astrovirus,  Norovirus GI/GII, Rotavirus A, Sapovirus  --  --      Clean Catch None  05-26 @ 14:37   <10,000 CFU/mL Normal Urogenital Leanne  --  --      .Blood None  05-26 @ 10:32   No Growth Final  --  --      
Patient is a 66y old  Male who presents with a chief complaint of Right foot Cellulitis (10 Aug 2022 07:06)    HPI:  66 year old male patient with past medical history hyperlipidemia, RA, restless leg syndrome admitted on 8/7 for evaluation of right foot pain and swelling. About 3 weeks ago the patient recalls stepping on a shell, he did not think there was a piece in his foot or that there was a potential problem until his symptoms developed. The patient underwent debridement and cultures are growing Staph aureus. Is comfortable sitting in chair.           PMH: as above  PSH: as above  Meds: per reconciliation sheet, noted below  MEDICATIONS  (STANDING):  amLODIPine   Tablet 10 milliGRAM(s) Oral daily  atorvastatin 10 milliGRAM(s) Oral at bedtime  doxycycline monohydrate Capsule 100 milliGRAM(s) Oral every 12 hours  enoxaparin Injectable 40 milliGRAM(s) SubCutaneous every 24 hours  folic acid 1 milliGRAM(s) Oral daily  losartan 25 milliGRAM(s) Oral daily  piperacillin/tazobactam IVPB.. 3.375 Gram(s) IV Intermittent every 8 hours  potassium chloride    Tablet ER 20 milliEquivalent(s) Oral once  pramipexole 0.25 milliGRAM(s) Oral at bedtime  predniSONE   Tablet 60 milliGRAM(s) Oral daily    MEDICATIONS  (PRN):  acetaminophen     Tablet .. 650 milliGRAM(s) Oral every 6 hours PRN Mild Pain (1 - 3)  aluminum hydroxide/magnesium hydroxide/simethicone Suspension 30 milliLiter(s) Oral every 4 hours PRN Dyspepsia  melatonin 3 milliGRAM(s) Oral at bedtime PRN Insomnia  ondansetron Injectable 4 milliGRAM(s) IV Push every 8 hours PRN Nausea and/or Vomiting  oxyCODONE    IR 5 milliGRAM(s) Oral every 6 hours PRN Moderate Pain (4 - 6)  polyethylene glycol 3350 17 Gram(s) Oral daily PRN Constipation  polyvinyl alcohol 1.4%/povidone 0.6% Ophthalmic Solution - Peds 1 Drop(s) Both EYES two times a day PRN Dry Eyes    Allergies    Aloe Lotion (Rash)    Intolerances      Social: no smoking, no alcohol, no illegal drugs; no recent travel, no exposure to TB  FAMILY HISTORY:  No pertinent family history in first degree relatives       no history of premature cardiovascular disease in first degree relatives  ROS: the patient denies fever, no chills, no HA, no dizziness, no sore throat, no blurry vision, no CP, no palpitations, no SOB, no cough, no abdominal pain, no diarrhea, no N/V, no dysuria, no leg pain, no claudication, no rash, no joint aches, no rectal pain or bleeding, no night sweats  All other systems reviewed and are negative    Vital Signs Last 24 Hrs  T(C): 36.7 (10 Aug 2022 09:09), Max: 36.7 (10 Aug 2022 09:09)  T(F): 98 (10 Aug 2022 09:09), Max: 98 (10 Aug 2022 09:09)  HR: 71 (10 Aug 2022 09:09) (64 - 77)  BP: 145/72 (10 Aug 2022 09:09) (126/89 - 145/72)  BP(mean): --  RR: 18 (10 Aug 2022 09:09) (18 - 18)  SpO2: 100% (10 Aug 2022 09:09) (99% - 100%)    Parameters below as of 10 Aug 2022 09:09  Patient On (Oxygen Delivery Method): room air      Daily     Daily     PE:    Constitutional: frail looking  HEENT: NC/AT, EOMI, PERRLA, conjunctivae clear; ears and nose atraumatic; pharynx clear  Neck: supple; thyroid not palpable  Back: no tenderness  Respiratory: respiratory effort normal; clear to auscultation  Cardiovascular: S1S2 regular, no murmurs  Abdomen: soft, not tender, not distended, positive BS; no liver or spleen organomegaly  Genitourinary: no suprapubic tenderness  Musculoskeletal: no muscle tenderness, right foot dressings in place  Neurological/ Psychiatric: AxOx3, judgement and insight normal;  moving all extremities  Skin: no rashes; no palpable lesions    Labs: all available labs reviewed                        11.8   3.52  )-----------( 117      ( 10 Aug 2022 08:37 )             34.8     08-10    136  |  104  |  24<H>  ----------------------------<  181<H>  3.5   |  23  |  1.09    Ca    8.8      10 Aug 2022 08:37    TPro  6.4  /  Alb  2.5<L>  /  TBili  0.6  /  DBili  0.3  /  AST  35  /  ALT  57  /  AlkPhos  46  08-09     LIVER FUNCTIONS - ( 09 Aug 2022 07:10 )  Alb: 2.5 g/dL / Pro: 6.4 gm/dL / ALK PHOS: 46 U/L / ALT: 57 U/L / AST: 35 U/L / GGT: x           Culture - Abscess with Gram Stain (08.08.22 @ 13:05)    Specimen Source: .Abscess Leg - Right    Culture Results:   Numerous Staphylococcus aureus          < from: MR Foot w/wo IV Cont, Right (08.08.22 @ 19:15) >    IMPRESSION:  Plantar soft tissue wound at the level of the third metatarsophalangeal   joint and proximal phalanx containing phlegmon and likely foci of air.   Wound extends into the deep subcutaneous tissues without contactof the   adjacent bone.  No osteomyelitis.    < end of copied text >          Radiology: all available radiological tests reviewed    Advanced directives addressed: full resuscitation

## 2022-08-10 NOTE — PROGRESS NOTE ADULT - ASSESSMENT
A: 66M inpatient seen at bedside for the following:   -Right foot abscess  -Right foot cellulitis  -Right foot ulceration including soft tissue    P:   Chart reviewed and Patient evaluated  Discussed diagnosis and treatment with patient.   Consent obtained and witnessed for: Aseptic sharp excisonal debridement including soft tissue of Right  foot with #15 blade  Wound flush with normal saline and betadine mix  Obtained wound culture and sent to Pathology on 08/08/22>> Prelim numerous S. Aureus.   Applied iodoform packing with betadine soaked gauze and dry sterile dressing on 8/10/22  X-rays reviewed : on wet read showing no cortical disruptions and no soft tissue gas. Official read noted above.   MRI ordered to r/o any abscess. Official read noted above.   Continue with IV antibiotics As Per ID/MED  Weight bearing as tolerated to RLE  Discussed importance of daily foot examinations and proper shoe gear and to importance of lower Fasting Blood Glucose levels.   Patient demonstrated verbal understanding of all interventions and tolerated interventions well without any complications.   Patient stable from podiatry standpoint. Please d/c once deemed stable from all other specialties.   Patient to follow up at the wound care center as outpatient  Podiatry will follow while in-house  Case D/W Dr. Carmona

## 2022-08-11 ENCOUNTER — TRANSCRIPTION ENCOUNTER (OUTPATIENT)
Age: 67
End: 2022-08-11

## 2022-08-11 VITALS
TEMPERATURE: 98 F | DIASTOLIC BLOOD PRESSURE: 72 MMHG | OXYGEN SATURATION: 100 % | HEART RATE: 67 BPM | RESPIRATION RATE: 18 BRPM | SYSTOLIC BLOOD PRESSURE: 133 MMHG

## 2022-08-11 LAB
ANION GAP SERPL CALC-SCNC: 5 MMOL/L — SIGNIFICANT CHANGE UP (ref 5–17)
BUN SERPL-MCNC: 25 MG/DL — HIGH (ref 7–23)
CALCIUM SERPL-MCNC: 8.8 MG/DL — SIGNIFICANT CHANGE UP (ref 8.5–10.1)
CHLORIDE SERPL-SCNC: 104 MMOL/L — SIGNIFICANT CHANGE UP (ref 96–108)
CO2 SERPL-SCNC: 25 MMOL/L — SIGNIFICANT CHANGE UP (ref 22–31)
CREAT SERPL-MCNC: 1.07 MG/DL — SIGNIFICANT CHANGE UP (ref 0.5–1.3)
EGFR: 77 ML/MIN/1.73M2 — SIGNIFICANT CHANGE UP
GLUCOSE SERPL-MCNC: 106 MG/DL — HIGH (ref 70–99)
HCT VFR BLD CALC: 35 % — LOW (ref 39–50)
HGB BLD-MCNC: 12.3 G/DL — LOW (ref 13–17)
MCHC RBC-ENTMCNC: 33.2 PG — SIGNIFICANT CHANGE UP (ref 27–34)
MCHC RBC-ENTMCNC: 35.1 GM/DL — SIGNIFICANT CHANGE UP (ref 32–36)
MCV RBC AUTO: 94.6 FL — SIGNIFICANT CHANGE UP (ref 80–100)
PLATELET # BLD AUTO: 118 K/UL — LOW (ref 150–400)
POTASSIUM SERPL-MCNC: 4.1 MMOL/L — SIGNIFICANT CHANGE UP (ref 3.5–5.3)
POTASSIUM SERPL-SCNC: 4.1 MMOL/L — SIGNIFICANT CHANGE UP (ref 3.5–5.3)
RBC # BLD: 3.7 M/UL — LOW (ref 4.2–5.8)
RBC # FLD: 16 % — HIGH (ref 10.3–14.5)
SODIUM SERPL-SCNC: 134 MMOL/L — LOW (ref 135–145)
WBC # BLD: 2.95 K/UL — LOW (ref 3.8–10.5)
WBC # FLD AUTO: 2.95 K/UL — LOW (ref 3.8–10.5)

## 2022-08-11 PROCEDURE — 99239 HOSP IP/OBS DSCHRG MGMT >30: CPT

## 2022-08-11 RX ORDER — ACETAMINOPHEN 500 MG
2 TABLET ORAL
Qty: 0 | Refills: 0 | DISCHARGE

## 2022-08-11 RX ORDER — FOLIC ACID 0.8 MG
1 TABLET ORAL
Qty: 30 | Refills: 0
Start: 2022-08-11 | End: 2022-09-09

## 2022-08-11 RX ORDER — CEPHALEXIN 500 MG
1 CAPSULE ORAL
Qty: 40 | Refills: 0
Start: 2022-08-11 | End: 2022-08-20

## 2022-08-11 RX ORDER — POLYETHYLENE GLYCOL 3350 17 G/17G
17 POWDER, FOR SOLUTION ORAL
Qty: 0 | Refills: 0 | DISCHARGE
Start: 2022-08-11

## 2022-08-11 RX ORDER — CYCLOPHOSPHAMIDE 100 MG
2 VIAL (EA) INTRAVENOUS
Qty: 0 | Refills: 0 | DISCHARGE

## 2022-08-11 RX ADMIN — PIPERACILLIN AND TAZOBACTAM 25 GRAM(S): 4; .5 INJECTION, POWDER, LYOPHILIZED, FOR SOLUTION INTRAVENOUS at 05:10

## 2022-08-11 RX ADMIN — Medication 100 MILLIGRAM(S): at 09:58

## 2022-08-11 RX ADMIN — LOSARTAN POTASSIUM 25 MILLIGRAM(S): 100 TABLET, FILM COATED ORAL at 09:58

## 2022-08-11 RX ADMIN — AMLODIPINE BESYLATE 10 MILLIGRAM(S): 2.5 TABLET ORAL at 09:58

## 2022-08-11 RX ADMIN — Medication 60 MILLIGRAM(S): at 09:57

## 2022-08-11 RX ADMIN — Medication 1 MILLIGRAM(S): at 09:57

## 2022-08-11 NOTE — PROGRESS NOTE ADULT - ATTENDING COMMENTS
Agree with note, patient foot is stable, discussed wound care and to follow up in [facility], dispensed patient surgical shoe to wear at all times.

## 2022-08-11 NOTE — PROGRESS NOTE ADULT - ASSESSMENT
66 year old male patient with past medical history hyperlipidemia, RA, restless leg syndrome admitted on 8/7 for evaluation of right foot pain and swelling. About 3 weeks ago the patient recalls stepping on a shell, he did not think there was a piece in his foot or that there was a potential problem until his symptoms developed. The patient underwent debridement and cultures are growing Staph aureus. Is comfortable sitting in chair.     1. Patient admitted with  right foot abscess, secondary to Staph aureus; s/p debridement  - follow up cultures -  MSSA in wound  - serial cbc and monitor temperature   - reviewed prior medical records to evaluate for resistant or atypical pathogens   - iv hydration and supportive care   - wound care per podiatry  - okay from id standpoint to discharge home on keflex 500 mg po q 6 hours for 10 more days and follow up with podiatry as outpatient  2. other issues: per medicine

## 2022-08-11 NOTE — PROGRESS NOTE ADULT - REASON FOR ADMISSION
Right foot Cellulitis

## 2022-08-11 NOTE — DISCHARGE NOTE PROVIDER - NSDCCAREPROVSEEN_GEN_ALL_CORE_FT
Meagan Curran MD, Donna Pairsh NP Infectious Disease   [facility]   Kehinde Sanchez Hematology/Oncology

## 2022-08-11 NOTE — DISCHARGE NOTE PROVIDER - HOSPITAL COURSE
CHIEF COMPLAINT: RLE swelling   HPI: 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, but dc'd as it caused elevation in LFTs , now on Cyclophosphamide and Steroid taper who presented to the ED complaining of right foot pain and swelling. States he noticed redness to leg/foot and had a hard time ambulating/weight bearing due to discomfort. Denies any trauma, insect bites, chills or discharge from foot but endorsed subjective fevers.  Interval History: 8/11/22 pt seen and examined at bedside, afebrile, he reports improvement in pain, tenderness, and swelling to RLE. RLE no longer erythematous. Able to bear weight. Daily right foot dressing change with Bacitracin and DSD. Denies CP, palps, sob, cough, HA, dizziness, lightheadedness, n/v/d, dysuria, fever or chills.   REVIEW OF SYSTEMS: All other review of systems is negative unless indicated above.  PE:  Vital Signs Last 24 Hrs: all reviewed   T(C): 36.5 (11 Aug 2022 08:46), Max: 36.9 (10 Aug 2022 15:18)  T(F): 97.7 (11 Aug 2022 08:46), Max: 98.5 (10 Aug 2022 15:18)  HR: 67 (11 Aug 2022 08:46) (67 - 72)  BP: 133/72 (11 Aug 2022 08:46) (131/83 - 141/74)  RR: 18 (11 Aug 2022 08:46) (18 - 18)  SpO2: 100% (11 Aug 2022 08:46) (100% - 100%)  Parameters below as of 11 Aug 2022 08:46  Patient On (Oxygen Delivery Method): room air  Constitutional: NAD, awake and alert  HEENT: EOMI  Neck: Soft and supple, No JVD  Respiratory: Breath sounds are clear bilaterally, No wheezing, rales or rhonchi  Cardiovascular: S1 and S2, regular rate and rhythm, no Murmurs  Gastrointestinal: Bowel Sounds present, soft, nontender, nondistended, round   Extremities: +1-2 edema RLE   Vascular: 2+ peripheral pulses  Neurological: A/O x 3, no focal deficits  Musculoskeletal: 5/5 strength b/l upper and lower extremities  Skin: RLE erythema better still w edema, s/p debridement to foot now wrapped in ACE    Hospital Course:  Sepsis due to RLE Cellulitis and Right foot abscess with soft tissue ulceration s/p Bedside Debridement on 8/8/22  - MRI no OM  - XRay as above  - s/p bedside debridement with Podiatry on 8/8/22   - Wound Culture S. aureus, s/p Doxy and Zosyn; DC home on Keflex 500mg PO q6h x 10 more days  - Blood and Urine Cultures NGTD  - Trend Lactate 2.6-->2.3-->2.3-->2.3-->0.6  - ESR elevated   - s/p 2700 LR bolus in ED--> Additional 1L NS bolus this am; Continue IVF, NS at 100 ml/hr; STOP IVF  - Tylenol for temps PRN  - Oxy for pain; Miralax PRN for constipation   - Podiatry Following   - ID Following   - Patient to f/u with Dr. Carmona at the [facility] wound care center in one week   DAILY WOUND CARE:  Epsom Salt Bath Soaks 20 mins two times a day for 5 days   Apply Bacitracin and Bandaid to wound after each soak  Hyponatremia  - s/p IVF  - Trend BMP   HTN  - Continue Amlodipine & Losartan with hold parameters   HLD  - Continue statin  - Lipid panel WNL  RLE Non-occlusive thrombus  Hx of DVT  - US demonstrates Nonocclusive thrombus/chronic postthrombotic change in the right common femoral and proximal femoral, and popliteal veins; No evidence of DVT   - no indication for anticoagulation  RA  LGL Leukemia   Pancytopenia   - Formerly on methotrexate, but stopped 2/2 elevated LFTs  - Now on Cyclophosphamide (on hold while hospitalized per Heme/Onc)  - Monitor Platelets while on Lovenox, Hold for <50k  - Continue Steroid Taper (60mg daily); DC home on 60mg daily, pt to follow up with Dr. Sanchez in one week.   - Continue Folic Acid   - B12, folate wnl   - Iron studies wnl  - Heme/Onc, Dr. Sanchez following  RLS  - Continue Mirapex   Hyperglycemia due to prediabetes  - A1c 6.1  - DM Diet   - Lifestyle modifications   EtOH Abuse  - With Hx of EtOH withdrawal; reports drinking 3-4 beers, 2-3 x week  - No active signs/symptoms of withdrawal  - Monitor  Borderline Low TSH   - TSH low end of normal, FT4 high end of normal   - Repeat testing OP in 4 weeks     Dispo: discharge to home in stable condition    Final diagnosis, treatment plan, and follow-up recommendations were discussed and explained to the patient. The patient was given an opportunity to ask questions concerning the diagnosis and treatment plan. The patient acknowledged understanding of the diagnosis, treatment, and follow-up recommendations. The patient was advised to seek urgent care upon discharge if worsening symptoms develop prior to scheduled follow-up. Time spent on discharge included time with the patient, and also coordinating discharge care as outlined below.         CHIEF COMPLAINT: RLE swelling   HPI: 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, but dc'd as it caused elevation in LFTs , now on Cyclophosphamide and Steroid taper who presented to the ED complaining of right foot pain and swelling. States he noticed redness to leg/foot and had a hard time ambulating/weight bearing due to discomfort. Denies any trauma, insect bites, chills or discharge from foot but endorsed subjective fevers.  Interval History: 8/11/22 pt seen and examined at bedside, afebrile, he reports improvement in pain, tenderness, and swelling to RLE. RLE no longer erythematous. Able to bear weight. Daily right foot dressing change with Bacitracin and DSD. Denies CP, palps, sob, cough, HA, dizziness, lightheadedness, n/v/d, dysuria, fever or chills.   REVIEW OF SYSTEMS: All other review of systems is negative unless indicated above.  PE  Vital Signs Last 24 Hrs: all reviewed   T(C): 36.5 (11 Aug 2022 08:46), Max: 36.9 (10 Aug 2022 15:18)  T(F): 97.7 (11 Aug 2022 08:46), Max: 98.5 (10 Aug 2022 15:18)  HR: 67 (11 Aug 2022 08:46) (67 - 72)  BP: 133/72 (11 Aug 2022 08:46) (131/83 - 141/74)  RR: 18 (11 Aug 2022 08:46) (18 - 18)  SpO2: 100% (11 Aug 2022 08:46) (100% - 100%)  Parameters below as of 11 Aug 2022 08:46  Patient On (Oxygen Delivery Method): room air  Constitutional: NAD, awake and alert  HEENT: EOMI  Neck: Soft and supple, No JVD  Respiratory: Breath sounds are clear bilaterally, No wheezing, rales or rhonchi  Cardiovascular: S1 and S2, regular rate and rhythm, no Murmurs  Gastrointestinal: Bowel Sounds present, soft, nontender, nondistended, round   Extremities: +1-2 edema RLE   Vascular: 2+ peripheral pulses  Neurological: A/O x 3, no focal deficits  Musculoskeletal: 5/5 strength b/l upper and lower extremities  Skin: RLE erythema better still w edema, s/p debridement to foot now wrapped in ACE    Hospital Course:  Sepsis due to RLE Cellulitis and Right foot abscess with soft tissue ulceration s/p Bedside Debridement on 8/8/22  - MRI no OM  - XRay as above  - s/p bedside debridement with Podiatry on 8/8/22   - Wound Culture S. aureus, s/p Doxy and Zosyn; DC home on Keflex 500mg PO q6h x 10 more days  - Blood and Urine Cultures NGTD  - Trend Lactate 2.6-->2.3-->2.3-->2.3-->0.6  - ESR elevated   - s/p 2700 LR bolus in ED--> Additional 1L NS bolus this am; Continue IVF, NS at 100 ml/hr; STOP IVF  - Tylenol for temps PRN  - Oxy for pain; Miralax PRN for constipation   - Podiatry Following   - ID Following   - Patient to f/u with Dr. Carmona at the [facility] wound care center in one week   DAILY WOUND CARE:  Epsom Salt Bath Soaks 20 mins two times a day for 5 days   Apply Bacitracin and Bandaid to wound after each soak  Hyponatremia, resolved   - s/p IVF,  Trend BMP   HTN - Continue Amlodipine & Losartan with hold parameters   HLD - Continue statin,  Lipid panel WNL  RLE Non-occlusive thrombus  Hx of DVT  - US demonstrates Nonocclusive thrombus/chronic postthrombotic change in the right common femoral and proximal femoral, and popliteal veins; No evidence of DVT   - no indication for anticoagulation  RA  LGL Leukemia   Pancytopenia   - Formerly on methotrexate, but stopped 2/2 elevated LFTs  - Now on Cyclophosphamide (on hold while hospitalized per Heme/Onc)  - Monitor Platelets while on Lovenox, Hold for <50k  - Continue Steroid Taper (60mg daily); DC home on 60mg daily, pt to follow up with Dr. Sanchez in one week.   - Continue Folic Acid   - B12, folate wnl   - Iron studies wnl  - Heme/Onc, Dr. Sanchez following  RLS - Continue Mirapex   Hyperglycemia due to prediabetes  - A1c 6.1,  DM Diet ,  Lifestyle modifications   EtOH Abuse  - With Hx of EtOH withdrawal; reports drinking 3-4 beers, 2-3 x week  - No active signs/symptoms of withdrawal  - Monitor  Borderline Low TSH   - TSH low end of normal, FT4 high end of normal   - Repeat testing OP in 4 weeks     Dispo: discharge to home in stable condition    Final diagnosis, treatment plan, and follow-up recommendations were discussed and explained to the patient. The patient was given an opportunity to ask questions concerning the diagnosis and treatment plan. The patient acknowledged understanding of the diagnosis, treatment, and follow-up recommendations. The patient was advised to seek urgent care upon discharge if worsening symptoms develop prior to scheduled follow-up. Time spent on discharge included time with the patient, and also coordinating discharge care as outlined below.

## 2022-08-11 NOTE — PROGRESS NOTE ADULT - SUBJECTIVE AND OBJECTIVE BOX
Date of Service: 08/11/22  HPI: 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, but dc'd as it caused elevation in LFTs , now on Cyclophosphamide and Steroid taper who presented to the ED complaining of right foot pain and swelling. States he noticed redness to leg/foot and had a hard time ambulating/weight bearing due to discomfort. Denies any trauma, insect bites, or discharge from foot but endorsed subjective fevers. Podiatry was consulted for possible R foot abscess. Patient denies c/n/v/cp/sob. No other pedal complaints at this time.     08/11/22: Patient seen by Podiatry team. Patient resting comfortably in bed. States feeling well and ready to go home. Pain well controlled. No other pedal complaints today.     REVIEW OF SYSTEMS:  All other review of systems is negative unless indicated above    PMH: ETOH abuse    Hyperlipidemia LDL goal &lt;100    Restless leg syndrome    Rheumatoid arteritis      PSH:  No significant past surgical history    Allergies:  Aloe Lotion (Rash)    MEDICATIONS  (STANDING):  amLODIPine   Tablet 10 milliGRAM(s) Oral daily  atorvastatin 10 milliGRAM(s) Oral at bedtime  doxycycline monohydrate Capsule 100 milliGRAM(s) Oral every 12 hours  enoxaparin Injectable 40 milliGRAM(s) SubCutaneous every 24 hours  folic acid 1 milliGRAM(s) Oral daily  losartan 25 milliGRAM(s) Oral daily  piperacillin/tazobactam IVPB.. 3.375 Gram(s) IV Intermittent every 8 hours  pramipexole 0.25 milliGRAM(s) Oral at bedtime  predniSONE   Tablet 60 milliGRAM(s) Oral daily      MEDICATIONS  (PRN):  acetaminophen     Tablet .. 650 milliGRAM(s) Oral every 6 hours PRN Mild Pain (1 - 3)  aluminum hydroxide/magnesium hydroxide/simethicone Suspension 30 milliLiter(s) Oral every 4 hours PRN Dyspepsia  melatonin 3 milliGRAM(s) Oral at bedtime PRN Insomnia  ondansetron Injectable 4 milliGRAM(s) IV Push every 8 hours PRN Nausea and/or Vomiting  oxyCODONE    IR 5 milliGRAM(s) Oral every 6 hours PRN Moderate Pain (4 - 6)  polyethylene glycol 3350 17 Gram(s) Oral daily PRN Constipation  polyvinyl alcohol 1.4%/povidone 0.6% Ophthalmic Solution - Peds 1 Drop(s) Both EYES two times a day PRN Dry Eyes        Labs:                                              12.3   2.95  )-----------( 118      ( 11 Aug 2022 07:21 )             35.0   08-11    134<L>  |  104  |  25<H>  ----------------------------<  106<H>  4.1   |  25  |  1.07    Ca    8.8      11 Aug 2022 07:21          Vital Signs Last 24 Hrs  Vital Signs Last 24 Hrs  T(C): 36.5 (11 Aug 2022 08:46), Max: 36.9 (10 Aug 2022 15:18)  T(F): 97.7 (11 Aug 2022 08:46), Max: 98.5 (10 Aug 2022 15:18)  HR: 67 (11 Aug 2022 08:46) (67 - 72)  BP: 133/72 (11 Aug 2022 08:46) (131/83 - 141/74)  BP(mean): --  RR: 18 (11 Aug 2022 08:46) (18 - 18)  SpO2: 100% (11 Aug 2022 08:46) (100% - 100%)    Parameters below as of 11 Aug 2022 08:46  Patient On (Oxygen Delivery Method): room air          Physical Exam:   Constitutiona: NAD, alert;  Derm:  Skin warm, dry and supple bilateral.    Circular lesion (approx 1cm x 1cm), generalized erythema on the dorsal aspect of R foot (improved), no active drainage, no PTB. TTP surrounding the lesion (improving).   Vascular: Dorsalis Pedis and Posterior Tibial pulses +2/4.  Capillary re-fill time less then 3 seconds digits 1-5 bilateral.    Neuro: Protective sensation intact to the level of the digits bilateral.  MSK: Muscle strength 5/5 all major muscle groups bilateral.      Radiology:  < from: MR Foot w/wo IV Cont, Right (08.08.22 @ 19:15) >  IMPRESSION:  Plantar soft tissue wound at the level of the third metatarsophalangeal   joint and proximal phalanx containing phlegmon and likely foci of air.   Wound extends into the deep subcutaneous tissues without contactof the   adjacent bone.  No osteomyelitis.    < end of copied text >  < from: Xray Foot AP + Lateral + Oblique, Right (08.07.22 @ 16:13) >  Right foot. 3 views.    COMPARISON: None available.    Small posterior and inferior calcaneal spurs.    Mild to moderate first MTP degeneration.    Mild first IP degeneration.    No bone destruction or fracture.    IMPRESSION: No acute finding. Incidental right foot findings.    < end of copied text >

## 2022-08-11 NOTE — DISCHARGE NOTE PROVIDER - ATTENDING DISCHARGE PHYSICAL EXAMINATION:
Constitutional: NAD, awake and alert  HEENT: EOMI  Neck: Soft and supple, No JVD  Respiratory: Breath sounds are clear bilaterally, No wheezing, rales or rhonchi  Cardiovascular: S1 and S2, regular rate and rhythm, no Murmurs  Gastrointestinal: Bowel Sounds present, soft, nontender, nondistended, round   Extremities: +1-2 edema RLE   Vascular: 2+ peripheral pulses  Neurological: A/O x 3, no focal deficits  Musculoskeletal: 5/5 strength b/l upper and lower extremities  Skin: RLE erythema better still w edema, s/p debridement to foot now wrapped in ACE

## 2022-08-11 NOTE — PROGRESS NOTE ADULT - ASSESSMENT
A: 66M inpatient seen at bedside for the following:   -Right foot abscess  -Right foot cellulitis  -Right foot ulceration including soft tissue    P:   Chart reviewed and Patient evaluated  Discussed diagnosis and treatment with patient.   Consent obtained and witnessed for: Aseptic sharp excisonal debridement including soft tissue of Right  foot with #15 blade  Wound flush with normal saline and betadine mix  Obtained wound culture and sent to Pathology on 08/08/22>> Prelim numerous S. Aureus.   Applied iodoform packing with betadine soaked gauze and dry sterile dressing on 8/10/22  X-rays reviewed : on wet read showing no cortical disruptions and no soft tissue gas. Official read noted above.   MRI ordered to r/o any abscess. Official read noted above.   Continue with IV antibiotics As Per ID/MED  Weight bearing as tolerated to RLE  Discussed importance of daily foot examinations and proper shoe gear and to importance of lower Fasting Blood Glucose levels.   Patient demonstrated verbal understanding of all interventions and tolerated interventions well without any complications.   Patient stable from podiatry standpoint. Please d/c once deemed stable from all other specialties.   Patient to follow up at the wound care center as outpatient [phone]  Podiatry will follow while in-house  Case D/W Dr. Carmona    DAILY WOUND CARE:  Epsom Salt Bath Soaks 20 mins x 2 Day for 5 days   Apply Bacitracin and Band aid to wound

## 2022-08-11 NOTE — DISCHARGE NOTE PROVIDER - NSDCCPCAREPLAN_GEN_ALL_CORE_FT
PRINCIPAL DISCHARGE DIAGNOSIS  Diagnosis: Cellulitis  Assessment and Plan of Treatment: Sepsis due to Right lower extremity Cellulitis and Right foot abscess with soft tissue ulceration   - You had a bedside Debridement on 8/8/22  - You were treated with a course of IV antibiotics  - START taking Keflex 500mg by mouth every 6 hours x 10 days, complete this course of antibiotics in it's entirety even if you feel better.   - START taking an over the counter probiotic daily for 2 weeks   - Take Tylenol as needed for pain  - Follow up with Dr. Carmona within one week at the [facility]  DAILY WOUND CARE:  Epsom Salt Bath Soaks 20 mins two times a day for 5 days   Apply Bacitracin and Bandaid to wound after each soak   - Follow up with your PCP within one week to go over the details of your hospitalization  - If you develop fever, chills, worsening swelling, redness, apin, oozing at wound site call your PCP/Dr. Carmona or return to ER ASAP        SECONDARY DISCHARGE DIAGNOSES  Diagnosis: ETOH abuse  Assessment and Plan of Treatment: - Stop drinking alcohol  You have been prescribed Folic acid for one month, follow up with your PCP in one month to assess the need to continue    Diagnosis: Prediabetes  Assessment and Plan of Treatment: - A1c 6.1  - Eat a no concentrated carbohydrate diet  - Follow up with your PCP for further preventative diabetic care      Diagnosis: Restless leg syndrome  Assessment and Plan of Treatment: - Continue Mirapex    Diagnosis: Pancytopenia  Assessment and Plan of Treatment: Follow up with Dr. Sanchez for routine blood work    Diagnosis: Rheumatoid arthritis  Assessment and Plan of Treatment: with LGL Leukemia   - Resume Cyclophosphamide per Dr Sanchez's recommendations   - Continue Steroid Taper (60mg daily) follow up with Dr. sanchez within one week for further tapering of Prednisone       Diagnosis: Non-occlusive thrombus  Assessment and Plan of Treatment: Right lower extremity   - Doppler ultrasound demonstrates Nonocclusive thrombus/chronic postthrombotic change in the right common femoral and proximal femoral, and popliteal veins; No evidence of DVT    Diagnosis: HLD (hyperlipidemia)  Assessment and Plan of Treatment: Continue Lovalo    Diagnosis: HTN (hypertension)  Assessment and Plan of Treatment: - Continue Amlodipine & Losartan    Diagnosis: Low TSH level  Assessment and Plan of Treatment: Borderline low TSH (low end of normal) with Free T4 high end of normal   - Repeat testing outpatient in 4 weeks

## 2022-08-11 NOTE — DISCHARGE NOTE PROVIDER - PROVIDER TOKENS
PROVIDER:[TOKEN:[[id number]],FOLLOWUP:[1 week]],PROVIDER:[TOKEN:[[id number]],FOLLOWUP:[1 week]],FREE:[LAST:[Kristine],FIRST:[Dutch],PHONE:[phone]],FAX:[(   )    -],ADDRESS:[[place] [address]],FOLLOWUP:[1 week]]

## 2022-08-11 NOTE — DISCHARGE NOTE NURSING/CASE MANAGEMENT/SOCIAL WORK - NSDCPEFALRISK_GEN_ALL_CORE
For information on Fall & Injury Prevention, visit: [email or web address] OR  [email or web address] OR  https://www.cdc.gov/steadi/patient.html

## 2022-08-11 NOTE — DISCHARGE NOTE PROVIDER - NSDCMRMEDTOKEN_GEN_ALL_CORE_FT
acetaminophen 325 mg oral tablet: 2 tab(s) orally every 6 hours, As Needed  amLODIPine 10 mg oral tablet: 1 tab(s) orally once a day  cephalexin 500 mg oral capsule: 1 cap(s) orally every 6 hours   folic acid 1 mg oral tablet: 1 tab(s) orally once a day  Livalo 2 mg oral tablet: 1 tab(s) orally once a day  losartan 25 mg oral tablet: 1 tab(s) orally once a day  Multiple Vitamins oral tablet: 1 tab(s) orally once a day  ondansetron 4 mg oral tablet: 1 tab(s) orally every 8 hours, As Needed - for nausea  polyethylene glycol 3350 oral powder for reconstitution: 17 gram(s) orally once a day, As needed, Constipation  pramipexole 0.25 mg oral tablet: 3 tab(s) orally 2 times a day  predniSONE 20 mg oral tablet: 3 tab(s) orally once a day

## 2022-08-11 NOTE — DISCHARGE NOTE PROVIDER - CARE PROVIDER_API CALL
Boxer, Jonathan A  INTERNAL MEDICINE  [address] [place] [address]  Phone: [phone]  Fax: [phone]  Follow Up Time: 1 week    Kehinde Sanchez)  Medicine  [address]  [place] [address]  Phone: [phone]  Fax: [phone]  Follow Up Time: 1 week    Dutch Carmona  [facility]  [address] [place] [address]  Phone: [phone]  Fax: (   )    -  Follow Up Time: 1 week

## 2022-08-11 NOTE — DISCHARGE NOTE PROVIDER - CARE PROVIDERS DIRECT ADDRESSES
[email or web address],DirectAddress_Unknown

## 2022-08-11 NOTE — PROGRESS NOTE ADULT - SUBJECTIVE AND OBJECTIVE BOX
Date of service: 08-11-22 @ 09:07      Patient sitting in chair; comfortable, afebrile, no complaints      ROS: no fever or chills; denies dizziness, no HA, no SOB or cough, no abdominal pain, no diarrhea or constipation; no dysuria, no urinary frequency, no legs pain, no rashes    MEDICATIONS  (STANDING):  amLODIPine   Tablet 10 milliGRAM(s) Oral daily  atorvastatin 10 milliGRAM(s) Oral at bedtime  doxycycline monohydrate Capsule 100 milliGRAM(s) Oral every 12 hours  enoxaparin Injectable 40 milliGRAM(s) SubCutaneous every 24 hours  folic acid 1 milliGRAM(s) Oral daily  losartan 25 milliGRAM(s) Oral daily  piperacillin/tazobactam IVPB.. 3.375 Gram(s) IV Intermittent every 8 hours  pramipexole 0.25 milliGRAM(s) Oral at bedtime  predniSONE   Tablet 60 milliGRAM(s) Oral daily    MEDICATIONS  (PRN):  acetaminophen     Tablet .. 650 milliGRAM(s) Oral every 6 hours PRN Mild Pain (1 - 3)  aluminum hydroxide/magnesium hydroxide/simethicone Suspension 30 milliLiter(s) Oral every 4 hours PRN Dyspepsia  melatonin 3 milliGRAM(s) Oral at bedtime PRN Insomnia  ondansetron Injectable 4 milliGRAM(s) IV Push every 8 hours PRN Nausea and/or Vomiting  oxyCODONE    IR 5 milliGRAM(s) Oral every 6 hours PRN Moderate Pain (4 - 6)  polyethylene glycol 3350 17 Gram(s) Oral daily PRN Constipation  polyvinyl alcohol 1.4%/povidone 0.6% Ophthalmic Solution - Peds 1 Drop(s) Both EYES two times a day PRN Dry Eyes      Vital Signs Last 24 Hrs  T(C): 36.6 (10 Aug 2022 21:10), Max: 36.9 (10 Aug 2022 15:18)  T(F): 97.9 (10 Aug 2022 21:10), Max: 98.5 (10 Aug 2022 15:18)  HR: 71 (10 Aug 2022 21:10) (71 - 72)  BP: 131/83 (10 Aug 2022 21:10) (131/83 - 145/72)  BP(mean): --  RR: 18 (10 Aug 2022 21:10) (18 - 18)  SpO2: 100% (10 Aug 2022 21:10) (100% - 100%)    Parameters below as of 10 Aug 2022 21:10  Patient On (Oxygen Delivery Method): room air            Physical Exam:        Constitutional: frail looking  HEENT: NC/AT, EOMI, PERRLA, conjunctivae clear; ears and nose atraumatic; pharynx clear  Neck: supple; thyroid not palpable  Back: no tenderness  Respiratory: respiratory effort normal; clear to auscultation  Cardiovascular: S1S2 regular, no murmurs  Abdomen: soft, not tender, not distended, positive BS; no liver or spleen organomegaly  Genitourinary: no suprapubic tenderness  Musculoskeletal: no muscle tenderness, right foot dressings in place  Neurological/ Psychiatric: AxOx3, judgement and insight normal;  moving all extremities  Skin: no rashes; no palpable lesions    Labs: all available labs reviewed                      Labs:                        12.3   2.95  )-----------( 118      ( 11 Aug 2022 07:21 )             35.0     08-11    134<L>  |  104  |  25<H>  ----------------------------<  106<H>  4.1   |  25  |  1.07    Ca    8.8      11 Aug 2022 07:21             Cultures:       Culture - Abscess with Gram Stain (collected 08-08-22 @ 13:05)  Source: .Abscess Leg - Right  Preliminary Report (08-09-22 @ 15:30):    Numerous Staphylococcus aureus  Organism: Staphylococcus aureus (08-10-22 @ 17:08)  Organism: Staphylococcus aureus (08-10-22 @ 17:08)      -  Ampicillin/Sulbactam: S <=8/4      -  Cefazolin: S <=4      -  Clindamycin: S <=0.25      -  Erythromycin: S <=0.25      -  Gentamicin: S <=1 Should not be used as monotherapy      -  Oxacillin: S <=0.25 Oxacillin predicts susceptibility for dicloxacillin, methicillin, and nafcillin      -  Rifampin: S <=1 Should not be used as monotherapy      -  Tetra/Doxy: S <=1      -  Trimethoprim/Sulfamethoxazole: S <=0.5/9.5      -  Vancomycin: S 1      Method Type: ESME    Culture - Urine (collected 08-07-22 @ 18:51)  Source: Clean Catch None  Final Report (08-08-22 @ 22:22):    No growth    Culture - Blood (collected 08-07-22 @ 16:53)  Source: .Blood None  Preliminary Report (08-08-22 @ 22:03):    No growth to date.    Culture - Blood (collected 08-07-22 @ 16:53)  Source: .Blood None  Preliminary Report (08-08-22 @ 22:03):    No growth to date.      C-Reactive Protein, Serum: 24 mg/L (08-07-22 @ 16:53)        < from: MR Foot w/wo IV Cont, Right (08.08.22 @ 19:15) >    IMPRESSION:  Plantar soft tissue wound at the level of the third metatarsophalangeal   joint and proximal phalanx containing phlegmon and likely foci of air.   Wound extends into the deep subcutaneous tissues without contactof the   adjacent bone.  No osteomyelitis.    < end of copied text >          Radiology: all available radiological tests reviewed    Advanced directives addressed: full resuscitation

## 2022-08-11 NOTE — DISCHARGE NOTE NURSING/CASE MANAGEMENT/SOCIAL WORK - PATIENT PORTAL LINK FT
You can access the FollowMyHealth Patient Portal offered by [facility] by registering at the following website: [email or web address]. By joining [employer]’s FollowMyHealth portal, you will also be able to view your health information using other applications (apps) compatible with our system.

## 2022-08-12 LAB
CULTURE RESULTS: SIGNIFICANT CHANGE UP
CULTURE RESULTS: SIGNIFICANT CHANGE UP
SPECIMEN SOURCE: SIGNIFICANT CHANGE UP
SPECIMEN SOURCE: SIGNIFICANT CHANGE UP

## 2022-08-12 NOTE — CDI QUERY NOTE - NSCDIOTHERTXTBX_GEN_ALL_CORE_HH
This patient was admitted with sepsis, on chemotherapy, and diagnosed with pancytopenia:    Discharge Note Provider 8-11-22 @ 10:03  LGL Leukemia   Pancytopenia   - Formerly on methotrexate, but stopped 2/2 elevated LFTs  - Now on Cyclophosphamide (on hold while hospitalized per Heme/Onc)    Diagnosis: Pancytopenia  Assessment and Plan of Treatment: Follow up with Dr. Sanchez for routine blood work    Can the type of pancytopenia be specified?  A) Chemotherapy induced pancytopenia  B) Other type of pancytopenia, please specify:  C) Unable to determine.

## 2022-08-13 LAB
CULTURE RESULTS: SIGNIFICANT CHANGE UP
ORGANISM # SPEC MICROSCOPIC CNT: SIGNIFICANT CHANGE UP
ORGANISM # SPEC MICROSCOPIC CNT: SIGNIFICANT CHANGE UP
SPECIMEN SOURCE: SIGNIFICANT CHANGE UP

## 2022-08-16 DIAGNOSIS — I10 ESSENTIAL (PRIMARY) HYPERTENSION: ICD-10-CM

## 2022-08-16 DIAGNOSIS — M06.9 RHEUMATOID ARTHRITIS, UNSPECIFIED: ICD-10-CM

## 2022-08-16 DIAGNOSIS — D61.810 ANTINEOPLASTIC CHEMOTHERAPY INDUCED PANCYTOPENIA: ICD-10-CM

## 2022-08-16 DIAGNOSIS — A41.01 SEPSIS DUE TO METHICILLIN SUSCEPTIBLE STAPHYLOCOCCUS AUREUS: ICD-10-CM

## 2022-08-16 DIAGNOSIS — L03.115 CELLULITIS OF RIGHT LOWER LIMB: ICD-10-CM

## 2022-08-16 DIAGNOSIS — L97.512 NON-PRESSURE CHRONIC ULCER OF OTHER PART OF RIGHT FOOT WITH FAT LAYER EXPOSED: ICD-10-CM

## 2022-08-16 DIAGNOSIS — R73.9 HYPERGLYCEMIA, UNSPECIFIED: ICD-10-CM

## 2022-08-16 DIAGNOSIS — C95.90 LEUKEMIA, UNSPECIFIED NOT HAVING ACHIEVED REMISSION: ICD-10-CM

## 2022-08-16 DIAGNOSIS — D61.818 OTHER PANCYTOPENIA: ICD-10-CM

## 2022-08-16 DIAGNOSIS — I82.411 ACUTE EMBOLISM AND THROMBOSIS OF RIGHT FEMORAL VEIN: ICD-10-CM

## 2022-08-16 DIAGNOSIS — Z79.899 OTHER LONG TERM (CURRENT) DRUG THERAPY: ICD-10-CM

## 2022-08-16 DIAGNOSIS — F10.10 ALCOHOL ABUSE, UNCOMPLICATED: ICD-10-CM

## 2022-08-16 DIAGNOSIS — L02.611 CUTANEOUS ABSCESS OF RIGHT FOOT: ICD-10-CM

## 2022-08-16 DIAGNOSIS — E78.5 HYPERLIPIDEMIA, UNSPECIFIED: ICD-10-CM

## 2022-08-16 DIAGNOSIS — E87.1 HYPO-OSMOLALITY AND HYPONATREMIA: ICD-10-CM

## 2022-08-16 DIAGNOSIS — L97.412 NON-PRESSURE CHRONIC ULCER OF RIGHT HEEL AND MIDFOOT WITH FAT LAYER EXPOSED: ICD-10-CM

## 2022-08-16 DIAGNOSIS — G25.81 RESTLESS LEGS SYNDROME: ICD-10-CM

## 2022-08-16 DIAGNOSIS — I82.431 ACUTE EMBOLISM AND THROMBOSIS OF RIGHT POPLITEAL VEIN: ICD-10-CM

## 2022-08-16 DIAGNOSIS — R73.03 PREDIABETES: ICD-10-CM

## 2022-08-22 ENCOUNTER — OUTPATIENT (OUTPATIENT)
Dept: OUTPATIENT SERVICES | Facility: HOSPITAL | Age: 67
LOS: 1 days | End: 2022-08-22
Payer: MEDICARE

## 2022-08-22 DIAGNOSIS — L89.619 PRESSURE ULCER OF RIGHT HEEL, UNSPECIFIED STAGE: ICD-10-CM

## 2022-08-22 DIAGNOSIS — L97.512 NON-PRESSURE CHRONIC ULCER OF OTHER PART OF RIGHT FOOT WITH FAT LAYER EXPOSED: ICD-10-CM

## 2022-08-22 PROCEDURE — 11042 DBRDMT SUBQ TIS 1ST 20SQCM/<: CPT

## 2022-08-22 PROCEDURE — 99203 OFFICE O/P NEW LOW 30 MIN: CPT | Mod: 25

## 2022-08-22 PROCEDURE — 93922 UPR/L XTREMITY ART 2 LEVELS: CPT

## 2022-08-29 ENCOUNTER — OUTPATIENT (OUTPATIENT)
Dept: OUTPATIENT SERVICES | Facility: HOSPITAL | Age: 67
LOS: 1 days | End: 2022-08-29
Payer: MEDICARE

## 2022-08-29 DIAGNOSIS — L97.512 NON-PRESSURE CHRONIC ULCER OF OTHER PART OF RIGHT FOOT WITH FAT LAYER EXPOSED: ICD-10-CM

## 2022-08-29 PROCEDURE — 99213 OFFICE O/P EST LOW 20 MIN: CPT

## 2022-11-15 ENCOUNTER — NON-APPOINTMENT (OUTPATIENT)
Age: 67
End: 2022-11-15

## 2023-01-17 ENCOUNTER — EMERGENCY (EMERGENCY)
Facility: HOSPITAL | Age: 68
LOS: 0 days | Discharge: HOME | End: 2023-01-17
Attending: EMERGENCY MEDICINE
Payer: COMMERCIAL

## 2023-01-17 VITALS
OXYGEN SATURATION: 96 % | WEIGHT: 190.92 LBS | HEART RATE: 91 BPM | RESPIRATION RATE: 18 BRPM | SYSTOLIC BLOOD PRESSURE: 115 MMHG | DIASTOLIC BLOOD PRESSURE: 68 MMHG | TEMPERATURE: 98 F | HEIGHT: 68 IN

## 2023-01-17 VITALS
TEMPERATURE: 98 F | DIASTOLIC BLOOD PRESSURE: 89 MMHG | SYSTOLIC BLOOD PRESSURE: 130 MMHG | RESPIRATION RATE: 18 BRPM | HEART RATE: 100 BPM | OXYGEN SATURATION: 97 %

## 2023-01-17 DIAGNOSIS — D64.9 ANEMIA, UNSPECIFIED: ICD-10-CM

## 2023-01-17 DIAGNOSIS — Z79.01 LONG TERM (CURRENT) USE OF ANTICOAGULANTS: ICD-10-CM

## 2023-01-17 DIAGNOSIS — S22.42XA MULTIPLE FRACTURES OF RIBS, LEFT SIDE, INITIAL ENCOUNTER FOR CLOSED FRACTURE: ICD-10-CM

## 2023-01-17 DIAGNOSIS — Z91.09 OTHER ALLERGY STATUS, OTHER THAN TO DRUGS AND BIOLOGICAL SUBSTANCES: ICD-10-CM

## 2023-01-17 DIAGNOSIS — Z86.59 PERSONAL HISTORY OF OTHER MENTAL AND BEHAVIORAL DISORDERS: ICD-10-CM

## 2023-01-17 DIAGNOSIS — G25.81 RESTLESS LEGS SYNDROME: ICD-10-CM

## 2023-01-17 DIAGNOSIS — C91.50 ADULT T-CELL LYMPHOMA/LEUKEMIA (HTLV-1-ASSOCIATED) NOT HAVING ACHIEVED REMISSION: ICD-10-CM

## 2023-01-17 DIAGNOSIS — E78.5 HYPERLIPIDEMIA, UNSPECIFIED: ICD-10-CM
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
## 2023-01-17 DIAGNOSIS — M06.9 RHEUMATOID ARTHRITIS, UNSPECIFIED: ICD-10-CM

## 2023-01-17 LAB
ALBUMIN SERPL ELPH-MCNC: 3.4 G/DL — SIGNIFICANT CHANGE UP (ref 3.3–5)
ALP SERPL-CCNC: 53 U/L — SIGNIFICANT CHANGE UP (ref 40–120)
ALT FLD-CCNC: 45 U/L — SIGNIFICANT CHANGE UP (ref 12–78)
ANION GAP SERPL CALC-SCNC: 10 MMOL/L — SIGNIFICANT CHANGE UP (ref 5–17)
APTT BLD: 31.5 SEC — SIGNIFICANT CHANGE UP (ref 27.5–35.5)
AST SERPL-CCNC: 52 U/L — HIGH (ref 15–37)
BASOPHILS # BLD AUTO: 0.03 K/UL — SIGNIFICANT CHANGE UP (ref 0–0.2)
BASOPHILS # BLD AUTO: 0.05 K/UL — SIGNIFICANT CHANGE UP (ref 0–0.2)
BASOPHILS NFR BLD AUTO: 0.5 % — SIGNIFICANT CHANGE UP (ref 0–2)
BASOPHILS NFR BLD AUTO: 0.7 % — SIGNIFICANT CHANGE UP (ref 0–2)
BILIRUB SERPL-MCNC: 0.4 MG/DL — SIGNIFICANT CHANGE UP (ref 0.2–1.2)
BUN SERPL-MCNC: 10 MG/DL — SIGNIFICANT CHANGE UP (ref 7–23)
CALCIUM SERPL-MCNC: 9.3 MG/DL — SIGNIFICANT CHANGE UP (ref 8.5–10.1)
CHLORIDE SERPL-SCNC: 97 MMOL/L — SIGNIFICANT CHANGE UP (ref 96–108)
CO2 SERPL-SCNC: 25 MMOL/L — SIGNIFICANT CHANGE UP (ref 22–31)
CREAT SERPL-MCNC: 1.74 MG/DL — HIGH (ref 0.5–1.3)
EGFR: 42 ML/MIN/1.73M2 — LOW
EOSINOPHIL # BLD AUTO: 0.06 K/UL — SIGNIFICANT CHANGE UP (ref 0–0.5)
EOSINOPHIL # BLD AUTO: 0.07 K/UL — SIGNIFICANT CHANGE UP (ref 0–0.5)
EOSINOPHIL NFR BLD AUTO: 1 % — SIGNIFICANT CHANGE UP (ref 0–6)
EOSINOPHIL NFR BLD AUTO: 1.1 % — SIGNIFICANT CHANGE UP (ref 0–6)
GLUCOSE SERPL-MCNC: 99 MG/DL — SIGNIFICANT CHANGE UP (ref 70–99)
HCT VFR BLD CALC: 25 % — LOW (ref 39–50)
HCT VFR BLD CALC: 25.5 % — LOW (ref 39–50)
HGB BLD-MCNC: 8.8 G/DL — LOW (ref 13–17)
HGB BLD-MCNC: 8.9 G/DL — LOW (ref 13–17)
IMM GRANULOCYTES NFR BLD AUTO: 1 % — HIGH (ref 0–0.9)
IMM GRANULOCYTES NFR BLD AUTO: 1.6 % — HIGH (ref 0–0.9)
INR BLD: 1.24 RATIO — HIGH (ref 0.88–1.16)
LYMPHOCYTES # BLD AUTO: 0.44 K/UL — LOW (ref 1–3.3)
LYMPHOCYTES # BLD AUTO: 0.55 K/UL — LOW (ref 1–3.3)
LYMPHOCYTES # BLD AUTO: 7.7 % — LOW (ref 13–44)
LYMPHOCYTES # BLD AUTO: 8 % — LOW (ref 13–44)
MCHC RBC-ENTMCNC: 34.9 GM/DL — SIGNIFICANT CHANGE UP (ref 32–36)
MCHC RBC-ENTMCNC: 35.2 GM/DL — SIGNIFICANT CHANGE UP (ref 32–36)
MCHC RBC-ENTMCNC: 38 PG — HIGH (ref 27–34)
MCHC RBC-ENTMCNC: 38.3 PG — HIGH (ref 27–34)
MCV RBC AUTO: 108.7 FL — HIGH (ref 80–100)
MCV RBC AUTO: 109 FL — HIGH (ref 80–100)
MONOCYTES # BLD AUTO: 0.68 K/UL — SIGNIFICANT CHANGE UP (ref 0–0.9)
MONOCYTES # BLD AUTO: 0.84 K/UL — SIGNIFICANT CHANGE UP (ref 0–0.9)
MONOCYTES NFR BLD AUTO: 11.7 % — SIGNIFICANT CHANGE UP (ref 2–14)
MONOCYTES NFR BLD AUTO: 12.4 % — SIGNIFICANT CHANGE UP (ref 2–14)
NEUTROPHILS # BLD AUTO: 4.2 K/UL — SIGNIFICANT CHANGE UP (ref 1.8–7.4)
NEUTROPHILS # BLD AUTO: 5.6 K/UL — SIGNIFICANT CHANGE UP (ref 1.8–7.4)
NEUTROPHILS NFR BLD AUTO: 76.4 % — SIGNIFICANT CHANGE UP (ref 43–77)
NEUTROPHILS NFR BLD AUTO: 77.9 % — HIGH (ref 43–77)
PLATELET # BLD AUTO: 151 K/UL — SIGNIFICANT CHANGE UP (ref 150–400)
PLATELET # BLD AUTO: 171 K/UL — SIGNIFICANT CHANGE UP (ref 150–400)
POTASSIUM SERPL-MCNC: 3.5 MMOL/L — SIGNIFICANT CHANGE UP (ref 3.5–5.3)
POTASSIUM SERPL-SCNC: 3.5 MMOL/L — SIGNIFICANT CHANGE UP (ref 3.5–5.3)
PROT SERPL-MCNC: 7.4 GM/DL — SIGNIFICANT CHANGE UP (ref 6–8.3)
PROTHROM AB SERPL-ACNC: 14.4 SEC — HIGH (ref 10.5–13.4)
RBC # BLD: 2.3 M/UL — LOW (ref 4.2–5.8)
RBC # BLD: 2.34 M/UL — LOW (ref 4.2–5.8)
RBC # FLD: 15.2 % — HIGH (ref 10.3–14.5)
RBC # FLD: 15.4 % — HIGH (ref 10.3–14.5)
SODIUM SERPL-SCNC: 132 MMOL/L — LOW (ref 135–145)
WBC # BLD: 5.5 K/UL — SIGNIFICANT CHANGE UP (ref 3.8–10.5)
WBC # BLD: 7.18 K/UL — SIGNIFICANT CHANGE UP (ref 3.8–10.5)
WBC # FLD AUTO: 5.5 K/UL — SIGNIFICANT CHANGE UP (ref 3.8–10.5)
WBC # FLD AUTO: 7.18 K/UL — SIGNIFICANT CHANGE UP (ref 3.8–10.5)

## 2023-01-17 PROCEDURE — 80053 COMPREHEN METABOLIC PANEL: CPT

## 2023-01-17 PROCEDURE — 85025 COMPLETE CBC W/AUTO DIFF WBC: CPT

## 2023-01-17 PROCEDURE — 72125 CT NECK SPINE W/O DYE: CPT | Mod: MA

## 2023-01-17 PROCEDURE — 99285 EMERGENCY DEPT VISIT HI MDM: CPT

## 2023-01-17 PROCEDURE — 74177 CT ABD & PELVIS W/CONTRAST: CPT | Mod: MA

## 2023-01-17 PROCEDURE — 99284 EMERGENCY DEPT VISIT MOD MDM: CPT | Mod: 25

## 2023-01-17 PROCEDURE — 85730 THROMBOPLASTIN TIME PARTIAL: CPT

## 2023-01-17 PROCEDURE — 71260 CT THORAX DX C+: CPT | Mod: 26,MA

## 2023-01-17 PROCEDURE — 85610 PROTHROMBIN TIME: CPT

## 2023-01-17 PROCEDURE — 70450 CT HEAD/BRAIN W/O DYE: CPT | Mod: MA

## 2023-01-17 PROCEDURE — 70450 CT HEAD/BRAIN W/O DYE: CPT | Mod: 26,MA

## 2023-01-17 PROCEDURE — 36415 COLL VENOUS BLD VENIPUNCTURE: CPT

## 2023-01-17 PROCEDURE — 99242 OFF/OP CONSLTJ NEW/EST SF 20: CPT

## 2023-01-17 PROCEDURE — 72125 CT NECK SPINE W/O DYE: CPT | Mod: 26,MA

## 2023-01-17 PROCEDURE — 71260 CT THORAX DX C+: CPT | Mod: MA

## 2023-01-17 PROCEDURE — 74177 CT ABD & PELVIS W/CONTRAST: CPT | Mod: 26,MA

## 2023-01-17 RX ORDER — SODIUM CHLORIDE 9 MG/ML
1000 INJECTION INTRAMUSCULAR; INTRAVENOUS; SUBCUTANEOUS ONCE
Refills: 0 | Status: COMPLETED | OUTPATIENT
Start: 2023-01-17 | End: 2023-01-17

## 2023-01-17 RX ORDER — OXYCODONE AND ACETAMINOPHEN 5; 325 MG/1; MG/1
1 TABLET ORAL
Qty: 12 | Refills: 0
Start: 2023-01-17 | End: 2023-01-19

## 2023-01-17 RX ADMIN — SODIUM CHLORIDE 1000 MILLILITER(S): 9 INJECTION INTRAMUSCULAR; INTRAVENOUS; SUBCUTANEOUS at 20:22

## 2023-01-17 NOTE — ED PROVIDER NOTE - PATIENT PORTAL LINK FT
You can access the FollowMyHealth Patient Portal offered by [facility] by registering at the following website: [email or web address]. By joining [employer]’s FollowMyHealth portal, you will also be able to view your health information using other applications (apps) compatible with our system.

## 2023-01-17 NOTE — ED PROVIDER NOTE - CARE PROVIDER_API CALL
Doe Levy (DO)  Surgery  [address], [address] [place] [address]  Phone: [phone]  Fax: [phone]  Follow Up Time: 1-3 Days
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
## 2023-01-17 NOTE — ED PROVIDER NOTE - NSFOLLOWUPINSTRUCTIONS_ED_ALL_ED_FT
Fracture    A fracture is a break in one of your bones. This can occur from a variety of injuries, especially traumatic ones. Symptoms include pain, bruising, or swelling. Do not use the injured limb. If a fracture is in one of the bones below your waist, do not put weight on that limb unless instructed to do so by your healthcare provider. Crutches or a cane may have been provided. A splint or cast may have been applied by your health care provider. Make sure to keep it dry and follow up with an orthopedist as instructed.    SEEK IMMEDIATE MEDICAL CARE IF YOU HAVE ANY OF THE FOLLOWING SYMPTOMS: numbness, tingling, increasing pain, or weakness in any part of the injured limb.  Anemia    WHAT YOU NEED TO KNOW:    Anemia is a low number of red blood cells or a low amount of hemoglobin in your red blood cells. Hemoglobin is a protein that helps carry oxygen throughout your body. Red blood cells use iron to create hemoglobin. Anemia may develop if your body does not have enough iron. It may also develop if your body does not make enough red blood cells or they die faster than your body can make them.     DISCHARGE INSTRUCTIONS:    Call 911 or have someone call 911 for any of the following:     You lose consciousness.      You have severe chest pain.    Return to the emergency department if:     You have dark or bloody bowel movements.        Contact your healthcare provider if:     Your symptoms are worse, even after treatment.      You have questions or concerns about your condition or care.    Medicines:     Iron or folic acid supplements help increase your red blood cell and hemoglobin levels.       Vitamin B12 injections may help boost your red blood cell level and decrease your symptoms. Ask your healthcare provider how to inject B12.      Take your medicine as directed. Contact your healthcare provider if you think your medicine is not helping or if you have side effects. Tell him of her if you are allergic to any medicine. Keep a list of the medicines, vitamins, and herbs you take. Include the amounts, and when and why you take them. Bring the list or the pill bottles to follow-up visits. Carry your medicine list with you in case of an emergency.    Prevent anemia: Eat healthy foods rich in iron and vitamin C. Nuts, meat, dark leafy green vegetables, and beans are high in iron and protein. Vitamin C helps your body absorb iron. Foods rich in vitamin C include oranges and other citrus fruits. Ask your healthcare provider for a list of other foods that are high in iron or vitamin C. Ask if you need to be on a special diet.     Follow up with your healthcare provider as directed: Write down your questions so you remember to ask them during your visits.

## 2023-01-17 NOTE — ED ADULT NURSE NOTE - NSIMPLEMENTINTERV_GEN_ALL_ED
Implemented All Universal Safety Interventions:  [place]nett to call system. Call bell, personal items and telephone within reach. Instruct patient to call for assistance. Room bathroom lighting operational. Non-slip footwear when patient is off stretcher. Physically safe environment: no spills, clutter or unnecessary equipment. Stretcher in lowest position, wheels locked, appropriate side rails in place.
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
## 2023-01-17 NOTE — ED PROVIDER NOTE - PHYSICAL EXAMINATION
Constitutional: NAD, well appearing  HEENT: no rhinorrhea, PERRL, no oropharyngeal erythema or exudates, midline uvula.  TMs clear.  CVS:  RRR, no m/r/g  Resp:  CTAB  GI: soft, ntnd  MSK:  no restriction to rom, full ROM to all extremities, no midline spinal tenderness, no chest wall tenderness,   Neuro:  A&Ox3, 5/5 strength to all extremities,  SILT to all extremities  Skin: no rash  psych: clear thought content  Heme/lymph:  No LAD
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
## 2023-01-17 NOTE — ED ADULT NURSE NOTE - CHPI ED NUR SYMPTOMS NEG
Prescription approved per [facility] Refill Protocol.  Vamsi Zhao RN     no acting out behaviors/no back pain/no bruising/no crying/no decreased eating/drinking/no difficulty bearing weight/no disorientation/no dizziness/no fussiness/no headache/no laceration/no loss of consciousness/no neck tenderness/no sleeping issues/no pain
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
## 2023-01-17 NOTE — CONSULT NOTE ADULT - NSCONSULTADDITIONALINFOA_GEN_ALL_CORE
Attending A/P:  Left 6-9th rib fractures  Pt refusing admission for pain control/observation  Advise outpt f/u thoracic surgery

## 2023-05-27 ENCOUNTER — EMERGENCY (EMERGENCY)
Facility: HOSPITAL | Age: 68
LOS: 0 days | Discharge: HOME | End: 2023-05-28
Attending: EMERGENCY MEDICINE
Payer: MEDICARE

## 2023-05-27 VITALS
SYSTOLIC BLOOD PRESSURE: 101 MMHG | RESPIRATION RATE: 18 BRPM | DIASTOLIC BLOOD PRESSURE: 79 MMHG | TEMPERATURE: 98 F | OXYGEN SATURATION: 95 % | HEART RATE: 99 BPM

## 2023-05-27 VITALS — WEIGHT: 199.96 LBS | HEIGHT: 68 IN

## 2023-05-27 DIAGNOSIS — R79.89 OTHER SPECIFIED ABNORMAL FINDINGS OF BLOOD CHEMISTRY: ICD-10-CM
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
## 2023-05-27 DIAGNOSIS — Z86.59 PERSONAL HISTORY OF OTHER MENTAL AND BEHAVIORAL DISORDERS: ICD-10-CM

## 2023-05-27 DIAGNOSIS — Z91.09 OTHER ALLERGY STATUS, OTHER THAN TO DRUGS AND BIOLOGICAL SUBSTANCES: ICD-10-CM

## 2023-05-27 DIAGNOSIS — M48.00 SPINAL STENOSIS, SITE UNSPECIFIED: ICD-10-CM

## 2023-05-27 DIAGNOSIS — M54.50 LOW BACK PAIN, UNSPECIFIED: ICD-10-CM

## 2023-05-27 DIAGNOSIS — C95.90 LEUKEMIA, UNSPECIFIED NOT HAVING ACHIEVED REMISSION: ICD-10-CM

## 2023-05-27 DIAGNOSIS — M06.9 RHEUMATOID ARTHRITIS, UNSPECIFIED: ICD-10-CM

## 2023-05-27 DIAGNOSIS — M79.89 OTHER SPECIFIED SOFT TISSUE DISORDERS: ICD-10-CM

## 2023-05-27 DIAGNOSIS — E78.5 HYPERLIPIDEMIA, UNSPECIFIED: ICD-10-CM
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
## 2023-05-27 DIAGNOSIS — G25.81 RESTLESS LEGS SYNDROME: ICD-10-CM

## 2023-05-27 LAB
ALBUMIN SERPL ELPH-MCNC: 3.9 G/DL — SIGNIFICANT CHANGE UP (ref 3.3–5)
ALP SERPL-CCNC: 51 U/L — SIGNIFICANT CHANGE UP (ref 40–120)
ALT FLD-CCNC: 107 U/L — HIGH (ref 12–78)
ANION GAP SERPL CALC-SCNC: 12 MMOL/L — SIGNIFICANT CHANGE UP (ref 5–17)
ANISOCYTOSIS BLD QL: SLIGHT — SIGNIFICANT CHANGE UP
APPEARANCE UR: ABNORMAL
AST SERPL-CCNC: 134 U/L — HIGH (ref 15–37)
BACTERIA # UR AUTO: ABNORMAL
BASOPHILS # BLD AUTO: 0.07 K/UL — SIGNIFICANT CHANGE UP (ref 0–0.2)
BASOPHILS NFR BLD AUTO: 1.4 % — SIGNIFICANT CHANGE UP (ref 0–2)
BILIRUB SERPL-MCNC: 2.1 MG/DL — HIGH (ref 0.2–1.2)
BILIRUB UR-MCNC: ABNORMAL
BUN SERPL-MCNC: 21 MG/DL — SIGNIFICANT CHANGE UP (ref 7–23)
CALCIUM SERPL-MCNC: 9.1 MG/DL — SIGNIFICANT CHANGE UP (ref 8.5–10.1)
CHLORIDE SERPL-SCNC: 101 MMOL/L — SIGNIFICANT CHANGE UP (ref 96–108)
CO2 SERPL-SCNC: 21 MMOL/L — LOW (ref 22–31)
COLOR SPEC: ABNORMAL
CREAT SERPL-MCNC: 2.04 MG/DL — HIGH (ref 0.5–1.3)
DIFF PNL FLD: ABNORMAL
EGFR: 35 ML/MIN/1.73M2 — LOW
EOSINOPHIL # BLD AUTO: 0.07 K/UL — SIGNIFICANT CHANGE UP (ref 0–0.5)
EOSINOPHIL NFR BLD AUTO: 1.4 % — SIGNIFICANT CHANGE UP (ref 0–6)
EPI CELLS # UR: SIGNIFICANT CHANGE UP
GLUCOSE SERPL-MCNC: 115 MG/DL — HIGH (ref 70–99)
GLUCOSE UR QL: NEGATIVE — SIGNIFICANT CHANGE UP
GRAN CASTS # UR COMP ASSIST: ABNORMAL /LPF
HCT VFR BLD CALC: 38.3 % — LOW (ref 39–50)
HGB BLD-MCNC: 13.4 G/DL — SIGNIFICANT CHANGE UP (ref 13–17)
IMM GRANULOCYTES NFR BLD AUTO: 0.4 % — SIGNIFICANT CHANGE UP (ref 0–0.9)
KETONES UR-MCNC: ABNORMAL
LEUKOCYTE ESTERASE UR-ACNC: ABNORMAL
LYMPHOCYTES # BLD AUTO: 0.61 K/UL — LOW (ref 1–3.3)
LYMPHOCYTES # BLD AUTO: 12.1 % — LOW (ref 13–44)
MACROCYTES BLD QL: SLIGHT — SIGNIFICANT CHANGE UP
MAGNESIUM SERPL-MCNC: 1.9 MG/DL — SIGNIFICANT CHANGE UP (ref 1.6–2.6)
MANUAL SMEAR VERIFICATION: SIGNIFICANT CHANGE UP
MCHC RBC-ENTMCNC: 35 GM/DL — SIGNIFICANT CHANGE UP (ref 32–36)
MCHC RBC-ENTMCNC: 35.6 PG — HIGH (ref 27–34)
MCV RBC AUTO: 101.9 FL — HIGH (ref 80–100)
MONOCYTES # BLD AUTO: 0.29 K/UL — SIGNIFICANT CHANGE UP (ref 0–0.9)
MONOCYTES NFR BLD AUTO: 5.8 % — SIGNIFICANT CHANGE UP (ref 2–14)
NEUTROPHILS # BLD AUTO: 3.98 K/UL — SIGNIFICANT CHANGE UP (ref 1.8–7.4)
NEUTROPHILS NFR BLD AUTO: 78.9 % — HIGH (ref 43–77)
NITRITE UR-MCNC: NEGATIVE — SIGNIFICANT CHANGE UP
PH UR: 5 — SIGNIFICANT CHANGE UP (ref 5–8)
PHOSPHATE SERPL-MCNC: 2.9 MG/DL — SIGNIFICANT CHANGE UP (ref 2.5–4.5)
PLAT MORPH BLD: NORMAL — SIGNIFICANT CHANGE UP
PLATELET # BLD AUTO: 121 K/UL — LOW (ref 150–400)
PLATELET COUNT - ESTIMATE: ABNORMAL
POLYCHROMASIA BLD QL SMEAR: SLIGHT — SIGNIFICANT CHANGE UP
POTASSIUM SERPL-MCNC: 3.9 MMOL/L — SIGNIFICANT CHANGE UP (ref 3.5–5.3)
POTASSIUM SERPL-SCNC: 3.9 MMOL/L — SIGNIFICANT CHANGE UP (ref 3.5–5.3)
PROT SERPL-MCNC: 7.6 GM/DL — SIGNIFICANT CHANGE UP (ref 6–8.3)
PROT UR-MCNC: 100
RBC # BLD: 3.76 M/UL — LOW (ref 4.2–5.8)
RBC # FLD: 14.2 % — SIGNIFICANT CHANGE UP (ref 10.3–14.5)
RBC BLD AUTO: ABNORMAL
RBC CASTS # UR COMP ASSIST: ABNORMAL /HPF (ref 0–4)
SODIUM SERPL-SCNC: 134 MMOL/L — LOW (ref 135–145)
SP GR SPEC: 1.02 — SIGNIFICANT CHANGE UP (ref 1.01–1.02)
TROPONIN I, HIGH SENSITIVITY RESULT: 49.7 NG/L — SIGNIFICANT CHANGE UP
UROBILINOGEN FLD QL: 4
WBC # BLD: 5.04 K/UL — SIGNIFICANT CHANGE UP (ref 3.8–10.5)
WBC # FLD AUTO: 5.04 K/UL — SIGNIFICANT CHANGE UP (ref 3.8–10.5)
WBC UR QL: ABNORMAL /HPF (ref 0–5)

## 2023-05-27 PROCEDURE — 36415 COLL VENOUS BLD VENIPUNCTURE: CPT

## 2023-05-27 PROCEDURE — 72131 CT LUMBAR SPINE W/O DYE: CPT | Mod: 26,MA

## 2023-05-27 PROCEDURE — 96374 THER/PROPH/DIAG INJ IV PUSH: CPT

## 2023-05-27 PROCEDURE — 84484 ASSAY OF TROPONIN QUANT: CPT

## 2023-05-27 PROCEDURE — 83735 ASSAY OF MAGNESIUM: CPT

## 2023-05-27 PROCEDURE — 84100 ASSAY OF PHOSPHORUS: CPT

## 2023-05-27 PROCEDURE — 72131 CT LUMBAR SPINE W/O DYE: CPT | Mod: MA

## 2023-05-27 PROCEDURE — 99285 EMERGENCY DEPT VISIT HI MDM: CPT

## 2023-05-27 PROCEDURE — 99284 EMERGENCY DEPT VISIT MOD MDM: CPT | Mod: 25

## 2023-05-27 PROCEDURE — 96375 TX/PRO/DX INJ NEW DRUG ADDON: CPT

## 2023-05-27 PROCEDURE — 80053 COMPREHEN METABOLIC PANEL: CPT

## 2023-05-27 PROCEDURE — 85025 COMPLETE CBC W/AUTO DIFF WBC: CPT

## 2023-05-27 PROCEDURE — 81001 URINALYSIS AUTO W/SCOPE: CPT

## 2023-05-27 RX ORDER — DIAZEPAM 5 MG
5 TABLET ORAL ONCE
Refills: 0 | Status: DISCONTINUED | OUTPATIENT
Start: 2023-05-27 | End: 2023-05-27

## 2023-05-27 RX ORDER — MORPHINE SULFATE 50 MG/1
4 CAPSULE, EXTENDED RELEASE ORAL ONCE
Refills: 0 | Status: DISCONTINUED | OUTPATIENT
Start: 2023-05-27 | End: 2023-05-27

## 2023-05-27 RX ORDER — ONDANSETRON 8 MG/1
4 TABLET, FILM COATED ORAL ONCE
Refills: 0 | Status: COMPLETED | OUTPATIENT
Start: 2023-05-27 | End: 2023-05-27

## 2023-05-27 RX ORDER — SODIUM CHLORIDE 9 MG/ML
1000 INJECTION INTRAMUSCULAR; INTRAVENOUS; SUBCUTANEOUS ONCE
Refills: 0 | Status: COMPLETED | OUTPATIENT
Start: 2023-05-27 | End: 2023-05-27

## 2023-05-27 RX ADMIN — Medication 5 MILLIGRAM(S): at 22:25

## 2023-05-27 RX ADMIN — MORPHINE SULFATE 4 MILLIGRAM(S): 50 CAPSULE, EXTENDED RELEASE ORAL at 22:25

## 2023-05-27 RX ADMIN — SODIUM CHLORIDE 2000 MILLILITER(S): 9 INJECTION INTRAMUSCULAR; INTRAVENOUS; SUBCUTANEOUS at 22:25

## 2023-05-27 RX ADMIN — ONDANSETRON 4 MILLIGRAM(S): 8 TABLET, FILM COATED ORAL at 22:25

## 2023-05-27 NOTE — ED ADULT TRIAGE NOTE - CHIEF COMPLAINT QUOTE
Pt. presents to ED c/o lower back pain that radiates down both his legs. Pt. reports lifting something last week when he initially felt the pain, progressively worsened throughout the week. Pt. reports walking is now difficult due to pain. Pt. took advil PTA

## 2023-05-27 NOTE — ED STATDOCS - PHYSICAL EXAMINATION
Constitutional: NAD AAOx3  Eyes: PERRL, EOMI  Head: Normocephalic atraumatic  Mouth: MMM  Cardiac: regular rate   Resp: Lungs CTAB  GI: Abd s/nt/nd  Neuro: CN2-12 intact  Extremities: Intact distal pulses b/l, no calf tenderness, normal ROM b/l UE and LE   Skin: No rashes  MSK: +Bilateral dorsum of the hands and fingers are swollen.

## 2023-05-27 NOTE — ED STATDOCS - CLINICAL SUMMARY MEDICAL DECISION MAKING FREE TEXT BOX
Patient with low back pain, history of rheumatoid arthritis and leukemia. Doubt cord compression as patient has not bowel/bladder incontinence. Will get CT scan, r/o fracture, r/o compression. Likely radiculopathy or sciatica. Pain medication and antispasm medication.

## 2023-05-27 NOTE — ED STATDOCS - PROGRESS NOTE DETAILS
Giovany De La Rosa, DO PGY-2: Pt CT showing spinal stenosis. Pt will f/u with his spine surgeon. LAbs showing elevated Cr to 2, in Jan 2023 pt Cr was 1.7 and pt received 1L IVF. Other labs and urine non actionable. Pt was able to ambulate and states that pain is better. Will dc pt to f/u with PMD and spine.

## 2023-05-27 NOTE — ED STATDOCS - OBJECTIVE STATEMENT
66 y/o male with PMHx of history of leukemia, currently on chemotherapy, rheumatoid arthritis on possible biologic as Methotrexate was dced due to chemo, presents with bilateral lower lumbar pain radiating down the back of his legs to his knees. No bladder or bowel incontinence. The pain started Monday when he was lifting boxes to put on a shelf and progressively worsened. No history of trauma, no fevers, chills. Patient can walk holding onto things due to pain. No paresthesias.

## 2023-05-27 NOTE — ED STATDOCS - NSICDXPASTMEDICALHX_GEN_ALL_CORE_FT
PAST MEDICAL HISTORY:  ETOH abuse     Hyperlipidemia LDL goal <100     Restless leg syndrome     Rheumatoid arteritis       Leukemia

## 2023-05-27 NOTE — ED STATDOCS - NS ED ATTENDING STATEMENT MOD
I have seen and examined this patient and fully participated in the care of this patient as the teaching attending.  The service was shared with the IDALIA.  I reviewed and verified the documentation and independently performed the documented: Attending with

## 2023-05-27 NOTE — ED STATDOCS - ATTENDING CONTRIBUTION TO CARE
Dr. Maharaj: I performed a face to face bedside interview with patient regarding history of present illness, review of symptoms and past medical history. I completed an independent physical exam.  I have discussed patient's plan of care with resident.   I agree with note as stated above, having amended the EMR as needed to reflect my findings.   This includes HISTORY OF PRESENT ILLNESS, HIV, PAST MEDICAL/SURGICAL/FAMILY/SOCIAL HISTORY, ALLERGIES AND HOME MEDICATIONS, REVIEW OF SYSTEMS, PHYSICAL EXAM, and any PROGRESS NOTES during the time I functioned as the attending physician for this patient.  NICOLE Maharaj DO

## 2023-05-27 NOTE — ED STATDOCS - PATIENT PORTAL LINK FT
You can access the FollowMyHealth Patient Portal offered by [facility] by registering at the following website: [email or web address]. By joining [employer]’s FollowMyHealth portal, you will also be able to view your health information using other applications (apps) compatible with our system.

## 2023-05-27 NOTE — ED STATDOCS - NSFOLLOWUPINSTRUCTIONS_ED_ALL_ED_FT
1. Please follow up with your primary care doctor and spine surgeon within the next week.    2. Please take tylenol for pain as needed. Please follow instructions on packaging.    Back Pain    Back pain is very common in adults. The cause of back pain is rarely dangerous and the pain often gets better over time. The cause of your back pain may not be known and may include strain of muscles or ligaments, degeneration of the spinal disks, or arthritis. Occasionally the pain may radiate down your leg(s). Over-the-counter medicines to reduce pain and inflammation are often the most helpful. Stretching and remaining active frequently helps the healing process.       Rest, no heavy lifting.  Light walking encouraged, advanced activity as tolerated.    Warm compresses to area.     Recommend outpatient orthopedics follow up to discuss possible MRI vs Physical Therapy- referral list provided.         SEEK IMMEDIATE MEDICAL CARE IF YOU HAVE ANY OF THE FOLLOWING SYMPTOMS: bowel or bladder control problems, unusual weakness or numbness in your arms or legs, nausea or vomiting, abdominal pain, fever, dizziness/lightheadedness.

## 2023-05-28 NOTE — ED ADULT NURSE NOTE - MODE OF DISCHARGE
Caller is patient.    Caller states she's still having issues breathing even after her office visit and taking the Advir and Prednisone.     Call [phone]   Ambulatory

## 2023-05-28 NOTE — ED ADULT NURSE NOTE - OBJECTIVE STATEMENT
pt presents to the ED with back pain that has been getting progressively worse over the last few days after lifting heavy boxes.

## 2023-05-28 NOTE — ED ADULT NURSE NOTE - NS ED TRIAGE CLINICAL UPGRADE
range of motion is not limited and there is no muscle tenderness. Pain - Patient was clinically upgraded due to pain.

## 2023-06-14 ENCOUNTER — INPATIENT (INPATIENT)
Facility: HOSPITAL | Age: 68
LOS: 0 days | Discharge: HOME | DRG: 299 | End: 2023-06-14
Attending: INTERNAL MEDICINE | Admitting: INTERNAL MEDICINE
Payer: MEDICARE

## 2023-06-14 ENCOUNTER — TRANSCRIPTION ENCOUNTER (OUTPATIENT)
Age: 68
End: 2023-06-14

## 2023-06-14 VITALS — WEIGHT: 197.98 LBS | HEIGHT: 68 IN

## 2023-06-14 VITALS
RESPIRATION RATE: 19 BRPM | HEART RATE: 86 BPM | SYSTOLIC BLOOD PRESSURE: 169 MMHG | DIASTOLIC BLOOD PRESSURE: 94 MMHG | OXYGEN SATURATION: 96 % | TEMPERATURE: 98 F

## 2023-06-14 DIAGNOSIS — M54.9 DORSALGIA, UNSPECIFIED: ICD-10-CM

## 2023-06-14 PROBLEM — C95.90 LEUKEMIA, UNSPECIFIED NOT HAVING ACHIEVED REMISSION: Chronic | Status: ACTIVE | Noted: 2023-06-04

## 2023-06-14 LAB
ADD ON TEST-SPECIMEN IN LAB: SIGNIFICANT CHANGE UP
ALBUMIN SERPL ELPH-MCNC: 3.6 G/DL — SIGNIFICANT CHANGE UP (ref 3.3–5)
ALP SERPL-CCNC: 56 U/L — SIGNIFICANT CHANGE UP (ref 40–120)
ALT FLD-CCNC: 90 U/L — HIGH (ref 12–78)
ANION GAP SERPL CALC-SCNC: 6 MMOL/L — SIGNIFICANT CHANGE UP (ref 5–17)
APTT BLD: 31.8 SEC — SIGNIFICANT CHANGE UP (ref 27.5–35.5)
AST SERPL-CCNC: 79 U/L — HIGH (ref 15–37)
BASOPHILS # BLD AUTO: 0.07 K/UL — SIGNIFICANT CHANGE UP (ref 0–0.2)
BASOPHILS NFR BLD AUTO: 1.1 % — SIGNIFICANT CHANGE UP (ref 0–2)
BILIRUB SERPL-MCNC: 0.4 MG/DL — SIGNIFICANT CHANGE UP (ref 0.2–1.2)
BUN SERPL-MCNC: 18 MG/DL — SIGNIFICANT CHANGE UP (ref 7–23)
CALCIUM SERPL-MCNC: 8.8 MG/DL — SIGNIFICANT CHANGE UP (ref 8.5–10.1)
CHLORIDE SERPL-SCNC: 100 MMOL/L — SIGNIFICANT CHANGE UP (ref 96–108)
CO2 SERPL-SCNC: 26 MMOL/L — SIGNIFICANT CHANGE UP (ref 22–31)
CREAT SERPL-MCNC: 1.21 MG/DL — SIGNIFICANT CHANGE UP (ref 0.5–1.3)
EGFR: 66 ML/MIN/1.73M2 — SIGNIFICANT CHANGE UP
EOSINOPHIL # BLD AUTO: 0.16 K/UL — SIGNIFICANT CHANGE UP (ref 0–0.5)
EOSINOPHIL NFR BLD AUTO: 2.5 % — SIGNIFICANT CHANGE UP (ref 0–6)
ETHANOL SERPL-MCNC: 107 MG/DL — HIGH (ref 0–10)
GLUCOSE SERPL-MCNC: 99 MG/DL — SIGNIFICANT CHANGE UP (ref 70–99)
HCT VFR BLD CALC: 32.7 % — LOW (ref 39–50)
HGB BLD-MCNC: 11.4 G/DL — LOW (ref 13–17)
IMM GRANULOCYTES NFR BLD AUTO: 0.3 % — SIGNIFICANT CHANGE UP (ref 0–0.9)
INR BLD: 1.1 RATIO — SIGNIFICANT CHANGE UP (ref 0.88–1.16)
LYMPHOCYTES # BLD AUTO: 1.76 K/UL — SIGNIFICANT CHANGE UP (ref 1–3.3)
LYMPHOCYTES # BLD AUTO: 27.3 % — SIGNIFICANT CHANGE UP (ref 13–44)
MCHC RBC-ENTMCNC: 34.7 PG — HIGH (ref 27–34)
MCHC RBC-ENTMCNC: 34.9 GM/DL — SIGNIFICANT CHANGE UP (ref 32–36)
MCV RBC AUTO: 99.4 FL — SIGNIFICANT CHANGE UP (ref 80–100)
MONOCYTES # BLD AUTO: 0.47 K/UL — SIGNIFICANT CHANGE UP (ref 0–0.9)
MONOCYTES NFR BLD AUTO: 7.3 % — SIGNIFICANT CHANGE UP (ref 2–14)
NEUTROPHILS # BLD AUTO: 3.96 K/UL — SIGNIFICANT CHANGE UP (ref 1.8–7.4)
NEUTROPHILS NFR BLD AUTO: 61.5 % — SIGNIFICANT CHANGE UP (ref 43–77)
NT-PROBNP SERPL-SCNC: 553 PG/ML — HIGH (ref 0–125)
PLATELET # BLD AUTO: 168 K/UL — SIGNIFICANT CHANGE UP (ref 150–400)
POTASSIUM SERPL-MCNC: 4.5 MMOL/L — SIGNIFICANT CHANGE UP (ref 3.5–5.3)
POTASSIUM SERPL-SCNC: 4.5 MMOL/L — SIGNIFICANT CHANGE UP (ref 3.5–5.3)
PROT SERPL-MCNC: 7.2 GM/DL — SIGNIFICANT CHANGE UP (ref 6–8.3)
PROTHROM AB SERPL-ACNC: 12.8 SEC — SIGNIFICANT CHANGE UP (ref 10.5–13.4)
RBC # BLD: 3.29 M/UL — LOW (ref 4.2–5.8)
RBC # FLD: 13.6 % — SIGNIFICANT CHANGE UP (ref 10.3–14.5)
SODIUM SERPL-SCNC: 132 MMOL/L — LOW (ref 135–145)
TROPONIN I, HIGH SENSITIVITY RESULT: 36.54 NG/L — SIGNIFICANT CHANGE UP
WBC # BLD: 6.44 K/UL — SIGNIFICANT CHANGE UP (ref 3.8–10.5)
WBC # FLD AUTO: 6.44 K/UL — SIGNIFICANT CHANGE UP (ref 3.8–10.5)

## 2023-06-14 PROCEDURE — 93970 EXTREMITY STUDY: CPT | Mod: 26

## 2023-06-14 PROCEDURE — 71275 CT ANGIOGRAPHY CHEST: CPT | Mod: 26,MA

## 2023-06-14 PROCEDURE — 93010 ELECTROCARDIOGRAM REPORT: CPT

## 2023-06-14 PROCEDURE — 99236 HOSP IP/OBS SAME DATE HI 85: CPT

## 2023-06-14 PROCEDURE — 99285 EMERGENCY DEPT VISIT HI MDM: CPT

## 2023-06-14 RX ORDER — LOSARTAN POTASSIUM 100 MG/1
50 TABLET, FILM COATED ORAL DAILY
Refills: 0 | Status: DISCONTINUED | OUTPATIENT
Start: 2023-06-14 | End: 2023-06-14

## 2023-06-14 RX ORDER — SODIUM CHLORIDE 9 MG/ML
1000 INJECTION INTRAMUSCULAR; INTRAVENOUS; SUBCUTANEOUS ONCE
Refills: 0 | Status: COMPLETED | OUTPATIENT
Start: 2023-06-14 | End: 2023-06-14

## 2023-06-14 RX ORDER — HEPARIN SODIUM 5000 [USP'U]/ML
4000 INJECTION INTRAVENOUS; SUBCUTANEOUS EVERY 6 HOURS
Refills: 0 | Status: DISCONTINUED | OUTPATIENT
Start: 2023-06-14 | End: 2023-06-14

## 2023-06-14 RX ORDER — LIDOCAINE 4 G/100G
1 CREAM TOPICAL ONCE
Refills: 0 | Status: COMPLETED | OUTPATIENT
Start: 2023-06-14 | End: 2023-06-14

## 2023-06-14 RX ORDER — DIAZEPAM 5 MG
10 TABLET ORAL ONCE
Refills: 0 | Status: DISCONTINUED | OUTPATIENT
Start: 2023-06-14 | End: 2023-06-14

## 2023-06-14 RX ORDER — RIVAROXABAN 15 MG-20MG
1 KIT ORAL
Qty: 30 | Refills: 0 | DISCHARGE
Start: 2023-06-14

## 2023-06-14 RX ORDER — PRAMIPEXOLE DIHYDROCHLORIDE 0.12 MG/1
3 TABLET ORAL
Qty: 0 | Refills: 0 | DISCHARGE

## 2023-06-14 RX ORDER — LOSARTAN POTASSIUM 100 MG/1
1 TABLET, FILM COATED ORAL
Qty: 0 | Refills: 0 | DISCHARGE

## 2023-06-14 RX ORDER — HYDROMORPHONE HYDROCHLORIDE 2 MG/ML
1 INJECTION INTRAMUSCULAR; INTRAVENOUS; SUBCUTANEOUS ONCE
Refills: 0 | Status: DISCONTINUED | OUTPATIENT
Start: 2023-06-14 | End: 2023-06-14

## 2023-06-14 RX ORDER — AMLODIPINE BESYLATE 2.5 MG/1
1 TABLET ORAL
Qty: 0 | Refills: 0 | DISCHARGE

## 2023-06-14 RX ORDER — RIVAROXABAN 15 MG-20MG
1 KIT ORAL
Qty: 42 | Refills: 0
Start: 2023-06-14

## 2023-06-14 RX ORDER — HEPARIN SODIUM 5000 [USP'U]/ML
8000 INJECTION INTRAVENOUS; SUBCUTANEOUS ONCE
Refills: 0 | Status: COMPLETED | OUTPATIENT
Start: 2023-06-14 | End: 2023-06-14

## 2023-06-14 RX ORDER — ACETAMINOPHEN 500 MG
1000 TABLET ORAL ONCE
Refills: 0 | Status: COMPLETED | OUTPATIENT
Start: 2023-06-14 | End: 2023-06-14

## 2023-06-14 RX ORDER — ONDANSETRON 8 MG/1
1 TABLET, FILM COATED ORAL
Qty: 0 | Refills: 0 | DISCHARGE

## 2023-06-14 RX ORDER — HEPARIN SODIUM 5000 [USP'U]/ML
INJECTION INTRAVENOUS; SUBCUTANEOUS
Qty: 25000 | Refills: 0 | Status: DISCONTINUED | OUTPATIENT
Start: 2023-06-14 | End: 2023-06-14

## 2023-06-14 RX ORDER — RIVAROXABAN 15 MG-20MG
15 KIT ORAL
Refills: 0 | Status: DISCONTINUED | OUTPATIENT
Start: 2023-06-14 | End: 2023-06-14

## 2023-06-14 RX ORDER — HEPARIN SODIUM 5000 [USP'U]/ML
8000 INJECTION INTRAVENOUS; SUBCUTANEOUS EVERY 6 HOURS
Refills: 0 | Status: DISCONTINUED | OUTPATIENT
Start: 2023-06-14 | End: 2023-06-14

## 2023-06-14 RX ORDER — MORPHINE SULFATE 50 MG/1
4 CAPSULE, EXTENDED RELEASE ORAL ONCE
Refills: 0 | Status: DISCONTINUED | OUTPATIENT
Start: 2023-06-14 | End: 2023-06-14

## 2023-06-14 RX ORDER — FAMOTIDINE 10 MG/ML
1 INJECTION INTRAVENOUS
Qty: 30 | Refills: 0
Start: 2023-06-14

## 2023-06-14 RX ORDER — RIVAROXABAN 15 MG-20MG
1 KIT ORAL
Qty: 30 | Refills: 0
Start: 2023-06-14

## 2023-06-14 RX ORDER — ACETAMINOPHEN 500 MG
2 TABLET ORAL
Qty: 0 | Refills: 0 | DISCHARGE

## 2023-06-14 RX ORDER — OXYCODONE HYDROCHLORIDE 5 MG/1
1 TABLET ORAL
Qty: 21 | Refills: 0
Start: 2023-06-14 | End: 2023-06-20

## 2023-06-14 RX ADMIN — Medication 1000 MILLIGRAM(S): at 02:10

## 2023-06-14 RX ADMIN — LOSARTAN POTASSIUM 50 MILLIGRAM(S): 100 TABLET, FILM COATED ORAL at 10:28

## 2023-06-14 RX ADMIN — MORPHINE SULFATE 4 MILLIGRAM(S): 50 CAPSULE, EXTENDED RELEASE ORAL at 02:10

## 2023-06-14 RX ADMIN — HYDROMORPHONE HYDROCHLORIDE 1 MILLIGRAM(S): 2 INJECTION INTRAMUSCULAR; INTRAVENOUS; SUBCUTANEOUS at 03:37

## 2023-06-14 RX ADMIN — MORPHINE SULFATE 4 MILLIGRAM(S): 50 CAPSULE, EXTENDED RELEASE ORAL at 01:55

## 2023-06-14 RX ADMIN — SODIUM CHLORIDE 1000 MILLILITER(S): 9 INJECTION INTRAMUSCULAR; INTRAVENOUS; SUBCUTANEOUS at 02:55

## 2023-06-14 RX ADMIN — Medication 400 MILLIGRAM(S): at 01:55

## 2023-06-14 RX ADMIN — HEPARIN SODIUM 8000 UNIT(S): 5000 INJECTION INTRAVENOUS; SUBCUTANEOUS at 07:39

## 2023-06-14 RX ADMIN — Medication 10 MILLIGRAM(S): at 01:55

## 2023-06-14 RX ADMIN — SODIUM CHLORIDE 1000 MILLILITER(S): 9 INJECTION INTRAMUSCULAR; INTRAVENOUS; SUBCUTANEOUS at 01:55

## 2023-06-14 RX ADMIN — RIVAROXABAN 15 MILLIGRAM(S): KIT at 09:35

## 2023-06-14 RX ADMIN — HYDROMORPHONE HYDROCHLORIDE 1 MILLIGRAM(S): 2 INJECTION INTRAMUSCULAR; INTRAVENOUS; SUBCUTANEOUS at 03:52

## 2023-06-14 RX ADMIN — HEPARIN SODIUM 1800 UNIT(S)/HR: 5000 INJECTION INTRAVENOUS; SUBCUTANEOUS at 07:41

## 2023-06-14 RX ADMIN — LIDOCAINE 1 PATCH: 4 CREAM TOPICAL at 01:55

## 2023-06-14 NOTE — DISCHARGE NOTE PROVIDER - HOSPITAL COURSE
History of Present Illness:   68 y/o M with PMHx RA on humira, MTX, , DVT in right leg, off Xarelto for the past 3mo,  Leukemia s/p cyclophosphamide (completed in Jan 2023), L3/L4 severe spinal stenosis,   presents to the ED for back pain after chiropractor treatment; he was given muscle relaxant and analgesia in the ED.  He was also found to have chronic DVT in RLE and subsegmental PE  -no cp, no sob, no suazo       US LE doppler b/l- 1. Acute deep venous thrombosis in the right posterior tibial vein. Chronic deep venous thrombosis in the right common femoral, femoral, popliteal and gastrocnemius veins. No evidence of deep venous thrombosis in the left lower extremity.  CTA chest-  Right lower lobe posterior subsegmental pulmonary embolism.  Interlobular septal thickening suggestive of interstitial pulmonary edema.  Pt given morphine for pain and started on hep gttREVIEW OF SYSTEMS:    CONSTITUTIONAL: No weakness, No fevers or chills  ENT: No ear ache, No sorethroat  NECK: No pain, No stiffness  RESPIRATORY: No cough, No wheezing, No hemoptysis; No dyspnea  CARDIOVASCULAR: No chest pain, No palpitations  GASTROINTESTINAL: No abd pain, No nausea, No vomiting, No hematemesis, No diarrhea or constipation. No melena, No hematochezia.  GENITOURINARY: No dysuria, No  hematuria  NEUROLOGICAL: No diplopia, No paresthesia, No motor dysfunction  MUSCULOSKELETAL: No arthralgia, +back pain , +myalgia  SKIN: No rashes, or lesions   PSYCH: no anxiety, no suicidal ideation    All other review of systems is negative unless indicated above    Vital Signs Last 24 Hrs  T(C): 36.8 (14 Jun 2023 06:11), Max: 36.8 (14 Jun 2023 06:11)  T(F): 98.3 (14 Jun 2023 06:11), Max: 98.3 (14 Jun 2023 06:11)  HR: 71 (14 Jun 2023 06:11) (71 - 82)  BP: 175/95 (14 Jun 2023 06:11) (151/107 - 175/95)  BP(mean): 118 (14 Jun 2023 01:00) (118 - 118)  RR: 20 (14 Jun 2023 06:11) (20 - 20)  SpO2: 94% (14 Jun 2023 06:11) (94% - 97%)    Parameters below as of 14 Jun 2023 01:00  Patient On (Oxygen Delivery Method): room air        PHYSICAL EXAM:    GENERAL: NAD  HEENT:  NC/AT, EOMI, PERRLA, No scleral icterus, Moist mucous membranes  NECK: Supple, No JVD  CNS:  Alert & Oriented X3, Motor Strength 5/5 B/L upper and lower extremities; DTRs 2+ intact   LUNG: Normal Breath sounds, Clear to auscultation bilaterally, No rales, No rhonchi, No wheezing  HEART: RRR; No murmurs, No rubs  ABDOMEN: +BS, ST/ND/NT  GENITOURINARY: Voiding, Bladder not distended  EXTREMITIES:  2+ Peripheral Pulses, No clubbing, No cyanosis, No tibial edema  MUSCULOSKELTAL: +straight leg raise on Rt  SKIN: no rashes  RECTAL: deferred, not indicated  BREAST: deferred  a/p:    1. Chronic DVT, PE:  restart Xarelto and c/w anticoagulation   Hematology eval noted, will f/u as outpatient    2. Severe lumbar canal stenosis:  analgesia  muscle relaxants  NSAIDs prn    3. Leukemia:   outpatient f/u     4. RA:  c/w Humira  outpatient f/u    Patient is agreement to be discharged home and f/u as outpatient. History of Present Illness:   68 y/o M with PMHx RA on humira, MTX, , DVT in right leg, off Xarelto for the past 3mo,  Leukemia s/p cyclophosphamide (completed in Jan 2023), L3/L4 severe spinal stenosis,   presents to the ED for back pain after chiropractor treatment; he was given muscle relaxant and analgesia in the ED.  He was also found to have chronic DVT in RLE and subsegmental PE  -no cp, no sob, no suazo       US LE doppler b/l- 1. Acute deep venous thrombosis in the right posterior tibial vein. Chronic deep venous thrombosis in the right common femoral, femoral, popliteal and gastrocnemius veins. No evidence of deep venous thrombosis in the left lower extremity.  CTA chest-  Right lower lobe posterior subsegmental pulmonary embolism.  Interlobular septal thickening suggestive of interstitial pulmonary edema.  Pt given morphine for pain and started on hep gttREVIEW OF SYSTEMS:    CONSTITUTIONAL: No weakness, No fevers or chills  ENT: No ear ache, No sorethroat  NECK: No pain, No stiffness  RESPIRATORY: No cough, No wheezing, No hemoptysis; No dyspnea  CARDIOVASCULAR: No chest pain, No palpitations  GASTROINTESTINAL: No abd pain, No nausea, No vomiting, No hematemesis, No diarrhea or constipation. No melena, No hematochezia.  GENITOURINARY: No dysuria, No  hematuria  NEUROLOGICAL: No diplopia, No paresthesia, No motor dysfunction  MUSCULOSKELETAL: No arthralgia, +back pain , +myalgia  SKIN: No rashes, or lesions   PSYCH: no anxiety, no suicidal ideation    All other review of systems is negative unless indicated above    Vital Signs Last 24 Hrs  T(C): 36.8 (14 Jun 2023 06:11), Max: 36.8 (14 Jun 2023 06:11)  T(F): 98.3 (14 Jun 2023 06:11), Max: 98.3 (14 Jun 2023 06:11)  HR: 71 (14 Jun 2023 06:11) (71 - 82)  BP: 175/95 (14 Jun 2023 06:11) (151/107 - 175/95)  BP(mean): 118 (14 Jun 2023 01:00) (118 - 118)  RR: 20 (14 Jun 2023 06:11) (20 - 20)  SpO2: 94% (14 Jun 2023 06:11) (94% - 97%)    Parameters below as of 14 Jun 2023 01:00  Patient On (Oxygen Delivery Method): room air        PHYSICAL EXAM:    GENERAL: NAD  HEENT:  NC/AT, EOMI, PERRLA, No scleral icterus, Moist mucous membranes  NECK: Supple, No JVD  CNS:  Alert & Oriented X3, Motor Strength 5/5 B/L upper and lower extremities; DTRs 2+ intact   LUNG: Normal Breath sounds, Clear to auscultation bilaterally, No rales, No rhonchi, No wheezing  HEART: RRR; No murmurs, No rubs  ABDOMEN: +BS, ST/ND/NT  GENITOURINARY: Voiding, Bladder not distended  EXTREMITIES:  2+ Peripheral Pulses, No clubbing, No cyanosis, No tibial edema  MUSCULOSKELTAL: +straight leg raise on Rt  SKIN: no rashes  RECTAL: deferred, not indicated  BREAST: deferred  a/p:    1. Chronic DVT, PE:  restart Xarelto and c/w anticoagulation   Hematology eval noted, will f/u as outpatient    2. Severe lumbar canal stenosis:  was on steroids 2weeks ago  analgesia with oxycodone 5mg prn   NSAIDs prn  GI prophy: Pepcid HS    3. Leukemia:   outpatient f/u     4. RA:  c/w Humira  outpatient f/u    Patient is agreement to be discharged home and f/u as outpatient.

## 2023-06-14 NOTE — ED PROVIDER NOTE - PHYSICAL EXAMINATION
***GEN - very uncomfortable appearing; A+O x3 ***HEAD - NC/AT ***EYES/NOSE - PERRL, EOMI, mucous membranes moist, no discharge ***THROAT: Oral cavity and pharynx normal.    ***NECK: Neck supple, non-tender   ***PULMONARY - CTA b/l, symmetric breath sounds. ***CARDIAC -s1s2, RRR, no M,G,R  ***ABDOMEN - +BS, ND, NT, soft  ***BACK - no CVA tenderness, Normal spine, +ttp over lower lumbar back b/l ***EXTREMITIES - symmetric pulses, 2+ dp, capillary refill < 2 seconds, no clubbing, no cyanosis, 3+ edema of b/l LE, R>L ***SKIN - no rash or bruising   ***NEUROLOGIC - alert, CN 2-12 intact, no focal deficits.

## 2023-06-14 NOTE — H&P ADULT - HISTORY OF PRESENT ILLNESS
68 y/o M with PMHx RA on humira, MTX, , DVT in right leg, off Xarelto for the past 3mo,  Leukemia s/p cyclophosphamide (completed in Jan 2023), L3/L4 severe spinal stenosis,   presents to the ED for back pain after chiropractor treatment; he was given muscle relaxant and analgesia in the ED.  He was also found to have chronic DVT in RLE and subsegmental PE  -no cp, no sob, no suazo       US LE doppler b/l- 1. Acute deep venous thrombosis in the right posterior tibial vein. Chronic deep venous thrombosis in the right common femoral, femoral, popliteal and gastrocnemius veins. No evidence of deep venous thrombosis in the left lower extremity.  CTA chest-  Right lower lobe posterior subsegmental pulmonary embolism.  Interlobular septal thickening suggestive of interstitial pulmonary edema.  Pt given morphine for pain and started on hep gtt          PAST MEDICAL & SURGICAL HISTORY:  ETOH abuse  Hyperlipidemia LDL goal <100  Restless leg syndrome  Rheumatoid arteritis  Leukemia    No significant past surgical history      FAMILY HISTORY:  no h/o CAD, h/o MI/CVA  Mother had COPD      Social History:  +Etoh occasionally  no smoking              REVIEW OF SYSTEMS:    CONSTITUTIONAL: No weakness, No fevers or chills  ENT: No ear ache, No sorethroat  NECK: No pain, No stiffness  RESPIRATORY: No cough, No wheezing, No hemoptysis; No dyspnea  CARDIOVASCULAR: No chest pain, No palpitations  GASTROINTESTINAL: No abd pain, No nausea, No vomiting, No hematemesis, No diarrhea or constipation. No melena, No hematochezia.  GENITOURINARY: No dysuria, No  hematuria  NEUROLOGICAL: No diplopia, No paresthesia, No motor dysfunction  MUSCULOSKELETAL: No arthralgia, +back pain , +myalgia  SKIN: No rashes, or lesions   PSYCH: no anxiety, no suicidal ideation    All other review of systems is negative unless indicated above    Vital Signs Last 24 Hrs  T(C): 36.8 (14 Jun 2023 06:11), Max: 36.8 (14 Jun 2023 06:11)  T(F): 98.3 (14 Jun 2023 06:11), Max: 98.3 (14 Jun 2023 06:11)  HR: 71 (14 Jun 2023 06:11) (71 - 82)  BP: 175/95 (14 Jun 2023 06:11) (151/107 - 175/95)  BP(mean): 118 (14 Jun 2023 01:00) (118 - 118)  RR: 20 (14 Jun 2023 06:11) (20 - 20)  SpO2: 94% (14 Jun 2023 06:11) (94% - 97%)    Parameters below as of 14 Jun 2023 01:00  Patient On (Oxygen Delivery Method): room air        PHYSICAL EXAM:    GENERAL: NAD  HEENT:  NC/AT, EOMI, PERRLA, No scleral icterus, Moist mucous membranes  NECK: Supple, No JVD  CNS:  Alert & Oriented X3, Motor Strength 5/5 B/L upper and lower extremities; DTRs 2+ intact   LUNG: Normal Breath sounds, Clear to auscultation bilaterally, No rales, No rhonchi, No wheezing  HEART: RRR; No murmurs, No rubs  ABDOMEN: +BS, ST/ND/NT  GENITOURINARY: Voiding, Bladder not distended  EXTREMITIES:  2+ Peripheral Pulses, No clubbing, No cyanosis, No tibial edema  MUSCULOSKELTAL: +straight leg raise on Rt  SKIN: no rashes  RECTAL: deferred, not indicated  BREAST: deferred                          11.4   6.44  )-----------( 168      ( 14 Jun 2023 01:49 )             32.7     06-14    132<L>  |  100  |  18  ----------------------------<  99  4.5   |  26  |  1.21    Ca    8.8      14 Jun 2023 01:49    TPro  7.2  /  Alb  3.6  /  TBili  0.4  /  DBili  x   /  AST  79<H>  /  ALT  90<H>  /  AlkPhos  56  06-14        a/p:    1. Chronic DVT, PE:  restart Xarelto and c/w anticoagulation   Hematology eval noted, will f/u as outpatient    2. Severe lumbar canal stenosis:  analgesia  muscle relaxants  NSAIDs prn    3. Leukemia:   outpatient f/u     4. RA:  c/w Humira  outpatient f/u

## 2023-06-14 NOTE — ED PROVIDER NOTE - CLINICAL SUMMARY MEDICAL DECISION MAKING FREE TEXT BOX
67-year-old male with severe acute on chronic back pain likely associated with muscle spasm.  history of sciatica.  No clinical signs or symptoms of cauda equina/spinal cord involvement.  Will give medications for pain and reassess.  We will also obtain ultrasound of bilateral lower extremities to rule out DVT. 67-year-old male with severe acute on chronic back pain likely associated with muscle spasm.  history of sciatica.  No clinical signs or symptoms of cauda equina/spinal cord involvement.  Will give medications for pain and reassess.  We will also obtain ultrasound of bilateral lower extremities to rule out DVT. Called by radiology that patient has a new DVT of the right lower extremity.  EKG performed and found to be normal sinus rhythm.  CT angio ordered which is positive for small PE.  proBNP only mildly elevated.  Troponin pending.  Will start on heparin drip and admit.  All results discussed with patient and son at bedside.  Back pain is much better controlled and patient is comfortable at this time.  Signout given to Hospitalist, Dr. Bolton will place on [facility] with remote telemetry monitoring.

## 2023-06-14 NOTE — ED ADULT NURSE NOTE - OBJECTIVE STATEMENT
67y male presented to the ED with complaints of lower back pain. Pt has history of Sciatica. Pt reports lower back pain radiating down both legs. Pt recently at  for the same reason 2 weeks ago. Pt saw chiropractor today, every since the pain has been increasing. Pt has full mobility in all extremities, A&O4, ambulatory. Pt denies SOB, CP, N/V/D. 67y male presented to the ED with complaints of lower back pain. Pt has history of Sciatica. Pt reports lower back pain radiating down both legs. Pt recently at  for the same reason 2 weeks ago. Pt saw chiropractor today, every since the pain has been increasing. Pt has b/l leg swelling. Pt has full mobility in all extremities, A&O4, ambulatory. Pt denies SOB, CP, N/V/D.

## 2023-06-14 NOTE — ED PROVIDER NOTE - NSICDXPASTMEDICALHX_GEN_ALL_CORE_FT
PAST MEDICAL HISTORY:  ETOH abuse     Hyperlipidemia LDL goal <100     Leukemia     Restless leg syndrome     Rheumatoid arteritis

## 2023-06-14 NOTE — ED PROVIDER NOTE - OBJECTIVE STATEMENT
67-year-old male presents with worsening back pain times the past 1 day.  Patient has been having increased back pain for the past 2 weeks which was initially getting better.  He went to see his chiropractor today and had a muscle stimulator applied to his back as a therapeutic treatment while he was there.  Since getting home he has been having severe back pain.  Back pain is lower and radiating down both legs.  This is typically where his back pain occurs and has a history of sciatica.  Took 1000 mg of ibuprofen at 11 PM prior to arrival.  Also notes his legs are more swollen than usual but states he does have chronic LE edema.  No change in bladder or bowel functioning.  No numbness or tingling.  No weakness of lower extremities. no fevers.

## 2023-06-14 NOTE — DISCHARGE NOTE PROVIDER - NSDCMRMEDTOKEN_GEN_ALL_CORE_FT
furosemide 40 mg oral tablet: 1 tab(s) orally once a day  hydroxychloroquine 200 mg oral tablet: 1 tab(s) orally 2 times a day  Livalo 2 mg oral tablet: 1 tab(s) orally once a day  losartan 50 mg oral tablet: 1 tab(s) orally once a day  Xarelto 15 mg oral tablet: 1 tab(s) orally 2 times a day  Xarelto 20 mg oral tablet: 1 tab(s) orally once a day (at bedtime) start day#22   furosemide 40 mg oral tablet: 1 tab(s) orally once a day  hydroxychloroquine 200 mg oral tablet: 1 tab(s) orally 2 times a day  Livalo 2 mg oral tablet: 1 tab(s) orally once a day  losartan 50 mg oral tablet: 1 tab(s) orally once a day  naproxen 500 mg oral tablet: 1 tab(s) orally 2 times a day  oxyCODONE 5 mg oral tablet: 1 tab(s) orally 3 times a day MDD: 3tb  Pepcid 20 mg oral tablet: 1 tab(s) orally once a day (at bedtime)  Xarelto 15 mg oral tablet: 1 tab(s) orally 2 times a day  Xarelto 20 mg oral tablet: 1 tab(s) orally once a day (at bedtime) start day#22

## 2023-06-14 NOTE — ED ADULT NURSE NOTE - NSFALLUNIVINTERV_ED_ALL_ED
Bed/Stretcher in lowest position, wheels locked, appropriate side rails in place/Call bell, personal items and telephone in reach/Instruct patient to call for assistance before getting out of bed/chair/stretcher/Non-slip footwear applied when patient is off stretcher/[place] to call system/Physically safe environment - no spills, clutter or unnecessary equipment/Purposeful proactive rounding/Room/bathroom lighting operational, light cord in reach

## 2023-06-14 NOTE — PHARMACOTHERAPY INTERVENTION NOTE - COMMENTS
Medication history complete, reviewed medication with patient and confirmed with DrFirst. Pt has an infection in his right eye and was prescribed Ofloxacin but has not picked up from pharmacy.

## 2023-06-14 NOTE — CONSULT NOTE ADULT - ASSESSMENT
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    # DVT/PE  - pt with history of RLL PE in Sept 15th 2022, s/p 6 mo of xarelto   - US LE doppler b/l- 1. Acute deep venous thrombosis in the right posterior tibial vein. Chronic deep venous thrombosis in the right common femoral, femoral, popliteal and gastrocnemius veins. No evidence of deep venous thrombosis in the left lower extremity.  - CTA chest-  Right lower lobe posterior subsegmental pulmonary embolism.  Interlobular septal thickening suggestive of interstitial pulmonary edema.  - pt on hep gtt  - if no plan for procedures would switch to xarelto 15 mg po bid x 21d followed by xarelto 20 mg qd thereafter  - pt to f/u w/ me outpatient- will require lifelong a/c at this point     # h/o LGL  - s/p 6 mo of cyclophosphamide   - no sign of active disease  - blood counts stable - most recent flow cytometry negative   - remains on surveillance    will follow

## 2023-06-14 NOTE — ED PROVIDER NOTE - CARE PLAN
1 Principal Discharge DX:	Back pain with sciatica   Principal Discharge DX:	Back pain with sciatica  Secondary Diagnosis:	DVT of lower limb, acute  Secondary Diagnosis:	Pulmonary embolism

## 2023-06-14 NOTE — CONSULT NOTE ADULT - SUBJECTIVE AND OBJECTIVE BOX
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    Pt states that he went to go and see chiropractor last week and ever since the stimulation, he has had severe back pain, unable to move from bed at home.   States he had an MRI outpatient about 2 weeks ago.     In ER, WBC 6.44, Hb 11.4, plt 168, cmp unremarkable  US LE doppler b/l- 1. Acute deep venous thrombosis in the right posterior tibial vein. Chronic deep venous thrombosis in the right common femoral, femoral, popliteal and gastrocnemius veins. No evidence of deep venous thrombosis in the left lower extremity.  CTA chest-  Right lower lobe posterior subsegmental pulmonary embolism.  Interlobular septal thickening suggestive of interstitial pulmonary edema.    Pt given morphine for pain and started on hep gtt          PAST MEDICAL & SURGICAL HISTORY:  ETOH abuse      Hyperlipidemia LDL goal <100      Restless leg syndrome      Rheumatoid arteritis      Leukemia      No significant past surgical history          FAMILY HISTORY:  No pertinent family history in first degree relatives        VITAL SIGNS:  T(F): 98.3 (06-14-23 @ 06:11)  HR: 71 (06-14-23 @ 06:11)  BP: 175/95 (06-14-23 @ 06:11)  RR: 20 (06-14-23 @ 06:11)  SpO2: 94% (06-14-23 @ 06:11)  Wt(kg): --    ROS- reports back pain, right leg pain but denies any fever, chills, sob, chest pain, abdominal pain, constipation or diarrhea    PHYSICAL EXAM:    Constitutional: NAD, groggy from morphine   Eyes: EOMI,   Respiratory: CTA b/l, good air entry b/l  Cardiovascular: RRR  Gastrointestinal: soft, NTND,   Extremities: no c/c/e, rle swelling   Neurological: AAOx3      MEDICATIONS  (STANDING):  heparin  Infusion.  Unit(s)/Hr (18 mL/Hr) IV Continuous <Continuous>    MEDICATIONS  (PRN):  heparin   Injectable 8000 Unit(s) IV Push every 6 hours PRN For aPTT less than 40  heparin   Injectable 4000 Unit(s) IV Push every 6 hours PRN For aPTT between 40 - 57      Allergies    Aloe Lotion (Rash)    Intolerances        LABS:                        11.4   6.44  )-----------( 168      ( 14 Jun 2023 01:49 )             32.7     06-14    132<L>  |  100  |  18  ----------------------------<  99  4.5   |  26  |  1.21    Ca    8.8      14 Jun 2023 01:49    TPro  7.2  /  Alb  3.6  /  TBili  0.4  /  DBili  x   /  AST  79<H>  /  ALT  90<H>  /  AlkPhos  56  06-14    PT/INR - ( 14 Jun 2023 01:49 )   PT: 12.8 sec;   INR: 1.10 ratio         PTT - ( 14 Jun 2023 01:49 )  PTT:31.8 sec      RADIOLOGY & ADDITIONAL TESTS:  Studies reviewed.

## 2023-06-14 NOTE — DISCHARGE NOTE PROVIDER - NSDCCPCAREPLAN_GEN_ALL_CORE_FT
PRINCIPAL DISCHARGE DIAGNOSIS  Diagnosis: Back pain with sciatica  Assessment and Plan of Treatment:       SECONDARY DISCHARGE DIAGNOSES  Diagnosis: DVT of lower limb, acute  Assessment and Plan of Treatment:     Diagnosis: Pulmonary embolism  Assessment and Plan of Treatment:

## 2023-06-14 NOTE — DISCHARGE NOTE PROVIDER - CARE PROVIDER_API CALL
Kehinde Sanchez  Hematology/Oncology  [address], [address]  [place] [address]  Phone: [phone]  Fax: [phone]  Follow Up Time: 1 week

## 2023-06-14 NOTE — ED ADULT TRIAGE NOTE - CHIEF COMPLAINT QUOTE
pt was diagnosed with sciatica 2 weeks ago and went to chiropractor today as it was recommended and pain came back worse.  pt was at  2 weeks ago and was getting better prior to today.

## 2023-06-21 DIAGNOSIS — M48.061 SPINAL STENOSIS, LUMBAR REGION WITHOUT NEUROGENIC CLAUDICATION: ICD-10-CM

## 2023-06-21 DIAGNOSIS — Z91.048 OTHER NONMEDICINAL SUBSTANCE ALLERGY STATUS: ICD-10-CM

## 2023-06-21 DIAGNOSIS — C92.Z1 OTHER MYELOID LEUKEMIA, IN REMISSION: ICD-10-CM

## 2023-06-21 DIAGNOSIS — I26.93 SINGLE SUBSEGMENTAL PULMONARY EMBOLISM WITHOUT ACUTE COR PULMONALE: ICD-10-CM

## 2023-06-21 DIAGNOSIS — Z79.52 LONG TERM (CURRENT) USE OF SYSTEMIC STEROIDS: ICD-10-CM

## 2023-06-21 DIAGNOSIS — I82.531 CHRONIC EMBOLISM AND THROMBOSIS OF RIGHT POPLITEAL VEIN: ICD-10-CM

## 2023-06-21 DIAGNOSIS — M06.9 RHEUMATOID ARTHRITIS, UNSPECIFIED: ICD-10-CM

## 2023-06-21 DIAGNOSIS — Z79.631 LONG TERM (CURRENT) USE OF ANTIMETABOLITE AGENT: ICD-10-CM

## 2023-06-21 DIAGNOSIS — I82.441 ACUTE EMBOLISM AND THROMBOSIS OF RIGHT TIBIAL VEIN: ICD-10-CM

## 2023-06-21 DIAGNOSIS — G25.81 RESTLESS LEGS SYNDROME: ICD-10-CM

## 2023-06-21 DIAGNOSIS — I82.511 CHRONIC EMBOLISM AND THROMBOSIS OF RIGHT FEMORAL VEIN: ICD-10-CM

## 2023-06-21 DIAGNOSIS — E78.5 HYPERLIPIDEMIA, UNSPECIFIED: ICD-10-CM

## 2023-08-17 NOTE — ED PROVIDER NOTE - RESPIRATORY, MLM
Patients mailbox is full. LVM on spouse phone to confirm appointment for 08/18/2023 at 10:30am with Dr. Jackson.  
Breath sounds clear and equal bilaterally.

## 2023-10-04 ENCOUNTER — EMERGENCY (EMERGENCY)
Facility: HOSPITAL | Age: 68
LOS: 0 days | Discharge: HOME | End: 2023-10-05
Attending: EMERGENCY MEDICINE
Payer: MEDICARE

## 2023-10-04 VITALS
HEART RATE: 68 BPM | SYSTOLIC BLOOD PRESSURE: 157 MMHG | DIASTOLIC BLOOD PRESSURE: 93 MMHG | TEMPERATURE: 98 F | OXYGEN SATURATION: 97 % | RESPIRATION RATE: 18 BRPM
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
## 2023-10-04 DIAGNOSIS — E78.5 HYPERLIPIDEMIA, UNSPECIFIED: ICD-10-CM

## 2023-10-04 DIAGNOSIS — M06.9 RHEUMATOID ARTHRITIS, UNSPECIFIED: ICD-10-CM

## 2023-10-04 DIAGNOSIS — S09.90XA UNSPECIFIED INJURY OF HEAD, INITIAL ENCOUNTER: ICD-10-CM

## 2023-10-04 DIAGNOSIS — Z79.01 LONG TERM (CURRENT) USE OF ANTICOAGULANTS: ICD-10-CM

## 2023-10-04 DIAGNOSIS — G25.81 RESTLESS LEGS SYNDROME: ICD-10-CM
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
## 2023-10-04 DIAGNOSIS — F10.129 ALCOHOL ABUSE WITH INTOXICATION, UNSPECIFIED: ICD-10-CM

## 2023-10-04 DIAGNOSIS — Z91.09 OTHER ALLERGY STATUS, OTHER THAN TO DRUGS AND BIOLOGICAL SUBSTANCES: ICD-10-CM

## 2023-10-04 DIAGNOSIS — S00.81XA ABRASION OF OTHER PART OF HEAD, INITIAL ENCOUNTER: ICD-10-CM

## 2023-10-04 DIAGNOSIS — S00.31XA ABRASION OF NOSE, INITIAL ENCOUNTER: ICD-10-CM

## 2023-10-04 DIAGNOSIS — Z85.6 PERSONAL HISTORY OF LEUKEMIA: ICD-10-CM

## 2023-10-04 PROCEDURE — 99291 CRITICAL CARE FIRST HOUR: CPT | Mod: 25

## 2023-10-04 PROCEDURE — 70450 CT HEAD/BRAIN W/O DYE: CPT | Mod: 26,MA

## 2023-10-04 PROCEDURE — 70486 CT MAXILLOFACIAL W/O DYE: CPT | Mod: 26,MA

## 2023-10-04 PROCEDURE — 70486 CT MAXILLOFACIAL W/O DYE: CPT | Mod: MA

## 2023-10-04 PROCEDURE — 76376 3D RENDER W/INTRP POSTPROCES: CPT

## 2023-10-04 PROCEDURE — 99285 EMERGENCY DEPT VISIT HI MDM: CPT

## 2023-10-04 PROCEDURE — 72125 CT NECK SPINE W/O DYE: CPT | Mod: 26,MA

## 2023-10-04 PROCEDURE — 70450 CT HEAD/BRAIN W/O DYE: CPT | Mod: MA

## 2023-10-04 PROCEDURE — 72125 CT NECK SPINE W/O DYE: CPT | Mod: MA

## 2023-10-04 PROCEDURE — 76376 3D RENDER W/INTRP POSTPROCES: CPT | Mod: 26

## 2023-10-04 NOTE — ED PROVIDER NOTE - MDM ORDERS SUBMITTED SELECTION
[facility] calling back for status of medication refill; see message below.  [phone]  
[facility], patient's representative, is calling and  Requesting a refill.    Medication(s) for Samir Dwyer submitted for a refill request and is pending approval from the Provider.    Caller has been advised that their call does not guarantee an immediate refill. This refill will be reviewed within 24-72 hours by a qualified provider who will determine whether he or she can refill the medication.    Patient has contacted the pharmacy?  Yes    Call Back Number: [phone] (M)    Can a detailed message be left? n/a    Additional information:     Caller states that the patient is COMPLETELY OUT OF MEDICATION and he needs to take his medication today. Writer verified Pharmacy information. Thank you.     Patient’s preferred pharmacy has been noted and populated.     [id number] [address] & [address] [place] [id number]  Phone: [phone] Fax: [phone]        
Refilled his Coreg 6.125. Will reevaluate pt in clinic, scheduled for follow up on Dec. 6 with Dr. Campbell. 
Imaging Studies

## 2023-10-04 NOTE — ED PROVIDER NOTE - OBJECTIVE STATEMENT
Pt. is a 68 yo M with hx of leukemia, alcohol abuse, hyperlipidemia, rheumatoid arteritis, restless leg syndrome presents s/p trip and fall while intoxicated.  Patient was BIB ambulance for fall onto his face while intoxicated.  Denies LOC.  No vomiting.  Has abrasions on forehead and nose.  Takes xarelto daily.  No other injury.  Neuro Alert on arrival called due to fall, intoxicated on anticoagulation with isolated head and facial injury.

## 2023-10-04 NOTE — ED PROVIDER NOTE - ENMT, MLM
Airway patent, Nasal mucosa clear. Mouth with normal mucosa. Throat has no vesicles, no oropharyngeal exudates and uvula is midline. no facial bone tenderness; no trismus

## 2023-10-04 NOTE — ED PROVIDER NOTE - CARE PROVIDER_API CALL
Boxer, Jonathan A  Internal Medicine  [address] [place] [address]  Phone: [phone]  Fax: [phone]  Follow Up Time:

## 2023-10-04 NOTE — ED ADULT TRIAGE NOTE - CHIEF COMPLAINT QUOTE
Pt. A&OX3, BIBEMS s/p fall. EMS reports pt was drinking when he fell face first. Pt denies LOC. Pt on anticoagulation, unknown of the name. Pt. has medium sized abrasion above the left eye. Pt. denies dizziness or headache at this time.   Neuro alert called at 9:51pm. Pt. taken to CT-scan. Unknown LOC.

## 2023-10-04 NOTE — ED PROVIDER NOTE - NSFOLLOWUPINSTRUCTIONS_ED_ALL_ED_FT
Head Injury    WHAT YOU NEED TO KNOW:    A head injury is most often caused by a blow to the head. This may occur from a fall, bicycle injury, sports injury, being struck in the head, or a motor vehicle accident.     DISCHARGE INSTRUCTIONS:    Call 911 or have someone else call for any of the following:     You cannot be woken.      You have a seizure.      You stop responding to others or you faint.      You have blurry or double vision.      Your speech becomes slurred or confused.      You have arm or leg weakness, loss of feeling, or new problems with coordination.      Your pupils are larger than usual or one pupil is a different size than the other.       You have blood or clear fluid coming out of your ears or nose.    Return to the emergency department if:     You have repeated or forceful vomiting.      You feel confused.      Your headache gets worse or becomes severe.      You or someone caring for you notices that you are harder to wake than usual.    Contact your healthcare provider if:     Your symptoms last longer than 6 weeks after the injury.      You have questions or concerns about your condition or care.    Medicines:     Acetaminophen decreases pain. Acetaminophen is available without a doctor's order. Ask how much to take and how often to take it. Follow directions. Acetaminophen can cause liver damage if not taken correctly.      Take your medicine as directed. Contact your healthcare provider if you think your medicine is not helping or if you have side effects. Tell him or her if you are allergic to any medicine. Keep a list of the medicines, vitamins, and herbs you take. Include the amounts, and when and why you take them. Bring the list or the pill bottles to follow-up visits. Carry your medicine list with you in case of an emergency.    Self-care:     Rest or do quiet activities for 24 to 48 hours. Limit your time watching TV, using the computer, or doing tasks that require a lot of thinking. Slowly return to your normal activities as directed. Do not play sports or do activities that may cause you to get hit in the head. Ask your healthcare provider when you can return to sports.       Apply ice on your head for 15 to 20 minutes every hour or as directed. Use an ice pack, or put crushed ice in a plastic bag. Cover it with a towel before you apply it to your skin. Ice helps prevent tissue damage and decreases swelling and pain.       Have someone stay with you for 24 hours or as directed. This person can monitor you for complications and call 911. When you are awake the person should ask you a few questions to see if you are thinking clearly. An example would be to ask your name or your address.     Prevent another head injury:     Wear a helmet that fits properly. Do this when you play sports, or ride a bike, scooter, or skateboard. Helmets help decrease your risk of a serious head injury. Talk to your healthcare provider about other ways you can protect yourself if you play sports.      Wear your seat belt every time you are in a car. This helps to decrease your risk for a head injury if you are in a car accident.     Follow up with your healthcare provider as directed: Write down your questions so you remember to ask them during your visits.    Abrasion    WHAT YOU NEED TO KNOW:    An abrasion is a scrape on your skin. It happens when your skin rubs against a rough surface. Some examples of an abrasion include rug burn, a skinned elbow, or road rash. Abrasions can be many shapes and sizes. The wound may hurt, bleed, bruise, or swell.     DISCHARGE INSTRUCTIONS:    Return to the emergency department if:     The bleeding does not stop after 10 minutes of firm pressure.      You cannot rinse one or more foreign objects out of your wound.      You have red streaks on your skin coming from your wound.    Contact your healthcare provider if:     You have a fever or chills.       Your abrasion is red, warm, swollen, or draining pus.      You have questions or concerns about your condition or care.    Care for your abrasion:     Wash your hands and dry them with a clean towel.      Press a clean cloth against your wound to stop any bleeding.      Rinse your wound with a lot of clean water. Do not use harsh soap, alcohol, or iodine solutions.      Use a clean, wet cloth to remove any objects, such as small pieces of rocks or dirt.      Rub antibiotic ointment on your wound. This may help prevent infection and help your wound heal.      Cover the wound with a non-stick bandage. Change the bandage daily, and if gets wet or dirty.     Follow up with your healthcare provider as directed: Write down your questions so you remember to ask them during your visits.

## 2023-10-04 NOTE — ED ADULT TRIAGE NOTE - CCCP TRG CHIEF CMPLNT
[TextBox_4] : Ms. GIL is a 53 year old female with hx of calcified tendinitis, acne, ADD, eczema, who presents to the office today for pulmonary follow up. Her chief complaint is \par \par -s/p chronic cough due to URI, resolved with inhaler use\par -she notes she is going to have Lasik done \par -she denies any acute visual issues \par -she denies hoarseness\par -she notes exercising (yoga)\par -she notes she is sleeping better than every, likely due to yoga and increased activity\par -she notes that she has gained weight during the holidays\par -s/p flu shot \par \par -patient denies any headaches, nausea, vomiting, fever, chills, sweats, chest pain, chest pressure, palpitations, wheezing, fatigue, diarrhea, constipation, dysphagia, myalgias, dizziness, leg swelling, leg pain, itchy eyes, itchy ears, heartburn, reflux or sour taste in the mouth  fall

## 2023-10-04 NOTE — ED PROVIDER NOTE - CARE PLAN
1 Principal Discharge DX:	Closed head injury  Secondary Diagnosis:	Facial abrasion, initial encounter

## 2023-10-04 NOTE — ED PROVIDER NOTE - PATIENT PORTAL LINK FT
(3) walks occasionally
You can access the FollowMyHealth Patient Portal offered by [facility] by registering at the following website: [email or web address]. By joining [employer]’s FollowMyHealth portal, you will also be able to view your health information using other applications (apps) compatible with our system.

## 2023-10-04 NOTE — ED PROVIDER NOTE - CONSTITUTIONAL, MLM
AAOX3, cooperative, alcohol on breath, clear speech, HEAD: no hematoma; FACE: +large abrasion mid forehead and along entire length of nose with scant bleeding normal...

## 2023-10-04 NOTE — ED PROVIDER NOTE - CARE PROVIDERS DIRECT ADDRESSES
[email or web address]

## 2023-10-04 NOTE — ED PROVIDER NOTE - CLINICAL SUMMARY MEDICAL DECISION MAKING FREE TEXT BOX
66 yo M Neuro Alert; fall with head and facial injury intoxicated on anticoagulation.    CTs of head, facial bones and C spine ordered to r/o hemorrhage or fractures. 68 yo M Neuro Alert; fall with head and facial injury intoxicated on anticoagulation.    Need to r/o traumatic intracranial bleeding.    CTs of head, facial bones and C spine ordered to r/o hemorrhage or fractures.

## 2023-10-05 NOTE — ED ADULT NURSE NOTE - OBJECTIVE STATEMENT
Pt. A&OX3, BIBEMS s/p fall. EMS reports pt was drinking when he fell face first. Pt denies LOC. Pt on anticoagulation, unknown of the name. Pt. has medium sized abrasion above the left eye. Pt. denies dizziness or headache at this time.   Neuro alert called at 9:51pm. Pt. taken to CT-scan. Unknown LOC. no other complaints at this time.

## 2023-10-30 NOTE — ED ADULT NURSE NOTE - IN THE PAST 12 MONTHS HAVE YOU USED DRUGS OTHER THAN THOSE REQUIRED FOR MEDICAL REASON?
[facility] Call Center    Phone Message    May a detailed message be left on voicemail: yes     Reason for Call: Other: Patient is calling to check on the status of her medication refill. She is out of this medication.      Action Taken: Message routed to:  Other: [facility]    Travel Screening: Not Applicable                                                                    No

## 2023-12-27 ENCOUNTER — APPOINTMENT (OUTPATIENT)
Dept: ORTHOPEDIC SURGERY | Facility: CLINIC | Age: 68
End: 2023-12-27
Payer: MEDICARE

## 2023-12-27 VITALS — WEIGHT: 188 LBS | HEIGHT: 68 IN | BODY MASS INDEX: 28.49 KG/M2

## 2023-12-27 DIAGNOSIS — I10 ESSENTIAL (PRIMARY) HYPERTENSION: ICD-10-CM

## 2023-12-27 DIAGNOSIS — M19.90 UNSPECIFIED OSTEOARTHRITIS, UNSPECIFIED SITE: ICD-10-CM

## 2023-12-27 DIAGNOSIS — M17.12 UNILATERAL PRIMARY OSTEOARTHRITIS, LEFT KNEE: ICD-10-CM

## 2023-12-27 DIAGNOSIS — M17.11 UNILATERAL PRIMARY OSTEOARTHRITIS, RIGHT KNEE: ICD-10-CM

## 2023-12-27 DIAGNOSIS — E78.00 PURE HYPERCHOLESTEROLEMIA, UNSPECIFIED: ICD-10-CM

## 2023-12-27 DIAGNOSIS — C80.1 MALIGNANT (PRIMARY) NEOPLASM, UNSPECIFIED: ICD-10-CM

## 2023-12-27 PROCEDURE — 73564 X-RAY EXAM KNEE 4 OR MORE: CPT | Mod: 50

## 2023-12-27 PROCEDURE — 20610 DRAIN/INJ JOINT/BURSA W/O US: CPT | Mod: 50

## 2023-12-27 PROCEDURE — 99214 OFFICE O/P EST MOD 30 MIN: CPT | Mod: 25

## 2023-12-27 PROCEDURE — 99204 OFFICE O/P NEW MOD 45 MIN: CPT | Mod: 25

## 2023-12-27 NOTE — ASSESSMENT
[FreeTextEntry1] : The patient is 68-year-old male with chief complaint of bilateral knee pain to the left worse than the right.  Has had cortisone in the past which is definitely helped.  He mostly has pain with stairs and going from sitting to standing.  He has some mild pain walking distances.  He has no pain at night.  Examination of both lower extremities reveal normal neurovascular exam.  Examination of both knees were limited range of motion 5 to 125 degrees with no deformities.  He has patellofemoral crepitation on the right.  He has some lateral joint crepitation on the left with slight valgus deformity.  There is no effusions.  There is no redness, rashes or visible scars.  There is no instability or apprehension.  Bilateral hip exams are normal with full painless range of motion.  X-rays done in the office today bilateral knees 4 views weightbearing was performed.  The left knee shows severe bone-on-bone arthritis of the lateral compartment particularly seen on the flexed knee view.  There are no obvious tumors or masses seen.  The right knee x-ray 4 views weightbearing shows moderate patellofemoral changes with narrowing diffusely.  There is no obvious tumors, masses or calcifications seen.  Under sterile conditions the left knee was injected with 5 cc of quarter percent plain Marcaine; 5 cc of 2% plain lidocaine and 80 mg of Depo-Medrol.  Patient tolerated the procedure well.  Under sterile conditions the right knee was injected with 5 cc of quarter percent plain Marcaine; 5 cc of 2% plain lidocaine and 80 mg of Depo-Medrol.  Patient tolerated the procedure well.  Plan at this time is ice and activity modification we will see him back in the office as needed.

## 2023-12-27 NOTE — HISTORY OF PRESENT ILLNESS
[8] : 8 [Dull/Aching] : dull/aching [Shooting] : shooting [Throbbing] : throbbing [Constant] : constant [Rest] : rest [Walking] : walking [Stairs] : stairs [] : Post Surgical Visit: no [FreeTextEntry5] : pt here for BL knee pain for a few years. states he had a csi  in lt knee last year. states pain is worse and cant go up and down stairs without pain.

## 2024-01-18 RX ORDER — AMOXICILLIN 250 MG/5ML
1 SUSPENSION, RECONSTITUTED, ORAL (ML) ORAL
Refills: 0 | DISCHARGE
Start: 2024-01-18

## 2024-02-16 ENCOUNTER — INPATIENT (INPATIENT)
Facility: HOSPITAL | Age: 69
LOS: 1 days | Discharge: HOME | DRG: 684 | End: 2024-02-18
Attending: STUDENT IN AN ORGANIZED HEALTH CARE EDUCATION/TRAINING PROGRAM | Admitting: FAMILY MEDICINE
Payer: MEDICARE

## 2024-02-16 VITALS — WEIGHT: 186.95 LBS | HEIGHT: 68 IN

## 2024-02-16 DIAGNOSIS — N17.9 ACUTE KIDNEY FAILURE, UNSPECIFIED: ICD-10-CM

## 2024-02-16 DIAGNOSIS — D61.818 OTHER PANCYTOPENIA: ICD-10-CM

## 2024-02-16 LAB
ALBUMIN SERPL ELPH-MCNC: 3.9 G/DL — SIGNIFICANT CHANGE UP (ref 3.3–5)
ALP SERPL-CCNC: 67 U/L — SIGNIFICANT CHANGE UP (ref 40–120)
ALT FLD-CCNC: 51 U/L — SIGNIFICANT CHANGE UP (ref 12–78)
ANION GAP SERPL CALC-SCNC: 11 MMOL/L — SIGNIFICANT CHANGE UP (ref 5–17)
APPEARANCE UR: CLEAR — SIGNIFICANT CHANGE UP
AST SERPL-CCNC: 52 U/L — HIGH (ref 15–37)
BACTERIA # UR AUTO: NEGATIVE /HPF — SIGNIFICANT CHANGE UP
BASOPHILS # BLD AUTO: 0 K/UL — SIGNIFICANT CHANGE UP (ref 0–0.2)
BASOPHILS NFR BLD AUTO: 0 % — SIGNIFICANT CHANGE UP (ref 0–2)
BILIRUB SERPL-MCNC: 0.5 MG/DL — SIGNIFICANT CHANGE UP (ref 0.2–1.2)
BILIRUB UR-MCNC: NEGATIVE — SIGNIFICANT CHANGE UP
BUN SERPL-MCNC: 58 MG/DL — HIGH (ref 7–23)
CALCIUM SERPL-MCNC: 10.4 MG/DL — HIGH (ref 8.5–10.1)
CAST: 11 /LPF — HIGH (ref 0–4)
CHLORIDE SERPL-SCNC: 97 MMOL/L — SIGNIFICANT CHANGE UP (ref 96–108)
CO2 SERPL-SCNC: 27 MMOL/L — SIGNIFICANT CHANGE UP (ref 22–31)
COLOR SPEC: YELLOW — SIGNIFICANT CHANGE UP
COMMENT - URINE: SIGNIFICANT CHANGE UP
CREAT SERPL-MCNC: 4.42 MG/DL — HIGH (ref 0.5–1.3)
DIFF PNL FLD: NEGATIVE — SIGNIFICANT CHANGE UP
EGFR: 14 ML/MIN/1.73M2 — LOW
EOSINOPHIL # BLD AUTO: 0.11 K/UL — SIGNIFICANT CHANGE UP (ref 0–0.5)
EOSINOPHIL NFR BLD AUTO: 2 % — SIGNIFICANT CHANGE UP (ref 0–6)
GLUCOSE SERPL-MCNC: 74 MG/DL — SIGNIFICANT CHANGE UP (ref 70–99)
GLUCOSE UR QL: NEGATIVE MG/DL — SIGNIFICANT CHANGE UP
HCT VFR BLD CALC: 29.3 % — LOW (ref 39–50)
HGB BLD-MCNC: 10.3 G/DL — LOW (ref 13–17)
HYALINE CASTS # UR AUTO: PRESENT
KETONES UR-MCNC: ABNORMAL MG/DL
LEUKOCYTE ESTERASE UR-ACNC: ABNORMAL
LYMPHOCYTES # BLD AUTO: 1.12 K/UL — SIGNIFICANT CHANGE UP (ref 1–3.3)
LYMPHOCYTES # BLD AUTO: 20 % — SIGNIFICANT CHANGE UP (ref 13–44)
MAGNESIUM SERPL-MCNC: 1.8 MG/DL — SIGNIFICANT CHANGE UP (ref 1.6–2.6)
MANUAL SMEAR VERIFICATION: SIGNIFICANT CHANGE UP
MCHC RBC-ENTMCNC: 35 PG — HIGH (ref 27–34)
MCHC RBC-ENTMCNC: 35.2 GM/DL — SIGNIFICANT CHANGE UP (ref 32–36)
MCV RBC AUTO: 99.7 FL — SIGNIFICANT CHANGE UP (ref 80–100)
MONOCYTES # BLD AUTO: 0.62 K/UL — SIGNIFICANT CHANGE UP (ref 0–0.9)
MONOCYTES NFR BLD AUTO: 11 % — SIGNIFICANT CHANGE UP (ref 2–14)
NEUTROPHILS # BLD AUTO: 3.77 K/UL — SIGNIFICANT CHANGE UP (ref 1.8–7.4)
NEUTROPHILS NFR BLD AUTO: 66 % — SIGNIFICANT CHANGE UP (ref 43–77)
NEUTS BAND # BLD: 1 % — SIGNIFICANT CHANGE UP (ref 0–8)
NITRITE UR-MCNC: NEGATIVE — SIGNIFICANT CHANGE UP
NRBC # BLD: 0 /100 WBCS — SIGNIFICANT CHANGE UP (ref 0–0)
NRBC # BLD: SIGNIFICANT CHANGE UP /100 WBCS (ref 0–0)
PH UR: 5 — SIGNIFICANT CHANGE UP (ref 5–8)
PHOSPHATE SERPL-MCNC: 5.6 MG/DL — HIGH (ref 2.5–4.5)
PLAT MORPH BLD: NORMAL — SIGNIFICANT CHANGE UP
PLATELET # BLD AUTO: 168 K/UL — SIGNIFICANT CHANGE UP (ref 150–400)
POTASSIUM SERPL-MCNC: 3.7 MMOL/L — SIGNIFICANT CHANGE UP (ref 3.5–5.3)
POTASSIUM SERPL-SCNC: 3.7 MMOL/L — SIGNIFICANT CHANGE UP (ref 3.5–5.3)
PROT SERPL-MCNC: 7.8 GM/DL — SIGNIFICANT CHANGE UP (ref 6–8.3)
PROT UR-MCNC: 100 MG/DL
RBC # BLD: 2.94 M/UL — LOW (ref 4.2–5.8)
RBC # FLD: 15.3 % — HIGH (ref 10.3–14.5)
RBC BLD AUTO: NORMAL — SIGNIFICANT CHANGE UP
RBC CASTS # UR COMP ASSIST: 0 /HPF — SIGNIFICANT CHANGE UP (ref 0–4)
SODIUM SERPL-SCNC: 135 MMOL/L — SIGNIFICANT CHANGE UP (ref 135–145)
SP GR SPEC: 1.02 — SIGNIFICANT CHANGE UP (ref 1–1.03)
SQUAMOUS # UR AUTO: 9 /HPF — HIGH (ref 0–5)
UROBILINOGEN FLD QL: 1 MG/DL — SIGNIFICANT CHANGE UP (ref 0.2–1)
WBC # BLD: 5.62 K/UL — SIGNIFICANT CHANGE UP (ref 3.8–10.5)
WBC # FLD AUTO: 5.62 K/UL — SIGNIFICANT CHANGE UP (ref 3.8–10.5)
WBC UR QL: 5 /HPF — SIGNIFICANT CHANGE UP (ref 0–5)

## 2024-02-16 PROCEDURE — 36415 COLL VENOUS BLD VENIPUNCTURE: CPT

## 2024-02-16 PROCEDURE — 76770 US EXAM ABDO BACK WALL COMP: CPT | Mod: 26

## 2024-02-16 PROCEDURE — 99223 1ST HOSP IP/OBS HIGH 75: CPT | Mod: 25

## 2024-02-16 PROCEDURE — 80048 BASIC METABOLIC PNL TOTAL CA: CPT

## 2024-02-16 PROCEDURE — 99497 ADVNCD CARE PLAN 30 MIN: CPT

## 2024-02-16 PROCEDURE — 71045 X-RAY EXAM CHEST 1 VIEW: CPT | Mod: 26

## 2024-02-16 PROCEDURE — 99285 EMERGENCY DEPT VISIT HI MDM: CPT | Mod: FS

## 2024-02-16 PROCEDURE — 85730 THROMBOPLASTIN TIME PARTIAL: CPT

## 2024-02-16 PROCEDURE — 93010 ELECTROCARDIOGRAM REPORT: CPT

## 2024-02-16 PROCEDURE — 85610 PROTHROMBIN TIME: CPT

## 2024-02-16 PROCEDURE — 80061 LIPID PANEL: CPT

## 2024-02-16 PROCEDURE — 85027 COMPLETE CBC AUTOMATED: CPT

## 2024-02-16 RX ORDER — SODIUM CHLORIDE 9 MG/ML
1000 INJECTION INTRAMUSCULAR; INTRAVENOUS; SUBCUTANEOUS ONCE
Refills: 0 | Status: COMPLETED | OUTPATIENT
Start: 2024-02-16 | End: 2024-02-16

## 2024-02-16 RX ORDER — INFLUENZA VIRUS VACCINE 15; 15; 15; 15 UG/.5ML; UG/.5ML; UG/.5ML; UG/.5ML
0.7 SUSPENSION INTRAMUSCULAR ONCE
Refills: 0 | Status: DISCONTINUED | OUTPATIENT
Start: 2024-02-16 | End: 2024-02-18

## 2024-02-16 RX ORDER — ATORVASTATIN CALCIUM 80 MG/1
10 TABLET, FILM COATED ORAL AT BEDTIME
Refills: 0 | Status: DISCONTINUED | OUTPATIENT
Start: 2024-02-16 | End: 2024-02-18

## 2024-02-16 RX ORDER — SODIUM CHLORIDE 9 MG/ML
1000 INJECTION INTRAMUSCULAR; INTRAVENOUS; SUBCUTANEOUS
Refills: 0 | Status: DISCONTINUED | OUTPATIENT
Start: 2024-02-16 | End: 2024-02-17

## 2024-02-16 RX ADMIN — SODIUM CHLORIDE 1000 MILLILITER(S): 9 INJECTION INTRAMUSCULAR; INTRAVENOUS; SUBCUTANEOUS at 21:57

## 2024-02-16 RX ADMIN — SODIUM CHLORIDE 1000 MILLILITER(S): 9 INJECTION INTRAMUSCULAR; INTRAVENOUS; SUBCUTANEOUS at 18:42

## 2024-02-16 RX ADMIN — ATORVASTATIN CALCIUM 10 MILLIGRAM(S): 80 TABLET, FILM COATED ORAL at 23:38

## 2024-02-16 NOTE — H&P ADULT - NSHPPHYSICALEXAM_GEN_ALL_CORE
ICU Vital Signs Last 24 Hrs  T(C): 36.8 (16 Feb 2024 21:59), Max: 36.8 (16 Feb 2024 17:51)  T(F): 98.3 (16 Feb 2024 21:59), Max: 98.3 (16 Feb 2024 21:59)  HR: 86 (16 Feb 2024 21:59) (86 - 114)  BP: 145/84 (16 Feb 2024 21:59) (105/68 - 145/84)  BP(mean): 98 (16 Feb 2024 21:59) (81 - 98)    RR: 18 (16 Feb 2024 21:59) (18 - 18)  SpO2: 100% (16 Feb 2024 21:59) (98% - 100%)    O2 Parameters below as of 16 Feb 2024 21:59  Patient On (Oxygen Delivery Method): room air

## 2024-02-16 NOTE — H&P ADULT - TIME BILLING
I spent a total of 80 minutes on the date of this encounter coordinating the patient's care. This includes reviewing documentation pertinent to this admission, results and imaging in addition to completing a history and physical examination on the patient. Further tests, medications, and procedures have been ordered as indicated. Laboratory results and the plan of care were communicated to the patient and or their family member. Supporting documentation was completed and added to the patient's chart.

## 2024-02-16 NOTE — H&P ADULT - NSICDXFAMILYHX_GEN_ALL_CORE_FT
FAMILY HISTORY:  No pertinent family history in first degree relatives     FAMILY HISTORY:  Mother  Still living? Unknown  FHx: rheumatoid arthritis, Age at diagnosis: Age Unknown

## 2024-02-16 NOTE — ED ADULT NURSE NOTE - OBJECTIVE STATEMENT
Sent in by PMD with abnormal lab results  Patient states he was taking Ibuprofen in AM and PM for arthritis pain

## 2024-02-16 NOTE — H&P ADULT - ASSESSMENT
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 education  - encourage po fluids  - Nephrology consult  - DVT proph: heparin  - Full Code

## 2024-02-16 NOTE — PATIENT PROFILE ADULT - FALL HARM RISK - HARM RISK INTERVENTIONS

## 2024-02-16 NOTE — PATIENT PROFILE ADULT - FUNCTIONAL ASSESSMENT - BASIC MOBILITY 6.
3-calculated by average/Not able to assess (calculate score using [facility] averaging method)

## 2024-02-16 NOTE — H&P ADULT - NSHPLABSRESULTS_GEN_ALL_CORE
e< from: US Kidney and Bladder (02.16.24 @ 19:39) >    ACC: [id number] EXAM:  US KIDNEYS AND BLADDER   ORDERED BY: PAO KEVIN     PROCEDURE DATE:  02/16/2024          INTERPRETATION:  CLINICAL INFORMATION: Acute kidney injury.    COMPARISON: Right upper quadrant ultrasound from January 28, 2020.    TECHNIQUE: Sonography of the kidneys and bladder.    FINDINGS:  Right kidney: 9.7 cm. No hydronephrosis. Echogenic focus in the   interpolar region, measuring 0.5 cm.    Left kidney: 10.8 cm. No hydronephrosis.    Urinary bladder: Within normal limits. Bladder volume: 34 mL.    IMPRESSION:  No hydronephrosis of either kidney.    --- End of Report ---  PRINCESS VALERIO MD; Attending Radiologist  This document has been electronically signed. Feb 16 2024  7:57PM    < end of copied text >

## 2024-02-16 NOTE — H&P ADULT - CONVERSATION DETAILS
Pt is Full code.   Pt states he is Full Code and would like all measures of resuscitation including  CPR, intubation, IVF, antibiotics,  HD, feeding tube as needed.      He states  his  son, Guillermo Hatfield,  is his HCP.

## 2024-02-16 NOTE — ED ADULT NURSE NOTE - CHPI ED NUR SYMPTOMS NEG
no back pain/no chills/no decreased eating/drinking/no dizziness/no fever/no headache/no loss of consciousness/no nausea

## 2024-02-16 NOTE — ED ADULT NURSE NOTE - NSFALLHARMRISKINTERV_ED_ALL_ED
Assistance OOB with selected safe patient handling equipment if applicable/Assistance with ambulation/Communicate risk of Fall with Harm to all staff, patient, and family/Encourage patient to sit up slowly, dangle for a short time, stand at bedside before walking/Monitor gait and stability/Orthostatic vital signs/Provide visual cue: red socks, yellow wristband, yellow gown, etc/Reinforce activity limits and safety measures with patient and family/Bed in lowest position, wheels locked, appropriate side rails in place/Call bell, personal items and telephone in reach/Instruct patient to call for assistance before getting out of bed/chair/stretcher/Non-slip footwear applied when patient is off stretcher/[place] to call system/Physically safe environment - no spills, clutter or unnecessary equipment/Purposeful Proactive Rounding/Room/bathroom lighting operational, light cord in reach

## 2024-02-16 NOTE — ED ADULT TRIAGE NOTE - CHIEF COMPLAINT QUOTE
Patient presents to ED sent in by PCP for elevated kidney function level. Pt denies any complaints. Pt hx of HTN, high cholesterol and is on diuretics.

## 2024-02-16 NOTE — ED STATDOCS - CLINICAL SUMMARY MEDICAL DECISION MAKING FREE TEXT BOX
In summary this is a 68-year-old male who presents with chief complaint of elevated creatinine.  Vital signs within normal limits on arrival with exception of mild hypertension.  Labs demonstrate hemoglobin 10.3, has chronic anemia.  CMP with elevated creatinine at 4.42 elevated BUN to 58.  LFTs within normal limits, mild elevation phosphorus 5.6.  UA demonstrates hyaline casts, negative for infection.  X-ray was performed independently interpreted by myself revealed no acute findings.  Ultrasound of the kidney and bladder was performed demonstrating no hydronephrosis or any other findings.  Patient was given IV fluids in the department.  Likely ANTOINE secondary to ATN from NSAID use, with concurrent Lasix use.  Admit to medicine service, nephrology consult.

## 2024-02-16 NOTE — H&P ADULT - MUSCULOSKELETAL
hands/ROM intact/no calf tenderness/joint swelling/strength 5/5 bilateral upper extremities/strength 5/5 bilateral lower extremities

## 2024-02-16 NOTE — H&P ADULT - NSHPSOCIALHISTORY_GEN_ALL_CORE
Pt lives at home.  He is a retired .  Pt denies tob/drug use.  He reports occasional ETOH heavy use and states he last drank 8 days ago.

## 2024-02-16 NOTE — ED STATDOCS - PROGRESS NOTE DETAILS
69 yo male with a PMH of leukemia in remission, RA, presents with elevated Cr. Pt was having normal visit checking his labs with Dr. Sanchez and was called stating that he cr was elevated and to come to the ER. Pt states that he has been on lasix for years for LE edema and also has been takign 8 advil (4 in the morning and 4 at night) this past week for his RA pain. Denies trouble urinating, fever, abd pain, n/v. Will check labs, sono, ua and reeval. -Elvin Lopez PA-C Spoke with nephro attending Dr. Cotter who is covering for DR. Herron. Was made aware of the case and findings and agree with admission. -Elvin Lopez PA-C

## 2024-02-16 NOTE — H&P ADULT - HISTORY OF PRESENT ILLNESS
67 y/o male with PMHx of HTN, RA, Leukemia in remission since January 2023 presents to the ED with elevated creatinine level on outpatient labs. Pt states he was at his doctor for monitoring of his Leukemia and was found to have creatinine around 4. Pt reports recently has been taking 3-4 advil BID for his RA. Pt is on furosemide due to having swelling in his legs at some point but denies hx of heart failure. Pt reports previous high of creatinine was around 2 but is usually around normal.  Pt is a pleasant 69 y/o male with PMHx of HTN, RA, Leukemia in remission since January 2023, CKD I ,  DVT and PE on xaralto,  who  presented  to [place] ED with elevated creatinine level on outpatient labs. Pt reports he was seeing Dr. Sanchez his oncologist for monitoring of his Leukemia and was found to have creatinine around 4.    He  reported to me that he was having pain and swelling of his hands, knees and shoulders associated with his Rheumatoid Arthritis flaring up and then for the last week he ran out of tylenol and took  4-5  advil 200mg tabs twice daily   for his RA.   Pt states he was told to avoid prednisone and that he had not wanted to take prednisone for the flare up.   Pt is on furosemide due to prior leg swelling  but denies hx of heart failure. Pt is on losartan.  He denies ASA or other NSAID use.    Pt denies CP, SOB, no abd pain,  no fever/chills, no URI complaints,  no  urinary complaints, no palpitations,  no edema or calf pain, no dark stools, or BRBPR.

## 2024-02-16 NOTE — ED ADULT NURSE NOTE - NSFALLOOBATTEMPT_ED_ALL_ED
HPI:    Patient ID: Hali Adorno is a 80year old female. HPI Comments: She has a cough for the past several weeks. She has had some discharge out of her eyes in the morning. Her thyroid is underactive. She has cold intolerance.   She started [employer] (two) times daily as needed. Disp: 50 g Rfl: 3   Hydrocortisone 2.5 % External Lotion Apply 1 Application topically 2 (two) times daily.  Disp: 118 mL Rfl: 3   Pantoprazole Sodium 40 MG Oral Tab EC Take 1 tablet (40 mg total) by mouth 2 (two) times daily as No rx at present. Pt only has wheezing when she is sick, but her URI's last a long time. - Magnesium [E]; Future    2. Upper respiratory tract infection, unspecified type  - azithromycin (ZITHROMAX Z-JOHN) 250 MG Oral Tab;  Take two tablets by mouth today, No

## 2024-02-16 NOTE — ED STATDOCS - OBJECTIVE STATEMENT
69 y/o male with PMHx of HTN, RA, Leukemia in remission since January 2023 presents to the ED with elevated creatinine level on outpatient labs. Pt states he was at his doctor for monitoring of his Leukemia and was found to have creatinine around 4. Pt reports recently has been taking 3-4 advil BID for his RA. Pt is on furosemide due to having swelling in his legs at some point but denies hx of heart failure. Pt reports previous high of creatinine was around 2 but is usually around normal.     Onc: Laura 69 y/o male with PMHx of HTN, RA, Leukemia in remission since January 2023 presents to the ED with elevated creatinine level on outpatient labs. Pt states he was at his doctor for monitoring of his Leukemia and was found to have creatinine around 4. Pt reports recently has been taking 3-4 advil BID for his RA. Pt is on furosemide due to having swelling in his legs at some point but denies hx of heart failure. Pt reports previous high of creatinine was around 2 but is usually around normal.     Onc: Laura  Nephro: Gopal

## 2024-02-17 LAB
ANION GAP SERPL CALC-SCNC: 11 MMOL/L — SIGNIFICANT CHANGE UP (ref 5–17)
APTT BLD: 32.9 SEC — SIGNIFICANT CHANGE UP (ref 24.5–35.6)
APTT BLD: 78 SEC — HIGH (ref 24.5–35.6)
BUN SERPL-MCNC: 49 MG/DL — HIGH (ref 7–23)
CALCIUM SERPL-MCNC: 9.3 MG/DL — SIGNIFICANT CHANGE UP (ref 8.5–10.1)
CHLORIDE SERPL-SCNC: 104 MMOL/L — SIGNIFICANT CHANGE UP (ref 96–108)
CHOLEST SERPL-MCNC: 134 MG/DL — SIGNIFICANT CHANGE UP
CO2 SERPL-SCNC: 22 MMOL/L — SIGNIFICANT CHANGE UP (ref 22–31)
CREAT SERPL-MCNC: 3.14 MG/DL — HIGH (ref 0.5–1.3)
EGFR: 21 ML/MIN/1.73M2 — LOW
GLUCOSE SERPL-MCNC: 138 MG/DL — HIGH (ref 70–99)
HCT VFR BLD CALC: 25.1 % — LOW (ref 39–50)
HCT VFR BLD CALC: 25.2 % — LOW (ref 39–50)
HDLC SERPL-MCNC: 55 MG/DL — SIGNIFICANT CHANGE UP
HGB BLD-MCNC: 8.8 G/DL — LOW (ref 13–17)
HGB BLD-MCNC: 9 G/DL — LOW (ref 13–17)
INR BLD: 1.27 RATIO — HIGH (ref 0.85–1.18)
LIPID PNL WITH DIRECT LDL SERPL: 60 MG/DL — SIGNIFICANT CHANGE UP
MCHC RBC-ENTMCNC: 34.9 PG — HIGH (ref 27–34)
MCHC RBC-ENTMCNC: 35.1 GM/DL — SIGNIFICANT CHANGE UP (ref 32–36)
MCHC RBC-ENTMCNC: 35.3 PG — HIGH (ref 27–34)
MCHC RBC-ENTMCNC: 35.7 GM/DL — SIGNIFICANT CHANGE UP (ref 32–36)
MCV RBC AUTO: 98.8 FL — SIGNIFICANT CHANGE UP (ref 80–100)
MCV RBC AUTO: 99.6 FL — SIGNIFICANT CHANGE UP (ref 80–100)
NON HDL CHOLESTEROL: 79 MG/DL — SIGNIFICANT CHANGE UP
PLATELET # BLD AUTO: 117 K/UL — LOW (ref 150–400)
PLATELET # BLD AUTO: 123 K/UL — LOW (ref 150–400)
POTASSIUM SERPL-MCNC: 3.3 MMOL/L — LOW (ref 3.5–5.3)
POTASSIUM SERPL-SCNC: 3.3 MMOL/L — LOW (ref 3.5–5.3)
PROTHROM AB SERPL-ACNC: 14.2 SEC — HIGH (ref 9.5–13)
RBC # BLD: 2.52 M/UL — LOW (ref 4.2–5.8)
RBC # BLD: 2.55 M/UL — LOW (ref 4.2–5.8)
RBC # FLD: 15 % — HIGH (ref 10.3–14.5)
RBC # FLD: 15.2 % — HIGH (ref 10.3–14.5)
SODIUM SERPL-SCNC: 137 MMOL/L — SIGNIFICANT CHANGE UP (ref 135–145)
TRIGL SERPL-MCNC: 98 MG/DL — SIGNIFICANT CHANGE UP
WBC # BLD: 3.11 K/UL — LOW (ref 3.8–10.5)
WBC # BLD: 3.48 K/UL — LOW (ref 3.8–10.5)
WBC # FLD AUTO: 3.11 K/UL — LOW (ref 3.8–10.5)
WBC # FLD AUTO: 3.48 K/UL — LOW (ref 3.8–10.5)

## 2024-02-17 PROCEDURE — 99222 1ST HOSP IP/OBS MODERATE 55: CPT

## 2024-02-17 PROCEDURE — 99232 SBSQ HOSP IP/OBS MODERATE 35: CPT

## 2024-02-17 RX ORDER — HEPARIN SODIUM 5000 [USP'U]/ML
INJECTION INTRAVENOUS; SUBCUTANEOUS
Qty: 25000 | Refills: 0 | Status: DISCONTINUED | OUTPATIENT
Start: 2024-02-17 | End: 2024-02-18

## 2024-02-17 RX ORDER — HEPARIN SODIUM 5000 [USP'U]/ML
7000 INJECTION INTRAVENOUS; SUBCUTANEOUS EVERY 6 HOURS
Refills: 0 | Status: DISCONTINUED | OUTPATIENT
Start: 2024-02-17 | End: 2024-02-18

## 2024-02-17 RX ORDER — POTASSIUM CHLORIDE 20 MEQ
40 PACKET (EA) ORAL ONCE
Refills: 0 | Status: COMPLETED | OUTPATIENT
Start: 2024-02-17 | End: 2024-02-17

## 2024-02-17 RX ORDER — HEPARIN SODIUM 5000 [USP'U]/ML
3500 INJECTION INTRAVENOUS; SUBCUTANEOUS EVERY 6 HOURS
Refills: 0 | Status: DISCONTINUED | OUTPATIENT
Start: 2024-02-17 | End: 2024-02-18

## 2024-02-17 RX ORDER — HYDROXYCHLOROQUINE SULFATE 200 MG
200 TABLET ORAL
Refills: 0 | Status: DISCONTINUED | OUTPATIENT
Start: 2024-02-17 | End: 2024-02-18

## 2024-02-17 RX ORDER — ZOLPIDEM TARTRATE 10 MG/1
5 TABLET ORAL AT BEDTIME
Refills: 0 | Status: DISCONTINUED | OUTPATIENT
Start: 2024-02-17 | End: 2024-02-18

## 2024-02-17 RX ADMIN — Medication 200 MILLIGRAM(S): at 20:43

## 2024-02-17 RX ADMIN — HEPARIN SODIUM 1600 UNIT(S)/HR: 5000 INJECTION INTRAVENOUS; SUBCUTANEOUS at 14:01

## 2024-02-17 RX ADMIN — Medication 40 MILLIEQUIVALENT(S): at 10:50

## 2024-02-17 RX ADMIN — HEPARIN SODIUM 1600 UNIT(S)/HR: 5000 INJECTION INTRAVENOUS; SUBCUTANEOUS at 07:22

## 2024-02-17 RX ADMIN — ATORVASTATIN CALCIUM 10 MILLIGRAM(S): 80 TABLET, FILM COATED ORAL at 20:43

## 2024-02-17 RX ADMIN — HEPARIN SODIUM 1600 UNIT(S)/HR: 5000 INJECTION INTRAVENOUS; SUBCUTANEOUS at 21:14

## 2024-02-17 RX ADMIN — ZOLPIDEM TARTRATE 5 MILLIGRAM(S): 10 TABLET ORAL at 20:43

## 2024-02-17 RX ADMIN — Medication 200 MILLIGRAM(S): at 10:50

## 2024-02-17 RX ADMIN — HEPARIN SODIUM 1600 UNIT(S)/HR: 5000 INJECTION INTRAVENOUS; SUBCUTANEOUS at 19:07

## 2024-02-17 RX ADMIN — HEPARIN SODIUM 1600 UNIT(S)/HR: 5000 INJECTION INTRAVENOUS; SUBCUTANEOUS at 06:00

## 2024-02-17 RX ADMIN — SODIUM CHLORIDE 150 MILLILITER(S): 9 INJECTION INTRAMUSCULAR; INTRAVENOUS; SUBCUTANEOUS at 01:57

## 2024-02-17 NOTE — PROGRESS NOTE ADULT - ASSESSMENT
67 y/o male with PMHx of HTN, RA, Leukemia in remission since January 2023, CKD I ,  DVT and PE on xaralto,  who  presented  to [place] ED with elevated creatinine level on outpatient labs. Pt reports he was seeing Dr. Sanchez his oncologist for monitoring of his Leukemia and was found to have creatinine around 4.    He  reported to me that he was having pain and swelling of his hands, knees and shoulders associated with his Rheumatoid Arthritis flaring up and then for the last week he ran out of tylenol and took  4-5  advil 200mg tabs twice daily   for his RA.   Pt states he was told to avoid prednisone and that he had not wanted to take prednisone for the flare up.   Pt is on furosemide due to prior leg swelling  but denies hx of heart failure. Pt is on losartan.    # ANTOINE secondary to ATN from NSAID use  - Creatinine improving  - Continue IVF  - Nephro consult  - Sono negative for obstructive uropathy  - Trend  - Continue to hold lasix and losartan    # HTN  - Stable  - Continue to monitor off ARB    # History of DVT/PE  - Hold eliquis  - Continue heparin for now until   creatinine improves    # History of leukemia  - Stable  - Dr. Sanchez    # RA  - Continue hydroxychloroquine    # DVT prophylaxis  - Heparin    # Disposition: Monitor Cr, restart DOAC once Egfr improves or switch to eliquis.    69 y/o male with PMHx of HTN, RA, Leukemia in remission since January 2023, CKD I ,  DVT and PE on xaralto,  who  presented  to [place] ED with elevated creatinine level on outpatient labs. Pt reports he was seeing Dr. Sanchez his oncologist for monitoring of his Leukemia and was found to have creatinine around 4.    He  reported to me that he was having pain and swelling of his hands, knees and shoulders associated with his Rheumatoid Arthritis flaring up and then for the last week he ran out of tylenol and took  4-5  advil 200mg tabs twice daily   for his RA.   Pt states he was told to avoid prednisone and that he had not wanted to take prednisone for the flare up.   Pt is on furosemide due to prior leg swelling  but denies hx of heart failure. Pt is on losartan.    # ANTOINE secondary to ATN from NSAID use  - Creatinine improving  - Continue IVF  - Nephro consult  - Sono negative for obstructive uropathy  - Trend  - Continue to hold lasix and losartan    # HTN  - Stable  - Continue to monitor off ARB    # History of DVT/PE  - Hold eliquis  - Continue heparin for now until   creatinine improves      # History of leukemia with Pancytopenia  - Stable  - Dr. Sanchez FU    # RA  - Continue hydroxychloroquine    # DVT prophylaxis  - Heparin    # Disposition: Monitor Cr, restart DOAC once Egfr improves or switch to eliquis.

## 2024-02-17 NOTE — CONSULT NOTE ADULT - SUBJECTIVE AND OBJECTIVE BOX
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 Pt reports he was seeing Dr. Sanchez his oncologist for monitoring of his Leukemia and was found to have creatinine around 4.    He  reported to me that he was having pain and swelling of his hands, knees and shoulders associated with his Rheumatoid Arthritis flaring up and then for the last week he ran out of tylenol and took  4-5  advil 200mg tabs twice daily   for his RA.   Pt states he was told to avoid prednisone and that he had not wanted to take prednisone for the flare up.   Pt is on furosemide due to prior leg swelling  but denies hx of heart failure. Pt is on losartan.  He denies ASA or other NSAID use.    Pt denies CP, SOB, no abd pain,  no fever/chills, no URI complaints,  no  urinary complaints, no palpitations,  no edema or calf pain, no dark stools, or BRBPR.      (16 Feb 2024 22:02)    Nephrology  Above in fo from chart  Pt states he took 4-5 Advils BID for about a week  Creat  was 4.46 on admission, now improving to <4        PAST MEDICAL & SURGICAL HISTORY:  ETOH abuse      Hyperlipidemia LDL goal <100      Restless leg syndrome      Rheumatoid arteritis      Leukemia      No significant past surgical history          FAMILY HISTORY:  FHx: rheumatoid arthritis (Mother)          MEDICATIONS  (STANDING):  atorvastatin 10 milliGRAM(s) Oral at bedtime  heparin  Infusion.  Unit(s)/Hr (16 mL/Hr) IV Continuous <Continuous>  hydroxychloroquine 200 milliGRAM(s) Oral two times a day  influenza  Vaccine (HIGH DOSE) 0.7 milliLiter(s) IntraMuscular once  sodium chloride 0.9%. 1000 milliLiter(s) (150 mL/Hr) IV Continuous <Continuous>    MEDICATIONS  (PRN):  heparin   Injectable 7000 Unit(s) IV Push every 6 hours PRN For aPTT less than 40  heparin   Injectable 3500 Unit(s) IV Push every 6 hours PRN For aPTT between 40 - 57  zolpidem 5 milliGRAM(s) Oral at bedtime PRN Insomnia        Allergies    Aloe Lotion (Rash)    Intolerances        I&O's Summary        REVIEW OF SYSTEMS:    CONSTITUTIONAL:  As per HPI.  CONSTITUTIONAL: No weakness, fevers or chills  EYES/ENT: No visual changes;  No vertigo or throat pain   NECK: No pain or stiffness  CARDIOVASCULAR: No chest pain or palpitations  GASTROINTESTINAL: No abdominal or epigastric pain. No nausea, vomiting, or hematemesis; No diarrhea or constipation. No melena or hematochezia.  GENITOURINARY: No dysuria, frequency or hematuria  NEUROLOGICAL: No numbness or weakness  SKIN: No itching, burning, rashes, or lesions   All other review of systems is negative unless indicated above        Vital Signs Last 24 Hrs  T(C): 36.6 (17 Feb 2024 20:30), Max: 36.9 (17 Feb 2024 10:05)  T(F): 97.8 (17 Feb 2024 20:30), Max: 98.5 (17 Feb 2024 10:05)  HR: 78 (17 Feb 2024 20:30) (76 - 89)  BP: 126/74 (17 Feb 2024 20:30) (121/57 - 148/82)  BP(mean): 98 (16 Feb 2024 21:59) (98 - 98)  RR: 17 (17 Feb 2024 20:30) (17 - 18)  SpO2: 98% (17 Feb 2024 20:30) (97% - 100%)    Parameters below as of 17 Feb 2024 20:30  Patient On (Oxygen Delivery Method): room air        Daily     Daily     I&O's Summary      PHYSICAL EXAM:    General:  Alert, No acute distress.    Neuro:  Alert and oriented to person, place, and time. Able to communicate  well.      HEENT:  No JVD, no masses, Eyes anicteric, ,    Cardiovascular:  Regular rate and rhythm, with normal S1 and S2.    Lungs:  clear. no rales, no wheezing, .    Abdomen:  Normoactive bowel sounds. Soft, flat, non-tender, and non-distended.  positive bowel sounds    Skin:  Warm, dry    Extremities:  warm,  no cyanosis  hands mildly swollen      LABS:                     137    |  104    |  49     ----------------------------<  138       17 Feb 2024 05:20  3.3     |  22     |  3.14     135    |  97     |  58     ----------------------------<  74        16 Feb 2024 18:24  3.7     |  27     |  4.42     Ca    9.3        17 Feb 2024 05:20  Ca    10.4       16 Feb 2024 18:24    Phos  5.6       16 Feb 2024 18:24    Mg     1.8       16 Feb 2024 18:24                              8.8    3.11  )-----------( 123      ( 17 Feb 2024 12:09 )             25.1        PTT - ( 17 Feb 2024 12:09 )  PTT:78.0 sec  Urinalysis Basic - ( 17 Feb 2024 05:20 )    Color: x / Appearance: x / SG: x / pH: x  Gluc: 138 mg/dL / Ketone: x  / Bili: x / Urobili: x   Blood: x / Protein: x / Nitrite: x   Leuk Esterase: x / RBC: x / WBC x   Sq Epi: x / Non Sq Epi: x / Bacteria: x

## 2024-02-17 NOTE — CONSULT NOTE ADULT - ASSESSMENT
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 Pt reports he was seeing Dr. Sanchez his oncologist for monitoring of his Leukemia and was found to have creatinine around 4.    He  reported to me that he was having pain and swelling of his hands, knees and shoulders associated with his Rheumatoid Arthritis flaring up and then for the last week he ran out of tylenol and took  4-5  advil 200mg tabs twice daily   for his RA.   Pt states he was told to avoid prednisone and that he had not wanted to take prednisone for the flare up.   Pt is on furosemide due to prior leg swelling  but denies hx of heart failure. Pt is on losartan.  He denies ASA or other NSAID use.    Pt denies CP, SOB, no abd pain,  no fever/chills, no URI complaints,  no  urinary complaints, no palpitations,  no edema or calf pain, no dark stools, or BRBPR.      (16 Feb 2024 22:02)    Nephrology  Above in fo from chart  CKD 3- ANTOINE due to NSAID use ~ 1 week  Pt states he took 4-5 Advils BID for about a week  Creat  was 4.46 on admission, now improving to <4  Anemia , will be managed by heme/ onc if required  No visible blood in stool

## 2024-02-18 ENCOUNTER — TRANSCRIPTION ENCOUNTER (OUTPATIENT)
Age: 69
End: 2024-02-18

## 2024-02-18 VITALS
OXYGEN SATURATION: 100 % | RESPIRATION RATE: 16 BRPM | DIASTOLIC BLOOD PRESSURE: 75 MMHG | TEMPERATURE: 98 F | HEART RATE: 66 BPM | SYSTOLIC BLOOD PRESSURE: 154 MMHG

## 2024-02-18 LAB
ANION GAP SERPL CALC-SCNC: 2 MMOL/L — LOW (ref 5–17)
APTT BLD: 104.5 SEC — HIGH (ref 24.5–35.6)
APTT BLD: 109.5 SEC — HIGH (ref 24.5–35.6)
APTT BLD: 78.7 SEC — HIGH (ref 24.5–35.6)
BUN SERPL-MCNC: 28 MG/DL — HIGH (ref 7–23)
CALCIUM SERPL-MCNC: 8.5 MG/DL — SIGNIFICANT CHANGE UP (ref 8.5–10.1)
CHLORIDE SERPL-SCNC: 110 MMOL/L — HIGH (ref 96–108)
CO2 SERPL-SCNC: 26 MMOL/L — SIGNIFICANT CHANGE UP (ref 22–31)
CREAT SERPL-MCNC: 1.64 MG/DL — HIGH (ref 0.5–1.3)
CULTURE RESULTS: NO GROWTH — SIGNIFICANT CHANGE UP
EGFR: 45 ML/MIN/1.73M2 — LOW
GLUCOSE SERPL-MCNC: 100 MG/DL — HIGH (ref 70–99)
HCT VFR BLD CALC: 24.2 % — LOW (ref 39–50)
HGB BLD-MCNC: 8.2 G/DL — LOW (ref 13–17)
MCHC RBC-ENTMCNC: 33.9 GM/DL — SIGNIFICANT CHANGE UP (ref 32–36)
MCHC RBC-ENTMCNC: 34.3 PG — HIGH (ref 27–34)
MCV RBC AUTO: 101.3 FL — HIGH (ref 80–100)
PLATELET # BLD AUTO: 127 K/UL — LOW (ref 150–400)
POTASSIUM SERPL-MCNC: 3.9 MMOL/L — SIGNIFICANT CHANGE UP (ref 3.5–5.3)
POTASSIUM SERPL-SCNC: 3.9 MMOL/L — SIGNIFICANT CHANGE UP (ref 3.5–5.3)
RBC # BLD: 2.39 M/UL — LOW (ref 4.2–5.8)
RBC # FLD: 14.7 % — HIGH (ref 10.3–14.5)
SODIUM SERPL-SCNC: 138 MMOL/L — SIGNIFICANT CHANGE UP (ref 135–145)
SPECIMEN SOURCE: SIGNIFICANT CHANGE UP
WBC # BLD: 3 K/UL — LOW (ref 3.8–10.5)
WBC # FLD AUTO: 3 K/UL — LOW (ref 3.8–10.5)

## 2024-02-18 PROCEDURE — 99231 SBSQ HOSP IP/OBS SF/LOW 25: CPT

## 2024-02-18 PROCEDURE — 99239 HOSP IP/OBS DSCHRG MGMT >30: CPT

## 2024-02-18 RX ORDER — FUROSEMIDE 40 MG
1 TABLET ORAL
Refills: 0 | DISCHARGE

## 2024-02-18 RX ADMIN — HEPARIN SODIUM 1400 UNIT(S)/HR: 5000 INJECTION INTRAVENOUS; SUBCUTANEOUS at 07:20

## 2024-02-18 RX ADMIN — HEPARIN SODIUM 1200 UNIT(S)/HR: 5000 INJECTION INTRAVENOUS; SUBCUTANEOUS at 09:20

## 2024-02-18 RX ADMIN — HEPARIN SODIUM 1400 UNIT(S)/HR: 5000 INJECTION INTRAVENOUS; SUBCUTANEOUS at 07:24

## 2024-02-18 RX ADMIN — Medication 200 MILLIGRAM(S): at 09:15

## 2024-02-18 RX ADMIN — HEPARIN SODIUM 1400 UNIT(S)/HR: 5000 INJECTION INTRAVENOUS; SUBCUTANEOUS at 01:23

## 2024-02-18 NOTE — DISCHARGE NOTE PROVIDER - NSDCCPCAREPLAN_GEN_ALL_CORE_FT
PRINCIPAL DISCHARGE DIAGNOSIS  Diagnosis: Acute kidney failure  Assessment and Plan of Treatment: Please avoid taking NSAIDs such as Alleve, Ibuprofen, Motrin etc....Please hydrate adequately. Your Lasix was stopped during this admission. Please follow up with your doctor to see when you can resume lasix.

## 2024-02-18 NOTE — DISCHARGE NOTE PROVIDER - CARE PROVIDERS DIRECT ADDRESSES
[email or web address]

## 2024-02-18 NOTE — DISCHARGE NOTE PROVIDER - NSDCMRMEDTOKEN_GEN_ALL_CORE_FT
hydroxychloroquine 200 mg oral tablet: 1 tab(s) orally 2 times a day  Livalo 2 mg oral tablet: 1 tab(s) orally once a day  losartan 50 mg oral tablet: 1 tab(s) orally once a day  Xarelto 20 mg oral tablet: 1 tab(s) orally once a day (at bedtime) start day#22

## 2024-02-18 NOTE — DISCHARGE NOTE PROVIDER - HOSPITAL COURSE
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 Pt reports he was seeing Dr. Sanchez his oncologist for monitoring of his Leukemia and was found to have creatinine around 4.    He  reported to me that he was having pain and swelling of his hands, knees and shoulders associated with his Rheumatoid Arthritis flaring up and then for the last week he ran out of tylenol and took  4-5  advil 200mg tabs twice daily   for his RA.   Pt states he was told to avoid prednisone and that he had not wanted to take prednisone for the flare up.   Pt is on furosemide due to prior leg swelling  but denies hx of heart failure. Pt is on losartan.  He denies ASA or other NSAID use. Pt denies CP, SOB, no abd pain,  no fever/chills, no URI complaints,  no  urinary complaints, no palpitations,  no edema or calf pain, no dark stools, or BRBPR.     Sub: Pt seen and evaluated. No acute complaints. Cr: 1.64. No other acute complaints.     Vital Signs Last 24 Hrs  T(C): 36.9 (17 Feb 2024 10:05), Max: 36.9 (17 Feb 2024 10:05)  T(F): 98.5 (17 Feb 2024 10:05), Max: 98.5 (17 Feb 2024 10:05)  HR: 76 (17 Feb 2024 10:05) (76 - 114)  BP: 135/77 (17 Feb 2024 10:05) (105/68 - 148/82)  BP(mean): 98 (16 Feb 2024 21:59) (81 - 98)  RR: 17 (17 Feb 2024 10:05) (17 - 18)  SpO2: 97% (17 Feb 2024 10:05) (97% - 100%)    GEN: NAD  HEENT:  pupils equal and reactive, EOMI, no oropharyngeal lesions, erythema, exudates, oral thrush  NECK:   supple, no carotid bruits, no palpable lymph nodes, no thyromegaly  CV:  +S1, +S2, regular, no murmurs or rubs  RESP:   lungs clear to auscultation bilaterally, no wheezing, rales, rhonchi, good air entry bilaterally  GI:  abdomen soft, non-tender, non-distended, normal BS, no bruits, no abdominal masses, no palpable masses  EXT:  no clubbing, no cyanosis, no edema, no calf pain, swelling or erythema  NEURO:  AAOX3, no focal neurological deficits, follows all commands, able to move extremities spontaneously  SKIN:  no ulcers, lesions or rashes                          8.2    3.00  )-----------( 127      ( 18 Feb 2024 06:54 )             24.2   02-18    138  |  110<H>  |  28<H>  ----------------------------<  100<H>  3.9   |  26  |  1.64<H>    Ca    8.5      18 Feb 2024 06:54  Phos  5.6     02-16  Mg     1.8     02-16    TPro  7.8  /  Alb  3.9  /  TBili  0.5  /  DBili  x   /  AST  52<H>  /  ALT  51  /  AlkPhos  67  02-16      67 y/o male with PMHx of HTN, RA, Leukemia in remission since January 2023, CKD I ,  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 Pt reports he was seeing Dr. Sanchez his oncologist for monitoring of his Leukemia and was found to have creatinine around 4.    He  reported to me that he was having pain and swelling of his hands, knees and shoulders associated with his Rheumatoid Arthritis flaring up and then for the last week he ran out of tylenol and took  4-5  advil 200mg tabs twice daily   for his RA.   Pt states he was told to avoid prednisone and that he had not wanted to take prednisone for the flare up.   Pt is on furosemide due to prior leg swelling  but denies hx of heart failure. Pt is on losartan.    # ANTOINE secondary to ATN from NSAID use  -Creatinine improving, Cr: 1.64  -counselled on NSAID use   - Sono negative for obstructive uropathy  -Trend  -d/c Lasix, discuss with PCP/Provider regarding use   -can resume Losartan     #HTN  - Stable  -c/w ARB     # History of DVT/PE  -d/c Heparin   -resume Xarelto     # History of leukemia with Pancytopenia  - Stable  -f/u with Dr. Sanchez as outpatient     # RA  - Continue hydroxychloroquine

## 2024-02-18 NOTE — PROGRESS NOTE ADULT - ASSESSMENT
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 Pt reports he was seeing Dr. Sanchez his oncologist for monitoring of his Leukemia and was found to have creatinine around 4.    He  reported to me that he was having pain and swelling of his hands, knees and shoulders associated with his Rheumatoid Arthritis flaring up and then for the last week he ran out of tylenol and took  4-5  advil 200mg tabs twice daily   for his RA.   Pt states he was told to avoid prednisone and that he had not wanted to take prednisone for the flare up.   Pt is on furosemide due to prior leg swelling  but denies hx of heart failure. Pt is on losartan.  He denies ASA or other NSAID use.    Pt denies CP, SOB, no abd pain,  no fever/chills, no URI complaints,  no  urinary complaints, no palpitations,  no edema or calf pain, no dark stools, or BRBPR.      (16 Feb 2024 22:02)    Nephrology  Above in fo from chart  CKD 3- ANTOINE due to NSAID use ~ 1 week  Pt states he took 4-5 Advils BID for about a week  Creat  was 4.46 on admission, now improving to <4  Anemia , will be managed by heme/ onc if required  No visible blood in stool    2/18  ANTOINE superimposed on CKD  ANTOINE due to NSAID use, improving  s/p IV hydration  d/w hospitalist, may be dcd , f/u w Dr Chino Herron,   Case d/w Dr Chino Herron

## 2024-02-18 NOTE — DISCHARGE NOTE PROVIDER - CARE PROVIDER_API CALL
Boxer, Jonathan A  Internal Medicine  [address] [place] [id number]  Phone: [phone]  Fax: [phone]  Established Patient  Follow Up Time:     Kehinde Sanchez  Hematology/Oncology  [address], [address]  [place] [address]  Phone: [phone]  Fax: [phone]  Follow Up Time:

## 2024-02-18 NOTE — PROGRESS NOTE ADULT - SUBJECTIVE AND OBJECTIVE BOX
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 Pt reports he was seeing Dr. Sanchez his oncologist for monitoring of his Leukemia and was found to have creatinine around 4.    He  reported to me that he was having pain and swelling of his hands, knees and shoulders associated with his Rheumatoid Arthritis flaring up and then for the last week he ran out of tylenol and took  4-5  advil 200mg tabs twice daily   for his RA.   Pt states he was told to avoid prednisone and that he had not wanted to take prednisone for the flare up.   Pt is on furosemide due to prior leg swelling  but denies hx of heart failure. Pt is on losartan.  He denies ASA or other NSAID use.    Pt denies CP, SOB, no abd pain,  no fever/chills, no URI complaints,  no  urinary complaints, no palpitations,  no edema or calf pain, no dark stools, or BRBPR.      (16 Feb 2024 22:02)    Nephrology  Above in fo from chart  Pt states he took 4-5 Advil's BID for about a week  Creat  was 4.46 on admission, now improving to <4    2/18  Feels well, creat improving        PAST MEDICAL & SURGICAL HISTORY:  ETOH abuse      Hyperlipidemia LDL goal <100      Restless leg syndrome      Rheumatoid arteritis      Leukemia      No significant past surgical history          FAMILY HISTORY:  FHx: rheumatoid arthritis (Mother)            MEDICATIONS  (STANDING):  atorvastatin 10 milliGRAM(s) Oral at bedtime  heparin  Infusion.  Unit(s)/Hr (16 mL/Hr) IV Continuous <Continuous>  hydroxychloroquine 200 milliGRAM(s) Oral two times a day  influenza  Vaccine (HIGH DOSE) 0.7 milliLiter(s) IntraMuscular once    MEDICATIONS  (PRN):  heparin   Injectable 7000 Unit(s) IV Push every 6 hours PRN For aPTT less than 40  heparin   Injectable 3500 Unit(s) IV Push every 6 hours PRN For aPTT between 40 - 57  zolpidem 5 milliGRAM(s) Oral at bedtime PRN Insomnia        Allergies    Aloe Lotion (Rash)    Intolerances        I&O's Summary        REVIEW OF SYSTEMS:    CONSTITUTIONAL:  As per HPI.  CONSTITUTIONAL: No weakness, fevers or chills  EYES/ENT: No visual changes;  No vertigo or throat pain   NECK: No pain or stiffness  CARDIOVASCULAR: No chest pain or palpitations  GASTROINTESTINAL: No abdominal or epigastric pain. No nausea, vomiting, or hematemesis; No diarrhea or constipation. No melena or hematochezia.  GENITOURINARY: No dysuria, frequency or hematuria  NEUROLOGICAL: No numbness or weakness  SKIN: No itching, burning, rashes, or lesions   All other review of systems is negative unless indicated above      Vital Signs Last 24 Hrs  T(C): 36.6 (18 Feb 2024 09:09), Max: 36.6 (17 Feb 2024 20:30)  T(F): 97.9 (18 Feb 2024 09:09), Max: 97.9 (18 Feb 2024 09:09)  HR: 66 (18 Feb 2024 09:09) (66 - 78)  BP: 154/75 (18 Feb 2024 09:09) (126/74 - 154/75)  BP(mean): --  RR: 16 (18 Feb 2024 09:09) (16 - 17)  SpO2: 100% (18 Feb 2024 09:09) (98% - 100%)    Parameters below as of 18 Feb 2024 09:09  Patient On (Oxygen Delivery Method): room air            Daily     Daily     I&O's Summary      PHYSICAL EXAM:    General:  Alert, No acute distress.    Neuro:  Alert and oriented to person, place, and time. Able to communicate  well.      HEENT:  No JVD, no masses, Eyes anicteric, ,    Cardiovascular:  Regular rate and rhythm, with normal S1 and S2.    Lungs:  clear. no rales, no wheezing, .    Abdomen:  Normoactive bowel sounds. Soft, flat, non-tender, and non-distended.  positive bowel sounds    Skin:  Warm, dry    Extremities:  warm,  no cyanosis  hands mildly swollen      LABS:               02-18    138  |  110<H>  |  28<H>  ----------------------------<  100<H>  3.9   |  26  |  1.64<H>    Ca    8.5      18 Feb 2024 06:54  Phos  5.6     02-16  Mg     1.8     02-16    TPro  7.8  /  Alb  3.9  /  TBili  0.5  /  DBili  x   /  AST  52<H>  /  ALT  51  /  AlkPhos  67  02-16        137    |  104    |  49     ----------------------------<  138       17 Feb 2024 05:20  3.3     |  22     |  3.14     135    |  97     |  58     ----------------------------<  74        16 Feb 2024 18:24  3.7     |  27     |  4.42     Ca    9.3        17 Feb 2024 05:20  Ca    10.4       16 Feb 2024 18:24    Phos  5.6       16 Feb 2024 18:24    Mg     1.8       16 Feb 2024 18:24                              8.8    3.11  )-----------( 123      ( 17 Feb 2024 12:09 )             25.1        PTT - ( 17 Feb 2024 12:09 )  PTT:78.0 sec  Urinalysis Basic - ( 17 Feb 2024 05:20 )    Color: x / Appearance: x / SG: x / pH: x  Gluc: 138 mg/dL / Ketone: x  / Bili: x / Urobili: x   Blood: x / Protein: x / Nitrite: x   Leuk Esterase: x / RBC: x / WBC x   Sq Epi: x / Non Sq Epi: x / Bacteria: x          
HOSPITALIST ATTENDING PROGRESS NOTE    Chart and meds reviewed.  Patient seen and examined.    CC: ANTOINE    Subjective: No acute issues overnight, feels well.     All other systems reviewed and found to be negative with the exception of what has been described above.    MEDICATIONS  (STANDING):  atorvastatin 10 milliGRAM(s) Oral at bedtime  heparin  Infusion.  Unit(s)/Hr (16 mL/Hr) IV Continuous <Continuous>  hydroxychloroquine 200 milliGRAM(s) Oral two times a day  influenza  Vaccine (HIGH DOSE) 0.7 milliLiter(s) IntraMuscular once  potassium chloride   Powder 40 milliEquivalent(s) Oral once  sodium chloride 0.9%. 1000 milliLiter(s) (150 mL/Hr) IV Continuous <Continuous>    MEDICATIONS  (PRN):  heparin   Injectable 7000 Unit(s) IV Push every 6 hours PRN For aPTT less than 40  heparin   Injectable 3500 Unit(s) IV Push every 6 hours PRN For aPTT between 40 - 57  zolpidem 5 milliGRAM(s) Oral at bedtime PRN Insomnia    Vital Signs Last 24 Hrs  T(C): 36.9 (17 Feb 2024 10:05), Max: 36.9 (17 Feb 2024 10:05)  T(F): 98.5 (17 Feb 2024 10:05), Max: 98.5 (17 Feb 2024 10:05)  HR: 76 (17 Feb 2024 10:05) (76 - 114)  BP: 135/77 (17 Feb 2024 10:05) (105/68 - 148/82)  BP(mean): 98 (16 Feb 2024 21:59) (81 - 98)  RR: 17 (17 Feb 2024 10:05) (17 - 18)  SpO2: 97% (17 Feb 2024 10:05) (97% - 100%)    GEN: NAD  HEENT:  pupils equal and reactive, EOMI, no oropharyngeal lesions, erythema, exudates, oral thrush  NECK:   supple, no carotid bruits, no palpable lymph nodes, no thyromegaly  CV:  +S1, +S2, regular, no murmurs or rubs  RESP:   lungs clear to auscultation bilaterally, no wheezing, rales, rhonchi, good air entry bilaterally  GI:  abdomen soft, non-tender, non-distended, normal BS, no bruits, no abdominal masses, no palpable masses  EXT:  no clubbing, no cyanosis, no edema, no calf pain, swelling or erythema  NEURO:  AAOX3, no focal neurological deficits, follows all commands, able to move extremities spontaneously  SKIN:  no ulcers, lesions or rashes    LABS:                          9.0    3.48  )-----------( 117      ( 17 Feb 2024 05:20 )             25.2     02-17    137  |  104  |  49<H>  ----------------------------<  138<H>  3.3<L>   |  22  |  3.14<H>    Ca    9.3      17 Feb 2024 05:20  Phos  5.6     02-16  Mg     1.8     02-16    TPro  7.8  /  Alb  3.9  /  TBili  0.5  /  DBili  x   /  AST  52<H>  /  ALT  51  /  AlkPhos  67  02-16    LIVER FUNCTIONS - ( 16 Feb 2024 18:24 )  Alb: 3.9 g/dL / Pro: 7.8 gm/dL / ALK PHOS: 67 U/L / ALT: 51 U/L / AST: 52 U/L / GGT: x           PT/INR - ( 17 Feb 2024 05:20 )   PT: 14.2 sec;   INR: 1.27 ratio    PTT - ( 17 Feb 2024 05:20 )  PTT:32.9 sec    Urinalysis Basic - ( 17 Feb 2024 05:20 )  Color: x / Appearance: x / SG: x / pH: x  Gluc: 138 mg/dL / Ketone: x  / Bili: x / Urobili: x   Blood: x / Protein: x / Nitrite: x   Leuk Esterase: x / RBC: x / WBC x   Sq Epi: x / Non Sq Epi: x / Bacteria: x      : US Kidney and Bladder (02.16.24 @ 19:39) >  IMPRESSION:  No hydronephrosis of either kidney.    Xray Chest 1 View- PORTABLE-Urgent (Xray Chest 1 View- PORTABLE-Urgent .) (02.16.24 @ 18:29) >  IMPRESSION:    No radiographic evidence of active chest disease..    --- End of Report ---

## 2024-02-18 NOTE — DISCHARGE NOTE NURSING/CASE MANAGEMENT/SOCIAL WORK - PATIENT PORTAL LINK FT
You can access the FollowMyHealth Patient Portal offered by [facility] by registering at the following website: [email or web address]. By joining [employer]’s FollowMyHealth portal, you will also be able to view your health information using other applications (apps) compatible with our system.

## 2024-02-25 DIAGNOSIS — Z86.718 PERSONAL HISTORY OF OTHER VENOUS THROMBOSIS AND EMBOLISM: ICD-10-CM

## 2024-02-25 DIAGNOSIS — I12.9 HYPERTENSIVE CHRONIC KIDNEY DISEASE WITH STAGE 1 THROUGH STAGE 4 CHRONIC KIDNEY DISEASE, OR UNSPECIFIED CHRONIC KIDNEY DISEASE: ICD-10-CM

## 2024-02-25 DIAGNOSIS — Z86.711 PERSONAL HISTORY OF PULMONARY EMBOLISM: ICD-10-CM

## 2024-02-25 DIAGNOSIS — E78.5 HYPERLIPIDEMIA, UNSPECIFIED: ICD-10-CM

## 2024-02-25 DIAGNOSIS — Z85.6 PERSONAL HISTORY OF LEUKEMIA: ICD-10-CM

## 2024-02-25 DIAGNOSIS — G25.81 RESTLESS LEGS SYNDROME: ICD-10-CM

## 2024-02-25 DIAGNOSIS — G47.00 INSOMNIA, UNSPECIFIED: ICD-10-CM

## 2024-02-25 DIAGNOSIS — D64.9 ANEMIA, UNSPECIFIED: ICD-10-CM

## 2024-02-25 DIAGNOSIS — T39.395A ADVERSE EFFECT OF OTHER NONSTEROIDAL ANTI-INFLAMMATORY DRUGS [NSAID], INITIAL ENCOUNTER: ICD-10-CM

## 2024-02-25 DIAGNOSIS — M06.9 RHEUMATOID ARTHRITIS, UNSPECIFIED: ICD-10-CM

## 2024-02-25 DIAGNOSIS — N18.30 CHRONIC KIDNEY DISEASE, STAGE 3 UNSPECIFIED: ICD-10-CM

## 2024-02-25 DIAGNOSIS — N17.0 ACUTE KIDNEY FAILURE WITH TUBULAR NECROSIS: ICD-10-CM

## 2024-02-25 DIAGNOSIS — Z79.01 LONG TERM (CURRENT) USE OF ANTICOAGULANTS: ICD-10-CM

## 2024-02-28 NOTE — PATIENT PROFILE ADULT - NSCANCSCRNDXHH_GEN_A_NUR
yes Is This A New Presentation, Or A Follow-Up?: Acne What Type Of Note Output Would You Prefer (Optional)?: Bullet Format Additional Comments (Use Complete Sentences): Pt states she currently using neutrogena gentle cleanser and elf moisturizer.

## 2024-04-02 NOTE — PATIENT PROFILE ADULT. - AS [facility] BRADEN ACTIVITY
Patient Education       Well Child Exam 9 Months   About this topic   Your baby's 9-month well child exam is a visit with the doctor to check your baby's health. The doctor measures your baby's weight, height, and head size. The doctor plots these numbers on a growth curve. The growth curve gives a picture of your baby's growth at each visit. The doctor may listen to your baby's heart, lungs, and belly. Your doctor will do a full exam of your baby from the head to the toes.  Your baby may also need shots or blood tests during this visit.  General   Growth and Development   Your doctor will ask you how your baby is developing. The doctor will focus on the skills that most children your baby's age are expected to do. During this time of your baby's life, here are some things you can expect.  Movement - Your baby may:  Begin to crawl without help  Start to pull up and stand  Start to wave  Sit without support  Use finger and thumb to  small objects  Move objects smoothy between hands  Start putting objects in their mouth  Hearing, seeing, and talking - Your baby will likely:  Respond to name  Say things like Mama or Geremias, but not specific to the parent  Enjoy playing peek-a-alanis  Will use fingers to point at things  Copy your sounds and gestures  Begin to understand no. Try to distract or redirect to correct your baby.  Be more comfortable with familiar people and toys. Be prepared for tears when saying good bye. Say I love you and then leave. Your baby may be upset, but will calm down in a little bit.  Feeding - Your baby:  Still takes breast milk or formula for some nutrition. Always hold your baby when feeding. Do not prop a bottle. Propping the bottle makes it easier for your baby to choke and get ear infections.  Is likely ready to start drinking water from a cup. Limit water to no more than 8 ounces per day. Healthy babies do not need extra water. Breastmilk and formula provide all of the fluids they  need.  Will be eating cereal and other baby foods for 3 meals and 2 to 3 snacks a day  May be ready to start eating table foods that are soft, mashed, or pureed.  Dont force your baby to eat foods. You may have to offer a food more than 10 times before your baby will like it.  Give your baby very small bites of soft finger foods like bananas or well cooked vegetables.  Watch for signs your baby is full, like turning the head or leaning back.  Avoid foods that can cause choking, such as whole grapes, popcorn, nuts or hot dogs.  Should be allowed to try to eat without help. Mealtime will be messy.  Should not have fruit juice.  May have new teeth. If so, brush them 2 times each day with a smear of toothpaste. Use a cold clean wash cloth or teething ring to help ease sore gums.  Sleep - Your baby:  Should still sleep in a safe crib, on the back, alone for naps and at night. Keep soft bedding, bumpers, and toys out of your baby's bed. It is OK if your baby rolls over without help at night.  Is likely sleeping about 9 to 10 hours in a row at night  Needs 1 to 2 naps each day  Sleeps about a total of 14 hours each day  Should be able to fall asleep without help. If your baby wakes up at night, check on your baby. Do not pick your baby up, offer a bottle, or play with your baby. Doing these things will not help your baby fall asleep without help.  Should not have a bottle in bed. This can cause tooth decay or ear infections. Give a bottle before putting your baby in the crib for the night.  Shots or vaccines - It is important for your baby to get shots on time. This protects from very serious illnesses like lung infections, meningitis, or infections that damage their nervous system. Your baby may need to get shots if it is flu season or if they were missed earlier. Check with your doctor to make sure your baby's shots are up to date. This is one of the most important things you can do to keep your baby healthy.  Help for  Parents   Play with your baby.  Give your baby soft balls, blocks, and containers to play with. Toys that make noise are also good.  Read to your baby. Name the things in the pictures in the book. Talk and sing to your baby. Use real language, not baby talk. This helps your baby learn language skills.  Sing songs with hand motions like pat-a-cake or active nursery rhymes.  Hide a toy partly under a blanket for your baby to find.  Here are some things you can do to help keep your baby safe and healthy.  Do not allow anyone to smoke in your home or around your baby. Second hand smoke can harm your baby.  Have the right size car seat for your baby and use it every time your baby is in the car. Your baby should be rear facing until at least 2 years of age or older.  Pad corners and sharp edges. Put a gate at the top and bottom of the stairs. Be sure furniture, shelves, and televisions are secure and cannot tip onto your baby.  Take extra care if your baby is in the kitchen.  Make sure you use the back burners on the stove and turn pot handles so your baby cannot grab them.  Keep hot items like liquids, coffee pots, and heaters away from your baby.  Put childproof locks on cabinets, especially those that contain cleaning supplies or other things that may harm your baby.  Never leave your baby alone. Do not leave your baby in the car, in the bath, or at home alone, even for a few minutes.  Avoid screen time for children under 2 years old. This means no TV, computers, or video games. They can cause problems with brain development.  Parents need to think about:  Coping with mealtime messes  How to distract your baby when doing something you dont want your baby to do  Using positive words to tell your baby what you want, rather than saying no or what not to do  How to childproof your home and yard to keep from having to say no to your baby as much  Your next well child visit will most likely be when your baby is 12 months  old. At this visit your doctor may:  Do a full check up on your baby  Talk about making sure your home is safe for your baby, if your baby becomes upset when you leave, and how to correct your baby  Give your baby the next set of shots     When do I need to call the doctor?   Fever of 100.4°F (38°C) or higher  Sleeps all the time or has trouble sleeping  Won't stop crying  You are worried about your baby's development  Where can I learn more?   American Academy of Pediatrics  https://www.healthychildren.org/English/ages-stages/baby/feeding-nutrition/Pages/Switching-To-Solid-Foods.aspx   Centers for Disease Control and Prevention  https://www.cdc.gov/ncbddd/actearly/milestones/milestones-9mo.html   Kids Health  https://kidshealth.org/en/parents/checkup-9mos.html?ref=search   Last Reviewed Date   2021-09-17  Consumer Information Use and Disclaimer   This information is not specific medical advice and does not replace information you receive from your health care provider. This is only a brief summary of general information. It does NOT include all information about conditions, illnesses, injuries, tests, procedures, treatments, therapies, discharge instructions or life-style choices that may apply to you. You must talk with your health care provider for complete information about your health and treatment options. This information should not be used to decide whether or not to accept your health care providers advice, instructions or recommendations. Only your health care provider has the knowledge and training to provide advice that is right for you.  Copyright   Copyright © 2021 [employer]To[employer], [employer]. and its affiliates and/or licensors. All rights reserved.    Children under the age of 2 years will be restrained in a rear facing child safety seat.   If you have an active [facility] account, please look for your well child questionnaire to come to your [facility] account before your next well child visit.   (3) walks occasionally

## 2024-04-23 NOTE — CDI QUERY NOTE - NSCDIOTHERTXTBX_GEN_ALL_CORE_HH
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    - pancytopenia due to leukemia  - pancytopenia due to ... please document reason  - pancytopenia ruled out this admission  - other ( Please specify)        Clinical Indicators:    WBC Count: 3.00 K/uL (02.18.24 @ 06:54)   WBC Count: 3.11 K/uL (02.17.24 @ 12:09)   WBC Count: 3.48 K/uL (02.17.24 @ 05:20)   WBC Count: 5.62 K/uL (02.16.24 @ 18:24)     RBC Count: 2.39 M/uL (02.18.24 @ 06:54)   RBC Count: 2.52 M/uL (02.17.24 @ 12:09)   RBC Count: 2.55 M/uL (02.17.24 @ 05:20)   RBC Count: 2.94 M/uL (02.16.24 @ 18:24)     Hemoglobin: 8.2 g/dL (02.18.24 @ 06:54)   Hemoglobin: 8.8 g/dL (02.17.24 @ 12:09)   Hemoglobin: 9.0 g/dL (02.17.24 @ 05:20)   Hemoglobin: 10.3 g/dL (02.16.24 @ 18:24)     Hematocrit: 24.2 % (02.18.24 @ 06:54)   Hematocrit: 25.1 % (02.17.24 @ 12:09)   Hematocrit: 25.2 % (02.17.24 @ 05:20)   Hematocrit: 29.3 % (02.16.24 @ 18:24)     Platelet Count - Automated: 127 K/uL (02.18.24 @ 06:54)   Platelet Count - Automated: 123 K/uL (02.17.24 @ 12:09)   Platelet Count - Automated: 117 K/uL (02.17.24 @ 05:20)   Platelet Count - Automated: 168 K/uL (02.16.24 @ 18:24)       Discharge summary 2/18/2024:  # History of leukemia with Pancytopenia  - Stable  -f/u with Dr. Sanchez as outpatient

## 2024-05-28 ENCOUNTER — INPATIENT (INPATIENT)
Facility: HOSPITAL | Age: 69
LOS: 12 days | Discharge: HOME | DRG: 896 | End: 2024-06-10
Attending: INTERNAL MEDICINE | Admitting: INTERNAL MEDICINE
Payer: MEDICARE

## 2024-05-28 VITALS
RESPIRATION RATE: 18 BRPM | DIASTOLIC BLOOD PRESSURE: 109 MMHG | OXYGEN SATURATION: 97 % | SYSTOLIC BLOOD PRESSURE: 167 MMHG | HEART RATE: 89 BPM | TEMPERATURE: 98 F

## 2024-05-28 LAB
ALBUMIN SERPL ELPH-MCNC: 3.9 G/DL — SIGNIFICANT CHANGE UP (ref 3.3–5)
ALP SERPL-CCNC: 40 U/L — SIGNIFICANT CHANGE UP (ref 40–120)
ALT FLD-CCNC: 119 U/L — HIGH (ref 12–78)
ANION GAP SERPL CALC-SCNC: 5 MMOL/L — SIGNIFICANT CHANGE UP (ref 5–17)
AST SERPL-CCNC: 83 U/L — HIGH (ref 15–37)
BASOPHILS # BLD AUTO: 0 K/UL — SIGNIFICANT CHANGE UP (ref 0–0.2)
BASOPHILS NFR BLD AUTO: 0 % — SIGNIFICANT CHANGE UP (ref 0–2)
BILIRUB SERPL-MCNC: 1.9 MG/DL — HIGH (ref 0.2–1.2)
BUN SERPL-MCNC: 18 MG/DL — SIGNIFICANT CHANGE UP (ref 7–23)
CALCIUM SERPL-MCNC: 8.8 MG/DL — SIGNIFICANT CHANGE UP (ref 8.5–10.1)
CHLORIDE SERPL-SCNC: 103 MMOL/L — SIGNIFICANT CHANGE UP (ref 96–108)
CO2 SERPL-SCNC: 27 MMOL/L — SIGNIFICANT CHANGE UP (ref 22–31)
CREAT SERPL-MCNC: 1.18 MG/DL — SIGNIFICANT CHANGE UP (ref 0.5–1.3)
EGFR: 67 ML/MIN/1.73M2 — SIGNIFICANT CHANGE UP
EOSINOPHIL # BLD AUTO: 0 K/UL — SIGNIFICANT CHANGE UP (ref 0–0.5)
EOSINOPHIL NFR BLD AUTO: 0 % — SIGNIFICANT CHANGE UP (ref 0–6)
GLUCOSE SERPL-MCNC: 117 MG/DL — HIGH (ref 70–99)
HCT VFR BLD CALC: 35.1 % — LOW (ref 39–50)
HGB BLD-MCNC: 11.8 G/DL — LOW (ref 13–17)
LYMPHOCYTES # BLD AUTO: 0.23 K/UL — LOW (ref 1–3.3)
LYMPHOCYTES # BLD AUTO: 5 % — LOW (ref 13–44)
MACROCYTES BLD QL: SLIGHT — SIGNIFICANT CHANGE UP
MANUAL SMEAR VERIFICATION: SIGNIFICANT CHANGE UP
MCHC RBC-ENTMCNC: 33.6 GM/DL — SIGNIFICANT CHANGE UP (ref 32–36)
MCHC RBC-ENTMCNC: 35.3 PG — HIGH (ref 27–34)
MCV RBC AUTO: 105.1 FL — HIGH (ref 80–100)
MONOCYTES # BLD AUTO: 0.18 K/UL — SIGNIFICANT CHANGE UP (ref 0–0.9)
MONOCYTES NFR BLD AUTO: 4 % — SIGNIFICANT CHANGE UP (ref 2–14)
NEUTROPHILS # BLD AUTO: 4.19 K/UL — SIGNIFICANT CHANGE UP (ref 1.8–7.4)
NEUTROPHILS NFR BLD AUTO: 90 % — HIGH (ref 43–77)
NEUTS BAND # BLD: 1 % — SIGNIFICANT CHANGE UP (ref 0–8)
NRBC # BLD: 0 /100 WBCS — SIGNIFICANT CHANGE UP (ref 0–0)
NRBC # BLD: SIGNIFICANT CHANGE UP /100 WBCS (ref 0–0)
PLAT MORPH BLD: NORMAL — SIGNIFICANT CHANGE UP
PLATELET # BLD AUTO: 58 K/UL — LOW (ref 150–400)
POTASSIUM SERPL-MCNC: 3.9 MMOL/L — SIGNIFICANT CHANGE UP (ref 3.5–5.3)
POTASSIUM SERPL-SCNC: 3.9 MMOL/L — SIGNIFICANT CHANGE UP (ref 3.5–5.3)
PROT SERPL-MCNC: 7 GM/DL — SIGNIFICANT CHANGE UP (ref 6–8.3)
RBC # BLD: 3.34 M/UL — LOW (ref 4.2–5.8)
RBC # FLD: 14.4 % — SIGNIFICANT CHANGE UP (ref 10.3–14.5)
RBC BLD AUTO: ABNORMAL
SODIUM SERPL-SCNC: 135 MMOL/L — SIGNIFICANT CHANGE UP (ref 135–145)
STOMATOCYTES BLD QL SMEAR: SLIGHT — SIGNIFICANT CHANGE UP
WBC # BLD: 4.6 K/UL — SIGNIFICANT CHANGE UP (ref 3.8–10.5)
WBC # FLD AUTO: 4.6 K/UL — SIGNIFICANT CHANGE UP (ref 3.8–10.5)

## 2024-05-28 PROCEDURE — 99285 EMERGENCY DEPT VISIT HI MDM: CPT

## 2024-05-28 PROCEDURE — 83735 ASSAY OF MAGNESIUM: CPT

## 2024-05-28 PROCEDURE — 84100 ASSAY OF PHOSPHORUS: CPT

## 2024-05-28 PROCEDURE — 82140 ASSAY OF AMMONIA: CPT

## 2024-05-28 PROCEDURE — 84484 ASSAY OF TROPONIN QUANT: CPT

## 2024-05-28 PROCEDURE — 94668 MNPJ CHEST WALL SBSQ: CPT

## 2024-05-28 PROCEDURE — 93306 TTE W/DOPPLER COMPLETE: CPT

## 2024-05-28 PROCEDURE — 36415 COLL VENOUS BLD VENIPUNCTURE: CPT

## 2024-05-28 PROCEDURE — 83880 ASSAY OF NATRIURETIC PEPTIDE: CPT

## 2024-05-28 PROCEDURE — 94667 MNPJ CHEST WALL 1ST: CPT

## 2024-05-28 PROCEDURE — 71045 X-RAY EXAM CHEST 1 VIEW: CPT

## 2024-05-28 PROCEDURE — 85025 COMPLETE CBC W/AUTO DIFF WBC: CPT

## 2024-05-28 PROCEDURE — 97162 PT EVAL MOD COMPLEX 30 MIN: CPT | Mod: GP

## 2024-05-28 PROCEDURE — 93005 ELECTROCARDIOGRAM TRACING: CPT

## 2024-05-28 PROCEDURE — 80048 BASIC METABOLIC PNL TOTAL CA: CPT

## 2024-05-28 PROCEDURE — 97110 THERAPEUTIC EXERCISES: CPT | Mod: GP

## 2024-05-28 PROCEDURE — 80053 COMPREHEN METABOLIC PANEL: CPT

## 2024-05-28 PROCEDURE — 97116 GAIT TRAINING THERAPY: CPT | Mod: GP

## 2024-05-28 PROCEDURE — 94640 AIRWAY INHALATION TREATMENT: CPT

## 2024-05-28 PROCEDURE — 80076 HEPATIC FUNCTION PANEL: CPT

## 2024-05-28 PROCEDURE — 85027 COMPLETE CBC AUTOMATED: CPT

## 2024-05-28 PROCEDURE — 97530 THERAPEUTIC ACTIVITIES: CPT | Mod: GP

## 2024-05-28 PROCEDURE — 74177 CT ABD & PELVIS W/CONTRAST: CPT | Mod: 26,MC

## 2024-05-28 RX ORDER — SODIUM CHLORIDE 9 MG/ML
500 INJECTION INTRAMUSCULAR; INTRAVENOUS; SUBCUTANEOUS
Refills: 0 | Status: DISCONTINUED | OUTPATIENT
Start: 2024-05-28 | End: 2024-05-29

## 2024-05-28 RX ORDER — ONDANSETRON 8 MG/1
4 TABLET, FILM COATED ORAL ONCE
Refills: 0 | Status: DISCONTINUED | OUTPATIENT
Start: 2024-05-28 | End: 2024-06-05

## 2024-05-28 RX ORDER — MORPHINE SULFATE 50 MG/1
4 CAPSULE, EXTENDED RELEASE ORAL ONCE
Refills: 0 | Status: DISCONTINUED | OUTPATIENT
Start: 2024-05-28 | End: 2024-05-28

## 2024-05-28 RX ORDER — DEXAMETHASONE 0.5 MG/5ML
6 ELIXIR ORAL ONCE
Refills: 0 | Status: COMPLETED | OUTPATIENT
Start: 2024-05-28 | End: 2024-05-28

## 2024-05-28 RX ORDER — MORPHINE SULFATE 50 MG/1
5 CAPSULE, EXTENDED RELEASE ORAL ONCE
Refills: 0 | Status: DISCONTINUED | OUTPATIENT
Start: 2024-05-28 | End: 2024-05-29

## 2024-05-28 RX ORDER — LOSARTAN POTASSIUM 100 MG/1
50 TABLET, FILM COATED ORAL ONCE
Refills: 0 | Status: COMPLETED | OUTPATIENT
Start: 2024-05-28 | End: 2024-05-28

## 2024-05-28 RX ORDER — ACETAMINOPHEN 500 MG
650 TABLET ORAL ONCE
Refills: 0 | Status: COMPLETED | OUTPATIENT
Start: 2024-05-28 | End: 2024-06-01

## 2024-05-28 RX ORDER — DIAZEPAM 5 MG
5 TABLET ORAL
Refills: 0 | Status: DISCONTINUED | OUTPATIENT
Start: 2024-05-28 | End: 2024-05-29

## 2024-05-28 RX ORDER — KETOROLAC TROMETHAMINE 30 MG/ML
15 SYRINGE (ML) INJECTION ONCE
Refills: 0 | Status: DISCONTINUED | OUTPATIENT
Start: 2024-05-28 | End: 2024-05-28

## 2024-05-28 RX ADMIN — Medication 15 MILLIGRAM(S): at 15:14

## 2024-05-28 RX ADMIN — LOSARTAN POTASSIUM 50 MILLIGRAM(S): 100 TABLET, FILM COATED ORAL at 20:11

## 2024-05-28 RX ADMIN — MORPHINE SULFATE 4 MILLIGRAM(S): 50 CAPSULE, EXTENDED RELEASE ORAL at 21:09

## 2024-05-28 RX ADMIN — Medication 2 MILLIGRAM(S): at 20:10

## 2024-05-28 RX ADMIN — MORPHINE SULFATE 4 MILLIGRAM(S): 50 CAPSULE, EXTENDED RELEASE ORAL at 15:14

## 2024-05-28 RX ADMIN — Medication 6 MILLIGRAM(S): at 15:15

## 2024-05-28 NOTE — ED PROVIDER NOTE - OBJECTIVE STATEMENT
67 y/o male with a PMHx of EtOH abuse, HTN, HLD, leukemia, restless leg syndrome, RA presents to the ED c/o right lower back pain over the last few days, worsening this morning. Pain radiates down right leg. No trauma or heavy lifting. Pt took Prednisone 5mg x4 earlier today. Denies b/b incontinence, saddle anesthesia. No other complaints at this time.

## 2024-05-28 NOTE — ED PROVIDER NOTE - PROGRESS NOTE DETAILS
CC:  Signout received from Dr. Delatorre at shift change to f/u CT report: + cirrhosis, gallstones w/o acute sy, dilated bladder (+ urinated large amount after CT done).  Before ambulation trial, potential DC could be done, pt became increasingly tremulous, + c/w alcohol withdrawal. case d/w Dr. Bolton & accepted to Med admit with [facility] protocol.

## 2024-05-28 NOTE — ED ADULT NURSE NOTE - NSFALLRISKINTERV_ED_ALL_ED

## 2024-05-28 NOTE — ED PROVIDER NOTE - CARE PLAN
1 Principal Discharge DX:	Alcohol dependence with withdrawal  Secondary Diagnosis:	Right-sided low back pain with left-sided sciatica

## 2024-05-28 NOTE — ED PROVIDER NOTE - NS_ATTENDINGSCRIBE_ED_ALL_ED
Patient refused her gabby cream despite education.   I personally performed the service described in the documentation recorded by the scribe in my presence, and it accurately and completely records my words and actions.

## 2024-05-28 NOTE — ED ADULT NURSE NOTE - OBJECTIVE STATEMENT
pt presents to the ED for back pain without trauma or injury. as per pt he has ongoing back complaints but today he woke up and reports they were worse. states "Its sciatica I can feel it going down my R leg. it makes it hard to walk." pt A&Ox4, well appearing, and ambulatory at baseline with no further complaints or discomforts reported at this time.

## 2024-05-28 NOTE — ED ADULT TRIAGE NOTE - CHIEF COMPLAINT QUOTE
Pt BIBEMS complaining of back pain. Pt endorsing 10/10 mback pain with difficulty ambulating. Pt has hx of sciatica. Pt Is A  &O x4, GCS 15. 20 g IV inserted by EMS.

## 2024-05-28 NOTE — ED PROVIDER NOTE - HOW PATIENT ADDRESSED, PROFILE
Pt reports most recent po intake was some sprite PTA. Last meal was at 0930 this morning. Pt informed of NPO status. Verbalized understanding.  
Rehan

## 2024-05-28 NOTE — ED PROVIDER NOTE - CLINICAL SUMMARY MEDICAL DECISION MAKING FREE TEXT BOX
68-year-old male presents with acute onset of lower back pain radiating down the posterior right leg into the knee.  No red flag symptoms, no concern for cauda equina.  Has had sciatica before.  Patient took 20 mg of prednisone prior to arrival.  Patient is neurovascularly intact, has some tenderness to the right gluteus.  Differential including but not limited to: Sciatica, SI joint dysfunction, disc herniation, other.  Plan for blood work, pain control, CT, reassess.  If pain controlled and able to ambulate, discharge with steroids and pain meds with spine follow-up.

## 2024-05-28 NOTE — ED PROVIDER NOTE - NSICDXFAMILYHX_GEN_ALL_CORE_FT
FAMILY HISTORY:  Mother  Still living? Unknown  FHx: rheumatoid arthritis, Age at diagnosis: Age Unknown

## 2024-05-28 NOTE — ED PROVIDER NOTE - PHYSICAL EXAMINATION
GENERAL: A&Ox4, non-toxic appearing, no acute distress  HEENT: NCAT, EOMI, oral mucosa moist, normal conjunctiva  RESP: no respiratory distress, speaking in full sentences  CV: RRR  MSK: no visible deformities, no midline spinal tenderness, no step offs, mild tenderness R gluteus, no pain with log roll  NEURO: no focal sensory or motor deficits, CN 2-12 grossly intact, 5/5 strength lower extremities, SILT  SKIN: warm, normal color, well perfused, no rash  PSYCH: normal affect

## 2024-05-28 NOTE — ED PROVIDER NOTE - NSFOLLOWUPINSTRUCTIONS_ED_ALL_ED_FT
Sciatica    Sciatica is pain, numbness, weakness, or tingling along the path of the sciatic nerve. The sciatic nerve starts in the lower back and runs down the back of each leg. The nerve controls the muscles in the lower leg and in the back of the knee. It also provides feeling (sensation) to the back of the thigh, the lower leg, and the sole of the foot. Sciatica is a symptom of another medical condition that pinches or puts pressure on the sciatic nerve.    Sciatica most often only affects one side of the body. Sciatica usually goes away on its own or with treatment. In some cases, sciatica may come back (recur).    What are the causes?  This condition is caused by pressure on the sciatic nerve or pinching of the nerve. This may be the result of:  A disk in between the bones of the spine bulging out too far (herniated disk).  Age-related changes in the spinal disks.  A pain disorder that affects a muscle in the buttock.  Extra bone growth near the sciatic nerve.  A break (fracture) of the pelvis.  Pregnancy.  Tumor. This is rare.  What increases the risk?  The following factors may make you more likely to develop this condition:  Playing sports that place pressure or stress on the spine.  Having poor strength and flexibility.  A history of back injury or surgery.  Sitting for long periods of time.  Doing activities that involve repetitive bending or lifting.  Obesity.  What are the signs or symptoms?  Symptoms can vary from mild to very severe. They may include:  Any of the following problems in the lower back, leg, hip, or buttock:  Mild tingling, numbness, or dull aches.  Burning sensations.  Sharp pains.  Numbness in the back of the calf or the sole of the foot.  Leg weakness.  Severe back pain that makes movement difficult.  Symptoms may get worse when you cough, sneeze, or laugh, or when you sit or stand for long periods of time.    How is this diagnosed?  This condition may be diagnosed based on:  Your symptoms and medical history.  A physical exam.  Blood tests.  Imaging tests, such as:  X-rays.  An MRI.  A CT scan.  How is this treated?  In many cases, this condition improves on its own without treatment. However, treatment may include:  Reducing or modifying physical activity.  Exercising, including strengthening and stretching.  Icing and applying heat to the affected area.  Medicines that help to:  Relieve pain and swelling.  Relax your muscles.  Injections of medicines that help to relieve pain and inflammation (steroids) around the sciatic nerve.  Surgery.  Follow these instructions at home:  Medicines    Take over-the-counter and prescription medicines only as told by your health care provider.  Ask your health care provider if the medicine prescribed to you requires you to avoid driving or using heavy machinery.  Managing pain    Bag of ice on a towel on the skin.  A heating pad for use on the affected area.  If directed, put ice on the affected area. To do this:  Put ice in a plastic bag.  Place a towel between your skin and the bag.  Leave the ice on for 20 minutes, 2–3 times a day.  If your skin turns bright red, remove the ice right away to prevent skin damage. The risk of skin damage is higher if you cannot feel pain, heat, or cold.  If directed, apply heat to the affected area as often as told by your health care provider. Use the heat source that your health care provider recommends, such as a moist heat pack or a heating pad.  Place a towel between your skin and the heat source.  Leave the heat on for 20–30 minutes.  If your skin turns bright red, remove the heat right away to prevent burns. The risk of burns is higher if you cannot feel pain, heat, or cold.  Activity    A comparison showing the right and wrong way to lift a heavy object.  Return to your normal activities as told by your health care provider. Ask your health care provider what activities are safe for you.  Avoid activities that make your symptoms worse.  Take brief periods of rest throughout the day.  When you rest for longer periods, mix in some mild activity or stretching between periods of rest. This will help to prevent stiffness and pain.  Avoid sitting for long periods of time without moving. Get up and move around at least one time each hour.  Exercise and stretch regularly as told by your health care provider.  Do not lift anything that is heavier than 10 lb (4.5 kg) until your health care provider says that it is safe. When you do not have symptoms, you should still avoid heavy lifting, especially repetitive heavy lifting.  When you lift objects, always use proper lifting technique, which includes:  Bending your knees.  Keeping the load close to your body.  Avoiding twisting.  General instructions    Maintain a healthy weight. Excess weight puts extra stress on your back.  Wear supportive, comfortable shoes. Avoid wearing high heels.  Avoid sleeping on a mattress that is too soft or too hard. A mattress that is firm enough to support your back when you sleep may help to reduce your pain.  Contact a health care provider if:  Your pain is not controlled by medicine.  Your pain does not improve or gets worse.  Your pain lasts longer than 4 weeks.  You have unexplained weight loss.  Get help right away if:  You are not able to control when you urinate or have bowel movements (incontinence).  You have:  Weakness in your lower back, pelvis, buttocks, or legs that gets worse.  Redness or swelling of your back.  A burning sensation when you urinate.  Summary  Sciatica is pain, numbness, weakness, or tingling along the path of the sciatic nerve, which may include the lower back, legs, hips, and buttocks.  This condition is caused by pressure on the sciatic nerve or pinching of the nerve.  Treatment often includes rest, exercise, medicines, and applying ice or heat.  This information is not intended to replace advice given to you by your health care provider. Make sure you discuss any questions you have with your health care provider.

## 2024-05-28 NOTE — ED ADULT NURSE REASSESSMENT NOTE - NS ED NURSE REASSESS COMMENT FT1
Received report from YOKO Bravo RN. Pt resting in stretcher with safety maintained. Respirations even and unlabored, no distress noted. Pt appears to be tremulous. MD Coleman made aware.

## 2024-05-29 DIAGNOSIS — Z86.718 PERSONAL HISTORY OF OTHER VENOUS THROMBOSIS AND EMBOLISM: ICD-10-CM

## 2024-05-29 DIAGNOSIS — F10.239 ALCOHOL DEPENDENCE WITH WITHDRAWAL, UNSPECIFIED: ICD-10-CM

## 2024-05-29 DIAGNOSIS — Z86.2 PERSONAL HISTORY OF DISEASES OF THE BLOOD AND BLOOD-FORMING ORGANS AND CERTAIN DISORDERS INVOLVING THE IMMUNE MECHANISM: ICD-10-CM

## 2024-05-29 DIAGNOSIS — Z86.69 PERSONAL HISTORY OF OTHER DISEASES OF THE NERVOUS SYSTEM AND SENSE ORGANS: ICD-10-CM

## 2024-05-29 DIAGNOSIS — R79.89 OTHER SPECIFIED ABNORMAL FINDINGS OF BLOOD CHEMISTRY: ICD-10-CM

## 2024-05-29 DIAGNOSIS — F10.939 ALCOHOL USE, UNSPECIFIED WITH WITHDRAWAL, UNSPECIFIED: ICD-10-CM

## 2024-05-29 LAB
ALBUMIN SERPL ELPH-MCNC: 4.1 G/DL — SIGNIFICANT CHANGE UP (ref 3.3–5)
ALP SERPL-CCNC: 45 U/L — SIGNIFICANT CHANGE UP (ref 40–120)
ALT FLD-CCNC: 116 U/L — HIGH (ref 12–78)
AMMONIA BLD-MCNC: 62 UMOL/L — HIGH (ref 11–32)
ANION GAP SERPL CALC-SCNC: 11 MMOL/L — SIGNIFICANT CHANGE UP (ref 5–17)
AST SERPL-CCNC: 81 U/L — HIGH (ref 15–37)
BILIRUB DIRECT SERPL-MCNC: 0.7 MG/DL — HIGH (ref 0–0.3)
BILIRUB INDIRECT FLD-MCNC: 1.1 MG/DL — HIGH (ref 0.2–1)
BILIRUB SERPL-MCNC: 1.8 MG/DL — HIGH (ref 0.2–1.2)
BUN SERPL-MCNC: 27 MG/DL — HIGH (ref 7–23)
CALCIUM SERPL-MCNC: 8.9 MG/DL — SIGNIFICANT CHANGE UP (ref 8.5–10.1)
CHLORIDE SERPL-SCNC: 105 MMOL/L — SIGNIFICANT CHANGE UP (ref 96–108)
CO2 SERPL-SCNC: 22 MMOL/L — SIGNIFICANT CHANGE UP (ref 22–31)
CREAT SERPL-MCNC: 1.33 MG/DL — HIGH (ref 0.5–1.3)
EGFR: 58 ML/MIN/1.73M2 — LOW
GLUCOSE SERPL-MCNC: 118 MG/DL — HIGH (ref 70–99)
HCT VFR BLD CALC: 37.8 % — LOW (ref 39–50)
HGB BLD-MCNC: 12.7 G/DL — LOW (ref 13–17)
MAGNESIUM SERPL-MCNC: 2.1 MG/DL — SIGNIFICANT CHANGE UP (ref 1.6–2.6)
MCHC RBC-ENTMCNC: 33.6 GM/DL — SIGNIFICANT CHANGE UP (ref 32–36)
MCHC RBC-ENTMCNC: 35 PG — HIGH (ref 27–34)
MCV RBC AUTO: 104.1 FL — HIGH (ref 80–100)
PHOSPHATE SERPL-MCNC: 2.7 MG/DL — SIGNIFICANT CHANGE UP (ref 2.5–4.5)
PLATELET # BLD AUTO: 83 K/UL — LOW (ref 150–400)
POTASSIUM SERPL-MCNC: 3.6 MMOL/L — SIGNIFICANT CHANGE UP (ref 3.5–5.3)
POTASSIUM SERPL-SCNC: 3.6 MMOL/L — SIGNIFICANT CHANGE UP (ref 3.5–5.3)
PROT SERPL-MCNC: 7.5 GM/DL — SIGNIFICANT CHANGE UP (ref 6–8.3)
RBC # BLD: 3.63 M/UL — LOW (ref 4.2–5.8)
RBC # FLD: 14.5 % — SIGNIFICANT CHANGE UP (ref 10.3–14.5)
SODIUM SERPL-SCNC: 138 MMOL/L — SIGNIFICANT CHANGE UP (ref 135–145)
WBC # BLD: 5.39 K/UL — SIGNIFICANT CHANGE UP (ref 3.8–10.5)
WBC # FLD AUTO: 5.39 K/UL — SIGNIFICANT CHANGE UP (ref 3.8–10.5)

## 2024-05-29 PROCEDURE — 93010 ELECTROCARDIOGRAM REPORT: CPT

## 2024-05-29 PROCEDURE — 99223 1ST HOSP IP/OBS HIGH 75: CPT

## 2024-05-29 RX ORDER — DEXMEDETOMIDINE HYDROCHLORIDE IN 0.9% SODIUM CHLORIDE 4 UG/ML
1 INJECTION INTRAVENOUS
Qty: 400 | Refills: 0 | Status: DISCONTINUED | OUTPATIENT
Start: 2024-05-29 | End: 2024-06-02

## 2024-05-29 RX ORDER — RIVAROXABAN 15 MG-20MG
20 KIT ORAL DAILY
Refills: 0 | Status: DISCONTINUED | OUTPATIENT
Start: 2024-05-29 | End: 2024-06-10

## 2024-05-29 RX ORDER — LOSARTAN POTASSIUM 100 MG/1
50 TABLET, FILM COATED ORAL DAILY
Refills: 0 | Status: DISCONTINUED | OUTPATIENT
Start: 2024-05-29 | End: 2024-06-10

## 2024-05-29 RX ORDER — THIAMINE MONONITRATE (VIT B1) 100 MG
100 TABLET ORAL DAILY
Refills: 0 | Status: DISCONTINUED | OUTPATIENT
Start: 2024-05-29 | End: 2024-06-03

## 2024-05-29 RX ORDER — DIAZEPAM 5 MG
20 TABLET ORAL EVERY 4 HOURS
Refills: 0 | Status: DISCONTINUED | OUTPATIENT
Start: 2024-05-29 | End: 2024-05-30

## 2024-05-29 RX ORDER — FOLIC ACID 0.8 MG
1 TABLET ORAL DAILY
Refills: 0 | Status: DISCONTINUED | OUTPATIENT
Start: 2024-05-29 | End: 2024-06-10

## 2024-05-29 RX ORDER — DIAZEPAM 5 MG
15 TABLET ORAL EVERY 4 HOURS
Refills: 0 | Status: DISCONTINUED | OUTPATIENT
Start: 2024-05-29 | End: 2024-05-30

## 2024-05-29 RX ORDER — HEPARIN SODIUM 5000 [USP'U]/ML
5000 INJECTION INTRAVENOUS; SUBCUTANEOUS EVERY 12 HOURS
Refills: 0 | Status: DISCONTINUED | OUTPATIENT
Start: 2024-05-29 | End: 2024-05-29

## 2024-05-29 RX ORDER — LANOLIN ALCOHOL/MO/W.PET/CERES
3 CREAM (GRAM) TOPICAL AT BEDTIME
Refills: 0 | Status: DISCONTINUED | OUTPATIENT
Start: 2024-05-29 | End: 2024-06-10

## 2024-05-29 RX ORDER — HYDROXYCHLOROQUINE SULFATE 200 MG
200 TABLET ORAL
Refills: 0 | Status: DISCONTINUED | OUTPATIENT
Start: 2024-05-29 | End: 2024-06-10

## 2024-05-29 RX ORDER — PITAVASTATIN CALCIUM 1.04 MG/1
1 TABLET, FILM COATED ORAL
Qty: 0 | Refills: 0 | DISCHARGE

## 2024-05-29 RX ORDER — ONDANSETRON 8 MG/1
4 TABLET, FILM COATED ORAL EVERY 8 HOURS
Refills: 0 | Status: DISCONTINUED | OUTPATIENT
Start: 2024-05-29 | End: 2024-06-05

## 2024-05-29 RX ORDER — DIAZEPAM 5 MG
10 TABLET ORAL EVERY 4 HOURS
Refills: 0 | Status: DISCONTINUED | OUTPATIENT
Start: 2024-05-29 | End: 2024-05-30

## 2024-05-29 RX ORDER — ACETAMINOPHEN 500 MG
650 TABLET ORAL EVERY 6 HOURS
Refills: 0 | Status: DISCONTINUED | OUTPATIENT
Start: 2024-05-29 | End: 2024-06-10

## 2024-05-29 RX ORDER — DEXMEDETOMIDINE HYDROCHLORIDE IN 0.9% SODIUM CHLORIDE 4 UG/ML
0.5 INJECTION INTRAVENOUS
Qty: 200 | Refills: 0 | Status: DISCONTINUED | OUTPATIENT
Start: 2024-05-29 | End: 2024-05-29

## 2024-05-29 RX ORDER — ACETAMINOPHEN 500 MG
650 TABLET ORAL EVERY 6 HOURS
Refills: 0 | Status: DISCONTINUED | OUTPATIENT
Start: 2024-05-29 | End: 2024-05-29

## 2024-05-29 RX ORDER — HYDRALAZINE HCL 50 MG
5 TABLET ORAL ONCE
Refills: 0 | Status: COMPLETED | OUTPATIENT
Start: 2024-05-29 | End: 2024-05-29

## 2024-05-29 RX ORDER — SODIUM CHLORIDE 9 MG/ML
1000 INJECTION INTRAMUSCULAR; INTRAVENOUS; SUBCUTANEOUS
Refills: 0 | Status: DISCONTINUED | OUTPATIENT
Start: 2024-05-29 | End: 2024-05-31

## 2024-05-29 RX ORDER — DIAZEPAM 5 MG
15 TABLET ORAL ONCE
Refills: 0 | Status: DISCONTINUED | OUTPATIENT
Start: 2024-05-29 | End: 2024-05-29

## 2024-05-29 RX ORDER — HYDROXYCHLOROQUINE SULFATE 200 MG
1 TABLET ORAL
Refills: 0 | DISCHARGE

## 2024-05-29 RX ADMIN — Medication 15 MILLIGRAM(S): at 21:20

## 2024-05-29 RX ADMIN — Medication 4 MILLIGRAM(S): at 10:43

## 2024-05-29 RX ADMIN — Medication 2 MILLIGRAM(S): at 08:06

## 2024-05-29 RX ADMIN — MORPHINE SULFATE 5 MILLIGRAM(S): 50 CAPSULE, EXTENDED RELEASE ORAL at 05:05

## 2024-05-29 RX ADMIN — Medication 1 MILLIGRAM(S): at 00:34

## 2024-05-29 RX ADMIN — DEXMEDETOMIDINE HYDROCHLORIDE IN 0.9% SODIUM CHLORIDE 11.3 MICROGRAM(S)/KG/HR: 4 INJECTION INTRAVENOUS at 11:27

## 2024-05-29 RX ADMIN — DEXMEDETOMIDINE HYDROCHLORIDE IN 0.9% SODIUM CHLORIDE 22.6 MICROGRAM(S)/KG/HR: 4 INJECTION INTRAVENOUS at 19:20

## 2024-05-29 RX ADMIN — Medication 1 MILLIGRAM(S): at 09:51

## 2024-05-29 RX ADMIN — Medication 15 MILLIGRAM(S): at 11:20

## 2024-05-29 RX ADMIN — SODIUM CHLORIDE 100 MILLILITER(S): 9 INJECTION INTRAMUSCULAR; INTRAVENOUS; SUBCUTANEOUS at 00:33

## 2024-05-29 RX ADMIN — Medication 5 MILLIGRAM(S): at 01:29

## 2024-05-29 RX ADMIN — Medication 15 MILLIGRAM(S): at 17:43

## 2024-05-29 RX ADMIN — Medication 100 MILLIGRAM(S): at 09:51

## 2024-05-29 RX ADMIN — DEXMEDETOMIDINE HYDROCHLORIDE IN 0.9% SODIUM CHLORIDE 22.6 MICROGRAM(S)/KG/HR: 4 INJECTION INTRAVENOUS at 13:37

## 2024-05-29 RX ADMIN — Medication 2 MILLIGRAM(S): at 10:03

## 2024-05-29 RX ADMIN — Medication 15 MILLIGRAM(S): at 13:38

## 2024-05-29 RX ADMIN — Medication 2 MILLIGRAM(S): at 05:05

## 2024-05-29 RX ADMIN — LOSARTAN POTASSIUM 50 MILLIGRAM(S): 100 TABLET, FILM COATED ORAL at 09:51

## 2024-05-29 RX ADMIN — SODIUM CHLORIDE 150 MILLILITER(S): 9 INJECTION INTRAMUSCULAR; INTRAVENOUS; SUBCUTANEOUS at 17:00

## 2024-05-29 RX ADMIN — Medication 2 MILLIGRAM(S): at 06:32

## 2024-05-29 NOTE — PHARMACOTHERAPY INTERVENTION NOTE - COMMENTS
Med history complete, patient unable to provide medication history, reviewed medications and allergies with patient with doctor first med profile and [facility] pharmacy records

## 2024-05-29 NOTE — H&P ADULT - NSHPLABSRESULTS_GEN_ALL_CORE
CBC Full  -  ( 28 May 2024 15:07 )  WBC Count : 4.60 K/uL  RBC Count : 3.34 M/uL  Hemoglobin : 11.8 g/dL  Hematocrit : 35.1 %  Platelet Count - Automated : 58 K/uL  Mean Cell Volume : 105.1 fl  Mean Cell Hemoglobin : 35.3 pg  Mean Cell Hemoglobin Concentration : 33.6 gm/dL  Auto Neutrophil # : 4.19 K/uL  Auto Lymphocyte # : 0.23 K/uL  Auto Monocyte # : 0.18 K/uL  Auto Eosinophil # : 0.00 K/uL  Auto Basophil # : 0.00 K/uL  Auto Neutrophil % : 90.0 %  Auto Lymphocyte % : 5.0 %  Auto Monocyte % : 4.0 %  Auto Eosinophil % : 0.0 %  Auto Basophil % : 0.0 %      Urinalysis Basic - ( 28 May 2024 15:07 )    Color: x / Appearance: x / SG: x / pH: x  Gluc: 117 mg/dL / Ketone: x  / Bili: x / Urobili: x   Blood: x / Protein: x / Nitrite: x   Leuk Esterase: x / RBC: x / WBC x   Sq Epi: x / Non Sq Epi: x / Bacteria: x      05-28    135  |  103  |  18  ----------------------------<  117<H>  3.9   |  27  |  1.18    Ca    8.8      28 May 2024 15:07    TPro  7.0  /  Alb  3.9  /  TBili  1.9<H>  /  DBili  x   /  AST  83<H>  /  ALT  119<H>  /  AlkPhos  40  05-28

## 2024-05-29 NOTE — H&P ADULT - NSHPREVIEWOFSYSTEMS_GEN_ALL_CORE
REVIEW OF SYSTEMS:  General: NAD, hemodynamically stable, + weakness  HEENT:  Eyes:  No visual loss, blurred vision, double vision or yellow sclerae. Ears, Nose, Throat:  No hearing loss, sneezing, congestion, runny nose or sore throat.  SKIN:  No rash or itching.  CARDIOVASCULAR:  No chest pain, chest pressure or chest discomfort. No palpitations or edema.  RESPIRATORY:  No shortness of breath, cough or sputum.  GASTROINTESTINAL:  No anorexia, nausea, vomiting or diarrhea. No abdominal pain or blood.  NEUROLOGICAL:  No headache, dizziness, syncope, paralysis, ataxia, numbness or tingling in the extremities. No change in bowel or bladder control.  MUSCULOSKELETAL:  sciatica, notes of having it in the past. Currently feels better.   HEMATOLOGIC:  No anemia, bleeding or bruising.  LYMPHATICS:  No enlarged nodes. No history of splenectomy.  ENDOCRINOLOGIC:  No reports of sweating, cold or heat intolerance. No polyuria or polydipsia.  ALLERGIES:  No history of asthma, hives, eczema or rhinitis.

## 2024-05-29 NOTE — H&P ADULT - NSHPPHYSICALEXAM_GEN_ALL_CORE
Physical Exam:   GENERAL APPEARANCE:  deconditioned, frail, chronically sick appearing  T(C): 36.8 (05-29-24 @ 05:00), Max: 37.1 (05-29-24 @ 00:32)  HR: 99 (05-29-24 @ 05:00) (89 - 110)  BP: 178/99 (05-29-24 @ 05:00) (156/95 - 185/115)  RR: 18 (05-29-24 @ 05:00) (18 - 22)  SpO2: 95% (05-29-24 @ 05:00) (94% - 99%)  HEENT:  atraumatic  Skin:  Warm and dry without any rash   NECK:  Supple without lymphadenopathy.   HEART:  Regular rate and rhythm. normal S1 and S2, No M/R/G  LUNGS:  Good ins/exp effort, no W/R/R/C  ABDOMEN:  Soft, nontender, nondistended with good bowel sounds heard  EXTREMITIES: (L) knee bruising    NEUROLOGICAL:  Gross nonfocal

## 2024-05-29 NOTE — H&P ADULT - PROBLEM SELECTOR PLAN 2
- hepatocellular in nature  - continue to monitor trends - hepatocellular in nature due to etoh use  - continue to monitor trends

## 2024-05-29 NOTE — CONSULT NOTE ADULT - ASSESSMENT
67 y/o male who follows with me at [facility] for h/o LGL s/p cyclophosphamide and prednisone completed 1/31/23 in remission, DVT and PE now on xarelto, HTN, RA prev on MTX now on HCQ and prednisone, CKD I , admitted in the hospital with ETOH withdrawal and sciatica as per patient.     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 IgK gene status negative, TCR gamma positive  ­ 5/26/22 CT a/p with splenomegaly up to 15 cm, cirrhosis seen and hepatic steatosis, no masses as well as sigmoid diverticulosis  ­ started cyclophosphamide 100 mg qd and prednisone 7/8/22- 1/31/23  - counts outpt have been stable- last 5/17/24 WBC 6.5, Hb 12.5, mcv 104, plt 147    # RLE DVT/PE  ­ 9/15/22 CTA chest showed small right lower lobe pulmonary embolus­ completed 3 mo of xarelto   ­ 6/14/23 US LE doppler b/l­ showing acute DVT in right posterior tibial vein, chronic DVT in right common femoral, femoral, popliteal and gastrocnemius veins, no DVT in LLE  ­ 6/14/23 CTA chest with RLL postrior subsegmental PE, interlobular septal thickening suggestive of interstitial pulmonary edema.  ­ continue with xarelto 20 mg qd  ­ pt will need lifelong a/c as unprovoked     # RA  ­ follows with Dr. Frank­ now off MTX, on HCQ and course of prednisone      67 y/o male who follows with me at [facility] for h/o LGL s/p cyclophosphamide and prednisone completed 1/31/23 in remission, DVT and PE now on xarelto, HTN, RA prev on MTX now on HCQ and prednisone, CKD I , admitted in the hospital with ETOH withdrawal and sciatica as per patient.     # etoh withdrawal  - follow [facility] protocol   - treatment as per primary team  - advised to refrain from drinking   - CT a/p with cirrhosis   - icu consulted     # T cell Large Granular Lymphocytic leukemia  ­ 5/27/22 Bone marrow biopsy (@ [facility]) showed aberrant T cell pop (65% of cells) positive for TCR alpha/beta c/w T cell  lymphoproliferative d/o of NK like T cells, CD8/CD57 positive with clonality by TCRB1 flow which can be seen in large granular lymphocyte leukemia. IgK gene status negative, TCR gamma positive  ­ 5/26/22 CT a/p with splenomegaly up to 15 cm, cirrhosis seen and hepatic steatosis, no masses as well as sigmoid diverticulosis  ­ started cyclophosphamide 100 mg qd and prednisone 7/8/22- 1/31/23  - counts outpt have been stable- last 5/17/24 WBC 6.5, Hb 12.5, mcv 104, plt 147    # RLE DVT/PE  ­ 9/15/22 CTA chest showed small right lower lobe pulmonary embolus­ completed 3 mo of xarelto   ­ 6/14/23 US LE doppler b/l­ showing acute DVT in right posterior tibial vein, chronic DVT in right common femoral, femoral, popliteal and gastrocnemius veins, no DVT in LLE  ­ 6/14/23 CTA chest with RLL postrior subsegmental PE, interlobular septal thickening suggestive of interstitial pulmonary edema.  ­ continue with xarelto 20 mg qd  ­ pt will need lifelong a/c as unprovoked     # RA  ­ follows with Dr. Frank­ now off MTX, on HCQ and course of prednisone

## 2024-05-29 NOTE — PROVIDER CONTACT NOTE (MEDICATION) - SITUATION
pt was given prn 2mg PO ativan at 5:05. Ativan taper and stat dose ordered after for 6am. Clarification with MD about how much ativan should be given after prn was administered.

## 2024-05-29 NOTE — ED ADULT NURSE REASSESSMENT NOTE - NS ED NURSE REASSESS COMMENT FT1
Contacted  r/t increased in pt CIWA score. Pt agitated and hallucinating. Additional medications for symptoms pending. MD aware

## 2024-05-29 NOTE — H&P ADULT - PROBLEM SELECTOR PLAN 1
- continue with [facility] protocol  - folic acid / thiamine / IV hydration  - social work to evaluate  - CT: Cirrhosis with hepatic steatosis. Cholelithiasis.  Diverticulosis coli without acute diverticulitis. Markedly distended urinary bladder.  - strongly recommended to stop drinking

## 2024-05-29 NOTE — ED ADULT NURSE REASSESSMENT NOTE - NS ED NURSE REASSESS COMMENT FT1
Attempted to call hospitalist in regard CIWA order set. As per MD Hurd "No one was assigned yet they will get to it".

## 2024-05-29 NOTE — H&P ADULT - HISTORY OF PRESENT ILLNESS
Patient is a 69 y/o male with PMHx of HTN, RA, Leukemia in remission since January 2023, CKD I ,  DVT and PE (on xaralto) admitted in the hospital with ETOH withdrawal and sciatica as per patient. Patient is limited historian at this time as patient appears to have a ETOH withdrawal signs of sweating, shakes. States he follows up with PCP Dr. Boxer. Has had ETOH withdrawal in the past 2-3 times. Drinks variably depending on the mood. Denies any HA, CP, SOB. NO fevers, chills or shakes. Labs and vitals reviewed. Some noted bruising in the lower extremities.                # HTN  - Stable  - Continue to monitor off ARB    # History of DVT/PE  - Hold eliquis  - Continue heparin for now until   creatinine improves      # History of leukemia with Pancytopenia  - Stable  - Dr. Laura GRANADO    # RA  - Continue hydroxychloroquine    # DVT prophylaxis  - Heparin    # Disposition: Monitor Cr, restart DOAC once Egfr improves or switch to eliquis.    Patient is a 69 y/o male with PMHx of HTN, RA, Leukemia in remission since January 2023, CKD I ,  DVT and PE (on xaralto) admitted in the hospital with ETOH withdrawal and sciatica as per patient. Patient is limited historian at this time as patient appears to have a ETOH withdrawal signs of sweating, shakes. States he follows up with PCP Dr. Boxer. Has had ETOH withdrawal in the past 2-3 times. Drinks variably depending on the mood. Denies any HA, CP, SOB. NO fevers, chills or shakes. Labs and vitals reviewed. Some noted bruising in the lower extremities.     Patient at this time is hemodynamically stable. Comfortable.                                 Patient is a 67 y/o male with PMHx of HTN, RA, Leukemia in remission since January 2023, CKD I ,  DVT and PE (on xaralto) admitted in the hospital with ETOH withdrawal and sciatica as per patient. Patient is limited historian at this time as patient appears to have a ETOH withdrawal signs of sweating, shakes. States he follows up with PCP Dr. Boxer. Has had ETOH withdrawal in the past 2-3 times. Drinks variably depending on the mood. Denies any HA, CP, SOB. NO fevers, chills or shakes. Labs and vitals reviewed. Some noted bruising in the lower extremities.     Patient at this time is hemodynamically stable. Comfortable.     I was called by the ED nurse as patient started scoring very high on CIWA scare. 4 mg of ativan given to the patient, no comfortably resting. ICU consulted.

## 2024-05-30 LAB
ALBUMIN SERPL ELPH-MCNC: 3.6 G/DL — SIGNIFICANT CHANGE UP (ref 3.3–5)
ALP SERPL-CCNC: 30 U/L — LOW (ref 40–120)
ALT FLD-CCNC: 91 U/L — HIGH (ref 12–78)
AMMONIA BLD-MCNC: 41 UMOL/L — HIGH (ref 11–32)
ANION GAP SERPL CALC-SCNC: 8 MMOL/L — SIGNIFICANT CHANGE UP (ref 5–17)
AST SERPL-CCNC: 61 U/L — HIGH (ref 15–37)
BASOPHILS # BLD AUTO: 0 K/UL — SIGNIFICANT CHANGE UP (ref 0–0.2)
BASOPHILS NFR BLD AUTO: 0 % — SIGNIFICANT CHANGE UP (ref 0–2)
BILIRUB SERPL-MCNC: 1.2 MG/DL — SIGNIFICANT CHANGE UP (ref 0.2–1.2)
BUN SERPL-MCNC: 34 MG/DL — HIGH (ref 7–23)
CALCIUM SERPL-MCNC: 8.4 MG/DL — LOW (ref 8.5–10.1)
CHLORIDE SERPL-SCNC: 111 MMOL/L — HIGH (ref 96–108)
CO2 SERPL-SCNC: 22 MMOL/L — SIGNIFICANT CHANGE UP (ref 22–31)
CREAT SERPL-MCNC: 1.14 MG/DL — SIGNIFICANT CHANGE UP (ref 0.5–1.3)
EGFR: 70 ML/MIN/1.73M2 — SIGNIFICANT CHANGE UP
EOSINOPHIL # BLD AUTO: 0.04 K/UL — SIGNIFICANT CHANGE UP (ref 0–0.5)
EOSINOPHIL NFR BLD AUTO: 0.7 % — SIGNIFICANT CHANGE UP (ref 0–6)
GLUCOSE SERPL-MCNC: 89 MG/DL — SIGNIFICANT CHANGE UP (ref 70–99)
HCT VFR BLD CALC: 35.3 % — LOW (ref 39–50)
HGB BLD-MCNC: 11.5 G/DL — LOW (ref 13–17)
IMM GRANULOCYTES NFR BLD AUTO: 0.9 % — SIGNIFICANT CHANGE UP (ref 0–0.9)
LYMPHOCYTES # BLD AUTO: 0.49 K/UL — LOW (ref 1–3.3)
LYMPHOCYTES # BLD AUTO: 8.8 % — LOW (ref 13–44)
MAGNESIUM SERPL-MCNC: 2.6 MG/DL — SIGNIFICANT CHANGE UP (ref 1.6–2.6)
MCHC RBC-ENTMCNC: 32.6 GM/DL — SIGNIFICANT CHANGE UP (ref 32–36)
MCHC RBC-ENTMCNC: 34.8 PG — HIGH (ref 27–34)
MCV RBC AUTO: 107 FL — HIGH (ref 80–100)
MONOCYTES # BLD AUTO: 0.32 K/UL — SIGNIFICANT CHANGE UP (ref 0–0.9)
MONOCYTES NFR BLD AUTO: 5.8 % — SIGNIFICANT CHANGE UP (ref 2–14)
NEUTROPHILS # BLD AUTO: 4.64 K/UL — SIGNIFICANT CHANGE UP (ref 1.8–7.4)
NEUTROPHILS NFR BLD AUTO: 83.8 % — HIGH (ref 43–77)
PHOSPHATE SERPL-MCNC: 2.9 MG/DL — SIGNIFICANT CHANGE UP (ref 2.5–4.5)
PLATELET # BLD AUTO: 57 K/UL — LOW (ref 150–400)
POTASSIUM SERPL-MCNC: 3.2 MMOL/L — LOW (ref 3.5–5.3)
POTASSIUM SERPL-SCNC: 3.2 MMOL/L — LOW (ref 3.5–5.3)
PROT SERPL-MCNC: 6.4 GM/DL — SIGNIFICANT CHANGE UP (ref 6–8.3)
RBC # BLD: 3.3 M/UL — LOW (ref 4.2–5.8)
RBC # FLD: 14.6 % — HIGH (ref 10.3–14.5)
SODIUM SERPL-SCNC: 141 MMOL/L — SIGNIFICANT CHANGE UP (ref 135–145)
WBC # BLD: 5.54 K/UL — SIGNIFICANT CHANGE UP (ref 3.8–10.5)
WBC # FLD AUTO: 5.54 K/UL — SIGNIFICANT CHANGE UP (ref 3.8–10.5)

## 2024-05-30 PROCEDURE — 99291 CRITICAL CARE FIRST HOUR: CPT

## 2024-05-30 RX ORDER — ACETAMINOPHEN 500 MG
1000 TABLET ORAL ONCE
Refills: 0 | Status: COMPLETED | OUTPATIENT
Start: 2024-05-30 | End: 2024-05-30

## 2024-05-30 RX ORDER — POTASSIUM CHLORIDE 20 MEQ
40 PACKET (EA) ORAL EVERY 4 HOURS
Refills: 0 | Status: COMPLETED | OUTPATIENT
Start: 2024-05-30 | End: 2024-05-30

## 2024-05-30 RX ORDER — LIDOCAINE 4 G/100G
1 CREAM TOPICAL DAILY
Refills: 0 | Status: DISCONTINUED | OUTPATIENT
Start: 2024-05-30 | End: 2024-06-10

## 2024-05-30 RX ORDER — IBUPROFEN 200 MG
400 TABLET ORAL EVERY 6 HOURS
Refills: 0 | Status: DISCONTINUED | OUTPATIENT
Start: 2024-05-30 | End: 2024-06-02

## 2024-05-30 RX ORDER — MORPHINE SULFATE 50 MG/1
2 CAPSULE, EXTENDED RELEASE ORAL ONCE
Refills: 0 | Status: DISCONTINUED | OUTPATIENT
Start: 2024-05-30 | End: 2024-05-30

## 2024-05-30 RX ADMIN — Medication 400 MILLIGRAM(S): at 11:39

## 2024-05-30 RX ADMIN — Medication 200 MILLIGRAM(S): at 11:46

## 2024-05-30 RX ADMIN — DEXMEDETOMIDINE HYDROCHLORIDE IN 0.9% SODIUM CHLORIDE 22.6 MICROGRAM(S)/KG/HR: 4 INJECTION INTRAVENOUS at 01:33

## 2024-05-30 RX ADMIN — Medication 40 MILLIEQUIVALENT(S): at 09:46

## 2024-05-30 RX ADMIN — Medication 15 MILLIGRAM(S): at 05:24

## 2024-05-30 RX ADMIN — Medication 1000 MILLIGRAM(S): at 17:41

## 2024-05-30 RX ADMIN — Medication 400 MILLIGRAM(S): at 17:13

## 2024-05-30 RX ADMIN — DEXMEDETOMIDINE HYDROCHLORIDE IN 0.9% SODIUM CHLORIDE 22.6 MICROGRAM(S)/KG/HR: 4 INJECTION INTRAVENOUS at 22:34

## 2024-05-30 RX ADMIN — LIDOCAINE 1 PATCH: 4 CREAM TOPICAL at 17:13

## 2024-05-30 RX ADMIN — Medication 100 MILLIGRAM(S): at 09:46

## 2024-05-30 RX ADMIN — DEXMEDETOMIDINE HYDROCHLORIDE IN 0.9% SODIUM CHLORIDE 22.6 MICROGRAM(S)/KG/HR: 4 INJECTION INTRAVENOUS at 13:24

## 2024-05-30 RX ADMIN — Medication 40 MILLIEQUIVALENT(S): at 13:47

## 2024-05-30 RX ADMIN — MORPHINE SULFATE 2 MILLIGRAM(S): 50 CAPSULE, EXTENDED RELEASE ORAL at 20:45

## 2024-05-30 RX ADMIN — LIDOCAINE 1 PATCH: 4 CREAM TOPICAL at 19:03

## 2024-05-30 RX ADMIN — LOSARTAN POTASSIUM 50 MILLIGRAM(S): 100 TABLET, FILM COATED ORAL at 09:46

## 2024-05-30 RX ADMIN — RIVAROXABAN 20 MILLIGRAM(S): KIT at 11:39

## 2024-05-30 RX ADMIN — DEXMEDETOMIDINE HYDROCHLORIDE IN 0.9% SODIUM CHLORIDE 22.6 MICROGRAM(S)/KG/HR: 4 INJECTION INTRAVENOUS at 20:45

## 2024-05-30 RX ADMIN — Medication 400 MILLIGRAM(S): at 12:45

## 2024-05-30 RX ADMIN — DEXMEDETOMIDINE HYDROCHLORIDE IN 0.9% SODIUM CHLORIDE 22.6 MICROGRAM(S)/KG/HR: 4 INJECTION INTRAVENOUS at 09:47

## 2024-05-30 RX ADMIN — MORPHINE SULFATE 2 MILLIGRAM(S): 50 CAPSULE, EXTENDED RELEASE ORAL at 21:30

## 2024-05-30 RX ADMIN — DEXMEDETOMIDINE HYDROCHLORIDE IN 0.9% SODIUM CHLORIDE 22.6 MICROGRAM(S)/KG/HR: 4 INJECTION INTRAVENOUS at 17:13

## 2024-05-30 RX ADMIN — Medication 2 MILLIGRAM(S): at 17:13

## 2024-05-30 RX ADMIN — Medication 2 MILLIGRAM(S): at 23:47

## 2024-05-30 RX ADMIN — Medication 2 MILLIGRAM(S): at 11:39

## 2024-05-30 RX ADMIN — Medication 1 MILLIGRAM(S): at 09:46

## 2024-05-30 RX ADMIN — SODIUM CHLORIDE 75 MILLILITER(S): 9 INJECTION INTRAMUSCULAR; INTRAVENOUS; SUBCUTANEOUS at 09:47

## 2024-05-30 RX ADMIN — Medication 2 MILLIGRAM(S): at 21:31

## 2024-05-30 RX ADMIN — Medication 15 MILLIGRAM(S): at 01:33

## 2024-05-30 NOTE — PROGRESS NOTE ADULT - ASSESSMENT
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  Pt found to have:    1. ETOH withdrawal  2. Sciatica  3. Transaminitis  4. Cirrhosis of the liver  5. Hx DVT/PE  6. Hx LGL leukemia  7. Hx ETOH misuse    Plan:  - change valium to ativan, titrate down ativan  - Precedex gtt for additional sedation d/t agitation  - Ammonia level 62, will continue thiamine and folic acid, start rifaximin  - NS at 75 cc/hr for hydration  - Xarelto for AC in setting of DVT/PE hx     hypokalemia replace     diet when awake     -

## 2024-05-30 NOTE — PROGRESS NOTE ADULT - ASSESSMENT
A:    68yMale  HD #    Here for:    1. DT's  2. Alcoholic cirrhosis  3. Hypokalemia  4. Anemia  5. DVT  6. RA  7. ANTOINE on CKD    This patient requires critical care for support of one or more vital organ systems with a high probability of imminent or life threatening deterioration in his/her condition    P:    Severe DT's refractory to high doze BZD therapy    Precedex drip for breakthru agitation; ctively titrating this and BZD therapy  HD monitoring  ISS, OOB as able  Add lactulose and rifaxamin for elevated NH3+  Replete K+  Anemia of chronic disease, no s/s active bleeding  Heme/onc note appreciated: leukemia; mgmt per oncology  DOAC for VTE disease  Gentle hydration  HCQ for RA  Folate, thiamine, MVI    Dispo: Cont critical care.    Date of entry of this note 5/31/24 is equal to the date of services rendered on 5/30/24    TOTAL CRITICAL CARE TIME:  Add'l 70 minutes CCT (EXCLUSIVE of any non bundled procedures)    Note: This is a critically ill patient. Time spent has been in salvage of life, limb and vital organ systems. This time INCLUDES time spent directly as this patient's bedside with evaluation and management, review of chart including review of laboratory and imaging studies, interpretation of vital signs and cardiac output measurements, any necessary ventilator management, and time spent discussing plan of care with patient and family, including goals of care discussion. This also includes time spent in multidisciplinary discussion with care team and various consultants to optimize treatment plan. This time may NOT include various procedures, which will be noted separately

## 2024-05-30 NOTE — DIETITIAN INITIAL EVALUATION ADULT - PROBLEM SELECTOR PLAN 1
- continue with [facility] protocol  - folic acid / thiamine / IV hydration  - social work to evaluate  - CT: Cirrhosis with hepatic steatosis. Cholelithiasis.  Diverticulosis coli without acute diverticulitis. Markedly distended urinary bladder.  - strongly recommended to stop drinking

## 2024-05-30 NOTE — PROGRESS NOTE ADULT - SUBJECTIVE AND OBJECTIVE BOX
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 Pt stated the pain worsened that morning and radiates down his right leg. Denied trauma, heavy lifting, numbness/tingling in LE, incontinence. Pt also w/ signs of ETOH withdrawal w/ tremors and diaphoresis. Pt admits to ETOH withdrawal 2-3 times in the past and drinking habits vary depending on his mood. CT abd/pelvis notes cirrhosis w/ hepatic steatosis, w/o any acute pathology.  Pt admitted for ETOH withdrawal and sciatica. ICU consulted d/t elevated CIWA s/p 12mg ativan IVP. Pt combative with SHRUTHI HART called. Pt administered 15mg Valium IVP and transferred to CCU where he was started on precedex gtt.    5/30: Severe alcohol withdrawal on precedex for for agitation + high dose BZD therapy. Cirrhotic, on Tx for hyperammonia.    ROS: unable to obtain 2/2 weakness        Allergies    Aloe Lotion (Rash)    Intolerances        MEDICATIONS  (STANDING):  albuterol/ipratropium for Nebulization 3 milliLiter(s) Nebulizer every 6 hours  dexMEDEtomidine Infusion 1 MICROgram(s)/kG/Hr (22.6 mL/Hr) IV Continuous <Continuous>  folic acid 1 milliGRAM(s) Oral daily  hydroxychloroquine 200 milliGRAM(s) Oral two times a day  lidocaine   4% Patch 1 Patch Transdermal daily  LORazepam   Injectable 2 milliGRAM(s) IV Push every 6 hours  losartan 50 milliGRAM(s) Oral daily  potassium phosphate / sodium phosphate Powder (PHOS-NaK) 1 Packet(s) Oral four times a day  rivaroxaban 20 milliGRAM(s) Oral daily  sodium chloride 0.9%. 1000 milliLiter(s) (75 mL/Hr) IV Continuous <Continuous>  thiamine 100 milliGRAM(s) Oral daily    MEDICATIONS  (PRN):  acetaminophen     Tablet .. 650 milliGRAM(s) Oral every 6 hours PRN Temp greater or equal to 38C (100.4F)  acetaminophen     Tablet .. 650 milliGRAM(s) Oral once PRN Temp greater or equal to 38C (100.4F), Mild Pain (1 - 3)  aluminum hydroxide/magnesium hydroxide/simethicone Suspension 30 milliLiter(s) Oral every 4 hours PRN Dyspepsia  ibuprofen  Tablet. 400 milliGRAM(s) Oral every 6 hours PRN Mild Pain (1 - 3)  LORazepam   Injectable 2 milliGRAM(s) IV Push every 8 hours PRN Agitation  melatonin 3 milliGRAM(s) Oral at bedtime PRN Insomnia  ondansetron Injectable 4 milliGRAM(s) IV Push once PRN Nausea and/or Vomiting  ondansetron Injectable 4 milliGRAM(s) IV Push every 8 hours PRN Nausea and/or Vomiting      Drug Dosing Weight  Height (cm): 172.7 (16 Feb 2024 17:36)  Weight (kg): 90.3 (29 May 2024 08:21)  BMI (kg/m2): 30.3 (29 May 2024 08:21)  BSA (m2): 2.04 (29 May 2024 08:21)        PAST MEDICAL & SURGICAL HISTORY:  ETOH abuse      Hyperlipidemia LDL goal <100      Restless leg syndrome      Rheumatoid arteritis      Leukemia      No significant past surgical history          FAMILY HISTORY:  FHx: rheumatoid arthritis (Mother)          REVIEW OF SYSTEMS    UNLESS OTHERWISE NOTED IN HPI above:    Constitutional:  No Weight Change, No Fever, No Chills, No Night Sweats, No Fatigue, No Malaise  ENT/Mouth:  No Hearing Changes, No Ear Pain, No Nasal Congestion, No  Sinus Pain, No Hoarseness, No sore throat, No Rhinorrhea, No Swallowing  Difficulty  Eyes:  No Eye Pain, No Swelling, No Redness, No Foreign Body, No Discharge, No Vision Changes  Cardiovascular:  No Chest Pain, No SOB, No PND, No Dyspnea on Exertion,  No Orthopnea, No Claudication, No Edema, No Palpitations  Respiratory:  No Cough, No Sputum, No Wheezing, No Smoke Exposure, No Dyspnea  Gastrointestinal:  No Nausea, No Vomiting, No Diarrhea, No  Constipation, No Pain, No Heartburn, No Anorexia, No Dysphagia, No  Hematochezia, No Melena, No Flatulence, No Jaundice  Genitourinary:  No Dysmenorrhea, No DUB, No Dyspareunia, No Dysuria, No  Urinary Frequency, No Hematuria, No Urinary Incontinence, No Urgency,  No Flank Pain, No Urinary Flow Changes, No Hesitancy  Musculoskeletal:  No Arthralgias, No Myalgias, No Joint Swelling, No  Joint Stiffness, No Back Pain, No Neck Pain, No Injury History  Skin:  No Skin Lesions, No Pruritis, No Hair Changes, No Breast/Skin Changes, No Nipple Discharge  Neuro:  No Weakness, No Numbness, No Paresthesias, No Loss of  Consciousness, No Syncope, No Dizziness, No Headache, No Coordination  Changes, No Recent Falls  Psych:  No Anxiety/Panic, No Depression, No Insomnia, No Personality  Changes, No Delusions, No Rumination, No SI/HI/AH/VH, No Social Issues,  No Memory Changes, No Violence/Abuse Hx., No Eating Concerns  Heme/Lymph:  No Bruising, No Bleeding, No Transfusions History, No Lymphadenopathy  Endocrine:  No Polyuria, No Polydipsia, No Temperature Intolerance    O:    ICU Vital Signs Last 24 Hrs  T(C): 36.2 (30 May 2024 23:50), Max: 36.6 (30 May 2024 15:24)  T(F): 97.1 (30 May 2024 23:50), Max: 97.8 (30 May 2024 15:24)  HR: 65 (31 May 2024 09:00) (60 - 81)  BP: 151/94 (31 May 2024 09:00) (129/85 - 177/87)  BP(mean): 113 (31 May 2024 09:00) (97 - 120)  ABP: --  ABP(mean): --  RR: 21 (31 May 2024 09:00) (18 - 24)  SpO2: 97% (31 May 2024 09:00) (90% - 99%)    O2 Parameters below as of 31 May 2024 04:29  Patient On (Oxygen Delivery Method): mask, Venturi, 50%                I&O's Detail    30 May 2024 07:01  -  31 May 2024 07:00  --------------------------------------------------------  IN:    Dexmedetomidine: 726.9 mL    sodium chloride 0.9%: 1819.7 mL  Total IN: 2546.6 mL    OUT:  Total OUT: 0 mL    Total NET: 2546.6 mL              PE:    Adult lying in bed  No JVD trachea midline  Normocephalic, atraumatic  S1S2+  CTA B/L  Abd soft NTND  No leg swelling/edema noted  Sleepy; when awakened intermittent agitation, tremulous  Skin pink, warm    LABS:    CBC Full  -  ( 31 May 2024 05:41 )  WBC Count : 4.58 K/uL  RBC Count : 3.18 M/uL  Hemoglobin : 11.3 g/dL  Hematocrit : 34.5 %  Platelet Count - Automated : 59 K/uL  Mean Cell Volume : 108.5 fl  Mean Cell Hemoglobin : 35.5 pg  Mean Cell Hemoglobin Concentration : 32.8 gm/dL  Auto Neutrophil # : x  Auto Lymphocyte # : x  Auto Monocyte # : x  Auto Eosinophil # : x  Auto Basophil # : x  Auto Neutrophil % : x  Auto Lymphocyte % : x  Auto Monocyte % : x  Auto Eosinophil % : x  Auto Basophil % : x    05-31    141  |  115<H>  |  33<H>  ----------------------------<  131<H>  4.1   |  20<L>  |  1.10    Ca    8.3<L>      31 May 2024 05:41  Phos  2.3     05-31  Mg     2.2     05-31    TPro  6.3  /  Alb  3.3  /  TBili  1.5<H>  /  DBili  x   /  AST  87<H>  /  ALT  96<H>  /  AlkPhos  44  05-31      Urinalysis Basic - ( 31 May 2024 05:41 )    Color: x / Appearance: x / SG: x / pH: x  Gluc: 131 mg/dL / Ketone: x  / Bili: x / Urobili: x   Blood: x / Protein: x / Nitrite: x   Leuk Esterase: x / RBC: x / WBC x   Sq Epi: x / Non Sq Epi: x / Bacteria: x      CAPILLARY BLOOD GLUCOSE            LIVER FUNCTIONS - ( 31 May 2024 05:41 )  Alb: 3.3 g/dL / Pro: 6.3 gm/dL / ALK PHOS: 44 U/L / ALT: 96 U/L / AST: 87 U/L / GGT: x

## 2024-05-30 NOTE — DIETITIAN INITIAL EVALUATION ADULT - ADD RECOMMEND
1) C/w Regular diet  2) Will add ONS if pt has poor po intake when more alert  3) Obtain vitamin D 25OH level to assess nutriture  4) Please obtain daily weights  5) C/w thiamine and folic acid supplementation  6) MVI w/ minerals daily to ensure 100% RDA met  7) Encourage protein-rich foods, maximize food preferences  8) Monitor bowel movements, if no BM for >3 days, consider implementing bowel regimen.  9) Monitor closely for refeeding syndrome - please obtain BMP, Mg, and Phos levels. Monitor and replete K, Phos, Mg prn if begins to downtrend. If nutrition support is initiated, recommend to check refeeding labs BID x 2-3 days upon initiation and then will reevaluate.    10) Confirm goals of care regarding nutrition support - consider placing corpak + bridle if pt remains lethargic or with very poor po intake; please reconsult RD if corpak is palced  RD will continue to monitor PO intake, labs, hydration, and wt prn.

## 2024-05-30 NOTE — PROGRESS NOTE ADULT - SUBJECTIVE AND OBJECTIVE BOX
Interval History:      Patient on precedex at 1, vallium, atc      more cooperate this am    HPI:       68M w/ pmhx HTN, DVT/PE (on Xarelto), CKD, RA, large granular lymphocytic leukemia (remission 1/2023, s/p cyclophosphamide and prednisone), ETOH use  presented to  ED 5/28/24 for right lower back pain for several days.  Pt also w/ signs of ETOH withdrawal w/ tremors and diaphoresis. Pt admits to ETOH withdrawal 2-3 times in the past and drinking habits vary depending on his mood. CT abd/pelvis notes cirrhosis w/ hepatic steatosis, w/o any acute pathology.  Pt admitted for ETOH withdrawal and sciatica. ICU consulted d/t elevated CIWA s/p 12mg ativan IVP.   now on precedex.  more cooperative    ROS cant obtain due to sedatives      PAST MEDICAL & SURGICAL HISTORY:  ETOH abuse  Hyperlipidemia LDL goal <100  Restless leg syndrome  Rheumatoid arteritis  Leukemia      MEDICATIONS  (STANDING):  dexMEDEtomidine Infusion 1 MICROgram(s)/kG/Hr (22.6 mL/Hr) IV Continuous <Continuous>  folic acid 1 milliGRAM(s) Oral daily  hydroxychloroquine 200 milliGRAM(s) Oral two times a day  LORazepam     Tablet 2 milliGRAM(s) Oral every 6 hours  losartan 50 milliGRAM(s) Oral daily  potassium chloride    Tablet ER 40 milliEquivalent(s) Oral every 4 hours  rivaroxaban 20 milliGRAM(s) Oral daily  sodium chloride 0.9%. 1000 milliLiter(s) (75 mL/Hr) IV Continuous <Continuous>  thiamine 100 milliGRAM(s) Oral daily    MEDICATIONS  (PRN):  acetaminophen     Tablet .. 650 milliGRAM(s) Oral once PRN Temp greater or equal to 38C (100.4F), Mild Pain (1 - 3)  acetaminophen     Tablet .. 650 milliGRAM(s) Oral every 6 hours PRN Temp greater or equal to 38C (100.4F), Mild Pain (1 - 3)  aluminum hydroxide/magnesium hydroxide/simethicone Suspension 30 milliLiter(s) Oral every 4 hours PRN Dyspepsia  melatonin 3 milliGRAM(s) Oral at bedtime PRN Insomnia  ondansetron Injectable 4 milliGRAM(s) IV Push once PRN Nausea and/or Vomiting  ondansetron Injectable 4 milliGRAM(s) IV Push every 8 hours PRN Nausea and/or Vomiting    ICU Vital Signs Last 24 Hrs  T(C): 36.3 (30 May 2024 04:21), Max: 37.1 (29 May 2024 20:00)  T(F): 97.3 (30 May 2024 04:21), Max: 98.8 (29 May 2024 20:00)  HR: 61 (30 May 2024 08:00) (60 - 116)  BP: 152/84 (30 May 2024 08:00) (141/102 - 176/95)  BP(mean): 102 (30 May 2024 08:00) (102 - 119)  ABP: --  ABP(mean): --  RR: 26 (30 May 2024 08:00) (15 - 26)  SpO2: 95% (30 May 2024 08:00) (93% - 99%)    O2 Parameters below as of 30 May 2024 08:00  Patient On (Oxygen Delivery Method): nasal cannula    Physical Exam    General: Male pt, laying in hospital bed, lethargic  HEENT: Pupils equal, reactive to light. Symmetric.  PULM: Clear to auscultation    NECK: Supple, no JVD  CVS: Regular rate and rhythm, +s1/s2.  ABD: Soft, nondistended, nontender, normoactive bowel sounds.  EXT: No edema, nontender.  SKIN: Warm and dry  NEURO: Alert but lethargic, confused    I&O's Summary    29 May 2024 07:01  -  30 May 2024 07:00  --------------------------------------------------------  IN: 2048.6 mL / OUT: 550 mL / NET: 1498.6 mL                          11.5   5.54  )-----------( 57       ( 30 May 2024 05:45 )             35.3   05-30    141  |  111<H>  |  34<H>  ----------------------------<  89  3.2<L>   |  22  |  1.14    Ca    8.4<L>      30 May 2024 05:45  Phos  2.9     05-30  Mg     2.6     05-30    TPro  6.4  /  Alb  3.6  /  TBili  1.2  /  DBili  x   /  AST  61<H>  /  ALT  91<H>  /  AlkPhos  30<L>  05-30    Urinalysis Basic - ( 30 May 2024 05:45 )    Color: x / Appearance: x / SG: x / pH: x  Gluc: 89 mg/dL / Ketone: x  / Bili: x / Urobili: x   Blood: x / Protein: x / Nitrite: x   Leuk Esterase: x / RBC: x / WBC x   Sq Epi: x / Non Sq Epi: x / Bacteria: x    DVT Prophylaxis:   xarekti                                                              Advanced Directives: Full Code         Labs, Notes, Orders, radiologic studies reviewed and care coordinated with multidisciplinary team

## 2024-05-30 NOTE — DIETITIAN INITIAL EVALUATION ADULT - OTHER INFO
69 y/o M with a PMHx of HTN, RA, Leukemia in remission since January 2023, CKD I, DVT and PE (on xaralto) admitted in the hospital with ETOH withdrawal and sciatica as per patient. Patient is limited historian at this time as patient appears to have a ETOH withdrawal signs of sweating, shakes. Has had ETOH withdrawal in the past 2-3 times. Drinks variably depending on the mood. Some noted bruising in the lower extremities. Admitted for alcohol withdrawal and sciatica. CIWA protocol. Transferred to CCU for elevated CIWA s/p ativan, disoriented, tremors, aggressively trying to get out of bed, agitated; CODE gray called.    Unable to obtain meaningful information 2/2 pt lethargic at time of visit; received ativan and precedex. RD unable to obtain bedscale wt at time of visit 2/2 bedscale not working. Admit weight of 199# - appears accurate. Pt frail and deconditioned but overweight appearing; NFPE reveals no muscle/ fat wasting, pt does not meet criteria for PCM at this time. Pt's body habitus may be masking wasting; pt is at HIGH RISK for becoming malnourished. C/w Regular diet. Will trial ONS if pt has poor po intake when more alert. If pt remains lethargic or with very poor po intake, recommend placing corpak + bridle and initiate TF. Please reconsult RD if corpak if placed. Monitor closely for refeeding syndrome - please obtain BMP, Mg, and Phos levels. Monitor and replete K, Phos, Mg prn if begins to downtrend. If nutrition support is initiated, recommend to check refeeding labs BID x 2-3 days upon initiation and then will reevaluate. C/w thiamine and folic acid supplementation and add MVI w/ minerals daily. Please see additional recommendations below.

## 2024-05-30 NOTE — DIETITIAN INITIAL EVALUATION ADULT - PERTINENT LABORATORY DATA
05-30    141  |  111<H>  |  34<H>  ----------------------------<  89  3.2<L>   |  22  |  1.14    Ca    8.4<L>      30 May 2024 05:45  Phos  2.9     05-30  Mg     2.6     05-30    TPro  6.4  /  Alb  3.6  /  TBili  1.2  /  DBili  x   /  AST  61<H>  /  ALT  91<H>  /  AlkPhos  30<L>  05-30    Folate, Serum: >20.0 ng/mL (08-09-22 @ 07:10)  Vitamin B12, Serum: 252 pg/mL (08-09-22 @ 07:10)  Vitamin D, 25-Hydroxy: 36.7 ng/mL (08-09-22 @ 07:10)  Iron Total, Serum: 69 ug/dL (08-09-22 @ 07:10)

## 2024-05-30 NOTE — DIETITIAN INITIAL EVALUATION ADULT - ORAL INTAKE PTA/DIET HISTORY
Unable to obtian intake/ diet hx pta 2/2 pt lethargic at time of visit; received ativan and precedex

## 2024-05-30 NOTE — DIETITIAN INITIAL EVALUATION ADULT - PERTINENT MEDS FT
MEDICATIONS  (STANDING):  dexMEDEtomidine Infusion 1 MICROgram(s)/kG/Hr (22.6 mL/Hr) IV Continuous <Continuous>  folic acid 1 milliGRAM(s) Oral daily  hydroxychloroquine 200 milliGRAM(s) Oral two times a day  LORazepam     Tablet 2 milliGRAM(s) Oral every 6 hours  losartan 50 milliGRAM(s) Oral daily  potassium chloride    Tablet ER 40 milliEquivalent(s) Oral every 4 hours  rivaroxaban 20 milliGRAM(s) Oral daily  sodium chloride 0.9%. 1000 milliLiter(s) (75 mL/Hr) IV Continuous <Continuous>  thiamine 100 milliGRAM(s) Oral daily    MEDICATIONS  (PRN):  acetaminophen     Tablet .. 650 milliGRAM(s) Oral once PRN Temp greater or equal to 38C (100.4F), Mild Pain (1 - 3)  acetaminophen     Tablet .. 650 milliGRAM(s) Oral every 6 hours PRN Temp greater or equal to 38C (100.4F)  aluminum hydroxide/magnesium hydroxide/simethicone Suspension 30 milliLiter(s) Oral every 4 hours PRN Dyspepsia  ibuprofen  Tablet. 400 milliGRAM(s) Oral every 6 hours PRN Mild Pain (1 - 3)  melatonin 3 milliGRAM(s) Oral at bedtime PRN Insomnia  ondansetron Injectable 4 milliGRAM(s) IV Push once PRN Nausea and/or Vomiting  ondansetron Injectable 4 milliGRAM(s) IV Push every 8 hours PRN Nausea and/or Vomiting    Home Medications:  furosemide 40 mg oral tablet: 1 tab(s) orally once a day (29 May 2024 09:40)  hydroxychloroquine 200 mg oral tablet: 1 tab(s) orally 2 times a day (29 May 2024 09:41)  Livalo 2 mg oral tablet: 1 tab(s) orally once a day (29 May 2024 09:41)  losartan 50 mg oral tablet: 1 tab(s) orally once a day (29 May 2024 09:41)  predniSONE 5 mg oral tablet: 4 tab(s) orally once a day (29 May 2024 09:40)  Xarelto 20 mg oral tablet: 1 tab(s) orally once a day (at bedtime) ***last filled 1/4/24 for 90 day supply*** (29 May 2024 09:39)

## 2024-05-30 NOTE — PROGRESS NOTE ADULT - ASSESSMENT
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  Pt found to have:    1. ETOH withdrawal  2. Sciatica  3. Transaminitis  4. Cirrhosis of the liver  5. Hx DVT/PE  6. Hx LGL leukemia  7. Hx ETOH misuse    Plan:  Neuro:  - CIWA protocol in place for ETOH withdrawal  - 15mg Valium Q4h  - 10mg valium IVP Q4h for CIWA >8, 20mg Valium IVP Q4h for CIWA >15  - Precedex gtt for additional sedation d/t agitation    CV:  - No active issues, Normotensive  - Avoid fluid overload  - Continue current antihypertensive regimen    Pulm:  - Continue Steroids for enhanced anti-inflammatory effect  - Supplemental oxygen as needed to maintain SpO2 > 92%  - Incentive spirometry                  GI:  - Continue Protonix 40mg Daily  - Enteral feeds as tolerated to avoid Stress ulceration and optimize nutritional state when indicated    Renal:  - Preserved Renal Function Continue to monitor Bun/Cr and UOP  - Replacing electrolytes as needed with Goal K> 4, PO> 3, Mg> 2               - Strict I&O's  - Avoid Nephro toxic medication  - Renally dose meds    Heme:  - SCDs for DVT/PE ppx   - *** AC    ID:  - WBC **,  remains afebrile with no antipyretics administered   - Continue ABX  ****  - Trend CBC with diff, CMP, and fever curve  - Avoiding antibiotics unless there is a concern for a bacterial/fungal infection    Endo:  - ISS for aggressive glycemic control to limit FS glucose to 140 - 180 mg/dl  - Keep Euthyroid    Plan discussed with  ****     68M w/ pmhx HTN, DVT/PE (on Xarelto), CKD, RA, 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  Pt found to have:    1. ETOH withdrawal  2. Sciatica  3. Transaminitis  4. Cirrhosis of the liver  5. Hx DVT/PE  6. Hx LGL leukemia  7. Hx ETOH misuse    Plan:  - CIWA protocol in place for ETOH withdrawal. Will continue to monitor for DTs and seizure activity  - 15mg Valium Q4h  - 10mg valium IVP Q4h for CIWA >8, 20mg Valium IVP Q4h for CIWA >15  - Precedex gtt for additional sedation d/t agitation  - Ammonia level 62, will continue thiamine and folic acid  - NS at 150cc/hr for hydration  - Xarelto for AC in setting of DVT/PE hx  - Will utilize supplemental oxygen with end tital CO2 monitoring to maintain spo2 >92%  - Transaminitis in setting of liver cirrhosis. Will continue to trend w/ CMP  - Replacing electrolytes as needed to maintain K >4, Mg >2, phos >3  - Goal blood glucose 140-180  - Home losartan for HTN, hydroxychloroquine for RA  - Regular Diet  - No evidence of acute infectious process at this time. Will defer any need for abx  - F/u AM labs    Dispo: Remains in CCU. Full code.    Will discuss case and plan with intensivist in AM    CRITICAL CARE TIME SPENT:  37 minutes of critical care time spent providing medical care for patient's acute illness/conditions that impairs at least one vital organ system and/or poses a high risk of imminent or life threatening deterioration in the patient's condition. It includes time spent evaluating and treating the patient's acute illness as well as time spent reviewing labs, radiology, discussing goals of care with patient and/or patient's family, and discussing the case with a multidisciplinary team, in an effort to prevent further life threatening deterioration or end organ damage. This time is independent of any procedures performed.    Date of entry of this note is equal to the date of services rendered.

## 2024-05-30 NOTE — PROGRESS NOTE ADULT - SUBJECTIVE AND OBJECTIVE BOX
Patient is a 68y old  Male who presents with a chief complaint of ETOH withdrawal  /  posterior right leg into the knee. 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 Pt stated the pain worsened that morning and radiates down his right leg. Denied trauma, heavy lifting, numbness/tingling in LE, incontinence. Pt also w/ signs of ETOH withdrawal w/ tremors and diaphoresis. Pt admits to ETOH withdrawal 2-3 times in the past and drinking habits vary depending on his mood. CT abd/pelvis notes cirrhosis w/ hepatic steatosis, w/o any acute pathology.  Pt admitted for ETOH withdrawal and sciatica. ICU consulted d/t elevated CIWA s/p 12mg ativan IVP. Pt combative with SHRUTHI HART called. Pt administered 15mg Valium IVP and transferred to CCU where he was started on precedex gtt.  Overnight pt resting comfortably in bed, lethargic. ETOH withdrawal being controlled with 15mg valium q4h and valium Q4h PRN.      PAST MEDICAL & SURGICAL HISTORY:  ETOH abuse      Hyperlipidemia LDL goal <100      Restless leg syndrome      Rheumatoid arteritis      Leukemia      No significant past surgical history        Allergies    Aloe Lotion (Rash)    Intolerances      FAMILY HISTORY:  FHx: rheumatoid arthritis (Mother)      SOCIAL HISTORY:     Review of Systems:  [ ] A ten-point review of systems was otherwise negative except as noted.  [X] Due to altered mental status/intubation, subjective information were not able to be obtained from the patient. History was obtained, to the extent possible, from review of the chart and collateral sources of information.    Medications:  hydroxychloroquine 200 milliGRAM(s) Oral two times a day    losartan 50 milliGRAM(s) Oral daily      acetaminophen     Tablet .. 650 milliGRAM(s) Oral once PRN  acetaminophen     Tablet .. 650 milliGRAM(s) Oral every 6 hours PRN  dexMEDEtomidine Infusion 1 MICROgram(s)/kG/Hr IV Continuous <Continuous>  diazepam  Injectable 15 milliGRAM(s) IV Push every 4 hours  diazepam  Injectable 10 milliGRAM(s) IV Push every 4 hours PRN  diazepam  Injectable 20 milliGRAM(s) IV Push every 4 hours PRN  melatonin 3 milliGRAM(s) Oral at bedtime PRN  ondansetron Injectable 4 milliGRAM(s) IV Push once PRN  ondansetron Injectable 4 milliGRAM(s) IV Push every 8 hours PRN      rivaroxaban 20 milliGRAM(s) Oral daily    aluminum hydroxide/magnesium hydroxide/simethicone Suspension 30 milliLiter(s) Oral every 4 hours PRN        folic acid 1 milliGRAM(s) Oral daily  sodium chloride 0.9%. 1000 milliLiter(s) IV Continuous <Continuous>  thiamine 100 milliGRAM(s) Oral daily                ICU Vital Signs Last 24 Hrs  T(C): 37 (30 May 2024 00:00), Max: 37.1 (29 May 2024 02:03)  T(F): 98.6 (30 May 2024 00:00), Max: 98.8 (29 May 2024 20:00)  HR: 63 (30 May 2024 00:00) (62 - 116)  BP: 160/94 (30 May 2024 00:00) (141/102 - 178/99)  BP(mean): 114 (30 May 2024 00:00) (104 - 119)  ABP: --  ABP(mean): --  RR: 17 (30 May 2024 00:00) (15 - 23)  SpO2: 97% (30 May 2024 00:00) (93% - 99%)    O2 Parameters below as of 29 May 2024 20:00  Patient On (Oxygen Delivery Method): nasal cannula  O2 Flow (L/min): 3        Vital Signs Last 24 Hrs  T(C): 37 (30 May 2024 00:00), Max: 37.1 (29 May 2024 02:03)  T(F): 98.6 (30 May 2024 00:00), Max: 98.8 (29 May 2024 20:00)  HR: 63 (30 May 2024 00:00) (62 - 116)  BP: 160/94 (30 May 2024 00:00) (141/102 - 178/99)  BP(mean): 114 (30 May 2024 00:00) (104 - 119)  RR: 17 (30 May 2024 00:00) (15 - 23)  SpO2: 97% (30 May 2024 00:00) (93% - 99%)    Parameters below as of 29 May 2024 20:00  Patient On (Oxygen Delivery Method): nasal cannula  O2 Flow (L/min): 3          I&O's Detail    29 May 2024 07:01  -  30 May 2024 00:38  --------------------------------------------------------  IN:  Total IN: 0 mL    OUT:    Incontinent per Condom Catheter (mL): 250 mL  Total OUT: 250 mL    Total NET: -250 mL          LABS:                        12.7   5.39  )-----------( 83       ( 29 May 2024 09:25 )             37.8     05-29    138  |  105  |  27<H>  ----------------------------<  118<H>  3.6   |  22  |  1.33<H>    Ca    8.9      29 May 2024 09:25  Phos  2.7     05-29  Mg     2.1     05-29    TPro  7.5  /  Alb  4.1  /  TBili  1.8<H>  /  DBili  0.7<H>  /  AST  81<H>  /  ALT  116<H>  /  AlkPhos  45  05-29          CAPILLARY BLOOD GLUCOSE          Urinalysis Basic - ( 29 May 2024 09:25 )    Color: x / Appearance: x / SG: x / pH: x  Gluc: 118 mg/dL / Ketone: x  / Bili: x / Urobili: x   Blood: x / Protein: x / Nitrite: x   Leuk Esterase: x / RBC: x / WBC x   Sq Epi: x / Non Sq Epi: x / Bacteria: x        CULTURES:      Physical Examination:    General: Male pt, laying in hospital bed, lethargic  HEENT: Pupils equal, reactive to light. Symmetric.  PULM: Clear to auscultation B/L. No wheezes, rales, or rhonchi appreciated. No increased respiratory effort  NECK: Supple, no JVD  CVS: Regular rate and rhythm, +s1/s2.  ABD: Soft, nondistended, nontender, normoactive bowel sounds.  EXT: No edema, nontender.  SKIN: Warm and dry  NEURO: Alert but lethargic      RADIOLOGY:  < from: CT Abdomen and Pelvis w/ IV Cont (05.28.24 @ 18:09) >    ACC: [id number] EXAM:  CT ABDOMEN AND PELVIS IC   ORDERED BY: LARRY GALINDO     PROCEDURE DATE:  05/28/2024          INTERPRETATION:  CLINICAL INFORMATION: Right lower back pain    COMPARISON: CT abdomen and pelvis 1/17/2023    CONTRAST/COMPLICATIONS:  IV Contrast: Omnipaque 350  90 cc administered   0 cc discarded  Oral Contrast: NONE  Complications: None reported at time of study completion    PROCEDURE:  CT of the Abdomen and Pelvis was performed.  Sagittal and coronal reformats were performed.    FINDINGS:  LOWER CHEST: Small hiatal hernia. Coronary artery calcification.    LIVER: Steatosis and cirrhosis similar to prior  BILE DUCTS: Normal caliber.  GALLBLADDER: Within normal limits.  SPLEEN: Within normal limits.  PANCREAS: Within normallimits.  ADRENALS: Within normal limits.  KIDNEYS/URETERS: Left renal cyst and too small to characterize cortical   hypodensity. No hydronephrosis. Bilateral nonspecific perinephric   stranding similar to prior    BLADDER: Markedly distended  REPRODUCTIVE ORGANS: Normal size prostate    BOWEL: No bowel obstruction. Colonic diverticulosis without acute   diverticulitis. Appendix normal  PERITONEUM: Trace left upper quadrant ascites.  VESSELS: Nonaneurysmal  RETROPERITONEUM/LYMPH NODES: No lymphadenopathy.  ABDOMINAL WALL: Within normal limits.  BONES: Degenerative changes    IMPRESSION:  No specific acute inflammatory or obstructive pathology.    Cirrhosis with hepatic steatosis.    Cholelithiasis.    Diverticulosis coli without acute diverticulitis.    Markedly distended urinary bladder.        --- End of Report ---            INO BLAIR MD; Attending Radiologist  This document has been electronically signed. May 28 2024  6:31PM    < end of copied text >

## 2024-05-31 LAB
ALBUMIN SERPL ELPH-MCNC: 3.3 G/DL — SIGNIFICANT CHANGE UP (ref 3.3–5)
ALP SERPL-CCNC: 44 U/L — SIGNIFICANT CHANGE UP (ref 40–120)
ALT FLD-CCNC: 96 U/L — HIGH (ref 12–78)
AMMONIA BLD-MCNC: 37 UMOL/L — HIGH (ref 11–32)
ANION GAP SERPL CALC-SCNC: 6 MMOL/L — SIGNIFICANT CHANGE UP (ref 5–17)
AST SERPL-CCNC: 87 U/L — HIGH (ref 15–37)
BILIRUB SERPL-MCNC: 1.5 MG/DL — HIGH (ref 0.2–1.2)
BUN SERPL-MCNC: 33 MG/DL — HIGH (ref 7–23)
CALCIUM SERPL-MCNC: 8.3 MG/DL — LOW (ref 8.5–10.1)
CHLORIDE SERPL-SCNC: 115 MMOL/L — HIGH (ref 96–108)
CO2 SERPL-SCNC: 20 MMOL/L — LOW (ref 22–31)
CREAT SERPL-MCNC: 1.1 MG/DL — SIGNIFICANT CHANGE UP (ref 0.5–1.3)
EGFR: 73 ML/MIN/1.73M2 — SIGNIFICANT CHANGE UP
GLUCOSE SERPL-MCNC: 131 MG/DL — HIGH (ref 70–99)
HCT VFR BLD CALC: 34.5 % — LOW (ref 39–50)
HGB BLD-MCNC: 11.3 G/DL — LOW (ref 13–17)
MAGNESIUM SERPL-MCNC: 2.2 MG/DL — SIGNIFICANT CHANGE UP (ref 1.6–2.6)
MCHC RBC-ENTMCNC: 32.8 GM/DL — SIGNIFICANT CHANGE UP (ref 32–36)
MCHC RBC-ENTMCNC: 35.5 PG — HIGH (ref 27–34)
MCV RBC AUTO: 108.5 FL — HIGH (ref 80–100)
PHOSPHATE SERPL-MCNC: 2.3 MG/DL — LOW (ref 2.5–4.5)
PLATELET # BLD AUTO: 59 K/UL — LOW (ref 150–400)
POTASSIUM SERPL-MCNC: 4.1 MMOL/L — SIGNIFICANT CHANGE UP (ref 3.5–5.3)
POTASSIUM SERPL-SCNC: 4.1 MMOL/L — SIGNIFICANT CHANGE UP (ref 3.5–5.3)
PROT SERPL-MCNC: 6.3 GM/DL — SIGNIFICANT CHANGE UP (ref 6–8.3)
RBC # BLD: 3.18 M/UL — LOW (ref 4.2–5.8)
RBC # FLD: 14.8 % — HIGH (ref 10.3–14.5)
SODIUM SERPL-SCNC: 141 MMOL/L — SIGNIFICANT CHANGE UP (ref 135–145)
WBC # BLD: 4.58 K/UL — SIGNIFICANT CHANGE UP (ref 3.8–10.5)
WBC # FLD AUTO: 4.58 K/UL — SIGNIFICANT CHANGE UP (ref 3.8–10.5)

## 2024-05-31 PROCEDURE — 71045 X-RAY EXAM CHEST 1 VIEW: CPT | Mod: 26,77

## 2024-05-31 PROCEDURE — 71045 X-RAY EXAM CHEST 1 VIEW: CPT | Mod: 26

## 2024-05-31 PROCEDURE — 99291 CRITICAL CARE FIRST HOUR: CPT

## 2024-05-31 RX ORDER — PANTOPRAZOLE SODIUM 20 MG/1
40 TABLET, DELAYED RELEASE ORAL
Refills: 0 | Status: DISCONTINUED | OUTPATIENT
Start: 2024-05-31 | End: 2024-06-10

## 2024-05-31 RX ORDER — SODIUM,POTASSIUM PHOSPHATES 278-250MG
1 POWDER IN PACKET (EA) ORAL
Refills: 0 | Status: COMPLETED | OUTPATIENT
Start: 2024-05-31 | End: 2024-05-31

## 2024-05-31 RX ORDER — MORPHINE SULFATE 50 MG/1
2 CAPSULE, EXTENDED RELEASE ORAL ONCE
Refills: 0 | Status: DISCONTINUED | OUTPATIENT
Start: 2024-05-31 | End: 2024-05-31

## 2024-05-31 RX ORDER — CHLORHEXIDINE GLUCONATE 213 G/1000ML
1 SOLUTION TOPICAL
Refills: 0 | Status: DISCONTINUED | OUTPATIENT
Start: 2024-05-31 | End: 2024-06-10

## 2024-05-31 RX ORDER — IPRATROPIUM/ALBUTEROL SULFATE 18-103MCG
3 AEROSOL WITH ADAPTER (GRAM) INHALATION EVERY 6 HOURS
Refills: 0 | Status: DISCONTINUED | OUTPATIENT
Start: 2024-05-31 | End: 2024-06-03

## 2024-05-31 RX ORDER — NYSTATIN CREAM 100000 [USP'U]/G
1 CREAM TOPICAL
Refills: 0 | Status: DISCONTINUED | OUTPATIENT
Start: 2024-05-31 | End: 2024-06-10

## 2024-05-31 RX ORDER — IPRATROPIUM/ALBUTEROL SULFATE 18-103MCG
3 AEROSOL WITH ADAPTER (GRAM) INHALATION EVERY 6 HOURS
Refills: 0 | Status: COMPLETED | OUTPATIENT
Start: 2024-05-31 | End: 2024-06-03

## 2024-05-31 RX ORDER — FUROSEMIDE 40 MG
40 TABLET ORAL ONCE
Refills: 0 | Status: COMPLETED | OUTPATIENT
Start: 2024-05-31 | End: 2024-05-31

## 2024-05-31 RX ORDER — IPRATROPIUM/ALBUTEROL SULFATE 18-103MCG
3 AEROSOL WITH ADAPTER (GRAM) INHALATION ONCE
Refills: 0 | Status: COMPLETED | OUTPATIENT
Start: 2024-05-31 | End: 2024-05-31

## 2024-05-31 RX ADMIN — PANTOPRAZOLE SODIUM 40 MILLIGRAM(S): 20 TABLET, DELAYED RELEASE ORAL at 11:23

## 2024-05-31 RX ADMIN — Medication 1 PACKET(S): at 17:37

## 2024-05-31 RX ADMIN — LIDOCAINE 1 PATCH: 4 CREAM TOPICAL at 17:57

## 2024-05-31 RX ADMIN — DEXMEDETOMIDINE HYDROCHLORIDE IN 0.9% SODIUM CHLORIDE 22.6 MICROGRAM(S)/KG/HR: 4 INJECTION INTRAVENOUS at 02:57

## 2024-05-31 RX ADMIN — DEXMEDETOMIDINE HYDROCHLORIDE IN 0.9% SODIUM CHLORIDE 22.6 MICROGRAM(S)/KG/HR: 4 INJECTION INTRAVENOUS at 18:30

## 2024-05-31 RX ADMIN — Medication 1 PACKET(S): at 11:23

## 2024-05-31 RX ADMIN — SODIUM CHLORIDE 75 MILLILITER(S): 9 INJECTION INTRAMUSCULAR; INTRAVENOUS; SUBCUTANEOUS at 10:15

## 2024-05-31 RX ADMIN — Medication 200 MILLIGRAM(S): at 10:17

## 2024-05-31 RX ADMIN — Medication 3 MILLILITER(S): at 04:29

## 2024-05-31 RX ADMIN — DEXMEDETOMIDINE HYDROCHLORIDE IN 0.9% SODIUM CHLORIDE 22.6 MICROGRAM(S)/KG/HR: 4 INJECTION INTRAVENOUS at 11:21

## 2024-05-31 RX ADMIN — Medication 1 MILLIGRAM(S): at 10:15

## 2024-05-31 RX ADMIN — LOSARTAN POTASSIUM 50 MILLIGRAM(S): 100 TABLET, FILM COATED ORAL at 10:15

## 2024-05-31 RX ADMIN — Medication 40 MILLIGRAM(S): at 23:21

## 2024-05-31 RX ADMIN — LIDOCAINE 1 PATCH: 4 CREAM TOPICAL at 06:05

## 2024-05-31 RX ADMIN — Medication 3 MILLIGRAM(S): at 21:43

## 2024-05-31 RX ADMIN — DEXMEDETOMIDINE HYDROCHLORIDE IN 0.9% SODIUM CHLORIDE 22.6 MICROGRAM(S)/KG/HR: 4 INJECTION INTRAVENOUS at 21:23

## 2024-05-31 RX ADMIN — MORPHINE SULFATE 2 MILLIGRAM(S): 50 CAPSULE, EXTENDED RELEASE ORAL at 11:00

## 2024-05-31 RX ADMIN — DEXMEDETOMIDINE HYDROCHLORIDE IN 0.9% SODIUM CHLORIDE 22.6 MICROGRAM(S)/KG/HR: 4 INJECTION INTRAVENOUS at 16:07

## 2024-05-31 RX ADMIN — Medication 2 MILLIGRAM(S): at 23:27

## 2024-05-31 RX ADMIN — LIDOCAINE 1 PATCH: 4 CREAM TOPICAL at 10:16

## 2024-05-31 RX ADMIN — Medication 400 MILLIGRAM(S): at 10:45

## 2024-05-31 RX ADMIN — Medication 2 MILLIGRAM(S): at 11:21

## 2024-05-31 RX ADMIN — Medication 200 MILLIGRAM(S): at 21:42

## 2024-05-31 RX ADMIN — RIVAROXABAN 20 MILLIGRAM(S): KIT at 11:22

## 2024-05-31 RX ADMIN — MORPHINE SULFATE 2 MILLIGRAM(S): 50 CAPSULE, EXTENDED RELEASE ORAL at 01:10

## 2024-05-31 RX ADMIN — Medication 3 MILLILITER(S): at 20:07

## 2024-05-31 RX ADMIN — Medication 1 PACKET(S): at 23:19

## 2024-05-31 RX ADMIN — Medication 2 MILLIGRAM(S): at 17:37

## 2024-05-31 RX ADMIN — Medication 100 MILLIGRAM(S): at 10:15

## 2024-05-31 RX ADMIN — MORPHINE SULFATE 2 MILLIGRAM(S): 50 CAPSULE, EXTENDED RELEASE ORAL at 08:00

## 2024-05-31 RX ADMIN — Medication 400 MILLIGRAM(S): at 21:43

## 2024-05-31 RX ADMIN — Medication 1 TABLET(S): at 11:23

## 2024-05-31 RX ADMIN — CHLORHEXIDINE GLUCONATE 1 APPLICATION(S): 213 SOLUTION TOPICAL at 13:55

## 2024-05-31 RX ADMIN — Medication 3 MILLILITER(S): at 13:20

## 2024-05-31 RX ADMIN — MORPHINE SULFATE 2 MILLIGRAM(S): 50 CAPSULE, EXTENDED RELEASE ORAL at 10:30

## 2024-05-31 RX ADMIN — Medication 10 MILLIGRAM(S): at 17:37

## 2024-05-31 RX ADMIN — LIDOCAINE 1 PATCH: 4 CREAM TOPICAL at 21:44

## 2024-05-31 RX ADMIN — Medication 2 MILLIGRAM(S): at 13:54

## 2024-05-31 RX ADMIN — Medication 400 MILLIGRAM(S): at 10:15

## 2024-05-31 NOTE — PROGRESS NOTE ADULT - SUBJECTIVE AND OBJECTIVE BOX
INTERVAL HPI/OVERNIGHT EVENTS:  Patient S&E at bedside.   pt remains on precedex gtt  has been receiving ativan as well   still confused at times and in withdrawal       PAST MEDICAL & SURGICAL HISTORY:  ETOH abuse      Hyperlipidemia LDL goal <100      Restless leg syndrome      Rheumatoid arteritis      Leukemia      No significant past surgical history          FAMILY HISTORY:  FHx: rheumatoid arthritis (Mother)        VITAL SIGNS:  T(F): 97.1 (05-30-24 @ 23:50)  HR: 66 (05-31-24 @ 08:00)  BP: 144/95 (05-31-24 @ 08:00)  RR: 22 (05-31-24 @ 08:00)  SpO2: 98% (05-31-24 @ 08:00)  Wt(kg): --    PHYSICAL EXAM:    Constitutional: NAD, mild confusion   Eyes: EOMI,  Respiratory: mild wheezing  Cardiovascular: RRR,  Gastrointestinal: soft, NTND  Extremities: no c/c/e, +bruising      MEDICATIONS  (STANDING):  dexMEDEtomidine Infusion 1 MICROgram(s)/kG/Hr (22.6 mL/Hr) IV Continuous <Continuous>  folic acid 1 milliGRAM(s) Oral daily  hydroxychloroquine 200 milliGRAM(s) Oral two times a day  lidocaine   4% Patch 1 Patch Transdermal daily  LORazepam     Tablet 2 milliGRAM(s) Oral every 6 hours  losartan 50 milliGRAM(s) Oral daily  rivaroxaban 20 milliGRAM(s) Oral daily  sodium chloride 0.9%. 1000 milliLiter(s) (75 mL/Hr) IV Continuous <Continuous>  thiamine 100 milliGRAM(s) Oral daily    MEDICATIONS  (PRN):  acetaminophen     Tablet .. 650 milliGRAM(s) Oral every 6 hours PRN Temp greater or equal to 38C (100.4F)  acetaminophen     Tablet .. 650 milliGRAM(s) Oral once PRN Temp greater or equal to 38C (100.4F), Mild Pain (1 - 3)  aluminum hydroxide/magnesium hydroxide/simethicone Suspension 30 milliLiter(s) Oral every 4 hours PRN Dyspepsia  ibuprofen  Tablet. 400 milliGRAM(s) Oral every 6 hours PRN Mild Pain (1 - 3)  melatonin 3 milliGRAM(s) Oral at bedtime PRN Insomnia  ondansetron Injectable 4 milliGRAM(s) IV Push every 8 hours PRN Nausea and/or Vomiting  ondansetron Injectable 4 milliGRAM(s) IV Push once PRN Nausea and/or Vomiting      Allergies    Aloe Lotion (Rash)    Intolerances        LABS:                        11.3   4.58  )-----------( 59       ( 31 May 2024 05:41 )             34.5     05-31    141  |  115<H>  |  33<H>  ----------------------------<  131<H>  4.1   |  20<L>  |  1.10    Ca    8.3<L>      31 May 2024 05:41  Phos  2.3     05-31  Mg     2.2     05-31    TPro  6.3  /  Alb  3.3  /  TBili  1.5<H>  /  DBili  x   /  AST  87<H>  /  ALT  96<H>  /  AlkPhos  44  05-31      Urinalysis Basic - ( 31 May 2024 05:41 )    Color: x / Appearance: x / SG: x / pH: x  Gluc: 131 mg/dL / Ketone: x  / Bili: x / Urobili: x   Blood: x / Protein: x / Nitrite: x   Leuk Esterase: x / RBC: x / WBC x   Sq Epi: x / Non Sq Epi: x / Bacteria: x        RADIOLOGY & ADDITIONAL TESTS:  Studies reviewed.

## 2024-05-31 NOTE — PROGRESS NOTE ADULT - ASSESSMENT
A:    68yMale  HD #    Here for:    1. DT's  2. Alcoholic cirrhosis  3. Hypokalemia  4. Anemia  5. DVT  6. RA  7. ANTOINE on CKD    This patient requires critical care for support of one or more vital organ systems with a high probability of imminent or life threatening deterioration in his/her condition    P:    Severe DT's refractory to high doze BZD therapy    Precedex drip for breakthru agitation; ctively titrating this and BZD therapy  HD monitoring  ISS, OOB as able  Add lactulose and rifaxamin for elevated NH3+  Replete K+  Anemia of chronic disease, no s/s active bleeding  Heme/onc note appreciated: leukemia; mgmt per oncology  DOAC for VTE disease  Gentle hydration  HCQ for RA  Folate, thiamine, MVI    Dispo: Cont critical care.    Date of entry of this note is equal to the date of services rendered     TOTAL CRITICAL CARE TIME:  34 minutes (EXCLUSIVE of any non bundled procedures)    Note: This is a critically ill patient. Time spent has been in salvage of life, limb and vital organ systems. This time INCLUDES time spent directly as this patient's bedside with evaluation and management, review of chart including review of laboratory and imaging studies, interpretation of vital signs and cardiac output measurements, any necessary ventilator management, and time spent discussing plan of care with patient and family, including goals of care discussion. This also includes time spent in multidisciplinary discussion with care team and various consultants to optimize treatment plan. This time may NOT include various procedures, which will be noted separately

## 2024-05-31 NOTE — PROGRESS NOTE ADULT - ASSESSMENT
69 y/o male who follows with me at [facility] for h/o LGL s/p cyclophosphamide and prednisone completed 1/31/23 in remission, DVT and PE now on xarelto, HTN, RA prev on MTX now on HCQ and prednisone, CKD I , admitted in the hospital with ETOH withdrawal and sciatica as per patient.     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 IgK gene status negative, TCR gamma positive  ­ 5/26/22 CT a/p with splenomegaly up to 15 cm, cirrhosis seen and hepatic steatosis, no masses as well as sigmoid diverticulosis  ­ started cyclophosphamide 100 mg qd and prednisone 7/8/22- 1/31/23  - counts outpt have been stable- last 5/17/24 WBC 6.5, Hb 12.5, mcv 104, plt 147  - Today, WBC 4.58, Hb 11.3, plt 59    # RLE DVT/PE  ­ 9/15/22 CTA chest showed small right lower lobe pulmonary embolus­ completed 3 mo of xarelto   ­ 6/14/23 US LE doppler b/l­ showing acute DVT in right posterior tibial vein, chronic DVT in right common femoral, femoral, popliteal and gastrocnemius veins, no DVT in LLE  ­ 6/14/23 CTA chest with RLL postrior subsegmental PE, interlobular septal thickening suggestive of interstitial pulmonary edema.  ­ continue with xarelto 20 mg qd  ­ pt will need lifelong a/c as unprovoked     # RA  ­ follows with Dr. Frank­ now off MTX, on HCQ and course of prednisone     will f/u with me outpatient when withdrawal resolves

## 2024-05-31 NOTE — PROGRESS NOTE ADULT - SUBJECTIVE AND OBJECTIVE BOX
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 Pt stated the pain worsened that morning and radiates down his right leg. Denied trauma, heavy lifting, numbness/tingling in LE, incontinence. Pt also w/ signs of ETOH withdrawal w/ tremors and diaphoresis. Pt admits to ETOH withdrawal 2-3 times in the past and drinking habits vary depending on his mood. CT abd/pelvis notes cirrhosis w/ hepatic steatosis, w/o any acute pathology.  Pt admitted for ETOH withdrawal and sciatica. ICU consulted d/t elevated CIWA s/p 12mg ativan IVP. Pt combative with SHRUTHI HART called. Pt administered 15mg Valium IVP and transferred to CCU where he was started on precedex gtt.    5/30: Severe alcohol withdrawal on precedex for for agitation + high dose BZD therapy. Cirrhotic, on Tx for hyperammonia.  5/31: Remains on precedex and BZD therapy being actively managed and titrated.     ROS: unable to obtain 2/2 weakness    Allergies    Aloe Lotion (Rash)    Intolerances        MEDICATIONS  (STANDING):  albuterol/ipratropium for Nebulization 3 milliLiter(s) Nebulizer every 6 hours  dexMEDEtomidine Infusion 1 MICROgram(s)/kG/Hr (22.6 mL/Hr) IV Continuous <Continuous>  folic acid 1 milliGRAM(s) Oral daily  hydroxychloroquine 200 milliGRAM(s) Oral two times a day  lidocaine   4% Patch 1 Patch Transdermal daily  LORazepam   Injectable 2 milliGRAM(s) IV Push every 6 hours  losartan 50 milliGRAM(s) Oral daily  multivitamin 1 Tablet(s) Oral daily  pantoprazole    Tablet 40 milliGRAM(s) Oral before breakfast  potassium phosphate / sodium phosphate Powder (PHOS-NaK) 1 Packet(s) Oral four times a day  rivaroxaban 20 milliGRAM(s) Oral daily  sodium chloride 0.9%. 1000 milliLiter(s) (75 mL/Hr) IV Continuous <Continuous>  thiamine 100 milliGRAM(s) Oral daily    MEDICATIONS  (PRN):  acetaminophen     Tablet .. 650 milliGRAM(s) Oral once PRN Temp greater or equal to 38C (100.4F), Mild Pain (1 - 3)  acetaminophen     Tablet .. 650 milliGRAM(s) Oral every 6 hours PRN Temp greater or equal to 38C (100.4F)  aluminum hydroxide/magnesium hydroxide/simethicone Suspension 30 milliLiter(s) Oral every 4 hours PRN Dyspepsia  ibuprofen  Tablet. 400 milliGRAM(s) Oral every 6 hours PRN Mild Pain (1 - 3)  LORazepam   Injectable 2 milliGRAM(s) IV Push every 8 hours PRN Agitation  melatonin 3 milliGRAM(s) Oral at bedtime PRN Insomnia  ondansetron Injectable 4 milliGRAM(s) IV Push once PRN Nausea and/or Vomiting  ondansetron Injectable 4 milliGRAM(s) IV Push every 8 hours PRN Nausea and/or Vomiting      Drug Dosing Weight  Height (cm): 172.7 (16 Feb 2024 17:36)  Weight (kg): 90.3 (29 May 2024 08:21)  BMI (kg/m2): 30.3 (29 May 2024 08:21)  BSA (m2): 2.04 (29 May 2024 08:21)        PAST MEDICAL & SURGICAL HISTORY:  ETOH abuse      Hyperlipidemia LDL goal <100      Restless leg syndrome      Rheumatoid arteritis      Leukemia      No significant past surgical history          FAMILY HISTORY:  FHx: rheumatoid arthritis (Mother)          REVIEW OF SYSTEMS    UNLESS OTHERWISE NOTED IN HPI above:    Constitutional:  No Weight Change, No Fever, No Chills, No Night Sweats, No Fatigue, No Malaise  ENT/Mouth:  No Hearing Changes, No Ear Pain, No Nasal Congestion, No  Sinus Pain, No Hoarseness, No sore throat, No Rhinorrhea, No Swallowing  Difficulty  Eyes:  No Eye Pain, No Swelling, No Redness, No Foreign Body, No Discharge, No Vision Changes  Cardiovascular:  No Chest Pain, No SOB, No PND, No Dyspnea on Exertion,  No Orthopnea, No Claudication, No Edema, No Palpitations  Respiratory:  No Cough, No Sputum, No Wheezing, No Smoke Exposure, No Dyspnea  Gastrointestinal:  No Nausea, No Vomiting, No Diarrhea, No  Constipation, No Pain, No Heartburn, No Anorexia, No Dysphagia, No  Hematochezia, No Melena, No Flatulence, No Jaundice  Genitourinary:  No Dysmenorrhea, No DUB, No Dyspareunia, No Dysuria, No  Urinary Frequency, No Hematuria, No Urinary Incontinence, No Urgency,  No Flank Pain, No Urinary Flow Changes, No Hesitancy  Musculoskeletal:  No Arthralgias, No Myalgias, No Joint Swelling, No  Joint Stiffness, No Back Pain, No Neck Pain, No Injury History  Skin:  No Skin Lesions, No Pruritis, No Hair Changes, No Breast/Skin Changes, No Nipple Discharge  Neuro:  No Weakness, No Numbness, No Paresthesias, No Loss of  Consciousness, No Syncope, No Dizziness, No Headache, No Coordination  Changes, No Recent Falls  Psych:  No Anxiety/Panic, No Depression, No Insomnia, No Personality  Changes, No Delusions, No Rumination, No SI/HI/AH/VH, No Social Issues,  No Memory Changes, No Violence/Abuse Hx., No Eating Concerns  Heme/Lymph:  No Bruising, No Bleeding, No Transfusions History, No Lymphadenopathy  Endocrine:  No Polyuria, No Polydipsia, No Temperature Intolerance    O:    ICU Vital Signs Last 24 Hrs  T(C): 36.2 (30 May 2024 23:50), Max: 36.6 (30 May 2024 15:24)  T(F): 97.1 (30 May 2024 23:50), Max: 97.8 (30 May 2024 15:24)  HR: 94 (31 May 2024 11:00) (60 - 94)  BP: 155/109 (31 May 2024 11:00) (112/101 - 177/87)  BP(mean): 123 (31 May 2024 11:00) (97 - 123)  ABP: --  ABP(mean): --  RR: 34 (31 May 2024 11:00) (18 - 34)  SpO2: 97% (31 May 2024 11:00) (90% - 100%)    O2 Parameters below as of 31 May 2024 11:00  Patient On (Oxygen Delivery Method): nasal cannula  O2 Flow (L/min): 4              I&O's Detail    30 May 2024 07:01  -  31 May 2024 07:00  --------------------------------------------------------  IN:    Dexmedetomidine: 726.9 mL    sodium chloride 0.9%: 1819.7 mL  Total IN: 2546.6 mL    OUT:  Total OUT: 0 mL    Total NET: 2546.6 mL              PE:    Adult lying in bed  No JVD trachea midline  Normocephalic, atraumatic  S1S2+  CTA B/L  Abd soft NTND  No leg swelling/edema noted  Sleepy; when awakened intermittent agitation, tremulous  Skin pink, warm  LABS:    CBC Full  -  ( 31 May 2024 05:41 )  WBC Count : 4.58 K/uL  RBC Count : 3.18 M/uL  Hemoglobin : 11.3 g/dL  Hematocrit : 34.5 %  Platelet Count - Automated : 59 K/uL  Mean Cell Volume : 108.5 fl  Mean Cell Hemoglobin : 35.5 pg  Mean Cell Hemoglobin Concentration : 32.8 gm/dL  Auto Neutrophil # : x  Auto Lymphocyte # : x  Auto Monocyte # : x  Auto Eosinophil # : x  Auto Basophil # : x  Auto Neutrophil % : x  Auto Lymphocyte % : x  Auto Monocyte % : x  Auto Eosinophil % : x  Auto Basophil % : x    05-31    141  |  115<H>  |  33<H>  ----------------------------<  131<H>  4.1   |  20<L>  |  1.10    Ca    8.3<L>      31 May 2024 05:41  Phos  2.3     05-31  Mg     2.2     05-31    TPro  6.3  /  Alb  3.3  /  TBili  1.5<H>  /  DBili  x   /  AST  87<H>  /  ALT  96<H>  /  AlkPhos  44  05-31      Urinalysis Basic - ( 31 May 2024 05:41 )    Color: x / Appearance: x / SG: x / pH: x  Gluc: 131 mg/dL / Ketone: x  / Bili: x / Urobili: x   Blood: x / Protein: x / Nitrite: x   Leuk Esterase: x / RBC: x / WBC x   Sq Epi: x / Non Sq Epi: x / Bacteria: x      CAPILLARY BLOOD GLUCOSE            LIVER FUNCTIONS - ( 31 May 2024 05:41 )  Alb: 3.3 g/dL / Pro: 6.3 gm/dL / ALK PHOS: 44 U/L / ALT: 96 U/L / AST: 87 U/L / GGT: x

## 2024-05-31 NOTE — PROGRESS NOTE ADULT - ASSESSMENT
67 y/o male PMH RA on Plaquenil, HTN, leukemia in remission, CKD1, DVT/PE on Xarelto       Now with ETOH withdrawal/DTs   Also with aspiration pneumonitis and acute resp distress       -sedated in am today - taper Precedex   -continue Ativan 2 mg q6, add prn   -FA, thiamine   -IVF, will d/c once po intake adequate  -no evidence of pneumonia but high risk for it, add BDs, chest PT, maintain aspiration precautions   -saturating well on 3L NC   -continue losartan, Plaquenil, Xarelto  -DVT ppx Xarelto       Remains critically ill on iv sedation, high risk for decompensation

## 2024-05-31 NOTE — PROGRESS NOTE ADULT - SUBJECTIVE AND OBJECTIVE BOX
Patient is a 68y old  Male who presents with a chief complaint of ETOH withdrawal  /  posterior right leg into the knee. (31 May 2024 09:21)    24 hour events:     Allergies    Aloe Lotion (Rash)    Intolerances      REVIEW OF SYSTEMS: SEE BELOW       ICU Vital Signs Last 24 Hrs  T(C): 36.2 (30 May 2024 23:50), Max: 36.6 (30 May 2024 15:24)  T(F): 97.1 (30 May 2024 23:50), Max: 97.8 (30 May 2024 15:24)  HR: 65 (31 May 2024 09:00) (60 - 81)  BP: 151/94 (31 May 2024 09:00) (129/85 - 182/83)  BP(mean): 113 (31 May 2024 09:00) (97 - 120)  ABP: --  ABP(mean): --  RR: 21 (31 May 2024 09:00) (18 - 24)  SpO2: 97% (31 May 2024 09:00) (90% - 99%)    O2 Parameters below as of 31 May 2024 04:29  Patient On (Oxygen Delivery Method): mask, Venturi, 50%            CAPILLARY BLOOD GLUCOSE          I&O's Summary    30 May 2024 07:01  -  31 May 2024 07:00  --------------------------------------------------------  IN: 2546.6 mL / OUT: 0 mL / NET: 2546.6 mL            MEDICATIONS  (STANDING):  dexMEDEtomidine Infusion 1 MICROgram(s)/kG/Hr (22.6 mL/Hr) IV Continuous <Continuous>  folic acid 1 milliGRAM(s) Oral daily  hydroxychloroquine 200 milliGRAM(s) Oral two times a day  lidocaine   4% Patch 1 Patch Transdermal daily  LORazepam   Injectable 2 milliGRAM(s) IV Push every 6 hours  losartan 50 milliGRAM(s) Oral daily  morphine  - Injectable 2 milliGRAM(s) IV Push once  potassium phosphate / sodium phosphate Powder (PHOS-NaK) 1 Packet(s) Oral four times a day  rivaroxaban 20 milliGRAM(s) Oral daily  sodium chloride 0.9%. 1000 milliLiter(s) (75 mL/Hr) IV Continuous <Continuous>  thiamine 100 milliGRAM(s) Oral daily      MEDICATIONS  (PRN):  acetaminophen     Tablet .. 650 milliGRAM(s) Oral once PRN Temp greater or equal to 38C (100.4F), Mild Pain (1 - 3)  acetaminophen     Tablet .. 650 milliGRAM(s) Oral every 6 hours PRN Temp greater or equal to 38C (100.4F)  aluminum hydroxide/magnesium hydroxide/simethicone Suspension 30 milliLiter(s) Oral every 4 hours PRN Dyspepsia  ibuprofen  Tablet. 400 milliGRAM(s) Oral every 6 hours PRN Mild Pain (1 - 3)  LORazepam   Injectable 2 milliGRAM(s) IV Push every 8 hours PRN Agitation  melatonin 3 milliGRAM(s) Oral at bedtime PRN Insomnia  ondansetron Injectable 4 milliGRAM(s) IV Push every 8 hours PRN Nausea and/or Vomiting  ondansetron Injectable 4 milliGRAM(s) IV Push once PRN Nausea and/or Vomiting      PHYSICAL EXAM: SEE BELOW                          11.3   4.58  )-----------( 59       ( 31 May 2024 05:41 )             34.5       05-31    141  |  115<H>  |  33<H>  ----------------------------<  131<H>  4.1   |  20<L>  |  1.10    Ca    8.3<L>      31 May 2024 05:41  Phos  2.3     05-31  Mg     2.2     05-31    TPro  6.3  /  Alb  3.3  /  TBili  1.5<H>  /  DBili  x   /  AST  87<H>  /  ALT  96<H>  /  AlkPhos  44  05-31            Urinalysis Basic - ( 31 May 2024 05:41 )    Color: x / Appearance: x / SG: x / pH: x  Gluc: 131 mg/dL / Ketone: x  / Bili: x / Urobili: x   Blood: x / Protein: x / Nitrite: x   Leuk Esterase: x / RBC: x / WBC x   Sq Epi: x / Non Sq Epi: x / Bacteria: x

## 2024-06-01 ENCOUNTER — RESULT REVIEW (OUTPATIENT)
Age: 69
End: 2024-06-01

## 2024-06-01 LAB
ANION GAP SERPL CALC-SCNC: 7 MMOL/L — SIGNIFICANT CHANGE UP (ref 5–17)
BUN SERPL-MCNC: 24 MG/DL — HIGH (ref 7–23)
CALCIUM SERPL-MCNC: 9.1 MG/DL — SIGNIFICANT CHANGE UP (ref 8.5–10.1)
CHLORIDE SERPL-SCNC: 112 MMOL/L — HIGH (ref 96–108)
CO2 SERPL-SCNC: 20 MMOL/L — LOW (ref 22–31)
CREAT SERPL-MCNC: 1.28 MG/DL — SIGNIFICANT CHANGE UP (ref 0.5–1.3)
EGFR: 61 ML/MIN/1.73M2 — SIGNIFICANT CHANGE UP
GLUCOSE SERPL-MCNC: 131 MG/DL — HIGH (ref 70–99)
HCT VFR BLD CALC: 33.2 % — LOW (ref 39–50)
HGB BLD-MCNC: 11.1 G/DL — LOW (ref 13–17)
MAGNESIUM SERPL-MCNC: 2.3 MG/DL — SIGNIFICANT CHANGE UP (ref 1.6–2.6)
MCHC RBC-ENTMCNC: 33.4 GM/DL — SIGNIFICANT CHANGE UP (ref 32–36)
MCHC RBC-ENTMCNC: 36.2 PG — HIGH (ref 27–34)
MCV RBC AUTO: 108.1 FL — HIGH (ref 80–100)
NT-PROBNP SERPL-SCNC: 3077 PG/ML — HIGH (ref 0–125)
PHOSPHATE SERPL-MCNC: 3.6 MG/DL — SIGNIFICANT CHANGE UP (ref 2.5–4.5)
PLATELET # BLD AUTO: 82 K/UL — LOW (ref 150–400)
POTASSIUM SERPL-MCNC: 3.9 MMOL/L — SIGNIFICANT CHANGE UP (ref 3.5–5.3)
POTASSIUM SERPL-SCNC: 3.9 MMOL/L — SIGNIFICANT CHANGE UP (ref 3.5–5.3)
RBC # BLD: 3.07 M/UL — LOW (ref 4.2–5.8)
RBC # FLD: 14.9 % — HIGH (ref 10.3–14.5)
SODIUM SERPL-SCNC: 139 MMOL/L — SIGNIFICANT CHANGE UP (ref 135–145)
TROPONIN I, HIGH SENSITIVITY RESULT: 39.84 NG/L — SIGNIFICANT CHANGE UP
WBC # BLD: 6.13 K/UL — SIGNIFICANT CHANGE UP (ref 3.8–10.5)
WBC # FLD AUTO: 6.13 K/UL — SIGNIFICANT CHANGE UP (ref 3.8–10.5)

## 2024-06-01 PROCEDURE — 93306 TTE W/DOPPLER COMPLETE: CPT | Mod: 26

## 2024-06-01 PROCEDURE — 99291 CRITICAL CARE FIRST HOUR: CPT

## 2024-06-01 RX ORDER — FUROSEMIDE 40 MG
20 TABLET ORAL ONCE
Refills: 0 | Status: COMPLETED | OUTPATIENT
Start: 2024-06-01 | End: 2024-06-01

## 2024-06-01 RX ORDER — MORPHINE SULFATE 50 MG/1
2 CAPSULE, EXTENDED RELEASE ORAL ONCE
Refills: 0 | Status: DISCONTINUED | OUTPATIENT
Start: 2024-06-01 | End: 2024-06-01

## 2024-06-01 RX ORDER — ACETAMINOPHEN 500 MG
1000 TABLET ORAL ONCE
Refills: 0 | Status: COMPLETED | OUTPATIENT
Start: 2024-06-01 | End: 2024-06-01

## 2024-06-01 RX ORDER — KETOROLAC TROMETHAMINE 30 MG/ML
30 SYRINGE (ML) INJECTION ONCE
Refills: 0 | Status: DISCONTINUED | OUTPATIENT
Start: 2024-06-01 | End: 2024-06-01

## 2024-06-01 RX ADMIN — Medication 3 MILLIGRAM(S): at 21:50

## 2024-06-01 RX ADMIN — Medication 200 MILLIGRAM(S): at 10:33

## 2024-06-01 RX ADMIN — LIDOCAINE 1 PATCH: 4 CREAM TOPICAL at 18:51

## 2024-06-01 RX ADMIN — Medication 3 MILLILITER(S): at 01:49

## 2024-06-01 RX ADMIN — Medication 1 TABLET(S): at 10:30

## 2024-06-01 RX ADMIN — Medication 10 MILLIGRAM(S): at 10:30

## 2024-06-01 RX ADMIN — RIVAROXABAN 20 MILLIGRAM(S): KIT at 13:31

## 2024-06-01 RX ADMIN — DEXMEDETOMIDINE HYDROCHLORIDE IN 0.9% SODIUM CHLORIDE 22.6 MICROGRAM(S)/KG/HR: 4 INJECTION INTRAVENOUS at 01:10

## 2024-06-01 RX ADMIN — Medication 650 MILLIGRAM(S): at 13:39

## 2024-06-01 RX ADMIN — NYSTATIN CREAM 1 APPLICATION(S): 100000 CREAM TOPICAL at 10:33

## 2024-06-01 RX ADMIN — Medication 1000 MILLIGRAM(S): at 08:00

## 2024-06-01 RX ADMIN — PANTOPRAZOLE SODIUM 40 MILLIGRAM(S): 20 TABLET, DELAYED RELEASE ORAL at 10:30

## 2024-06-01 RX ADMIN — Medication 2 MILLIGRAM(S): at 05:45

## 2024-06-01 RX ADMIN — DEXMEDETOMIDINE HYDROCHLORIDE IN 0.9% SODIUM CHLORIDE 22.6 MICROGRAM(S)/KG/HR: 4 INJECTION INTRAVENOUS at 06:39

## 2024-06-01 RX ADMIN — DEXMEDETOMIDINE HYDROCHLORIDE IN 0.9% SODIUM CHLORIDE 22.6 MICROGRAM(S)/KG/HR: 4 INJECTION INTRAVENOUS at 13:31

## 2024-06-01 RX ADMIN — MORPHINE SULFATE 2 MILLIGRAM(S): 50 CAPSULE, EXTENDED RELEASE ORAL at 15:11

## 2024-06-01 RX ADMIN — Medication 200 MILLIGRAM(S): at 21:50

## 2024-06-01 RX ADMIN — MORPHINE SULFATE 2 MILLIGRAM(S): 50 CAPSULE, EXTENDED RELEASE ORAL at 22:47

## 2024-06-01 RX ADMIN — Medication 100 MILLIGRAM(S): at 10:30

## 2024-06-01 RX ADMIN — LIDOCAINE 1 PATCH: 4 CREAM TOPICAL at 10:31

## 2024-06-01 RX ADMIN — Medication 400 MILLIGRAM(S): at 11:00

## 2024-06-01 RX ADMIN — DEXMEDETOMIDINE HYDROCHLORIDE IN 0.9% SODIUM CHLORIDE 22.6 MICROGRAM(S)/KG/HR: 4 INJECTION INTRAVENOUS at 03:30

## 2024-06-01 RX ADMIN — Medication 20 MILLIGRAM(S): at 10:30

## 2024-06-01 RX ADMIN — Medication 3 MILLILITER(S): at 09:23

## 2024-06-01 RX ADMIN — Medication 2 MILLIGRAM(S): at 21:50

## 2024-06-01 RX ADMIN — Medication 1 MILLIGRAM(S): at 10:29

## 2024-06-01 RX ADMIN — DEXMEDETOMIDINE HYDROCHLORIDE IN 0.9% SODIUM CHLORIDE 22.6 MICROGRAM(S)/KG/HR: 4 INJECTION INTRAVENOUS at 20:00

## 2024-06-01 RX ADMIN — Medication 650 MILLIGRAM(S): at 14:10

## 2024-06-01 RX ADMIN — Medication 2 MILLIGRAM(S): at 03:18

## 2024-06-01 RX ADMIN — Medication 30 MILLIGRAM(S): at 22:12

## 2024-06-01 RX ADMIN — LOSARTAN POTASSIUM 50 MILLIGRAM(S): 100 TABLET, FILM COATED ORAL at 10:29

## 2024-06-01 RX ADMIN — Medication 3 MILLILITER(S): at 15:05

## 2024-06-01 RX ADMIN — Medication 3 MILLILITER(S): at 19:49

## 2024-06-01 RX ADMIN — Medication 400 MILLIGRAM(S): at 10:29

## 2024-06-01 RX ADMIN — Medication 2 MILLIGRAM(S): at 18:08

## 2024-06-01 RX ADMIN — NYSTATIN CREAM 1 APPLICATION(S): 100000 CREAM TOPICAL at 20:53

## 2024-06-01 RX ADMIN — MORPHINE SULFATE 2 MILLIGRAM(S): 50 CAPSULE, EXTENDED RELEASE ORAL at 22:37

## 2024-06-01 RX ADMIN — MORPHINE SULFATE 2 MILLIGRAM(S): 50 CAPSULE, EXTENDED RELEASE ORAL at 14:41

## 2024-06-01 RX ADMIN — Medication 30 MILLIGRAM(S): at 21:50

## 2024-06-01 RX ADMIN — Medication 400 MILLIGRAM(S): at 08:00

## 2024-06-01 RX ADMIN — Medication 400 MILLIGRAM(S): at 20:52

## 2024-06-01 RX ADMIN — Medication 2 MILLIGRAM(S): at 12:26

## 2024-06-01 RX ADMIN — Medication 400 MILLIGRAM(S): at 00:20

## 2024-06-01 RX ADMIN — Medication 400 MILLIGRAM(S): at 21:00

## 2024-06-01 RX ADMIN — CHLORHEXIDINE GLUCONATE 1 APPLICATION(S): 213 SOLUTION TOPICAL at 05:46

## 2024-06-01 RX ADMIN — LIDOCAINE 1 PATCH: 4 CREAM TOPICAL at 22:00

## 2024-06-01 NOTE — PROGRESS NOTE ADULT - ASSESSMENT
67 y/o male PMH RA on Plaquenil, HTN, leukemia in remission, CKD1, DVT/PE on Xarelto       Now with ETOH withdrawal/DTs   Also with aspiration pneumonitis and acute resp distress      Taper precedex  -continue Ativan 2 mg q6, ATC  if needs analgesia for back pain, ? toraldol   -FA, thiamine   -? aspiration vs. fluid overload, bnp 3000, cxr c/w chf   -continue losartan, Plaquenil, Xarelto(for hx. PE  -DVT ppx Xarelto   will need address back pain when out of DTs    Remains critically ill on iv sedation, high risk for decompensation

## 2024-06-01 NOTE — PROGRESS NOTE ADULT - SUBJECTIVE AND OBJECTIVE BOX
Patient is a 68y old  Male who presents with a chief complaint of ETOH withdrawal  /  posterior right leg into the knee. (31 May 2024 10:19)      BRIEF HOSPITAL COURSE: Patient is a 69 y/o male with PMHx of HTN, RA, Leukemia in remission since January 2023, CKD I ,  DVT and PE (on xaralto) admitted in the hospital with ETOH withdrawal and sciatica as per patient. Patient is limited historian at this time as patient appears to have a ETOH withdrawal signs of sweating, shakes. States he follows up with PCP Dr. Boxer. Has had ETOH withdrawal in the past 2-3 times. Drinks variably depending on the mood. Denies any HA, CP, SOB. NO fevers, chills or shakes. Labs and vitals reviewed. Some noted bruising in the lower extremities. Upgraded to ICU for ETOH withdrawal/DTs management.    Events last 24 hours: 5/31 at approx 0500 patient had aspiration event from ginger ale with aspiration pneumonitis and acute respiratory distress. CXR reviewed, no PNA noted. Labored with expiratory wheezing noted, duonebs given with good relief. This evening patient became tachypneic, POCUS done revealing poor EF function and B lines bilaterally. IV lasix given and FC inserted with 1500 cc output, some improvement to work of breathing. BNP 3077. Repeat CXR reviewed. TTE ordered for AM.    PAST MEDICAL & SURGICAL HISTORY:  ETOH abuse      Hyperlipidemia LDL goal <100      Restless leg syndrome      Rheumatoid arteritis      Leukemia      No significant past surgical history        Allergies    Aloe Lotion (Rash)    Intolerances      FAMILY HISTORY:  FHx: rheumatoid arthritis (Mother)      SOCIAL HISTORY:     Review of Systems:  Negative unless stated above. Patient poor historian currently 2/2 DTs    Medications:  hydroxychloroquine 200 milliGRAM(s) Oral two times a day    losartan 50 milliGRAM(s) Oral daily    albuterol/ipratropium for Nebulization 3 milliLiter(s) Nebulizer every 6 hours  albuterol/ipratropium for Nebulization 3 milliLiter(s) Nebulizer every 6 hours    acetaminophen     Tablet .. 650 milliGRAM(s) Oral once PRN  acetaminophen     Tablet .. 650 milliGRAM(s) Oral every 6 hours PRN  dexMEDEtomidine Infusion 1 MICROgram(s)/kG/Hr IV Continuous <Continuous>  ibuprofen  Tablet. 400 milliGRAM(s) Oral every 6 hours PRN  LORazepam   Injectable 2 milliGRAM(s) IV Push every 8 hours PRN  LORazepam   Injectable 2 milliGRAM(s) IV Push every 6 hours  melatonin 3 milliGRAM(s) Oral at bedtime PRN  ondansetron Injectable 4 milliGRAM(s) IV Push once PRN  ondansetron Injectable 4 milliGRAM(s) IV Push every 8 hours PRN      rivaroxaban 20 milliGRAM(s) Oral daily    aluminum hydroxide/magnesium hydroxide/simethicone Suspension 30 milliLiter(s) Oral every 4 hours PRN  pantoprazole    Tablet 40 milliGRAM(s) Oral before breakfast      predniSONE   Tablet 10 milliGRAM(s) Oral daily    folic acid 1 milliGRAM(s) Oral daily  multivitamin 1 Tablet(s) Oral daily  thiamine 100 milliGRAM(s) Oral daily      chlorhexidine 4% Liquid 1 Application(s) Topical <User Schedule>  lidocaine   4% Patch 1 Patch Transdermal daily  nystatin Powder 1 Application(s) Topical two times a day            ICU Vital Signs Last 24 Hrs  T(C): 36.9 (01 Jun 2024 00:38), Max: 37.7 (31 May 2024 11:25)  T(F): 98.4 (01 Jun 2024 00:38), Max: 99.9 (31 May 2024 11:25)  HR: 73 (01 Jun 2024 02:00) (65 - 94)  BP: 150/92 (01 Jun 2024 02:00) (106/64 - 155/109)  BP(mean): 108 (01 Jun 2024 02:00) (74 - 123)  ABP: --  ABP(mean): --  RR: 19 (01 Jun 2024 02:00) (16 - 34)  SpO2: 97% (01 Jun 2024 02:00) (92% - 100%)    O2 Parameters below as of 01 Jun 2024 01:49  Patient On (Oxygen Delivery Method): nasal cannula, 4L          Vital Signs Last 24 Hrs  T(C): 36.9 (01 Jun 2024 00:38), Max: 37.7 (31 May 2024 11:25)  T(F): 98.4 (01 Jun 2024 00:38), Max: 99.9 (31 May 2024 11:25)  HR: 73 (01 Jun 2024 02:00) (65 - 94)  BP: 150/92 (01 Jun 2024 02:00) (106/64 - 155/109)  BP(mean): 108 (01 Jun 2024 02:00) (74 - 123)  RR: 19 (01 Jun 2024 02:00) (16 - 34)  SpO2: 97% (01 Jun 2024 02:00) (92% - 100%)    Parameters below as of 01 Jun 2024 01:49  Patient On (Oxygen Delivery Method): nasal cannula, 4L            I&O's Detail    30 May 2024 07:01  -  31 May 2024 07:00  --------------------------------------------------------  IN:    Dexmedetomidine: 726.9 mL    sodium chloride 0.9%: 1819.7 mL  Total IN: 2546.6 mL    OUT:  Total OUT: 0 mL    Total NET: 2546.6 mL      31 May 2024 07:01  -  01 Jun 2024 04:07  --------------------------------------------------------  IN:    Dexmedetomidine: 300 mL    IV PiggyBack: 100 mL    Oral Fluid: 1550 mL    sodium chloride 0.9%: 640 mL  Total IN: 2590 mL    OUT:    Incontinent per Condom Catheter (mL): 250 mL    Incontinent per Diaper, Weight (mL): 2 mL    Indwelling Catheter - Urethral (mL): 1500 mL    Voided (mL): 550 mL  Total OUT: 2302 mL    Total NET: 288 mL          LABS:                        11.3   4.58  )-----------( 59       ( 31 May 2024 05:41 )             34.5     05-31    141  |  115<H>  |  33<H>  ----------------------------<  131<H>  4.1   |  20<L>  |  1.10    Ca    8.3<L>      31 May 2024 05:41  Phos  2.3     05-31  Mg     2.2     05-31    TPro  6.3  /  Alb  3.3  /  TBili  1.5<H>  /  DBili  x   /  AST  87<H>  /  ALT  96<H>  /  AlkPhos  44  05-31          CAPILLARY BLOOD GLUCOSE          Urinalysis Basic - ( 31 May 2024 05:41 )    Color: x / Appearance: x / SG: x / pH: x  Gluc: 131 mg/dL / Ketone: x  / Bili: x / Urobili: x   Blood: x / Protein: x / Nitrite: x   Leuk Esterase: x / RBC: x / WBC x   Sq Epi: x / Non Sq Epi: x / Bacteria: x        CULTURES:      Physical Examination:    General: Chronically ill appearing, obese, afebrile, disheveled.    HEENT: Pupils equal, reactive to light. Symmetric. No scleral icterus or injection.    PULM: Expiratory wheezes bilaterally, labored breathing. POCUS revealing bilateral B lines.    NECK: Supple, obese, unable to visualize JVD, trachea midline.    CVS: Regular rate and rhythm, +s1/s2. POCUS revealed less than normal EF.    ABD: Soft, rounded, nontender, normoactive bowel sounds.    EXT: Nonpitting edema, nontender.    SKIN: Warm and well perfused, no rashes noted.    NEURO: Lethargic, confused, tremulous. Clear speech, eyes PERRL, ONEIL to command 5/5

## 2024-06-01 NOTE — PROGRESS NOTE ADULT - ASSESSMENT
Assessment  Patient is a 67 y/o male with PMHx of HTN, RA, Leukemia in remission since January 2023, CKD I ,  DVT and PE (on xaralto) admitted in the hospital with ETOH withdrawal/DTs and sciatica. Developing new aspiration pneumonitis with acute respiratory distress. POCUS revealed bilateral B lines with less than normal EF. BNP 3077. Patient responsive to IV lasix.    ETOH withdrawal  DTs  Sciatica  Aspiration Pneumonitis  Acute respiratory distress  Hyperammonemia  RA on plaquenil  CKD    Plan  -ETOH withdrawal DTs with ativan Q6H standing taper. Continue CIWA scores. Precedex gtt ongoing for increased agitation. Actively wean precedex gtt to maintain RASS 0 to -1.   -MVI, Folic acid, Thiamine  -Acute respiratory distress 2/2 aspiration pneumonitis, CXR reviewed with POCUS revealing bilateral B lines and BNP 3077 indicative of pulmonary congestion. IV lasix given with good effect, 1500 cc output. Chest PT, aspiration precautions.  -Expiratory wheezes on auscultation, duonebs Q6H standing ordered.  -Actively titrating supplemental NC O2 to maintain SO2>92 for adequate tissue oxygenation. If hypoxia occurs, can consider HFNC.  -Labile blood pressure with recent high blood pressures noted in 160s possibly 2/2 sciatica pain and agitation. Can consider clonidine if consistent hypertension occurs.  -Less than normal EF noted on POCUS. Ordered for TTE to follow up.  -Pain control with IV ofirmev, lidocaine patch. Try to avoid further narcotics for deliriogenic effects.  -Continue plaquenil for RA  -Xarelto for DVT PPX  -Maintain NPO while DTs with aspiration risk, protonix for GI PPX  -Scr stable, will trend s/p IV lasix dose. Monitor I/Os. Avoid nephrotoxic agents. Replete lytes to maintain K>4, Mg>2, Phos>3.  -Redness in groin folds noted, nystatin powder BID ordered.      Critical care time spent on this patient encounter, which includes documenting this note in the electronic medical record, was 60 minutes including assessing the presenting problems with associated risks, reviewing the medical record to prepare for the encounter, and meeting face to face with the patient to obtain additional history. I have also performed an appropriate physical exam, made interventions listed and ordered and interpreted appropriate diagnostic studies as documented. To improve  communication and patient safety, I have coordinated care with the multidisciplinary team including the bedside nurse, appropriate attending of record and consultants as needed.    Date of entry of this note is equal to the date of services rendered    Plan discussed with Dr Sung

## 2024-06-01 NOTE — PROGRESS NOTE ADULT - SUBJECTIVE AND OBJECTIVE BOX
Interval History:         on nasal cannula         cxr overnight ? 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  Pt also w/ signs of ETOH withdrawal w/ tremors and diaphoresis. Pt admits to ETOH withdrawal 2-3 times in the past and drinking habits vary depending on his mood. CT abd/pelvis notes cirrhosis w/ hepatic steatosis, w/o any acute pathology.  now on precedex. 1.5  sedated  ? aspiration    ROS cant obtain due to sedatives    MEDICATIONS  (STANDING):  albuterol/ipratropium for Nebulization 3 milliLiter(s) Nebulizer every 6 hours  albuterol/ipratropium for Nebulization 3 milliLiter(s) Nebulizer every 6 hours  chlorhexidine 4% Liquid 1 Application(s) Topical <User Schedule>  dexMEDEtomidine Infusion 1 MICROgram(s)/kG/Hr (22.6 mL/Hr) IV Continuous <Continuous>  folic acid 1 milliGRAM(s) Oral daily  hydroxychloroquine 200 milliGRAM(s) Oral two times a day  lidocaine   4% Patch 1 Patch Transdermal daily  LORazepam   Injectable 2 milliGRAM(s) IV Push every 6 hours  losartan 50 milliGRAM(s) Oral daily  multivitamin 1 Tablet(s) Oral daily  nystatin Powder 1 Application(s) Topical two times a day  pantoprazole    Tablet 40 milliGRAM(s) Oral before breakfast  predniSONE   Tablet 10 milliGRAM(s) Oral daily  rivaroxaban 20 milliGRAM(s) Oral daily  thiamine 100 milliGRAM(s) Oral daily    MEDICATIONS  (PRN):  acetaminophen     Tablet .. 650 milliGRAM(s) Oral once PRN Temp greater or equal to 38C (100.4F), Mild Pain (1 - 3)  acetaminophen     Tablet .. 650 milliGRAM(s) Oral every 6 hours PRN Temp greater or equal to 38C (100.4F)  aluminum hydroxide/magnesium hydroxide/simethicone Suspension 30 milliLiter(s) Oral every 4 hours PRN Dyspepsia  ibuprofen  Tablet. 400 milliGRAM(s) Oral every 6 hours PRN Mild Pain (1 - 3)  LORazepam   Injectable 2 milliGRAM(s) IV Push every 8 hours PRN Agitation  melatonin 3 milliGRAM(s) Oral at bedtime PRN Insomnia  ondansetron Injectable 4 milliGRAM(s) IV Push once PRN Nausea and/or Vomiting  ondansetron Injectable 4 milliGRAM(s) IV Push every 8 hours PRN Nausea and/or Vomiting    ICU Vital Signs Last 24 Hrs  T(C): 36.2 (01 Jun 2024 05:00), Max: 37.7 (31 May 2024 11:25)  T(F): 97.2 (01 Jun 2024 05:00), Max: 99.9 (31 May 2024 11:25)  HR: 69 (01 Jun 2024 06:00) (65 - 94)  BP: 156/103 (01 Jun 2024 06:00) (106/64 - 167/97)  BP(mean): 118 (01 Jun 2024 06:00) (74 - 123)  ABP: --  ABP(mean): --  RR: 19 (01 Jun 2024 06:00) (16 - 34)  SpO2: 94% (01 Jun 2024 06:00) (91% - 100%)    O2 Parameters below as of 01 Jun 2024 01:49  Patient On (Oxygen Delivery Method): nasal cannula, 4L    Physical Exam    General - sedated  HEENT - nc/at, icteric  neck - supple  lungs - bilateral rhonchi, on nasal cannula  cv - rrr  abdomen - soft, non tender  neuro - somnolent   voiding    I&O's Summary    31 May 2024 07:01  -  01 Jun 2024 07:00  --------------------------------------------------------  IN: 2590 mL / OUT: 2527 mL / NET: 63 mL                       11.3   4.58  )-----------( 59       ( 31 May 2024 05:41 )             34.5       05-31    141  |  115<H>  |  33<H>  ----------------------------<  131<H>  4.1   |  20<L>  |  1.10    Ca    8.3<L>      31 May 2024 05:41  Phos  2.3     05-31  Mg     2.2     05-31    TPro  6.3  /  Alb  3.3  /  TBili  1.5<H>  /  DBili  x   /  AST  87<H>  /  ALT  96<H>  /  AlkPhos  44  05-31    Urinalysis Basic - ( 31 May 2024 05:41 )    Color: x / Appearance: x / SG: x / pH: x  Gluc: 131 mg/dL / Ketone: x  / Bili: x / Urobili: x   Blood: x / Protein: x / Nitrite: x   Leuk Esterase: x / RBC: x / WBC x   Sq Epi: x / Non Sq Epi: x / Bacteria: x    DVT Prophylaxis:  xarelto                                                                Advanced Directives: Full Code         Labs, Notes, Orders, radiologic studies reviewed and care coordinated with multidisciplinary team

## 2024-06-02 LAB
ALBUMIN SERPL ELPH-MCNC: 3.3 G/DL — SIGNIFICANT CHANGE UP (ref 3.3–5)
ALP SERPL-CCNC: 57 U/L — SIGNIFICANT CHANGE UP (ref 40–120)
ALT FLD-CCNC: 121 U/L — HIGH (ref 12–78)
AMMONIA BLD-MCNC: 16 UMOL/L — SIGNIFICANT CHANGE UP (ref 11–32)
ANION GAP SERPL CALC-SCNC: 7 MMOL/L — SIGNIFICANT CHANGE UP (ref 5–17)
AST SERPL-CCNC: 62 U/L — HIGH (ref 15–37)
BASOPHILS # BLD AUTO: 0 K/UL — SIGNIFICANT CHANGE UP (ref 0–0.2)
BASOPHILS NFR BLD AUTO: 0 % — SIGNIFICANT CHANGE UP (ref 0–2)
BILIRUB SERPL-MCNC: 1.3 MG/DL — HIGH (ref 0.2–1.2)
BUN SERPL-MCNC: 26 MG/DL — HIGH (ref 7–23)
CALCIUM SERPL-MCNC: 9 MG/DL — SIGNIFICANT CHANGE UP (ref 8.5–10.1)
CHLORIDE SERPL-SCNC: 111 MMOL/L — HIGH (ref 96–108)
CO2 SERPL-SCNC: 23 MMOL/L — SIGNIFICANT CHANGE UP (ref 22–31)
CREAT SERPL-MCNC: 1.19 MG/DL — SIGNIFICANT CHANGE UP (ref 0.5–1.3)
EGFR: 67 ML/MIN/1.73M2 — SIGNIFICANT CHANGE UP
EOSINOPHIL # BLD AUTO: 0.09 K/UL — SIGNIFICANT CHANGE UP (ref 0–0.5)
EOSINOPHIL NFR BLD AUTO: 2 % — SIGNIFICANT CHANGE UP (ref 0–6)
GLUCOSE SERPL-MCNC: 110 MG/DL — HIGH (ref 70–99)
HCT VFR BLD CALC: 31.5 % — LOW (ref 39–50)
HGB BLD-MCNC: 10.4 G/DL — LOW (ref 13–17)
LYMPHOCYTES # BLD AUTO: 0.56 K/UL — LOW (ref 1–3.3)
LYMPHOCYTES # BLD AUTO: 12 % — LOW (ref 13–44)
MAGNESIUM SERPL-MCNC: 2.3 MG/DL — SIGNIFICANT CHANGE UP (ref 1.6–2.6)
MCHC RBC-ENTMCNC: 33 GM/DL — SIGNIFICANT CHANGE UP (ref 32–36)
MCHC RBC-ENTMCNC: 35.3 PG — HIGH (ref 27–34)
MCV RBC AUTO: 106.8 FL — HIGH (ref 80–100)
MONOCYTES # BLD AUTO: 0.28 K/UL — SIGNIFICANT CHANGE UP (ref 0–0.9)
MONOCYTES NFR BLD AUTO: 6 % — SIGNIFICANT CHANGE UP (ref 2–14)
NEUTROPHILS # BLD AUTO: 3.75 K/UL — SIGNIFICANT CHANGE UP (ref 1.8–7.4)
NEUTROPHILS NFR BLD AUTO: 80 % — HIGH (ref 43–77)
NRBC # BLD: SIGNIFICANT CHANGE UP /100 WBCS (ref 0–0)
PHOSPHATE SERPL-MCNC: 3.4 MG/DL — SIGNIFICANT CHANGE UP (ref 2.5–4.5)
PLATELET # BLD AUTO: 74 K/UL — LOW (ref 150–400)
POTASSIUM SERPL-MCNC: 3.5 MMOL/L — SIGNIFICANT CHANGE UP (ref 3.5–5.3)
POTASSIUM SERPL-SCNC: 3.5 MMOL/L — SIGNIFICANT CHANGE UP (ref 3.5–5.3)
PROT SERPL-MCNC: 6.8 GM/DL — SIGNIFICANT CHANGE UP (ref 6–8.3)
RBC # BLD: 2.95 M/UL — LOW (ref 4.2–5.8)
RBC # FLD: 15 % — HIGH (ref 10.3–14.5)
SODIUM SERPL-SCNC: 141 MMOL/L — SIGNIFICANT CHANGE UP (ref 135–145)
WBC # BLD: 4.69 K/UL — SIGNIFICANT CHANGE UP (ref 3.8–10.5)
WBC # FLD AUTO: 4.69 K/UL — SIGNIFICANT CHANGE UP (ref 3.8–10.5)

## 2024-06-02 PROCEDURE — 99291 CRITICAL CARE FIRST HOUR: CPT

## 2024-06-02 PROCEDURE — 93010 ELECTROCARDIOGRAM REPORT: CPT

## 2024-06-02 PROCEDURE — 71045 X-RAY EXAM CHEST 1 VIEW: CPT | Mod: 26

## 2024-06-02 RX ORDER — POTASSIUM CHLORIDE 20 MEQ
40 PACKET (EA) ORAL EVERY 4 HOURS
Refills: 0 | Status: COMPLETED | OUTPATIENT
Start: 2024-06-02 | End: 2024-06-02

## 2024-06-02 RX ORDER — METOPROLOL TARTRATE 50 MG
5 TABLET ORAL ONCE
Refills: 0 | Status: COMPLETED | OUTPATIENT
Start: 2024-06-02 | End: 2024-06-02

## 2024-06-02 RX ORDER — CYCLOBENZAPRINE HYDROCHLORIDE 10 MG/1
5 TABLET, FILM COATED ORAL THREE TIMES A DAY
Refills: 0 | Status: DISCONTINUED | OUTPATIENT
Start: 2024-06-02 | End: 2024-06-03

## 2024-06-02 RX ORDER — MORPHINE SULFATE 50 MG/1
2 CAPSULE, EXTENDED RELEASE ORAL ONCE
Refills: 0 | Status: DISCONTINUED | OUTPATIENT
Start: 2024-06-02 | End: 2024-06-02

## 2024-06-02 RX ORDER — CYCLOBENZAPRINE HYDROCHLORIDE 10 MG/1
5 TABLET, FILM COATED ORAL ONCE
Refills: 0 | Status: COMPLETED | OUTPATIENT
Start: 2024-06-02 | End: 2024-06-02

## 2024-06-02 RX ORDER — QUETIAPINE FUMARATE 200 MG/1
25 TABLET, FILM COATED ORAL ONCE
Refills: 0 | Status: COMPLETED | OUTPATIENT
Start: 2024-06-02 | End: 2024-06-02

## 2024-06-02 RX ORDER — DEXMEDETOMIDINE HYDROCHLORIDE IN 0.9% SODIUM CHLORIDE 4 UG/ML
0.3 INJECTION INTRAVENOUS
Qty: 400 | Refills: 0 | Status: DISCONTINUED | OUTPATIENT
Start: 2024-06-02 | End: 2024-06-04

## 2024-06-02 RX ORDER — MORPHINE SULFATE 50 MG/1
2 CAPSULE, EXTENDED RELEASE ORAL EVERY 6 HOURS
Refills: 0 | Status: DISCONTINUED | OUTPATIENT
Start: 2024-06-02 | End: 2024-06-07

## 2024-06-02 RX ADMIN — Medication 2 MILLIGRAM(S): at 23:21

## 2024-06-02 RX ADMIN — MORPHINE SULFATE 2 MILLIGRAM(S): 50 CAPSULE, EXTENDED RELEASE ORAL at 21:21

## 2024-06-02 RX ADMIN — Medication 40 MILLIEQUIVALENT(S): at 09:36

## 2024-06-02 RX ADMIN — Medication 400 MILLIGRAM(S): at 05:13

## 2024-06-02 RX ADMIN — PANTOPRAZOLE SODIUM 40 MILLIGRAM(S): 20 TABLET, DELAYED RELEASE ORAL at 05:13

## 2024-06-02 RX ADMIN — Medication 400 MILLIGRAM(S): at 06:00

## 2024-06-02 RX ADMIN — Medication 200 MILLIGRAM(S): at 09:36

## 2024-06-02 RX ADMIN — CYCLOBENZAPRINE HYDROCHLORIDE 5 MILLIGRAM(S): 10 TABLET, FILM COATED ORAL at 17:55

## 2024-06-02 RX ADMIN — DEXMEDETOMIDINE HYDROCHLORIDE IN 0.9% SODIUM CHLORIDE 6.77 MICROGRAM(S)/KG/HR: 4 INJECTION INTRAVENOUS at 23:21

## 2024-06-02 RX ADMIN — Medication 100 MILLIGRAM(S): at 09:36

## 2024-06-02 RX ADMIN — DEXMEDETOMIDINE HYDROCHLORIDE IN 0.9% SODIUM CHLORIDE 22.6 MICROGRAM(S)/KG/HR: 4 INJECTION INTRAVENOUS at 08:54

## 2024-06-02 RX ADMIN — MORPHINE SULFATE 2 MILLIGRAM(S): 50 CAPSULE, EXTENDED RELEASE ORAL at 20:51

## 2024-06-02 RX ADMIN — MORPHINE SULFATE 2 MILLIGRAM(S): 50 CAPSULE, EXTENDED RELEASE ORAL at 05:30

## 2024-06-02 RX ADMIN — DEXMEDETOMIDINE HYDROCHLORIDE IN 0.9% SODIUM CHLORIDE 22.6 MICROGRAM(S)/KG/HR: 4 INJECTION INTRAVENOUS at 03:18

## 2024-06-02 RX ADMIN — CYCLOBENZAPRINE HYDROCHLORIDE 5 MILLIGRAM(S): 10 TABLET, FILM COATED ORAL at 11:27

## 2024-06-02 RX ADMIN — Medication 2 MILLIGRAM(S): at 05:13

## 2024-06-02 RX ADMIN — LOSARTAN POTASSIUM 50 MILLIGRAM(S): 100 TABLET, FILM COATED ORAL at 09:36

## 2024-06-02 RX ADMIN — MORPHINE SULFATE 2 MILLIGRAM(S): 50 CAPSULE, EXTENDED RELEASE ORAL at 15:21

## 2024-06-02 RX ADMIN — Medication 3 MILLILITER(S): at 14:03

## 2024-06-02 RX ADMIN — Medication 5 MILLIGRAM(S): at 23:21

## 2024-06-02 RX ADMIN — NYSTATIN CREAM 1 APPLICATION(S): 100000 CREAM TOPICAL at 17:56

## 2024-06-02 RX ADMIN — CHLORHEXIDINE GLUCONATE 1 APPLICATION(S): 213 SOLUTION TOPICAL at 05:14

## 2024-06-02 RX ADMIN — LIDOCAINE 1 PATCH: 4 CREAM TOPICAL at 09:37

## 2024-06-02 RX ADMIN — Medication 2 MILLIGRAM(S): at 17:56

## 2024-06-02 RX ADMIN — LIDOCAINE 1 PATCH: 4 CREAM TOPICAL at 20:18

## 2024-06-02 RX ADMIN — Medication 2 MILLIGRAM(S): at 02:00

## 2024-06-02 RX ADMIN — Medication 3 MILLILITER(S): at 19:53

## 2024-06-02 RX ADMIN — Medication 1 TABLET(S): at 09:36

## 2024-06-02 RX ADMIN — RIVAROXABAN 20 MILLIGRAM(S): KIT at 09:36

## 2024-06-02 RX ADMIN — Medication 2 MILLIGRAM(S): at 11:27

## 2024-06-02 RX ADMIN — QUETIAPINE FUMARATE 25 MILLIGRAM(S): 200 TABLET, FILM COATED ORAL at 09:36

## 2024-06-02 RX ADMIN — MORPHINE SULFATE 2 MILLIGRAM(S): 50 CAPSULE, EXTENDED RELEASE ORAL at 14:51

## 2024-06-02 RX ADMIN — Medication 3 MILLILITER(S): at 11:01

## 2024-06-02 RX ADMIN — Medication 1 MILLIGRAM(S): at 09:36

## 2024-06-02 RX ADMIN — Medication 40 MILLIEQUIVALENT(S): at 13:07

## 2024-06-02 RX ADMIN — Medication 10 MILLIGRAM(S): at 09:36

## 2024-06-02 RX ADMIN — Medication 200 MILLIGRAM(S): at 22:00

## 2024-06-02 RX ADMIN — Medication 2 MILLIGRAM(S): at 22:00

## 2024-06-02 RX ADMIN — NYSTATIN CREAM 1 APPLICATION(S): 100000 CREAM TOPICAL at 05:14

## 2024-06-02 RX ADMIN — LIDOCAINE 1 PATCH: 4 CREAM TOPICAL at 21:30

## 2024-06-02 RX ADMIN — Medication 3 MILLILITER(S): at 01:42

## 2024-06-02 RX ADMIN — MORPHINE SULFATE 2 MILLIGRAM(S): 50 CAPSULE, EXTENDED RELEASE ORAL at 05:13

## 2024-06-02 NOTE — PROGRESS NOTE ADULT - SUBJECTIVE AND OBJECTIVE BOX
Patient is a 68y old  Male who presents with a chief complaint of ETOH withdrawal  /  posterior right leg into the knee. (02 Jun 2024 09:16)    24 hour events:     Allergies    Aloe Lotion (Rash)    Intolerances      REVIEW OF SYSTEMS: SEE BELOW       ICU Vital Signs Last 24 Hrs  T(C): 36.8 (02 Jun 2024 07:23), Max: 36.8 (01 Jun 2024 15:35)  T(F): 98.2 (02 Jun 2024 07:23), Max: 98.3 (01 Jun 2024 15:35)  HR: 61 (02 Jun 2024 11:00) (60 - 79)  BP: 149/93 (02 Jun 2024 11:00) (144/80 - 169/107)  BP(mean): 110 (02 Jun 2024 11:00) (100 - 124)  ABP: --  ABP(mean): --  RR: 12 (02 Jun 2024 11:00) (10 - 25)  SpO2: 96% (02 Jun 2024 11:00) (92% - 99%)    O2 Parameters below as of 02 Jun 2024 07:00  Patient On (Oxygen Delivery Method): nasal cannula  O2 Flow (L/min): 4          CAPILLARY BLOOD GLUCOSE          I&O's Summary    01 Jun 2024 07:01  -  02 Jun 2024 07:00  --------------------------------------------------------  IN: 1831 mL / OUT: 1925 mL / NET: -94 mL            MEDICATIONS  (STANDING):  albuterol/ipratropium for Nebulization 3 milliLiter(s) Nebulizer every 6 hours  albuterol/ipratropium for Nebulization 3 milliLiter(s) Nebulizer every 6 hours  chlorhexidine 4% Liquid 1 Application(s) Topical <User Schedule>  dexMEDEtomidine Infusion 1 MICROgram(s)/kG/Hr (22.6 mL/Hr) IV Continuous <Continuous>  folic acid 1 milliGRAM(s) Oral daily  hydroxychloroquine 200 milliGRAM(s) Oral two times a day  lidocaine   4% Patch 1 Patch Transdermal daily  LORazepam   Injectable 2 milliGRAM(s) IV Push every 6 hours  losartan 50 milliGRAM(s) Oral daily  multivitamin 1 Tablet(s) Oral daily  nystatin Powder 1 Application(s) Topical two times a day  pantoprazole    Tablet 40 milliGRAM(s) Oral before breakfast  potassium chloride    Tablet ER 40 milliEquivalent(s) Oral every 4 hours  predniSONE   Tablet 10 milliGRAM(s) Oral daily  rivaroxaban 20 milliGRAM(s) Oral daily  thiamine 100 milliGRAM(s) Oral daily      MEDICATIONS  (PRN):  acetaminophen     Tablet .. 650 milliGRAM(s) Oral every 6 hours PRN Temp greater or equal to 38C (100.4F)  aluminum hydroxide/magnesium hydroxide/simethicone Suspension 30 milliLiter(s) Oral every 4 hours PRN Dyspepsia  LORazepam   Injectable 2 milliGRAM(s) IV Push every 8 hours PRN Agitation  melatonin 3 milliGRAM(s) Oral at bedtime PRN Insomnia  morphine  - Injectable 2 milliGRAM(s) IV Push every 6 hours PRN Severe Pain (7 - 10)  ondansetron Injectable 4 milliGRAM(s) IV Push once PRN Nausea and/or Vomiting  ondansetron Injectable 4 milliGRAM(s) IV Push every 8 hours PRN Nausea and/or Vomiting  oxycodone    5 mG/acetaminophen 325 mG 1 Tablet(s) Oral every 6 hours PRN Moderate Pain (4 - 6)      PHYSICAL EXAM: SEE BELOW                          10.4   4.69  )-----------( 74       ( 02 Jun 2024 05:40 )             31.5       06-02    141  |  111<H>  |  26<H>  ----------------------------<  110<H>  3.5   |  23  |  1.19    Ca    9.0      02 Jun 2024 05:40  Phos  3.4     06-02  Mg     2.3     06-02    TPro  6.8  /  Alb  3.3  /  TBili  1.3<H>  /  DBili  x   /  AST  62<H>  /  ALT  121<H>  /  AlkPhos  57  06-02            Urinalysis Basic - ( 02 Jun 2024 05:40 )    Color: x / Appearance: x / SG: x / pH: x  Gluc: 110 mg/dL / Ketone: x  / Bili: x / Urobili: x   Blood: x / Protein: x / Nitrite: x   Leuk Esterase: x / RBC: x / WBC x   Sq Epi: x / Non Sq Epi: x / Bacteria: x

## 2024-06-02 NOTE — PROGRESS NOTE ADULT - ASSESSMENT
67 y/o male PMH RA on Plaquenil, HTN, leukemia in remission, CKD1, DVT/PE on Xarelto, chronic back pain (prescribed NSAID, opioid, gabapentin, prednisone previously)       Now with ETOH withdrawal/DTs     Course complicated by acute resp distress from aspiration pneumonitis   Also found to have LV sys dysfn which may be chronic HFrEF as d/w patient's cardiologist Dr. Ham       -remains on Precedex 0.8, wean for goal RASS 0 to -1  -add Seroquel, QTc ok   -continue standing Ativan today, will start tapering once off Precedex   -major c/o is back pain - started on prednisone 10 mg, will add Flexeril and prn oxycodone & morphine   -d/c NSAIDs due to CKD and previous NSAID induced ANTOINE   -he says he has seen Dr. Jackson for spine issues before, may consider reconsulting him if pain does not improve (did not find any notes by Dr. Jackson, CT LS spine from 5/2023 showed severe spinal stenosis L3-4, no MRI)   -resp stable, appears euvolemic/dry - no need for diuresis   -TTE findings d/w Dr. Ham - no medication/intervention at this time    -continue losartan, Plaquenil, Xarelto  -DVT ppx Xarelto       Remains critically ill on iv sedation, high risk for decompensation  67 y/o male PMH RA on Plaquenil, HTN, leukemia in remission, CKD1, DVT/PE on Xarelto, chronic back pain (prescribed NSAID, opioid, gabapentin, prednisone previously)       Now with ETOH withdrawal/DTs     Course complicated by acute resp distress from aspiration pneumonitis   Also found to have LV sys dysfn which may be chronic HFrEF as d/w patient's cardiologist Dr. Ham       -remains on Precedex 0.8, wean for goal RASS 0 to -1  -add Seroquel, QTc ok   -continue standing Ativan today, will start tapering once off Precedex   -major c/o is back pain - started on prednisone 10 mg, will add Flexeril and prn oxycodone & morphine   -d/c NSAIDs due to CKD and previous NSAID induced ANTOINE   -he says he has seen Dr. Jackson for spine issues before, may consider reconsulting him if pain does not improve (did not find any notes by Dr. Jackson, CT LS spine from 5/2023 showed severe spinal stenosis L3-4, no MRI)   -resp stable, appears euvolemic/dry - no need for diuresis   -TTE findings d/w Dr. Ham - no medication/intervention at this time    -continue losartan, Plaquenil, Xarelto  -EKG without ischemic changes, trop neg   -DVT ppx Xarelto       Remains critically ill on iv sedation, high risk for decompensation       Home meds reviewed on [facility] and [facility]

## 2024-06-02 NOTE — CONSULT NOTE ADULT - SUBJECTIVE AND OBJECTIVE BOX
HPI:  67 y/o male who follows with me at [facility] for h/o LGL s/p cyclophosphamide and prednisone completed 1/31/23 in remission, DVT and PE now on xarelto, HTN, RA prev on MTX now on HCQ and prednisone, CKD I , admitted in the hospital with ETOH withdrawal and sciatica as per patient.     Patient is limited historian at this time as patient appears to have a ETOH withdrawal signs of sweating, shakes.   States he follows up with PCP Dr. Boxer.   Has had ETOH withdrawal and very confused and agitated in ER, stating someone turned his house into a casino, has pulled out IV lines.   Drinks variably depending on the mood. Denies any HA, CP, SOB. NO fevers, chills or shakes. Labs and vitals reviewed. Some noted bruising in the lower extremities.     Last labs in office 5/17/24 WBC 6.5, Hb 12.5, plt 147, mcv 104     very high on CIWA score. 4 mg of ativan given to the patient, no comfortably resting. ICU consulted.   WBC 5.3, Hb 127, plt 83k, Cr 1.33, , aST 81  5/28 CT a/p- No specific acute inflammatory or obstructive pathology. Cirrhosis with hepatic steatosis. Cholelithiasis. Diverticulosis coli without acute diverticulitis. Markedly distended urinary bladder.                        (29 May 2024 06:06)      PAST MEDICAL & SURGICAL HISTORY:  ETOH abuse      Hyperlipidemia LDL goal <100      Restless leg syndrome      Rheumatoid arteritis      Leukemia      No significant past surgical history          Allergies    Aloe Lotion (Rash)    Intolerances        MEDICATIONS  (STANDING):  dexMEDEtomidine Infusion 1 MICROgram(s)/kG/Hr (22.6 mL/Hr) IV Continuous <Continuous>  diazepam  Injectable 15 milliGRAM(s) IV Push every 4 hours  folic acid 1 milliGRAM(s) Oral daily  hydroxychloroquine 200 milliGRAM(s) Oral two times a day  losartan 50 milliGRAM(s) Oral daily  rivaroxaban 20 milliGRAM(s) Oral daily  sodium chloride 0.9%. 1000 milliLiter(s) (150 mL/Hr) IV Continuous <Continuous>  thiamine 100 milliGRAM(s) Oral daily    MEDICATIONS  (PRN):  acetaminophen     Tablet .. 650 milliGRAM(s) Oral every 6 hours PRN Temp greater or equal to 38C (100.4F), Mild Pain (1 - 3)  acetaminophen     Tablet .. 650 milliGRAM(s) Oral once PRN Temp greater or equal to 38C (100.4F), Mild Pain (1 - 3)  aluminum hydroxide/magnesium hydroxide/simethicone Suspension 30 milliLiter(s) Oral every 4 hours PRN Dyspepsia  diazepam  Injectable 10 milliGRAM(s) IV Push every 4 hours PRN CIWA >8  diazepam  Injectable 20 milliGRAM(s) IV Push every 4 hours PRN CIWA >15  melatonin 3 milliGRAM(s) Oral at bedtime PRN Insomnia  ondansetron Injectable 4 milliGRAM(s) IV Push every 8 hours PRN Nausea and/or Vomiting  ondansetron Injectable 4 milliGRAM(s) IV Push once PRN Nausea and/or Vomiting      FAMILY HISTORY:  FHx: rheumatoid arthritis (Mother)        SOCIAL HISTORY: No EtOH, no tobacco    REVIEW OF SYSTEMS:  confused      Weight (kg): 90.3 (05-29 @ 08:21)    T(F): 98.4 (05-29-24 @ 14:00), Max: 98.8 (05-29-24 @ 00:32)  HR: 70 (05-29-24 @ 14:00)  BP: 150/95 (05-29-24 @ 14:00)  RR: 15 (05-29-24 @ 14:00)  SpO2: 97% (05-29-24 @ 14:00)  Wt(kg): --    GENERAL: confused, agitated  EYES: EOMI,  HEART: Regular rate and rhythm;   ABDOMEN: Soft, Nontender  EXTREMITIES:  +edema  NEUROLOGY: confused                          12.7   5.39  )-----------( 83       ( 29 May 2024 09:25 )             37.8       05-29    138  |  105  |  27<H>  ----------------------------<  118<H>  3.6   |  22  |  1.33<H>    Ca    8.9      29 May 2024 09:25  Phos  2.7     05-29  Mg     2.1     05-29    TPro  7.5  /  Alb  4.1  /  TBili  1.8<H>  /  DBili  0.7<H>  /  AST  81<H>  /  ALT  116<H>  /  AlkPhos  45  05-29      Magnesium: 2.1 mg/dL (05-29 @ 09:25)  Phosphorus: 2.7 mg/dL (05-29 @ 09:25)          
MARCOS HOUSTON  MRN-[id number]  Patient is a 68y old  Male who presents with a chief complaint of ETOH withdrawal  /  posterior right leg into the knee. (29 May 2024 06:06)    HPI:  Patient is a 69 y/o male with PMHx of HTN, RA, Leukemia in remission since January 2023, CKD I ,  DVT and PE (on xaralto) admitted in the hospital with ETOH withdrawal and sciatica as per patient. Patient is limited historian at this time as patient appears to have a ETOH withdrawal signs of sweating, shakes. States he follows up with PCP Dr. Boxer. Has had ETOH withdrawal in the past 2-3 times. Drinks variably depending on the mood. Denies any HA, CP, SOB. NO fevers, chills or shakes. Labs and vitals reviewed. Some noted bruising in the lower extremities.       Events in last 24 hours:     ICU c/s called for elevated CIWA s/p 12 mg IVP Ativan   Pt was examined and assessed at the bedside. Pt was awake, alert, disoriented, tremors, aggressively  trying to get out of bed, agitated. CODE ange owens patient at the bedside for being combative   Pt was given 15 IVP valium started on precedex gtt and transferred to CCU     REVIEW OF SYSTEMS:  ROS not able to be obtained     Physical Exam:  Vital Signs Last 24 Hrs  T(C): 36.5 (29 May 2024 09:45), Max: 37.1 (29 May 2024 00:32)  T(F): 97.7 (29 May 2024 09:45), Max: 98.8 (29 May 2024 00:32)  HR: 100 (29 May 2024 09:45) (89 - 110)  BP: 157/93 (29 May 2024 09:45) (147/92 - 185/115)  BP(mean): 120 (29 May 2024 00:32) (111 - 133)  RR: 20 (29 May 2024 09:45) (18 - 22)  SpO2: 95% (29 May 2024 09:45) (93% - 99%)    Parameters below as of 29 May 2024 09:45  Patient On (Oxygen Delivery Method): room air        Gen:  Awake, alert disoriented   CNS: non focal hallucinating, agitated, combative   Neck: no JVD  RES : clear , no wheezing                      CVS: Regular  rhythm. Normal S1/S2  Abd: Soft, non-distended. Bowel sounds present.  Skin: No rash.  Ext:  no edema    ============================I/O===========================   I&O's Detail    ============================ LABS =========================                        12.7   5.39  )-----------( 83       ( 29 May 2024 09:25 )             37.8     05-29    138  |  105  |  27<H>  ----------------------------<  118<H>  3.6   |  22  |  1.33<H>    Ca    8.9      29 May 2024 09:25  Phos  2.7     05-29  Mg     2.1     05-29    TPro  7.5  /  Alb  4.1  /  TBili  1.8<H>  /  DBili  0.7<H>  /  AST  81<H>  /  ALT  116<H>  /  AlkPhos  45  05-29    LIVER FUNCTIONS - ( 29 May 2024 09:25 )  Alb: 4.1 g/dL / Pro: 7.5 gm/dL / ALK PHOS: 45 U/L / ALT: 116 U/L / AST: 81 U/L / GGT: x               Urinalysis Basic - ( 29 May 2024 09:25 )    Color: x / Appearance: x / SG: x / pH: x  Gluc: 118 mg/dL / Ketone: x  / Bili: x / Urobili: x   Blood: x / Protein: x / Nitrite: x   Leuk Esterase: x / RBC: x / WBC x   Sq Epi: x / Non Sq Epi: x / Bacteria: x      ======================Micro/Rad/Cardio=================  < from: CT Abdomen and Pelvis w/ IV Cont (05.28.24 @ 18:09) >    IMPRESSION:  No specific acute inflammatory or obstructive pathology.    Cirrhosis with hepatic steatosis.    Cholelithiasis.    Diverticulosis coli without acute diverticulitis.    Markedly distended urinary bladder.        --- End of Report ---      < end of copied text >    ======================================================  PAST MEDICAL & SURGICAL HISTORY:  ETOH abuse      Hyperlipidemia LDL goal <100      Restless leg syndrome      Rheumatoid arteritis      Leukemia      No significant past surgical history            ***This patient requires critical care for support of one or more vital organ systems with a high probability of imminent or life threatening deterioration in his/her condition    ====================ASSESSMENT ==============  1.  ETOH Withdrawal with worsening DT's       Plan:    -ETOH withdrawal s/p 12 mg IV ativan - since 4 AM   -CIWA protocol   -Started on precedex gtt   -Started on Valium 15 mg q4h IVP   -Valium 10 mg q2h  PRN CIWA > 8  -Valium 20 mg q4h PRN CIWA > 15  -Continue thiamine/Folic acid/MV   -No noted seizures at this time to date for DTt's   -1:1 constant observation   -Given pt has received a large quantity of benzodiazepines, will place pt on continuous end tidal CO2 to monitor for potential hypercapnia and respiratory depression   losartan 50 milliGRAM(s) Oral daily  rivaroxaban 20 milliGRAM(s) Oral daily - h/o DVT   Aggressive glycemic control to limit FS glucose to < 180mg/dl, BS stable.  hydroxychloroquine 200 milliGRAM(s) Oral two times a day for RA      PLAN DISCUSSED IN DETAIL WITH ICU ATTENDING DR. URRUTIA WHO IS AGREEABLE TO THE ABOVE PLAN     DATE OF ENTRY OF THIS NOTE IS EQUAL TO THE DATE OF SERVICES RENDERED ****    TOTAL CRITICAL CARE TIME:75  minutes (EXCLUSIVE of any non bundled procedures)    Critical Care time: 75 mins assessing presenting problems of acute illness that poses high probability of life threatening deterioration or end organ damage/dysfunction.  Medical decision making including Initiating plan of care, reviewing data, reviewing radiology, direct patient bedside evaluation and interpretation of vital signs, any necessary ventilator management , discussion with multidisciplinary team, discussing goals of care with patient/family, all non inclusive of procedures   
Patient is a 68y old  Male who presents with a chief complaint of ETOH withdrawal  /  posterior right leg into the knee. (      HPI:  Patient is a 67 y/o male with PMHx of HTN, RA, Leukemia in remission since January 2023, CKD I ,  DVT and PE (on xaralto) admitted in the hospital with ETOH withdrawal and sciatica as per patient. Patient is limited historian at this time as patient appears to have a ETOH withdrawal signs of sweating, shakes.  He was admitted to CCU for alcohol withdrawl.  Cardiology consulted for CHF evaluation   Mr Hatfield well known to me outpt.  He is asleep on meds      PAST MEDICAL & SURGICAL HISTORY:  ETOH abuse      Hyperlipidemia LDL goal <100      Restless leg syndrome      Rheumatoid arteritis      Leukemia      No significant past surgical history          MEDICATIONS  (STANDING):  albuterol/ipratropium for Nebulization 3 milliLiter(s) Nebulizer every 6 hours  albuterol/ipratropium for Nebulization 3 milliLiter(s) Nebulizer every 6 hours  chlorhexidine 4% Liquid 1 Application(s) Topical <User Schedule>  dexMEDEtomidine Infusion 1 MICROgram(s)/kG/Hr (22.6 mL/Hr) IV Continuous <Continuous>  folic acid 1 milliGRAM(s) Oral daily  hydroxychloroquine 200 milliGRAM(s) Oral two times a day  lidocaine   4% Patch 1 Patch Transdermal daily  LORazepam   Injectable 2 milliGRAM(s) IV Push every 6 hours  losartan 50 milliGRAM(s) Oral daily  multivitamin 1 Tablet(s) Oral daily  nystatin Powder 1 Application(s) Topical two times a day  pantoprazole    Tablet 40 milliGRAM(s) Oral before breakfast  potassium chloride    Tablet ER 40 milliEquivalent(s) Oral every 4 hours  predniSONE   Tablet 10 milliGRAM(s) Oral daily  QUEtiapine 25 milliGRAM(s) Oral once  rivaroxaban 20 milliGRAM(s) Oral daily  thiamine 100 milliGRAM(s) Oral daily    MEDICATIONS  (PRN):  acetaminophen     Tablet .. 650 milliGRAM(s) Oral every 6 hours PRN Temp greater or equal to 38C (100.4F)  aluminum hydroxide/magnesium hydroxide/simethicone Suspension 30 milliLiter(s) Oral every 4 hours PRN Dyspepsia  ibuprofen  Tablet. 400 milliGRAM(s) Oral every 6 hours PRN Mild Pain (1 - 3)  LORazepam   Injectable 2 milliGRAM(s) IV Push every 8 hours PRN Agitation  melatonin 3 milliGRAM(s) Oral at bedtime PRN Insomnia  morphine  - Injectable 2 milliGRAM(s) IV Push every 6 hours PRN Severe Pain (7 - 10)  ondansetron Injectable 4 milliGRAM(s) IV Push once PRN Nausea and/or Vomiting  ondansetron Injectable 4 milliGRAM(s) IV Push every 8 hours PRN Nausea and/or Vomiting  oxycodone    5 mG/acetaminophen 325 mG 1 Tablet(s) Oral every 6 hours PRN Moderate Pain (4 - 6)      FAMILY HISTORY:  FHx: rheumatoid arthritis (Mother)        SOCIAL HISTORY: alcohol abuse    REVIEW OF SYSTEMS: obtained from chart  CONSTITUTIONAL:    No fatigue, malaise, lethargy.  No fever or chills.  RESPIRATORY:  No cough.  No wheeze.  No hemoptysis.    CARDIOVASCULAR:  No chest pains.  No palpitations. No shortness of breath, No orthopnea or PND.  GASTROINTESTINAL:  No abdominal pain.  No nausea or vomiting.    GENITOURINARY:    No hematuria.    MUSCULOSKELETAL:  No musculoskeletal pain.  No joint swelling.  No arthritis.  NEUROLOGICAL:  tremors  PSYCHIATRIC:  No confusion  SKIN:  No rashes.            Vital Signs Last 24 Hrs  T(C): 36.8 (02 Jun 2024 07:23), Max: 36.8 (01 Jun 2024 15:35)  T(F): 98.2 (02 Jun 2024 07:23), Max: 98.3 (01 Jun 2024 15:35)  HR: 62 (02 Jun 2024 07:00) (62 - 81)  BP: 155/90 (02 Jun 2024 07:00) (144/80 - 169/107)  BP(mean): 108 (02 Jun 2024 07:00) (100 - 124)  RR: 10 (02 Jun 2024 07:00) (10 - 25)  SpO2: 99% (02 Jun 2024 07:00) (92% - 99%)    Parameters below as of 02 Jun 2024 07:00  Patient On (Oxygen Delivery Method): nasal cannula        PHYSICAL EXAM-    Constitutional:  no acute distress , lethargic on meds    Head: Head is normocephalic and atraumatic.      Neck:  No JVD.     Cardiovascular: Regular rate and rhythm without S3, S4. No murmurs or rubs are appreciated.      Respiratory: Breathsounds are normal. No rales. No wheezing.    Abdomen: Soft, nontender, nondistended with positive bowel sounds.      Extremity: No tenderness.trace b/l pedal   pitting edema     Neurologic: The patient is alert and oriented.      Skin: No rash, no obvious lesions noted.      Psychiatric: The patient appears to be emotionally stable.      INTERPRETATION OF TELEMETRY: Sinus rythm    ECG: Sinus rythm ,  no ST T changes.     I&O's Detail    01 Jun 2024 07:01  -  02 Jun 2024 07:00  --------------------------------------------------------  IN:    Dexmedetomidine: 331 mL    IV PiggyBack: 100 mL    Oral Fluid: 1400 mL  Total IN: 1831 mL    OUT:    Indwelling Catheter - Urethral (mL): 1925 mL  Total OUT: 1925 mL    Total NET: -94 mL          LABS:                        10.4   4.69  )-----------( 74       ( 02 Jun 2024 05:40 )             31.5     06-02    141  |  111<H>  |  26<H>  ----------------------------<  110<H>  3.5   |  23  |  1.19    Ca    9.0      02 Jun 2024 05:40  Phos  3.4     06-02  Mg     2.3     06-02    TPro  6.8  /  Alb  3.3  /  TBili  1.3<H>  /  DBili  x   /  AST  62<H>  /  ALT  121<H>  /  AlkPhos  57  06-02          Urinalysis Basic - ( 02 Jun 2024 05:40 )    Color: x / Appearance: x / SG: x / pH: x  Gluc: 110 mg/dL / Ketone: x  / Bili: x / Urobili: x   Blood: x / Protein: x / Nitrite: x   Leuk Esterase: x / RBC: x / WBC x   Sq Epi: x / Non Sq Epi: x / Bacteria: x      I&O's Summary    01 Jun 2024 07:01  -  02 Jun 2024 07:00  --------------------------------------------------------  IN: 1831 mL / OUT: 1925 mL / NET: -94 mL      BNP  RADIOLOGY & ADDITIONAL STUDIES:  < from: TTE W or WO Ultrasound Enhancing Agent (06.01.24 @ 08:49) >      1. Left ventricular cavity is normal in size. Left ventricular wall thickness is mildly increased. Left ventricular systolic function is moderately to severely decreased with an ejection fraction visually estimated at 35 to 40 %. Global left ventricular hypokinesis.   2. Moderate mitral regurgitation.   3. Mild to moderate tricuspid regurgitation.   4. Mild aortic regurgitation.   5. Mild left ventricular hypertrophy.   6. Moderate aortic stenosis.   7. Moderate pulmonary hypertension.   8. Technically difficult image quality.    ________________________________________________________________________________________  FINDINGS:     Left Ventricle:  The left ventricular cavity is normal in size. Left ventricular wall thickness is mildly increased. Left ventricular systolic function is moderately to severely decreased with an ejection fraction visually estimated at 35 to 40%. There is global left ventricular hypokinesis. There is normal left ventricular diastolic function. Mild left ventricular hypertrophy.     Right Ventricle:  The right ventricular cavity is normal in size and normal systolic function. Tricuspid annular plane systolic excursion (TAPSE) is 1.8 cm (normal >=1.7 cm).    Left Atrium:  The left atrium is mildly dilated with an indexed volume of 39 ml/m².     Right Atrium:  The right atrium is normal in size.     Interatrial Septum:  The interatrial septum appears intact.     Aortic Valve:  There is moderate calcification of the aortic valve leaflets. There is restriction to the opening of the aortic valve leaflets. There is moderate aortic stenosis. There is mild aortic regurgitation.     Mitral Valve:  Structurally normal mitral valve with normal leaflet excursion. There is mitral valve thickening of the anterior and posterior leaflets. There is moderate mitral regurgitation.     Tricuspid Valve:  Structurally normal tricuspid valve with normal leaflet excursion. There is mild to moderate tricuspid regurgitation. Estimated pulmonary artery systolic pressure is 47 mmHg, consistent with moderate pulmonary hypertension.     Pulmonic Valve:  Structurally normal pulmonic valve with normal leaflet excursion.     Aorta:  The aortic root at the sinuses of Valsalva is normal in size, measuring 2.70 cm (indexed 1.32 cm/m²). The ascending aorta diameter is normal in size, measuring 3.80 cm (indexed 1.86 cm/m²).     Pericardium:  No pericardial effusion seen.     Systemic Veins:  The inferior vena cava is dilated (dilated >2.1cm) with abnormal inspiratory collapse (abnormal <50%) consistent with elevated right atrial pressure (~15, range 10-20mmHg).  ____________________________________________________________________  QUANTITATIVE DATA:    < end of copied text >

## 2024-06-02 NOTE — CONSULT NOTE ADULT - REASON FOR ADMISSION
ETOH withdrawal  /  posterior right leg into the knee.

## 2024-06-02 NOTE — PROGRESS NOTE ADULT - RESPIRATORY
no wheezes/respiratory distress/diminished breath sounds, R/rales
clear to auscultation bilaterally/no wheezes/no respiratory distress/diminished breath sounds, R

## 2024-06-02 NOTE — CONSULT NOTE ADULT - ASSESSMENT
Chronic HFrEF, LVEF 35-40%- overall on exam appears close to euvolemia  I do not think at this time he will need diuresis since he was NPO and with concentrated urine.  He is sedated on meds now for alcohol withdrawl.  Will change losartan to entresto 24/26mg BID when he can take po meds.  Close monitoring of volume status    he did not followup since last november in office at which time his cr was elevated as well as mild BNP increase.  He was supposed to repeat blood work and f/u but did not followup.     CKD- follows with Dr Herron.  For now cr level normalized    Alcohol abuse- on [facility] protocol.    Leukemia- follows with Dr Sanchez    d/w Dr river    Other medical issues- Management per primary team.   Thank you for allowing me to participate in the care of this patient. Please feel free to contact me with any questions.

## 2024-06-02 NOTE — PROGRESS NOTE ADULT - SUBJECTIVE AND OBJECTIVE BOX
Patient is a 68y old  Male who presents with a chief complaint of ETOH withdrawal  /  posterior right leg into the knee. 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 Pt stated the pain worsened that morning and radiates down his right leg. Denied trauma, heavy lifting, numbness/tingling in LE, incontinence. Pt also w/ signs of ETOH withdrawal w/ tremors and diaphoresis. Pt admits to ETOH withdrawal 2-3 times in the past and drinking habits vary depending on his mood. CT abd/pelvis notes cirrhosis w/ hepatic steatosis, w/o any acute pathology.  Pt admitted for ETOH withdrawal and sciatica. ICU consulted d/t elevated CIWA s/p 12mg ativan IVP. Pt combative with SHRUTHI HART called. Pt administered 15mg Valium IVP and transferred to CCU where he was started on precedex gtt.  5/31/24 - Pt w/ aspiration event. CXR w/o PNA but POCUS w/ decreased LV function and B lines throughout. S/p IV lasix and repeat CXR revealed concern for pulmonary edema/fluid overload.    Events last 24 hours:  TTE performed, noting EF 35-40%, mod MR, mod AS, mod pulm HTN. Pt remains of NC. Pt also complains of intermittent back pain (present on admission). Administered 2mg morphine in the afternoon with +effect. Back pain again in evening, attempted relief with Toradol but pain did not resolve. Pt administered additional 2mg morphine again for +relief.   PAST MEDICAL & SURGICAL HISTORY:  ETOH abuse      Hyperlipidemia LDL goal <100      Restless leg syndrome      Rheumatoid arteritis      Leukemia      No significant past surgical history        Allergies    Aloe Lotion (Rash)    Intolerances      FAMILY HISTORY:  FHx: rheumatoid arthritis (Mother)      SOCIAL HISTORY:     Review of Systems:  [X] A ten-point review of systems was otherwise negative except as noted.  [ ] Due to altered mental status/intubation, subjective information were not able to be obtained from the patient. History was obtained, to the extent possible, from review of the chart and collateral sources of information.    Medications:  hydroxychloroquine 200 milliGRAM(s) Oral two times a day    losartan 50 milliGRAM(s) Oral daily    albuterol/ipratropium for Nebulization 3 milliLiter(s) Nebulizer every 6 hours  albuterol/ipratropium for Nebulization 3 milliLiter(s) Nebulizer every 6 hours    acetaminophen     Tablet .. 650 milliGRAM(s) Oral every 6 hours PRN  dexMEDEtomidine Infusion 1 MICROgram(s)/kG/Hr IV Continuous <Continuous>  ibuprofen  Tablet. 400 milliGRAM(s) Oral every 6 hours PRN  LORazepam   Injectable 2 milliGRAM(s) IV Push every 8 hours PRN  LORazepam   Injectable 2 milliGRAM(s) IV Push every 6 hours  melatonin 3 milliGRAM(s) Oral at bedtime PRN  ondansetron Injectable 4 milliGRAM(s) IV Push once PRN  ondansetron Injectable 4 milliGRAM(s) IV Push every 8 hours PRN      rivaroxaban 20 milliGRAM(s) Oral daily    aluminum hydroxide/magnesium hydroxide/simethicone Suspension 30 milliLiter(s) Oral every 4 hours PRN  pantoprazole    Tablet 40 milliGRAM(s) Oral before breakfast      predniSONE   Tablet 10 milliGRAM(s) Oral daily    folic acid 1 milliGRAM(s) Oral daily  multivitamin 1 Tablet(s) Oral daily  thiamine 100 milliGRAM(s) Oral daily      chlorhexidine 4% Liquid 1 Application(s) Topical <User Schedule>  lidocaine   4% Patch 1 Patch Transdermal daily  nystatin Powder 1 Application(s) Topical two times a day            ICU Vital Signs Last 24 Hrs  T(C): 36.1 (02 Jun 2024 00:00), Max: 36.8 (01 Jun 2024 15:35)  T(F): 97 (02 Jun 2024 00:00), Max: 98.3 (01 Jun 2024 15:35)  HR: 67 (02 Jun 2024 01:42) (65 - 81)  BP: 159/107 (01 Jun 2024 20:00) (106/83 - 167/97)  BP(mean): 122 (01 Jun 2024 20:00) (90 - 122)  ABP: --  ABP(mean): --  RR: 14 (01 Jun 2024 20:00) (14 - 25)  SpO2: 95% (02 Jun 2024 01:42) (91% - 99%)    O2 Parameters below as of 02 Jun 2024 01:42  Patient On (Oxygen Delivery Method): nasal cannula, 4L          Vital Signs Last 24 Hrs  T(C): 36.1 (02 Jun 2024 00:00), Max: 36.8 (01 Jun 2024 15:35)  T(F): 97 (02 Jun 2024 00:00), Max: 98.3 (01 Jun 2024 15:35)  HR: 67 (02 Jun 2024 01:42) (65 - 81)  BP: 159/107 (01 Jun 2024 20:00) (106/83 - 167/97)  BP(mean): 122 (01 Jun 2024 20:00) (90 - 122)  RR: 14 (01 Jun 2024 20:00) (14 - 25)  SpO2: 95% (02 Jun 2024 01:42) (91% - 99%)    Parameters below as of 02 Jun 2024 01:42  Patient On (Oxygen Delivery Method): nasal cannula, 4L            I&O's Detail    31 May 2024 07:01  -  01 Jun 2024 07:00  --------------------------------------------------------  IN:    Dexmedetomidine: 300 mL    IV PiggyBack: 100 mL    Oral Fluid: 1550 mL    sodium chloride 0.9%: 640 mL  Total IN: 2590 mL    OUT:    Incontinent per Condom Catheter (mL): 250 mL    Incontinent per Diaper, Weight (mL): 2 mL    Indwelling Catheter - Urethral (mL): 1725 mL    Voided (mL): 550 mL  Total OUT: 2527 mL    Total NET: 63 mL      01 Jun 2024 07:01  -  02 Jun 2024 02:06  --------------------------------------------------------  IN:    Dexmedetomidine: 182 mL    IV PiggyBack: 100 mL    Oral Fluid: 1400 mL  Total IN: 1682 mL    OUT:    Indwelling Catheter - Urethral (mL): 1350 mL  Total OUT: 1350 mL    Total NET: 332 mL          LABS:                        11.1   6.13  )-----------( 82       ( 01 Jun 2024 08:03 )             33.2     06-01    139  |  112<H>  |  24<H>  ----------------------------<  131<H>  3.9   |  20<L>  |  1.28    Ca    9.1      01 Jun 2024 08:03  Phos  3.6     06-01  Mg     2.3     06-01    TPro  6.3  /  Alb  3.3  /  TBili  1.5<H>  /  DBili  x   /  AST  87<H>  /  ALT  96<H>  /  AlkPhos  44  05-31          CAPILLARY BLOOD GLUCOSE          Urinalysis Basic - ( 01 Jun 2024 08:03 )    Color: x / Appearance: x / SG: x / pH: x  Gluc: 131 mg/dL / Ketone: x  / Bili: x / Urobili: x   Blood: x / Protein: x / Nitrite: x   Leuk Esterase: x / RBC: x / WBC x   Sq Epi: x / Non Sq Epi: x / Bacteria: x        CULTURES:      Physical Examination:    General: Male patient, somnolent, laying in hospital bed  HEENT: Pupils equal, reactive to light. Symmetric.  PULM: Decreased bs b/l  NECK: Supple  CVS: Regular rate and rhythm, +s1/s2.  ABD: Soft, nondistended, nontender, normoactive bowel sounds.  EXT: No edema, nontender.  SKIN: Warm and dry, of normal color  NEURO: Somnolent, alert when aroused        RADIOLOGY:  < from: CT Abdomen and Pelvis w/ IV Cont (05.28.24 @ 18:09) >    ACC: [id number] EXAM:  CT ABDOMEN AND PELVIS IC   ORDERED BY: LARRY GALINDO     PROCEDURE DATE:  05/28/2024          INTERPRETATION:  CLINICAL INFORMATION: Right lower back pain    COMPARISON: CT abdomen and pelvis 1/17/2023    CONTRAST/COMPLICATIONS:  IV Contrast: Omnipaque 350  90 cc administered   0 cc discarded  Oral Contrast: NONE  Complications: None reported at time of study completion    PROCEDURE:  CT of the Abdomen and Pelvis was performed.  Sagittal and coronal reformats were performed.    FINDINGS:  LOWER CHEST: Small hiatal hernia. Coronary artery calcification.    LIVER: Steatosis and cirrhosis similar to prior  BILE DUCTS: Normal caliber.  GALLBLADDER: Within normal limits.  SPLEEN: Within normal limits.  PANCREAS: Within normallimits.  ADRENALS: Within normal limits.  KIDNEYS/URETERS: Left renal cyst and too small to characterize cortical   hypodensity. No hydronephrosis. Bilateral nonspecific perinephric   stranding similar to prior    BLADDER: Markedly distended  REPRODUCTIVE ORGANS: Normal size prostate    BOWEL: No bowel obstruction. Colonic diverticulosis without acute   diverticulitis. Appendix normal  PERITONEUM: Trace left upper quadrant ascites.  VESSELS: Nonaneurysmal  RETROPERITONEUM/LYMPH NODES: No lymphadenopathy.  ABDOMINAL WALL: Within normal limits.  BONES: Degenerative changes    IMPRESSION:  No specific acute inflammatory or obstructive pathology.    Cirrhosis with hepatic steatosis.    Cholelithiasis.    Diverticulosis coli without acute diverticulitis.    Markedly distended urinary bladder.        --- End of Report ---            INO BLAIR MD; Attending Radiologist  This document has been electronically signed. May 28 2024  6:31PM    < end of copied text >  < from: Xray Chest 1 View- PORTABLE-Urgent (Xray Chest 1 View- PORTABLE-Urgent .) (05.31.24 @ 04:38) >    ACC: [id number] EXAM:  XR CHEST PORTABLE URGENT 1V   ORDERED BY: SWAPNA MEANS     PROCEDURE DATE:  05/31/2024          INTERPRETATION:  Exam:XR CHEST URGENT    clinical history:aspiration    Prominent interstitial markings bilaterally may reflect edema. No focal   consolidation. No pneumothorax.    IMPRESSION: Suspicion for interstitial edema. Follow-up recommended    --- End of Report ---            FLORA ANDERSON MD; Attending Radiologist  This document has been electronically signed. Jun 1 2024  8:45AM    < end of copied text >  < from: Xray Chest 1 View-PORTABLE IMMEDIATE (Xray Chest 1 View-PORTABLE IMMEDIATE .) (05.31.24 @ 23:13) >    ACC: [id number] EXAM:  XR CHEST PORTABLE IMMED 1V   ORDERED BY: OWEN ROB     PROCEDURE DATE:  05/31/2024          INTERPRETATION:  EXAMINATION: XR CHEST IMMEDIATE    CLINICAL INDICATION: Shortness of breath.    TECHNIQUE: Single frontal view of the chest was obtained.    COMPARISON: Chest x-ray 5/31/2024.    FINDINGS:    The cardiomediastinal silhouette is unchanged.    Increased bilateral perihilar patchy opacities and increased interstitial   markings. No pneumothorax. No large pleural effusion.    Degenerative changes of the spine. No acute osseous abnormalities.    IMPRESSION:  Increased bilateral perihilar patchy opacities and increased interstitial   markings compatible with pulmonary edema. Cannot exclude superimposed   infection.    --- End of Report ---            NURY DAVIS DO; Attending Radiologist  This document has been electronically signed. Jun 1 2024 10:13AM    < end of copied text >

## 2024-06-02 NOTE — PROGRESS NOTE ADULT - ASSESSMENT
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 Pt with:    1. ETOH withdrawal w/ DTs  2. Acute Respiratory distress 2/2  3. Aspiration pneumonitis vs. pulmonary edema  4. Sciatica  5. Transaminitis  6. Cirrhosis of the liver  7. Hyperammonemia  8. Hx DVT/PE  9. CKD  10. Hx LGL leukemia  11. Hx ETOH misuse    Plan:  - ETOH withdrawal with DTs. Ativan 2mg Q6h with PRNs  - Precedex gtt for increased agitation. Actively titrating to maintain RASS 0 to -1  - CIWA monitoring. Will continue to monitor for DTs and seizure activity  - Posey for restraint in setting of agitation/aggression. Will reconsider need for restraints daily.  - Ammonia elevated. Will continue folic acid, thiamine, multivitamin. F/u AM ammonia level  - Utilizing supplemental oxygen with end-tidal CO2 to maintain spo2 >92% in setting of fluid overload vs. aspiration. If oxygen requirements increase, will utilize NIPPV  - Duonebs Q6h  - F/u repeat CXR in AM  - Intermittently hypertensive to SBP >150 in setting of agitation and sciatic pain. Will utilize PRN hydralazine if SBP sustains >160. Will continue home losartan for hx HTN  - Xarelto for AC in setting of DVT/PE hx  - Transaminitis in setting of liver cirrhosis. Will continue to trend w/ CMP  - Replacing electrolytes as needed to maintain K >4, Mg >2, phos >3  - Goal blood glucose 140-180  - Hydroxychloroquine for RA  - Regular Diet  - No evidence of acute infectious process at this time. Will defer any need for abx and reconsider if continued concern for aspiration PNA  - F/u AM labs    Dispo: Remains in CCU. Full code.    Will discuss case and plan with intensivist in AM    CRITICAL CARE TIME SPENT: 39 minutes of critical care time spent providing medical care for patient's acute illness/conditions that impairs at least one vital organ system and/or poses a high risk of imminent or life threatening deterioration in the patient's condition. It includes time spent evaluating and treating the patient's acute illness as well as time spent reviewing labs, radiology, discussing goals of care with patient and/or patient's family, and discussing the case with a multidisciplinary team, in an effort to prevent further life threatening deterioration or end organ damage. This time is independent of any procedures performed.    Date of entry of this note is equal to the date of services rendered.

## 2024-06-03 LAB
ALBUMIN SERPL ELPH-MCNC: 3.5 G/DL — SIGNIFICANT CHANGE UP (ref 3.3–5)
ALP SERPL-CCNC: 90 U/L — SIGNIFICANT CHANGE UP (ref 40–120)
ALT FLD-CCNC: 109 U/L — HIGH (ref 12–78)
ANION GAP SERPL CALC-SCNC: 5 MMOL/L — SIGNIFICANT CHANGE UP (ref 5–17)
ANION GAP SERPL CALC-SCNC: 8 MMOL/L — SIGNIFICANT CHANGE UP (ref 5–17)
AST SERPL-CCNC: 46 U/L — HIGH (ref 15–37)
BILIRUB SERPL-MCNC: 1.3 MG/DL — HIGH (ref 0.2–1.2)
BUN SERPL-MCNC: 23 MG/DL — SIGNIFICANT CHANGE UP (ref 7–23)
BUN SERPL-MCNC: 24 MG/DL — HIGH (ref 7–23)
CALCIUM SERPL-MCNC: 9.3 MG/DL — SIGNIFICANT CHANGE UP (ref 8.5–10.1)
CALCIUM SERPL-MCNC: 9.8 MG/DL — SIGNIFICANT CHANGE UP (ref 8.5–10.1)
CHLORIDE SERPL-SCNC: 110 MMOL/L — HIGH (ref 96–108)
CHLORIDE SERPL-SCNC: 112 MMOL/L — HIGH (ref 96–108)
CO2 SERPL-SCNC: 21 MMOL/L — LOW (ref 22–31)
CO2 SERPL-SCNC: 23 MMOL/L — SIGNIFICANT CHANGE UP (ref 22–31)
CREAT SERPL-MCNC: 1.17 MG/DL — SIGNIFICANT CHANGE UP (ref 0.5–1.3)
CREAT SERPL-MCNC: 1.24 MG/DL — SIGNIFICANT CHANGE UP (ref 0.5–1.3)
EGFR: 63 ML/MIN/1.73M2 — SIGNIFICANT CHANGE UP
EGFR: 68 ML/MIN/1.73M2 — SIGNIFICANT CHANGE UP
GLUCOSE SERPL-MCNC: 96 MG/DL — SIGNIFICANT CHANGE UP (ref 70–99)
GLUCOSE SERPL-MCNC: 99 MG/DL — SIGNIFICANT CHANGE UP (ref 70–99)
HCT VFR BLD CALC: 32.3 % — LOW (ref 39–50)
HGB BLD-MCNC: 10.4 G/DL — LOW (ref 13–17)
MAGNESIUM SERPL-MCNC: 2 MG/DL — SIGNIFICANT CHANGE UP (ref 1.6–2.6)
MAGNESIUM SERPL-MCNC: 2.3 MG/DL — SIGNIFICANT CHANGE UP (ref 1.6–2.6)
MCHC RBC-ENTMCNC: 32.2 GM/DL — SIGNIFICANT CHANGE UP (ref 32–36)
MCHC RBC-ENTMCNC: 34.7 PG — HIGH (ref 27–34)
MCV RBC AUTO: 107.7 FL — HIGH (ref 80–100)
PHOSPHATE SERPL-MCNC: 3.1 MG/DL — SIGNIFICANT CHANGE UP (ref 2.5–4.5)
PHOSPHATE SERPL-MCNC: 3.3 MG/DL — SIGNIFICANT CHANGE UP (ref 2.5–4.5)
PLATELET # BLD AUTO: 103 K/UL — LOW (ref 150–400)
POTASSIUM SERPL-MCNC: 3.7 MMOL/L — SIGNIFICANT CHANGE UP (ref 3.5–5.3)
POTASSIUM SERPL-MCNC: 4.1 MMOL/L — SIGNIFICANT CHANGE UP (ref 3.5–5.3)
POTASSIUM SERPL-SCNC: 3.7 MMOL/L — SIGNIFICANT CHANGE UP (ref 3.5–5.3)
POTASSIUM SERPL-SCNC: 4.1 MMOL/L — SIGNIFICANT CHANGE UP (ref 3.5–5.3)
PROT SERPL-MCNC: 7.3 GM/DL — SIGNIFICANT CHANGE UP (ref 6–8.3)
RBC # BLD: 3 M/UL — LOW (ref 4.2–5.8)
RBC # FLD: 15.2 % — HIGH (ref 10.3–14.5)
SODIUM SERPL-SCNC: 138 MMOL/L — SIGNIFICANT CHANGE UP (ref 135–145)
SODIUM SERPL-SCNC: 141 MMOL/L — SIGNIFICANT CHANGE UP (ref 135–145)
WBC # BLD: 4.72 K/UL — SIGNIFICANT CHANGE UP (ref 3.8–10.5)
WBC # FLD AUTO: 4.72 K/UL — SIGNIFICANT CHANGE UP (ref 3.8–10.5)

## 2024-06-03 PROCEDURE — 93010 ELECTROCARDIOGRAM REPORT: CPT

## 2024-06-03 PROCEDURE — 99291 CRITICAL CARE FIRST HOUR: CPT

## 2024-06-03 RX ORDER — POTASSIUM CHLORIDE 20 MEQ
20 PACKET (EA) ORAL ONCE
Refills: 0 | Status: COMPLETED | OUTPATIENT
Start: 2024-06-03 | End: 2024-06-03

## 2024-06-03 RX ORDER — OLANZAPINE 15 MG/1
2.5 TABLET, FILM COATED ORAL EVERY 6 HOURS
Refills: 0 | Status: DISCONTINUED | OUTPATIENT
Start: 2024-06-03 | End: 2024-06-05

## 2024-06-03 RX ORDER — THIAMINE MONONITRATE (VIT B1) 100 MG
100 TABLET ORAL DAILY
Refills: 0 | Status: DISCONTINUED | OUTPATIENT
Start: 2024-06-07 | End: 2024-06-10

## 2024-06-03 RX ORDER — THIAMINE MONONITRATE (VIT B1) 100 MG
500 TABLET ORAL EVERY 8 HOURS
Refills: 0 | Status: COMPLETED | OUTPATIENT
Start: 2024-06-03 | End: 2024-06-06

## 2024-06-03 RX ORDER — LABETALOL HCL 100 MG
10 TABLET ORAL ONCE
Refills: 0 | Status: COMPLETED | OUTPATIENT
Start: 2024-06-03 | End: 2024-06-03

## 2024-06-03 RX ORDER — FUROSEMIDE 40 MG
20 TABLET ORAL ONCE
Refills: 0 | Status: COMPLETED | OUTPATIENT
Start: 2024-06-03 | End: 2024-06-03

## 2024-06-03 RX ORDER — OLANZAPINE 15 MG/1
2.5 TABLET, FILM COATED ORAL EVERY 12 HOURS
Refills: 0 | Status: DISCONTINUED | OUTPATIENT
Start: 2024-06-03 | End: 2024-06-05

## 2024-06-03 RX ADMIN — Medication 3 MILLILITER(S): at 08:15

## 2024-06-03 RX ADMIN — Medication 200 MILLIGRAM(S): at 11:03

## 2024-06-03 RX ADMIN — Medication 200 MILLIGRAM(S): at 21:29

## 2024-06-03 RX ADMIN — RIVAROXABAN 20 MILLIGRAM(S): KIT at 11:01

## 2024-06-03 RX ADMIN — Medication 105 MILLIGRAM(S): at 12:03

## 2024-06-03 RX ADMIN — NYSTATIN CREAM 1 APPLICATION(S): 100000 CREAM TOPICAL at 17:48

## 2024-06-03 RX ADMIN — LIDOCAINE 1 PATCH: 4 CREAM TOPICAL at 11:02

## 2024-06-03 RX ADMIN — OLANZAPINE 2.5 MILLIGRAM(S): 15 TABLET, FILM COATED ORAL at 22:14

## 2024-06-03 RX ADMIN — Medication 2 MILLIGRAM(S): at 23:09

## 2024-06-03 RX ADMIN — Medication 100 MILLIGRAM(S): at 11:01

## 2024-06-03 RX ADMIN — Medication 1 MILLIGRAM(S): at 11:01

## 2024-06-03 RX ADMIN — NYSTATIN CREAM 1 APPLICATION(S): 100000 CREAM TOPICAL at 06:09

## 2024-06-03 RX ADMIN — Medication 2 MILLIGRAM(S): at 11:03

## 2024-06-03 RX ADMIN — Medication 2 MILLIGRAM(S): at 16:27

## 2024-06-03 RX ADMIN — Medication 20 MILLIGRAM(S): at 11:55

## 2024-06-03 RX ADMIN — DEXMEDETOMIDINE HYDROCHLORIDE IN 0.9% SODIUM CHLORIDE 6.77 MICROGRAM(S)/KG/HR: 4 INJECTION INTRAVENOUS at 03:30

## 2024-06-03 RX ADMIN — Medication 105 MILLIGRAM(S): at 21:42

## 2024-06-03 RX ADMIN — DEXMEDETOMIDINE HYDROCHLORIDE IN 0.9% SODIUM CHLORIDE 6.77 MICROGRAM(S)/KG/HR: 4 INJECTION INTRAVENOUS at 22:13

## 2024-06-03 RX ADMIN — LIDOCAINE 1 PATCH: 4 CREAM TOPICAL at 23:13

## 2024-06-03 RX ADMIN — Medication 10 MILLIGRAM(S): at 11:02

## 2024-06-03 RX ADMIN — LOSARTAN POTASSIUM 50 MILLIGRAM(S): 100 TABLET, FILM COATED ORAL at 11:01

## 2024-06-03 RX ADMIN — OLANZAPINE 2.5 MILLIGRAM(S): 15 TABLET, FILM COATED ORAL at 10:01

## 2024-06-03 RX ADMIN — Medication 3 MILLILITER(S): at 01:55

## 2024-06-03 RX ADMIN — Medication 2 MILLIGRAM(S): at 06:12

## 2024-06-03 RX ADMIN — CHLORHEXIDINE GLUCONATE 1 APPLICATION(S): 213 SOLUTION TOPICAL at 06:10

## 2024-06-03 RX ADMIN — LIDOCAINE 1 PATCH: 4 CREAM TOPICAL at 19:56

## 2024-06-03 RX ADMIN — Medication 1 TABLET(S): at 11:02

## 2024-06-03 RX ADMIN — Medication 10 MILLIGRAM(S): at 18:13

## 2024-06-03 RX ADMIN — OLANZAPINE 2.5 MILLIGRAM(S): 15 TABLET, FILM COATED ORAL at 20:01

## 2024-06-03 RX ADMIN — Medication 2 MILLIGRAM(S): at 17:49

## 2024-06-03 RX ADMIN — DEXMEDETOMIDINE HYDROCHLORIDE IN 0.9% SODIUM CHLORIDE 6.77 MICROGRAM(S)/KG/HR: 4 INJECTION INTRAVENOUS at 09:47

## 2024-06-03 NOTE — PATIENT PROFILE ADULT - NSPROGENSOURCEINFO_GEN_A_NUR
patient Profile copied forward, patient confused, lethargic, unable to answer questions for himself. To the best of my knowledge this past information is still correct./patient

## 2024-06-03 NOTE — PHYSICAL THERAPY INITIAL EVALUATION ADULT - PERTINENT HX OF CURRENT PROBLEM, REHAB EVAL
67 y/o M admitted in the hospital with ETOH withdrawal and sciatica , ETOH withdrawal signs of sweating, shakes. Has had ETOH withdrawal in the past 2-3 times. Drinks variably depending on the mood.  PMHx of HTN, RA, Leukemia in remission since January 2023, CKD I ,  DVT and PE (on xaralto)
Patient is a 67 y/o male with PMHx of HTN, RA, Leukemia in remission since January 2023, CKD I ,  DVT and PE (on xaralto) admitted in the hospital with ETOH withdrawal and sciatica as per patient. Patient is limited historian at this time as patient appears to have a ETOH withdrawal signs of sweating, shakes. States he follows up with PCP Dr. Boxer. Has had ETOH withdrawal in the past 2-3 times. Drinks variably depending on the mood. Denies any HA, CP, SOB. NO fevers, chills or shakes. Labs and vitals reviewed. Some noted bruising in the lower extremities.

## 2024-06-03 NOTE — PROGRESS NOTE ADULT - SUBJECTIVE AND OBJECTIVE BOX
Patient is a 68y old  Male who presents with a chief complaint of ETOH withdrawal  /  posterior right leg into the knee. (02 Jun 2024 09:16)      Interval history:   6/3 - attempted to hold precedex at 6 pm last night, then patient became very agitated, restarted on precedex drip.  patient follows command.        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  Pt also w/ signs of ETOH withdrawal w/ tremors and diaphoresis. Pt admits to ETOH withdrawal 2-3 times in the past and drinking habits vary depending on his mood. CT abd/pelvis notes cirrhosis w/ hepatic steatosis, w/o any acute pathology.  now on precedex. 1.5  sedated  ? aspiration    Allergies    Aloe Lotion (Rash)    Intolerances      REVIEW OF SYSTEMS: SEE BELOW     ICU Vital Signs Last 24 Hrs  T(C): 35.8 (03 Jun 2024 08:05), Max: 37.4 (03 Jun 2024 00:53)  T(F): 96.5 (03 Jun 2024 08:05), Max: 99.3 (03 Jun 2024 00:53)  HR: 73 (03 Jun 2024 11:00) (65 - 125)  BP: 152/98 (03 Jun 2024 11:00) (118/89 - 188/118)  BP(mean): 117 (03 Jun 2024 11:00) (97 - 138)  ABP: --  ABP(mean): --  RR: 22 (03 Jun 2024 11:00) (12 - 42)  SpO2: 98% (03 Jun 2024 11:00) (90% - 100%)    O2 Parameters below as of 03 Jun 2024 08:17  Patient On (Oxygen Delivery Method): nasal cannula w/ humidification            CAPILLARY BLOOD GLUCOSE          I&O's Summary    01 Jun 2024 07:01  -  02 Jun 2024 07:00  --------------------------------------------------------  IN: 1831 mL / OUT: 1925 mL / NET: -94 mL    MEDICATIONS  (STANDING):  chlorhexidine 4% Liquid 1 Application(s) Topical <User Schedule>  dexMEDEtomidine Infusion 0.3 MICROgram(s)/kG/Hr (6.77 mL/Hr) IV Continuous <Continuous>  folic acid 1 milliGRAM(s) Oral daily  furosemide   Injectable 20 milliGRAM(s) IV Push once  hydroxychloroquine 200 milliGRAM(s) Oral two times a day  lidocaine   4% Patch 1 Patch Transdermal daily  LORazepam   Injectable 2 milliGRAM(s) IV Push every 6 hours  losartan 50 milliGRAM(s) Oral daily  multivitamin 1 Tablet(s) Oral daily  nystatin Powder 1 Application(s) Topical two times a day  OLANZapine Injectable 2.5 milliGRAM(s) IntraMuscular every 12 hours  pantoprazole    Tablet 40 milliGRAM(s) Oral before breakfast  predniSONE   Tablet 10 milliGRAM(s) Oral daily  rivaroxaban 20 milliGRAM(s) Oral daily  thiamine IVPB 500 milliGRAM(s) IV Intermittent every 8 hours    MEDICATIONS  (PRN):  acetaminophen     Tablet .. 650 milliGRAM(s) Oral every 6 hours PRN Temp greater or equal to 38C (100.4F)  aluminum hydroxide/magnesium hydroxide/simethicone Suspension 30 milliLiter(s) Oral every 4 hours PRN Dyspepsia  LORazepam   Injectable 2 milliGRAM(s) IV Push every 8 hours PRN Agitation  melatonin 3 milliGRAM(s) Oral at bedtime PRN Insomnia  morphine  - Injectable 2 milliGRAM(s) IV Push every 6 hours PRN Severe Pain (7 - 10)  ondansetron Injectable 4 milliGRAM(s) IV Push once PRN Nausea and/or Vomiting  ondansetron Injectable 4 milliGRAM(s) IV Push every 8 hours PRN Nausea and/or Vomiting  oxycodone    5 mG/acetaminophen 325 mG 1 Tablet(s) Oral every 6 hours PRN Moderate Pain (4 - 6)            PHYSICAL EXAM: SEE BELOW  GEN: sedated, but arousable  HEENT: MMM  RESP: CTA b/l  cv: s1 s2 heard, no murmur  ABD: Soft, NTND, BS+  LE: no edema                          10.4   4.69  )-----------( 74       ( 02 Jun 2024 05:40 )             31.5       06-02    141  |  111<H>  |  26<H>  ----------------------------<  110<H>  3.5   |  23  |  1.19    Ca    9.0      02 Jun 2024 05:40  Phos  3.4     06-02  Mg     2.3     06-02    TPro  6.8  /  Alb  3.3  /  TBili  1.3<H>  /  DBili  x   /  AST  62<H>  /  ALT  121<H>  /  AlkPhos  57  06-02            Urinalysis Basic - ( 02 Jun 2024 05:40 )    Color: x / Appearance: x / SG: x / pH: x  Gluc: 110 mg/dL / Ketone: x  / Bili: x / Urobili: x   Blood: x / Protein: x / Nitrite: x   Leuk Esterase: x / RBC: x / WBC x   Sq Epi: x / Non Sq Epi: x / Bacteria: x

## 2024-06-03 NOTE — PROGRESS NOTE ADULT - ASSESSMENT
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 Pt with:    1. ETOH withdrawal w/ DTs  2. Acute Respiratory distress 2/2  3. Aspiration pneumonitis vs.  4. Acute on chronic systolic heart failure  5. Sciatica  6. Transaminitis  7. Cirrhosis of the liver  8. Hyperammonemia (resolving)  9. Hx DVT/PE  10. CKD  11. Hx LGL leukemia  12. Hx ETOH misuse    Plan:    Neuro:  - ETOH withdrawal with DTs. Precedex gtt restarted for increased agitation overnight. Acitvely titrating to RAA 0 to -1.  - Ativan 2mg Q6h with PRNs  - CIWA monitoring. Will continue to monitor for DTs and seizure activity  - Continue Posey vest for restraints in setting of agitation/aggression. Will reconsider need for restraints daily.  - Prednisone and PNR Flexeril added for sciatic pain. PRN oxycodone and morphine as needed for supplemental pain control. Avoid NSAIDs d/t hx of NSAID-induced ANTOINE in the past.  - Ammonia WNL at this time. Will continue folic acid, thiamine, multivitamin.    CV:  - Intermittently hypertensive to SBP >150 in setting of agitation and sciatic pain. S/op 5mg lopressor overnight. Will utilize PRN anti-hypertensives if SBP sustains >160.  - Will continue home losartan for hx HTN  - Avoiding fluid overload    Pulm:  - Utilizing supplemental oxygen with end-tidal CO2 to maintain spo2 >92% in setting of acute on chronic systolic HF vs. aspiration pneumonitis.  - If oxygen requirements increase, will utilize NIPPV.  - Duonebs Q6h  - Incentive spirometry                  GI:  - Transaminitis in setting of liver cirrhosis. Will continue to trend w/ CMP  - Regular Diet    Renal:  - Hx CKD, no indication of ANTOINE and with adequate urine output daily  - Replacing electrolytes as needed with Goal K> 4, PO> 3, Mg> 2               - Avoid Nephro toxic medication  - Renally dose meds  - F/u AM BMP, mag, phos    Heme:  - Xarelto for AC in setting of DVT/PE hx    ID:  - No evidence of acute infectious process at this time. Will defer any need for abx and reconsider if continued concern for aspiration PNA  - Trend CBC with diff and fever curve    Endo:  - Goal blood glucose 140 - 180mg/dl.  - Hydroxychloroquine for RA    Dispo: Remains in CCU. Full code.    Will discuss case and plan with intensivist in AM    CRITICAL CARE TIME SPENT: 42 minutes of critical care time spent providing medical care for patient's acute illness/conditions that impairs at least one vital organ system and/or poses a high risk of imminent or life threatening deterioration in the patient's condition. It includes time spent evaluating and treating the patient's acute illness as well as time spent reviewing labs, radiology, discussing goals of care with patient and/or patient's family, and discussing the case with a multidisciplinary team, in an effort to prevent further life threatening deterioration or end organ damage. This time is independent of any procedures performed.    Date of entry of this note is equal to the date of services rendered.

## 2024-06-03 NOTE — PATIENT PROFILE ADULT - FALL HARM RISK - HARM RISK INTERVENTIONS

## 2024-06-03 NOTE — PHYSICAL THERAPY INITIAL EVALUATION ADULT - GENERAL OBSERVATIONS, REHAB EVAL
Pt found supine in bed on CCU, +tele monitoring, very lethargic appearing. Pt is agreeable to participate in range of motion and MMT screen, but unwilling to attempt bed mobility and transfer assessment. PT Evaluation to be completed at later date/time when patient is willing to participate. Pt left in bed with call bell and phone in reach, bed alarm on, BRIANNE Westbrook is aware.

## 2024-06-03 NOTE — PHYSICAL THERAPY INITIAL EVALUATION ADULT - REFERRING PHYSICIAN, REHAB EVAL
Dr. Randhawa Gopal
Glenda Sung 02-Jun-2024 at 14:26
Female Completion Statement: After discussing her treatment course we decided to discontinue isotretinoin therapy at this time. I explained that she would need to continue her birth control methods for at least one month after the last dosage. She should also get a pregnancy test one month after the last dose. She shouldn't donate blood for one month after the last dose. She should call with any new symptoms of depression.

## 2024-06-03 NOTE — PROGRESS NOTE ADULT - CRITICAL CARE ATTENDING COMMENT
greater than 50% of time spent reviewing labs, notes, orders and radiographs, coordinating care  discussed with nursing, ICU PA  patient is critically ill, continuing IV sedation, high risk for decompensation

## 2024-06-03 NOTE — PROGRESS NOTE ADULT - ASSESSMENT
69 y/o male PMH RA on Plaquenil, HTN, leukemia in remission, CKD1, DVT/PE on Xarelto, chronic back pain (prescribed NSAID, opioid, gabapentin, prednisone previously)       Now with ETOH withdrawal/DTs     Course complicated by acute resp distress from aspiration pneumonitis  vs. fluid overload s/p lasix trial   Also found to have LV sys dysfn which may be chronic HFrEF as d/w patient's cardiologist Dr. Ham       -remains on Precedex 0.35, wean for goal RASS 0 to -1  - will add zyprexa 2.5 mg IM q12h, hold for sedation  -c/w  Seroquel, QTc ok   - repeat EKG 6/4 am to follow up qtc   -continue standing Ativan today, will start tapering once off Precedex   -major c/o is back pain - started on prednisone 10 mg, and prn oxycodone & morphine.  will hold off flexirill to minimize sedative meds   -d/c NSAIDs due to CKD and previous NSAID induced ANTOINE   -he says he has seen Dr. Jackson for spine issues before, may consider reconsulting him if pain does not improve (did not find any notes by Dr. Jackson, CT LS spine from 5/2023 showed severe spinal stenosis L3-4, no MRI)   -resp stable, however remains on nasal canula, will give another dose of lasix 20 mg IVP 1x 6/3.  continue diuresis PRN   - repeat labs in PM to follow up electrolyte   -TTE findings d/w Dr. Ham - no medication/intervention at this time    -continue losartan, Plaquenil, Xarelto  -EKG without ischemic changes, trop neg   -DVT ppx Xarelto      69 y/o male PMH RA on Plaquenil, HTN, leukemia in remission, CKD1, DVT/PE on Xarelto, chronic back pain (prescribed NSAID, opioid, gabapentin, prednisone previously)       Now with ETOH withdrawal/DTs     Course complicated by acute resp distress from aspiration pneumonitis  vs. fluid overload s/p lasix trial   Also found to have LV sys dysfn which may be chronic HFrEF as d/w patient's cardiologist Dr. Ham       -remains on Precedex 0.35, wean for goal RASS 0 to -1  - will add zyprexa 2.5 mg IM q12h, hold for sedation  -c/w  Seroquel, QTc ok   - repeat EKG 6/4 am to follow up qtc   -continue standing Ativan today, will start tapering once off Precedex   -major c/o is back pain - started on prednisone 10 mg, and prn oxycodone & morphine.  will hold off flexirill to minimize sedative meds   -d/c NSAIDs due to CKD and previous NSAID induced ANTOINE   -he says he has seen Dr. Jackson for spine issues before, may consider reconsulting him if pain does not improve (did not find any notes by Dr. Jackson, CT LS spine from 5/2023 showed severe spinal stenosis L3-4, no MRI)   -resp stable, however remains on nasal canula, will give another dose of lasix 20 mg IVP 1x 6/3.  continue diuresis PRN   - repeat labs in PM to follow up electrolyte   -TTE findings d/w Dr. Ham - no medication/intervention at this time    -continue losartan, Plaquenil, Xarelto  -EKG without ischemic changes, trop neg   - start thiamine 500 mg q8h IVPB for 3 days, then 100 mg daily PO after  - NPO with exception to medication due to mental status, will reassess once more awake and less agitated   -DVT ppx Xarelto

## 2024-06-03 NOTE — PHYSICAL THERAPY INITIAL EVALUATION ADULT - NSACTIVITYREC_GEN_A_PT
Bed Mobility and OOB assessment to be completed at later date/time when patient is able to participate.

## 2024-06-03 NOTE — PROGRESS NOTE ADULT - SUBJECTIVE AND OBJECTIVE BOX
Patient is a 68y old  Male who presents with a chief complaint of ETOH withdrawal  /  posterior right leg into the knee. 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 Pt stated the pain worsened that morning and radiates down his right leg. Denied trauma, heavy lifting, numbness/tingling in LE, incontinence. Pt also w/ signs of ETOH withdrawal w/ tremors and diaphoresis. Pt admits to ETOH withdrawal 2-3 times in the past and drinking habits vary depending on his mood. CT abd/pelvis notes cirrhosis w/ hepatic steatosis, w/o any acute pathology.  Pt admitted for ETOH withdrawal and sciatica. ICU consulted d/t elevated CIWA s/p 12mg ativan IVP. Pt combative with SHRUTHI HART called. Pt administered 15mg Valium IVP and transferred to CCU where he was started on precedex gtt.  5/31/24 - Pt w/ aspiration event. CXR w/o PNA but POCUS w/ decreased LV function and B lines throughout. S/p IV lasix and repeat CXR revealed concern for pulmonary edema/fluid overload.    6/2/24 - TTE w/ EF 35-40%, mod MR, mod AS, mod pulm HTN. Pt remained on NC. Pt also complained of intermittent back pain (present on admission), alleviated with morphine PRN.  Events last 24 hours:  Primary complaint is sciatica/back pain, for which he was started on prednisone 10mg and Flexeril PRN. PRN pain control added as well with oxycodone and morphine. Initially weaned from Precedex gtt but became agitated overnight, getting out of posey vest, and was consistently hypertensive and tachycardic. Administered 5mg lopressor and precedex gtt restarted. Remains of 3L NC.    PAST MEDICAL & SURGICAL HISTORY:  ETOH abuse  Hyperlipidemia LDL goal <100  Restless leg syndrome  Rheumatoid arteritis  Leukemia    No significant past surgical history    Allergies  Aloe Lotion (Rash)    FAMILY HISTORY:  FHx: rheumatoid arthritis (Mother)      Review of Systems:  UTO d/t precedex sedation    Medications:  hydroxychloroquine 200 milliGRAM(s) Oral two times a day  losartan 50 milliGRAM(s) Oral daily    albuterol/ipratropium for Nebulization 3 milliLiter(s) Nebulizer every 6 hours    acetaminophen     Tablet .. 650 milliGRAM(s) Oral every 6 hours PRN  cyclobenzaprine 5 milliGRAM(s) Oral three times a day PRN  dexMEDEtomidine Infusion 0.3 MICROgram(s)/kG/Hr IV Continuous <Continuous>  LORazepam   Injectable 2 milliGRAM(s) IV Push every 6 hours  LORazepam   Injectable 2 milliGRAM(s) IV Push every 8 hours PRN  melatonin 3 milliGRAM(s) Oral at bedtime PRN  morphine  - Injectable 2 milliGRAM(s) IV Push every 6 hours PRN  ondansetron Injectable 4 milliGRAM(s) IV Push once PRN  ondansetron Injectable 4 milliGRAM(s) IV Push every 8 hours PRN  oxycodone    5 mG/acetaminophen 325 mG 1 Tablet(s) Oral every 6 hours PRN      rivaroxaban 20 milliGRAM(s) Oral daily    aluminum hydroxide/magnesium hydroxide/simethicone Suspension 30 milliLiter(s) Oral every 4 hours PRN  pantoprazole    Tablet 40 milliGRAM(s) Oral before breakfast      predniSONE   Tablet 10 milliGRAM(s) Oral daily    folic acid 1 milliGRAM(s) Oral daily  multivitamin 1 Tablet(s) Oral daily  thiamine 100 milliGRAM(s) Oral daily      chlorhexidine 4% Liquid 1 Application(s) Topical <User Schedule>  lidocaine   4% Patch 1 Patch Transdermal daily  nystatin Powder 1 Application(s) Topical two times a day            ICU Vital Signs Last 24 Hrs  T(C): 37.4 (03 Jun 2024 00:53), Max: 37.4 (03 Jun 2024 00:53)  T(F): 99.3 (03 Jun 2024 00:53), Max: 99.3 (03 Jun 2024 00:53)  HR: 76 (03 Jun 2024 03:07) (60 - 125)  BP: 145/100 (03 Jun 2024 03:07) (118/89 - 188/118)  BP(mean): 113 (03 Jun 2024 03:07) (97 - 138)  ABP: --  ABP(mean): --  RR: 17 (03 Jun 2024 03:07) (10 - 34)  SpO2: 97% (03 Jun 2024 03:07) (90% - 100%)    O2 Parameters below as of 03 Jun 2024 03:07  Patient On (Oxygen Delivery Method): nasal cannula w/ humidification  O2 Flow (L/min): 3        Vital Signs Last 24 Hrs  T(C): 37.4 (03 Jun 2024 00:53), Max: 37.4 (03 Jun 2024 00:53)  T(F): 99.3 (03 Jun 2024 00:53), Max: 99.3 (03 Jun 2024 00:53)  HR: 76 (03 Jun 2024 03:07) (60 - 125)  BP: 145/100 (03 Jun 2024 03:07) (118/89 - 188/118)  BP(mean): 113 (03 Jun 2024 03:07) (97 - 138)  RR: 17 (03 Jun 2024 03:07) (10 - 34)  SpO2: 97% (03 Jun 2024 03:07) (90% - 100%)    Parameters below as of 03 Jun 2024 03:07  Patient On (Oxygen Delivery Method): nasal cannula w/ humidification  O2 Flow (L/min): 3      I&O's Detail    01 Jun 2024 07:01  -  02 Jun 2024 07:00  --------------------------------------------------------  IN:    Dexmedetomidine: 331 mL    IV PiggyBack: 100 mL    Oral Fluid: 1400 mL  Total IN: 1831 mL    OUT:    Indwelling Catheter - Urethral (mL): 1925 mL  Total OUT: 1925 mL    Total NET: -94 mL      02 Jun 2024 07:01  -  03 Jun 2024 04:23  --------------------------------------------------------  IN:    Oral Fluid: 600 mL  Total IN: 600 mL    OUT:    Incontinent per Condom Catheter (mL): 400 mL    Indwelling Catheter - Urethral (mL): 250 mL  Total OUT: 650 mL    Total NET: -50 mL      LABS:                        10.4   4.69  )-----------( 74       ( 02 Jun 2024 05:40 )             31.5     06-02    141  |  111<H>  |  26<H>  ----------------------------<  110<H>  3.5   |  23  |  1.19    Ca    9.0      02 Jun 2024 05:40  Phos  3.4     06-02  Mg     2.3     06-02  TPro  6.8  /  Alb  3.3  /  TBili  1.3<H>  /  DBili  x   /  AST  62<H>  /  ALT  121<H>  /  AlkPhos  57  06-02    Urinalysis Basic - ( 02 Jun 2024 05:40 )  Color: x / Appearance: x / SG: x / pH: x  Gluc: 110 mg/dL / Ketone: x  / Bili: x / Urobili: x   Blood: x / Protein: x / Nitrite: x   Leuk Esterase: x / RBC: x / WBC x   Sq Epi: x / Non Sq Epi: x / Bacteria: x        Physical Examination:  General: Male patient, tremulous, in mild distress  HEENT: Pupils equal, reactive to light. Symmetric. No scleral icterus or injection.  PULM: Clear to auscultation B/L. No wheezes, rales, or rhonchi appreciated. No significant sputum production or increased respiratory effort.  NECK: Supple  CVS: Regular rate and rhythm, +s1/s2.  ABD: Soft, nondistended, nontender, normoactive bowel sounds.  EXT: No edema, nontender.  SKIN: Warm and dry  NEURO: Sleepy but arousable, not willing to follow commands      RADIOLOGY:  < from: CT Abdomen and Pelvis w/ IV Cont (05.28.24 @ 18:09) >  ACC: [id number] EXAM:  CT ABDOMEN AND PELVIS IC   ORDERED BY: LARRY GALINDO     PROCEDURE DATE:  05/28/2024          INTERPRETATION:  CLINICAL INFORMATION: Right lower back pain    COMPARISON: CT abdomen and pelvis 1/17/2023    CONTRAST/COMPLICATIONS:  IV Contrast: Omnipaque 350  90 cc administered   0 cc discarded  Oral Contrast: NONE  Complications: None reported at time of study completion    PROCEDURE:  CT of the Abdomen and Pelvis was performed.  Sagittal and coronal reformats were performed.    FINDINGS:  LOWER CHEST: Small hiatal hernia. Coronary artery calcification.    LIVER: Steatosis and cirrhosis similar to prior  BILE DUCTS: Normal caliber.  GALLBLADDER: Within normal limits.  SPLEEN: Within normal limits.  PANCREAS: Within normallimits.  ADRENALS: Within normal limits.  KIDNEYS/URETERS: Left renal cyst and too small to characterize cortical   hypodensity. No hydronephrosis. Bilateral nonspecific perinephric   stranding similar to prior    BLADDER: Markedly distended  REPRODUCTIVE ORGANS: Normal size prostate    BOWEL: No bowel obstruction. Colonic diverticulosis without acute   diverticulitis. Appendix normal  PERITONEUM: Trace left upper quadrant ascites.  VESSELS: Nonaneurysmal  RETROPERITONEUM/LYMPH NODES: No lymphadenopathy.  ABDOMINAL WALL: Within normal limits.  BONES: Degenerative changes    IMPRESSION:  No specific acute inflammatory or obstructive pathology.    Cirrhosis with hepatic steatosis.    Cholelithiasis.    Diverticulosis coli without acute diverticulitis.    Markedly distended urinary bladder.        --- End of Report ---            INO BLAIR MD; Attending Radiologist  This document has been electronically signed. May 28 2024  6:31PM    < end of copied text >  < from: Xray Chest 1 View- PORTABLE-Urgent (Xray Chest 1 View- PORTABLE-Urgent .) (05.31.24 @ 04:38) >    ACC: [id number] EXAM:  XR CHEST PORTABLE URGENT 1V   ORDERED BY: SWAPNA MEANS     PROCEDURE DATE:  05/31/2024          INTERPRETATION:  Exam:XR CHEST URGENT    clinical history:aspiration    Prominent interstitial markings bilaterally may reflect edema. No focal   consolidation. No pneumothorax.    IMPRESSION: Suspicion for interstitial edema. Follow-up recommended    --- End of Report ---            FLORA ANDERSON MD; Attending Radiologist  This document has been electronically signed. Jun 1 2024  8:45AM    < end of copied text >  < from: Xray Chest 1 View-PORTABLE IMMEDIATE (Xray Chest 1 View-PORTABLE IMMEDIATE .) (05.31.24 @ 23:13) >  ACC: [id number] EXAM:  XR CHEST PORTABLE IMMED 1V   ORDERED BY: OWEN ROB     PROCEDURE DATE:  05/31/2024          INTERPRETATION:  EXAMINATION: XR CHEST IMMEDIATE    CLINICAL INDICATION: Shortness of breath.    TECHNIQUE: Single frontal view of the chest was obtained.    COMPARISON: Chest x-ray 5/31/2024.    FINDINGS:    The cardiomediastinal silhouette is unchanged.    Increased bilateral perihilar patchy opacities and increased interstitial   markings. No pneumothorax. No large pleural effusion.    Degenerative changes of the spine. No acute osseous abnormalities.    IMPRESSION:  Increased bilateral perihilar patchy opacities and increased interstitial   markings compatible with pulmonary edema. Cannot exclude superimposed   infection.    --- End of Report ---            NURY DAVIS DO; Attending Radiologist  This document has been electronically signed. Jun 1 2024 10:13AM    < end of copied text >

## 2024-06-04 LAB
ALBUMIN SERPL ELPH-MCNC: 3.5 G/DL — SIGNIFICANT CHANGE UP (ref 3.3–5)
ALP SERPL-CCNC: 82 U/L — SIGNIFICANT CHANGE UP (ref 40–120)
ALT FLD-CCNC: 95 U/L — HIGH (ref 12–78)
ANION GAP SERPL CALC-SCNC: 6 MMOL/L — SIGNIFICANT CHANGE UP (ref 5–17)
AST SERPL-CCNC: 37 U/L — SIGNIFICANT CHANGE UP (ref 15–37)
BILIRUB SERPL-MCNC: 1.6 MG/DL — HIGH (ref 0.2–1.2)
BUN SERPL-MCNC: 27 MG/DL — HIGH (ref 7–23)
CALCIUM SERPL-MCNC: 9.6 MG/DL — SIGNIFICANT CHANGE UP (ref 8.5–10.1)
CHLORIDE SERPL-SCNC: 110 MMOL/L — HIGH (ref 96–108)
CO2 SERPL-SCNC: 26 MMOL/L — SIGNIFICANT CHANGE UP (ref 22–31)
CREAT SERPL-MCNC: 1.37 MG/DL — HIGH (ref 0.5–1.3)
EGFR: 56 ML/MIN/1.73M2 — LOW
GLUCOSE SERPL-MCNC: 91 MG/DL — SIGNIFICANT CHANGE UP (ref 70–99)
HCT VFR BLD CALC: 34.6 % — LOW (ref 39–50)
HGB BLD-MCNC: 11.4 G/DL — LOW (ref 13–17)
MAGNESIUM SERPL-MCNC: 2.2 MG/DL — SIGNIFICANT CHANGE UP (ref 1.6–2.6)
MCHC RBC-ENTMCNC: 32.9 GM/DL — SIGNIFICANT CHANGE UP (ref 32–36)
MCHC RBC-ENTMCNC: 34.3 PG — HIGH (ref 27–34)
MCV RBC AUTO: 104.2 FL — HIGH (ref 80–100)
PHOSPHATE SERPL-MCNC: 4.3 MG/DL — SIGNIFICANT CHANGE UP (ref 2.5–4.5)
PLATELET # BLD AUTO: 123 K/UL — LOW (ref 150–400)
POTASSIUM SERPL-MCNC: 3.9 MMOL/L — SIGNIFICANT CHANGE UP (ref 3.5–5.3)
POTASSIUM SERPL-SCNC: 3.9 MMOL/L — SIGNIFICANT CHANGE UP (ref 3.5–5.3)
PROT SERPL-MCNC: 7.2 GM/DL — SIGNIFICANT CHANGE UP (ref 6–8.3)
RBC # BLD: 3.32 M/UL — LOW (ref 4.2–5.8)
RBC # FLD: 14.6 % — HIGH (ref 10.3–14.5)
SODIUM SERPL-SCNC: 142 MMOL/L — SIGNIFICANT CHANGE UP (ref 135–145)
WBC # BLD: 4.15 K/UL — SIGNIFICANT CHANGE UP (ref 3.8–10.5)
WBC # FLD AUTO: 4.15 K/UL — SIGNIFICANT CHANGE UP (ref 3.8–10.5)

## 2024-06-04 PROCEDURE — 99233 SBSQ HOSP IP/OBS HIGH 50: CPT

## 2024-06-04 RX ORDER — LABETALOL HCL 100 MG
10 TABLET ORAL ONCE
Refills: 0 | Status: COMPLETED | OUTPATIENT
Start: 2024-06-04 | End: 2024-06-04

## 2024-06-04 RX ADMIN — MORPHINE SULFATE 2 MILLIGRAM(S): 50 CAPSULE, EXTENDED RELEASE ORAL at 15:16

## 2024-06-04 RX ADMIN — NYSTATIN CREAM 1 APPLICATION(S): 100000 CREAM TOPICAL at 05:57

## 2024-06-04 RX ADMIN — CHLORHEXIDINE GLUCONATE 1 APPLICATION(S): 213 SOLUTION TOPICAL at 05:57

## 2024-06-04 RX ADMIN — LIDOCAINE 1 PATCH: 4 CREAM TOPICAL at 15:22

## 2024-06-04 RX ADMIN — Medication 10 MILLIGRAM(S): at 12:40

## 2024-06-04 RX ADMIN — OLANZAPINE 2.5 MILLIGRAM(S): 15 TABLET, FILM COATED ORAL at 10:27

## 2024-06-04 RX ADMIN — RIVAROXABAN 20 MILLIGRAM(S): KIT at 12:39

## 2024-06-04 RX ADMIN — Medication 2 MILLIGRAM(S): at 12:24

## 2024-06-04 RX ADMIN — Medication 2 MILLIGRAM(S): at 23:57

## 2024-06-04 RX ADMIN — OLANZAPINE 2.5 MILLIGRAM(S): 15 TABLET, FILM COATED ORAL at 21:55

## 2024-06-04 RX ADMIN — Medication 105 MILLIGRAM(S): at 15:16

## 2024-06-04 RX ADMIN — Medication 105 MILLIGRAM(S): at 05:56

## 2024-06-04 RX ADMIN — DEXMEDETOMIDINE HYDROCHLORIDE IN 0.9% SODIUM CHLORIDE 6.77 MICROGRAM(S)/KG/HR: 4 INJECTION INTRAVENOUS at 03:56

## 2024-06-04 RX ADMIN — Medication 200 MILLIGRAM(S): at 21:56

## 2024-06-04 RX ADMIN — Medication 1 MILLIGRAM(S): at 12:40

## 2024-06-04 RX ADMIN — Medication 2 MILLIGRAM(S): at 18:15

## 2024-06-04 RX ADMIN — DEXMEDETOMIDINE HYDROCHLORIDE IN 0.9% SODIUM CHLORIDE 6.77 MICROGRAM(S)/KG/HR: 4 INJECTION INTRAVENOUS at 10:29

## 2024-06-04 RX ADMIN — NYSTATIN CREAM 1 APPLICATION(S): 100000 CREAM TOPICAL at 18:36

## 2024-06-04 RX ADMIN — Medication 105 MILLIGRAM(S): at 21:56

## 2024-06-04 RX ADMIN — Medication 2 MILLIGRAM(S): at 02:00

## 2024-06-04 RX ADMIN — Medication 10 MILLIGRAM(S): at 21:30

## 2024-06-04 RX ADMIN — PANTOPRAZOLE SODIUM 40 MILLIGRAM(S): 20 TABLET, DELAYED RELEASE ORAL at 12:39

## 2024-06-04 RX ADMIN — LOSARTAN POTASSIUM 50 MILLIGRAM(S): 100 TABLET, FILM COATED ORAL at 12:40

## 2024-06-04 RX ADMIN — Medication 1 TABLET(S): at 12:40

## 2024-06-04 RX ADMIN — Medication 200 MILLIGRAM(S): at 12:44

## 2024-06-04 RX ADMIN — LIDOCAINE 1 PATCH: 4 CREAM TOPICAL at 21:00

## 2024-06-04 RX ADMIN — Medication 2 MILLIGRAM(S): at 05:56

## 2024-06-04 NOTE — PROGRESS NOTE ADULT - SUBJECTIVE AND OBJECTIVE BOX
Patient is a 68y old  Male who presents with a chief complaint of ETOH withdrawal  /  posterior right leg into the knee. (02 Jun 2024 09:16)      Interval history:   6/3 - attempted to hold precedex at 6 pm last night, then patient became very agitated, restarted on precedex drip.  patient follows command.    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  Pt also w/ signs of ETOH withdrawal w/ tremors and diaphoresis. Pt admits to ETOH withdrawal 2-3 times in the past and drinking habits vary depending on his mood. CT abd/pelvis notes cirrhosis w/ hepatic steatosis, w/o any acute pathology.  now on precedex. 1.5  sedated  ? aspiration    Allergies    Aloe Lotion (Rash)    Intolerances      REVIEW OF SYSTEMS: SEE BELOW     ICU Vital Signs Last 24 Hrs  T(C): 36.8 (04 Jun 2024 16:09), Max: 36.8 (04 Jun 2024 16:09)  T(F): 98.2 (04 Jun 2024 16:09), Max: 98.2 (04 Jun 2024 16:09)  HR: 84 (04 Jun 2024 16:00) (56 - 113)  BP: 167/100 (04 Jun 2024 16:00) (91/73 - 176/91)  BP(mean): 121 (04 Jun 2024 16:00) (81 - 128)  ABP: --  ABP(mean): --  RR: 20 (04 Jun 2024 16:00) (13 - 31)  SpO2: 100% (04 Jun 2024 15:00) (91% - 100%)    O2 Parameters below as of 04 Jun 2024 08:00  Patient On (Oxygen Delivery Method): nasal cannula  O2 Flow (L/min): 3                  CAPILLARY BLOOD GLUCOSE          I&O's Summary    01 Jun 2024 07:01  -  02 Jun 2024 07:00  --------------------------------------------------------  IN: 1831 mL / OUT: 1925 mL / NET: -94 mL    MEDICATIONS  (STANDING):  chlorhexidine 4% Liquid 1 Application(s) Topical <User Schedule>  folic acid 1 milliGRAM(s) Oral daily  hydroxychloroquine 200 milliGRAM(s) Oral two times a day  lidocaine   4% Patch 1 Patch Transdermal daily  LORazepam   Injectable 2 milliGRAM(s) IV Push every 6 hours  losartan 50 milliGRAM(s) Oral daily  multivitamin 1 Tablet(s) Oral daily  nystatin Powder 1 Application(s) Topical two times a day  OLANZapine Injectable 2.5 milliGRAM(s) IntraMuscular every 12 hours  pantoprazole    Tablet 40 milliGRAM(s) Oral before breakfast  predniSONE   Tablet 10 milliGRAM(s) Oral daily  rivaroxaban 20 milliGRAM(s) Oral daily  thiamine IVPB 500 milliGRAM(s) IV Intermittent every 8 hours    MEDICATIONS  (PRN):  acetaminophen     Tablet .. 650 milliGRAM(s) Oral every 6 hours PRN Temp greater or equal to 38C (100.4F)  aluminum hydroxide/magnesium hydroxide/simethicone Suspension 30 milliLiter(s) Oral every 4 hours PRN Dyspepsia  LORazepam   Injectable 2 milliGRAM(s) IV Push every 8 hours PRN Agitation  melatonin 3 milliGRAM(s) Oral at bedtime PRN Insomnia  morphine  - Injectable 2 milliGRAM(s) IV Push every 6 hours PRN Severe Pain (7 - 10)  OLANZapine Injectable 2.5 milliGRAM(s) IntraMuscular every 6 hours PRN severe agitation  ondansetron Injectable 4 milliGRAM(s) IV Push once PRN Nausea and/or Vomiting  ondansetron Injectable 4 milliGRAM(s) IV Push every 8 hours PRN Nausea and/or Vomiting  oxycodone    5 mG/acetaminophen 325 mG 1 Tablet(s) Oral every 6 hours PRN Moderate Pain (4 - 6)          PHYSICAL EXAM: SEE BELOW  GEN: awake, alert   HEENT: MMM  RESP: CTA b/l  cv: s1 s2 heard, no murmur  ABD: Soft, NTND, BS+  LE: no edema                          10.4   4.69  )-----------( 74       ( 02 Jun 2024 05:40 )             31.5       06-02    141  |  111<H>  |  26<H>  ----------------------------<  110<H>  3.5   |  23  |  1.19    Ca    9.0      02 Jun 2024 05:40  Phos  3.4     06-02  Mg     2.3     06-02    TPro  6.8  /  Alb  3.3  /  TBili  1.3<H>  /  DBili  x   /  AST  62<H>  /  ALT  121<H>  /  AlkPhos  57  06-02            Urinalysis Basic - ( 02 Jun 2024 05:40 )    Color: x / Appearance: x / SG: x / pH: x  Gluc: 110 mg/dL / Ketone: x  / Bili: x / Urobili: x   Blood: x / Protein: x / Nitrite: x   Leuk Esterase: x / RBC: x / WBC x   Sq Epi: x / Non Sq Epi: x / Bacteria: x

## 2024-06-04 NOTE — PROGRESS NOTE ADULT - SUBJECTIVE AND OBJECTIVE BOX
Patient is a 68y old  Male who presents with a chief complaint of ETOH withdrawal  /  posterior right leg into the knee.       HPI:  Patient is a 69 y/o male with PMHx of HTN, RA, Leukemia in remission since January 2023, CKD I ,  DVT and PE (on xaralto) admitted in the hospital with ETOH withdrawal and sciatica as per patient. Patient is limited historian at this time as patient appears to have a ETOH withdrawal signs of sweating, shakes.  He was admitted to CCU for alcohol withdrawl.  Cardiology consulted for CHF evaluation   6/2/24:   Mr Hatfield well known to me outpt.  He is asleep on meds  6/4/24: Mr Hatfield Was seen and examined by me this morning.    He was agitated last night.     he is currently on IV drip with 2 medications for DT   yesterday he received a dose of Lasix.    PAST MEDICAL & SURGICAL HISTORY:  ETOH abuse      Hyperlipidemia LDL goal <100      Restless leg syndrome      Rheumatoid arteritis      Leukemia      No significant past surgical history          MEDICATIONS  (STANDING):  albuterol/ipratropium for Nebulization 3 milliLiter(s) Nebulizer every 6 hours  albuterol/ipratropium for Nebulization 3 milliLiter(s) Nebulizer every 6 hours  chlorhexidine 4% Liquid 1 Application(s) Topical <User Schedule>  dexMEDEtomidine Infusion 1 MICROgram(s)/kG/Hr (22.6 mL/Hr) IV Continuous <Continuous>  folic acid 1 milliGRAM(s) Oral daily  hydroxychloroquine 200 milliGRAM(s) Oral two times a day  lidocaine   4% Patch 1 Patch Transdermal daily  LORazepam   Injectable 2 milliGRAM(s) IV Push every 6 hours  losartan 50 milliGRAM(s) Oral daily  multivitamin 1 Tablet(s) Oral daily  nystatin Powder 1 Application(s) Topical two times a day  pantoprazole    Tablet 40 milliGRAM(s) Oral before breakfast  potassium chloride    Tablet ER 40 milliEquivalent(s) Oral every 4 hours  predniSONE   Tablet 10 milliGRAM(s) Oral daily  QUEtiapine 25 milliGRAM(s) Oral once  rivaroxaban 20 milliGRAM(s) Oral daily  thiamine 100 milliGRAM(s) Oral daily    MEDICATIONS  (PRN):  acetaminophen     Tablet .. 650 milliGRAM(s) Oral every 6 hours PRN Temp greater or equal to 38C (100.4F)  aluminum hydroxide/magnesium hydroxide/simethicone Suspension 30 milliLiter(s) Oral every 4 hours PRN Dyspepsia  ibuprofen  Tablet. 400 milliGRAM(s) Oral every 6 hours PRN Mild Pain (1 - 3)  LORazepam   Injectable 2 milliGRAM(s) IV Push every 8 hours PRN Agitation  melatonin 3 milliGRAM(s) Oral at bedtime PRN Insomnia  morphine  - Injectable 2 milliGRAM(s) IV Push every 6 hours PRN Severe Pain (7 - 10)  ondansetron Injectable 4 milliGRAM(s) IV Push once PRN Nausea and/or Vomiting  ondansetron Injectable 4 milliGRAM(s) IV Push every 8 hours PRN Nausea and/or Vomiting  oxycodone    5 mG/acetaminophen 325 mG 1 Tablet(s) Oral every 6 hours PRN Moderate Pain (4 - 6)      FAMILY HISTORY:  FHx: rheumatoid arthritis (Mother)        SOCIAL HISTORY: alcohol abuse    REVIEW OF SYSTEMS: obtained from chart  CONSTITUTIONAL:    No fatigue, malaise, lethargy.  No fever or chills.  RESPIRATORY:  No cough.  No wheeze.  No hemoptysis.    CARDIOVASCULAR:  No chest pains.  No palpitations. No shortness of breath, No orthopnea or PND.  GASTROINTESTINAL:  No abdominal pain.  No nausea or vomiting.    GENITOURINARY:    No hematuria.    MUSCULOSKELETAL:  No musculoskeletal pain.  No joint swelling.  No arthritis.  NEUROLOGICAL:  tremors  PSYCHIATRIC:  No confusion  SKIN:  No rashes.            ICU Vital Signs Last 24 Hrs  T(C): 36.2 (04 Jun 2024 04:00), Max: 37.2 (03 Jun 2024 17:33)  T(F): 97.1 (04 Jun 2024 04:00), Max: 98.9 (03 Jun 2024 17:33)  HR: 61 (04 Jun 2024 09:00) (61 - 113)  BP: 149/107 (04 Jun 2024 09:00) (91/73 - 176/91)  BP(mean): 121 (04 Jun 2024 09:00) (81 - 139)  ABP: --  ABP(mean): --  RR: 15 (04 Jun 2024 09:00) (15 - 42)  SpO2: 91% (04 Jun 2024 09:00) (90% - 100%)    O2 Parameters below as of 04 Jun 2024 08:00  Patient On (Oxygen Delivery Method): nasal cannula  O2 Flow (L/min): 3              PHYSICAL EXAM-    Constitutional:  no acute distress , sedated on meds    Head: Head is normocephalic and atraumatic.      Neck:  No JVD.     Cardiovascular: Regular rate and rhythm without S3, S4. No murmurs or rubs are appreciated.      Respiratory: Breath sounds are normal. No rales. No wheezing.    Abdomen: Soft, nontender, nondistended with positive bowel sounds.      Extremity: No tenderness. trace b/l pedal   pitting edema     Neurologic: The patient is alert and oriented.      Skin: No rash, no obvious lesions noted.      Psychiatric: The patient appears to be emotionally stable.      INTERPRETATION OF TELEMETRY: Sinus rythm    ECG: Sinus rythm ,  no ST T changes.     I&O's Detail    01 Jun 2024 07:01  -  02 Jun 2024 07:00  --------------------------------------------------------  IN:    Dexmedetomidine: 331 mL    IV PiggyBack: 100 mL    Oral Fluid: 1400 mL  Total IN: 1831 mL    OUT:    Indwelling Catheter - Urethral (mL): 1925 mL  Total OUT: 1925 mL    Total NET: -94 mL          LABS:                                   11.4   4.15  )-----------( 123      ( 04 Jun 2024 06:08 )             34.6     06-04    142  |  110<H>  |  27<H>  ----------------------------<  91  3.9   |  26  |  1.37<H>    Ca    9.6      04 Jun 2024 06:08  Phos  4.3     06-04  Mg     2.2     06-04    TPro  7.2  /  Alb  3.5  /  TBili  1.6<H>  /  DBili  x   /  AST  37  /  ALT  95<H>  /  AlkPhos  82  06-04        LIVER FUNCTIONS - ( 04 Jun 2024 06:08 )  Alb: 3.5 g/dL / Pro: 7.2 gm/dL / ALK PHOS: 82 U/L / ALT: 95 U/L / AST: 37 U/L / GGT: x           Urinalysis Basic - ( 02 Jun 2024 05:40 )    Color: x / Appearance: x / SG: x / pH: x  Gluc: 110 mg/dL / Ketone: x  / Bili: x / Urobili: x   Blood: x / Protein: x / Nitrite: x   Leuk Esterase: x / RBC: x / WBC x   Sq Epi: x / Non Sq Epi: x / Bacteria: x      I&O's Summary    01 Jun 2024 07:01  -  02 Jun 2024 07:00  --------------------------------------------------------  IN: 1831 mL / OUT: 1925 mL / NET: -94 mL      BNP  RADIOLOGY & ADDITIONAL STUDIES:  < from: TTE W or WO Ultrasound Enhancing Agent (06.01.24 @ 08:49) >      1. Left ventricular cavity is normal in size. Left ventricular wall thickness is mildly increased. Left ventricular systolic function is moderately to severely decreased with an ejection fraction visually estimated at 35 to 40 %. Global left ventricular hypokinesis.   2. Moderate mitral regurgitation.   3. Mild to moderate tricuspid regurgitation.   4. Mild aortic regurgitation.   5. Mild left ventricular hypertrophy.   6. Moderate aortic stenosis.   7. Moderate pulmonary hypertension.   8. Technically difficult image quality.    ________________________________________________________________________________________  FINDINGS:     Left Ventricle:  The left ventricular cavity is normal in size. Left ventricular wall thickness is mildly increased. Left ventricular systolic function is moderately to severely decreased with an ejection fraction visually estimated at 35 to 40%. There is global left ventricular hypokinesis. There is normal left ventricular diastolic function. Mild left ventricular hypertrophy.     Right Ventricle:  The right ventricular cavity is normal in size and normal systolic function. Tricuspid annular plane systolic excursion (TAPSE) is 1.8 cm (normal >=1.7 cm).    Left Atrium:  The left atrium is mildly dilated with an indexed volume of 39 ml/m².     Right Atrium:  The right atrium is normal in size.     Interatrial Septum:  The interatrial septum appears intact.     Aortic Valve:  There is moderate calcification of the aortic valve leaflets. There is restriction to the opening of the aortic valve leaflets. There is moderate aortic stenosis. There is mild aortic regurgitation.     Mitral Valve:  Structurally normal mitral valve with normal leaflet excursion. There is mitral valve thickening of the anterior and posterior leaflets. There is moderate mitral regurgitation.     Tricuspid Valve:  Structurally normal tricuspid valve with normal leaflet excursion. There is mild to moderate tricuspid regurgitation. Estimated pulmonary artery systolic pressure is 47 mmHg, consistent with moderate pulmonary hypertension.     Pulmonic Valve:  Structurally normal pulmonic valve with normal leaflet excursion.     Aorta:  The aortic root at the sinuses of Valsalva is normal in size, measuring 2.70 cm (indexed 1.32 cm/m²). The ascending aorta diameter is normal in size, measuring 3.80 cm (indexed 1.86 cm/m²).     Pericardium:  No pericardial effusion seen.     Systemic Veins:  The inferior vena cava is dilated (dilated >2.1cm) with abnormal inspiratory collapse (abnormal <50%) consistent with elevated right atrial pressure (~15, range 10-20mmHg).  ____________________________________________________________________  QUANTITATIVE DATA:    < end of copied text >

## 2024-06-04 NOTE — CHART NOTE - NSCHARTNOTEFT_GEN_A_CORE
Clinical Nutrition Follow Up Note:    * 69 y/o M with a PMHx of HTN, RA, Leukemia in remission since 2023, CKD I, DVT and PE (on xaralto) admitted in the hospital with ETOH withdrawal and sciatica as per patient. Patient is limited historian at this time as patient appears to have a ETOH withdrawal signs of sweating, shakes. Has had ETOH withdrawal in the past 2-3 times. Drinks variably depending on the mood. Some noted bruising in the lower extremities. Admitted for alcohol withdrawal and sciatica. CIWA protocol. Transferred to CCU for elevated CIWA s/p ativan, disoriented, tremors, aggressively trying to get out of bed, agitated; CODE gray called.  *: Unable to obtain meaningful information 2/2 pt lethargic at time of visit; received ativan and precedex. RD unable to obtain bedscale wt at time of visit 2/2 bedscale not working. Admit weight of 199# - appears accurate. Pt frail and deconditioned but overweight appearing; NFPE reveals no muscle/ fat wasting, pt does not meet criteria for PCM at this time. Pt's body habitus may be masking wasting; pt is at HIGH RISK for becoming malnourished. C/w Regular diet. Will trial ONS if pt has poor po intake when more alert. If pt remains lethargic or with very poor po intake, recommend placing corpak + bridle and initiate TF. Please reconsult RD if corpak if placed. Monitor closely for refeeding syndrome - please obtain BMP, Mg, and Phos levels. Monitor and replete K, Phos, Mg prn if begins to downtrend. If nutrition support is initiated, recommend to check refeeding labs BID x 2-3 days upon initiation and then will reevaluate. C/w thiamine and folic acid supplementation and add MVI w/ minerals daily. Please see additional recommendations below.    *current status: Lactulose and rifaxamin added for elevated NH3+ (). S/p aspiration event from ginger ale with aspiration pneumonitis and acute respiratory distress (). Found to have LV sys dysfn which may be chronic HFrEF.  S/p IV lasix and repeat CXR revealed concern for pulmonary edema/fluid overload. Made NPO (6/3). Visited pt this morning, pt remains with severe alcohol withdrawal. Discussed in IDR this morning, pt with poor po intake since admission d/t lethargy and ETOH withdrawal; consuming <50% of meals since admission. RD obtained bedscale wt on  - 191#. Weight loss of 9# (4.5%) x 1 week - clinsig and severe. NFPE reveals no muscle/ fat wasting, however pt now meets criteria for PCM at this time d/t poor po intake and weight loss. Recommend to advance diet to Regular as soon as medically feasible and will trial Ensure Plus High Protein when diet is advanced. If pt's diet cannot be advanced within the next 24-48 hours, STRONGLY recommend placing corpak + bridle and initiating TF. Monitor closely for refeeding syndrome - please obtain BMP, Mg, and Phos levels. Monitor and replete K, Phos, Mg prn if begins to downtrend, especially if IV dextrose started. If nutrition support is initiated, recommend to check refeeding labs BID x 2-3 days upon initiation and then will reevaluate. C/w thiamine, folic acid, and MVI supplementation. Please see additional recommendations below.     *Labs Reviewed:      142  |  110<H>  |  27<H>  ----------------------------<  91  3.9   |  26  |  1.37<H>    Ca    9.6      2024 06:08  Phos  4.3       Mg     2.2         TPro  7.2  /  Alb  3.5  /  TBili  1.6<H>  /  DBili  x   /  AST  37  /  ALT  95<H>  /  AlkPhos  82      BMI: BMI (kg/m2): 30.3 (24 @ 08:21)  HbA1c:   Glucose:   BP: 149/83 (24 @ 11:00) (91/73 - 188/118)Vital Signs Last 24 Hrs  T(C): 36.2 (24 @ 04:00), Max: 37.2 (24 @ 17:33)  T(F): 97.1 (24 @ 04:00), Max: 98.9 (24 @ 17:33)  HR: 59 (24 @ 11:00) (59 - 113)  BP: 149/83 (24 @ 11:00) (91/73 - 176/91)  BP(mean): 103 (24 @ 11:00) (81 - 139)  RR: 14 (24 @ 11:00) (14 - 31)  SpO2: 100% (24 @ 11:00) (90% - 100%)    Lipid Panel: Date/Time: 24 @ 05:20  Cholesterol, Serum: 134  LDL Cholesterol Calculated: 60  HDL Cholesterol, Serum: 55  Total Cholesterol/HDL Ration Measurement: --  Triglycerides, Serum: 98    *pertinent meds: Labetalol, Lasix, Folic Acid, Hydroxychlorquine, Ativan, Losartan, MVI, Nystatin, Zyprexa, KLOR-CON, Prednisone, Thiamine, Oxycodone, Precedex    *I and O's:    24 @ 07:01  -  24 @ 07:00  --------------------------------------------------------  IN:    Dexmedetomidine: 172.8 mL    IV PiggyBack: 305 mL  Total IN: 477.8 mL    OUT:    Incontinent per Condom Catheter (mL): 3800 mL  Total OUT: 3800 mL    Total NET: -3322.2 mL    *(+) BM on 6/ - fecal incontinence; pt not on bowel regimen.    *kumar score of 14 :No PU documented. No edema documented.    *PO intake, meeting ~ 0% of estimated nutr needs.    *Malnutrition dx: Pt now meets criteria for severe malnutrition in context of acute illness r/t decreased ability to meet increased nutrient needs 2/2 ETOH abuse/ withdrawal on precedex and lethargic AEB 4.5% wt loss x 1 week and intake of </=50% of ENN for >/= 5 days    Diet, NPO:   Except Medications (24 @ 11:50) [Active]    Estimated Needs: Based on Kg   Calories:  Kcal ( Kcal/Kg)  Protein:  g ( g/Kg)  Fluids:   mL ( mL/Kg)    *Wt Hx:  Daily Weight in k.6 (2024 04:00)  Weight (kg): 90.3 (24 @ 08:21)    Recommendations:  1) Advance diet to Regular as soon as medically feasible  2) Will add Ensure Plus High Protein when diet is advanced   3) Obtain vitamin D 25OH level to assess nutriture  4) Please obtain daily weights  5) C/w thiamine, MVI/MIN, and folic acid supplementation  6) Encourage protein-rich foods, maximize food preferences  7) Monitor bowel movements, if no BM for >3 days, consider implementing bowel regimen.  8) Monitor closely for refeeding syndrome - please obtain BMP, Mg, and Phos levels. Monitor and replete K, Phos, Mg prn if begins to downtrend. If nutrition support is initiated, recommend to check refeeding labs BID x 2-3 days upon initiation and then will reevaluate.    9) Confirm goals of care regarding nutrition support - if diet cannot be advanced within the next 24-48 hours, STRONGLY recommend placing corpak + bridle     *will continue to monitor and follow up with adjustments to treatment plan prn*  Sujey Fuentes, MS, RDN, CDN, [facility] [phone]  Certified Nutrition

## 2024-06-04 NOTE — BH CHART NOTE - NSEVENTNOTEFT_PSY_ALL_CORE
Patient unable to participate in assessment 2/2 sedation via IV medication. Will assess when patient is awake.

## 2024-06-04 NOTE — PROGRESS NOTE ADULT - SUBJECTIVE AND OBJECTIVE BOX
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 Pt stated the pain worsened that morning and radiates down his right leg. Denied trauma, heavy lifting, numbness/tingling in LE, incontinence. Pt also w/ signs of ETOH withdrawal w/ tremors and diaphoresis. Pt admits to ETOH withdrawal 2-3 times in the past and drinking habits vary depending on his mood. CT abd/pelvis notes cirrhosis w/ hepatic steatosis, w/o any acute pathology.  Pt admitted for ETOH withdrawal and sciatica. ICU consulted d/t elevated CIWA s/p 12mg ativan IVP. Pt combative with SHRUTHI HART called. Pt administered 15mg Valium IVP and transferred to CCU where he was started on precedex gtt.    5/30: Severe alcohol withdrawal on precedex for for agitation + high dose BZD therapy. Cirrhotic, on Tx for hyperammonia.  6/4: Remains with severe alcohol withdrawal on BZD + precedex for breakthrough; zyprexa added.     ROS: unable to obtain 2/2 weakness      Allergies    Aloe Lotion (Rash)    Intolerances        MEDICATIONS  (STANDING):  chlorhexidine 4% Liquid 1 Application(s) Topical <User Schedule>  dexMEDEtomidine Infusion 0.3 MICROgram(s)/kG/Hr (6.77 mL/Hr) IV Continuous <Continuous>  folic acid 1 milliGRAM(s) Oral daily  hydroxychloroquine 200 milliGRAM(s) Oral two times a day  lidocaine   4% Patch 1 Patch Transdermal daily  LORazepam   Injectable 2 milliGRAM(s) IV Push every 6 hours  losartan 50 milliGRAM(s) Oral daily  multivitamin 1 Tablet(s) Oral daily  nystatin Powder 1 Application(s) Topical two times a day  OLANZapine Injectable 2.5 milliGRAM(s) IntraMuscular every 12 hours  pantoprazole    Tablet 40 milliGRAM(s) Oral before breakfast  predniSONE   Tablet 10 milliGRAM(s) Oral daily  rivaroxaban 20 milliGRAM(s) Oral daily  thiamine IVPB 500 milliGRAM(s) IV Intermittent every 8 hours    MEDICATIONS  (PRN):  acetaminophen     Tablet .. 650 milliGRAM(s) Oral every 6 hours PRN Temp greater or equal to 38C (100.4F)  aluminum hydroxide/magnesium hydroxide/simethicone Suspension 30 milliLiter(s) Oral every 4 hours PRN Dyspepsia  LORazepam   Injectable 2 milliGRAM(s) IV Push every 8 hours PRN Agitation  melatonin 3 milliGRAM(s) Oral at bedtime PRN Insomnia  morphine  - Injectable 2 milliGRAM(s) IV Push every 6 hours PRN Severe Pain (7 - 10)  OLANZapine Injectable 2.5 milliGRAM(s) IntraMuscular every 6 hours PRN severe agitation  ondansetron Injectable 4 milliGRAM(s) IV Push once PRN Nausea and/or Vomiting  ondansetron Injectable 4 milliGRAM(s) IV Push every 8 hours PRN Nausea and/or Vomiting  oxycodone    5 mG/acetaminophen 325 mG 1 Tablet(s) Oral every 6 hours PRN Moderate Pain (4 - 6)      Drug Dosing Weight  Height (cm): 172.7 (16 Feb 2024 17:36)  Weight (kg): 90.3 (29 May 2024 08:21)  BMI (kg/m2): 30.3 (29 May 2024 08:21)  BSA (m2): 2.04 (29 May 2024 08:21)      PAST MEDICAL & SURGICAL HISTORY:  ETOH abuse      Hyperlipidemia LDL goal <100      Restless leg syndrome      Rheumatoid arteritis      Leukemia      No significant past surgical history          FAMILY HISTORY:  FHx: rheumatoid arthritis (Mother)          REVIEW OF SYSTEMS    UNLESS OTHERWISE NOTED IN HPI above:    Constitutional:  No Weight Change, No Fever, No Chills, No Night Sweats, No Fatigue, No Malaise  ENT/Mouth:  No Hearing Changes, No Ear Pain, No Nasal Congestion, No  Sinus Pain, No Hoarseness, No sore throat, No Rhinorrhea, No Swallowing  Difficulty  Eyes:  No Eye Pain, No Swelling, No Redness, No Foreign Body, No Discharge, No Vision Changes  Cardiovascular:  No Chest Pain, No SOB, No PND, No Dyspnea on Exertion,  No Orthopnea, No Claudication, No Edema, No Palpitations  Respiratory:  No Cough, No Sputum, No Wheezing, No Smoke Exposure, No Dyspnea  Gastrointestinal:  No Nausea, No Vomiting, No Diarrhea, No  Constipation, No Pain, No Heartburn, No Anorexia, No Dysphagia, No  Hematochezia, No Melena, No Flatulence, No Jaundice  Genitourinary:  No Dysmenorrhea, No DUB, No Dyspareunia, No Dysuria, No  Urinary Frequency, No Hematuria, No Urinary Incontinence, No Urgency,  No Flank Pain, No Urinary Flow Changes, No Hesitancy  Musculoskeletal:  No Arthralgias, No Myalgias, No Joint Swelling, No  Joint Stiffness, No Back Pain, No Neck Pain, No Injury History  Skin:  No Skin Lesions, No Pruritis, No Hair Changes, No Breast/Skin Changes, No Nipple Discharge  Neuro:  No Weakness, No Numbness, No Paresthesias, No Loss of  Consciousness, No Syncope, No Dizziness, No Headache, No Coordination  Changes, No Recent Falls  Psych:  No Anxiety/Panic, No Depression, No Insomnia, No Personality  Changes, No Delusions, No Rumination, No SI/HI/AH/VH, No Social Issues,  No Memory Changes, No Violence/Abuse Hx., No Eating Concerns  Heme/Lymph:  No Bruising, No Bleeding, No Transfusions History, No Lymphadenopathy  Endocrine:  No Polyuria, No Polydipsia, No Temperature Intolerance    O:    ICU Vital Signs Last 24 Hrs  T(C): 36.2 (04 Jun 2024 04:00), Max: 37.2 (03 Jun 2024 17:33)  T(F): 97.1 (04 Jun 2024 04:00), Max: 98.9 (03 Jun 2024 17:33)  HR: 65 (04 Jun 2024 05:00) (61 - 113)  BP: 158/93 (04 Jun 2024 05:00) (91/73 - 168/103)  BP(mean): 114 (04 Jun 2024 05:00) (81 - 139)  ABP: --  ABP(mean): --  RR: 20 (04 Jun 2024 05:00) (12 - 42)  SpO2: 99% (04 Jun 2024 05:00) (90% - 100%)    O2 Parameters below as of 04 Jun 2024 05:00  Patient On (Oxygen Delivery Method): nasal cannula  O2 Flow (L/min): 3              I&O's Detail    02 Jun 2024 07:01  -  03 Jun 2024 07:00  --------------------------------------------------------  IN:    Dexmedetomidine: 76.9 mL    Oral Fluid: 600 mL  Total IN: 676.9 mL    OUT:    Incontinent per Condom Catheter (mL): 825 mL    Indwelling Catheter - Urethral (mL): 250 mL  Total OUT: 1075 mL    Total NET: -398.1 mL      03 Jun 2024 07:01  -  04 Jun 2024 05:53  --------------------------------------------------------  IN:    Dexmedetomidine: 49.9 mL    IV PiggyBack: 305 mL  Total IN: 354.9 mL    OUT:    Incontinent per Condom Catheter (mL): 3600 mL  Total OUT: 3600 mL    Total NET: -3245.1 mL              PE:    Adult lying in bed  No JVD trachea midline  Normocephalic, atraumatic  S1S2+  CTA B/L  Abd soft NTND  No leg swelling/edema noted  Sleepy; when awakened intermittent agitation, tremulous  Skin pink, warm    LABS:    CBC Full  -  ( 03 Jun 2024 05:55 )  WBC Count : 4.72 K/uL  RBC Count : 3.00 M/uL  Hemoglobin : 10.4 g/dL  Hematocrit : 32.3 %  Platelet Count - Automated : 103 K/uL  Mean Cell Volume : 107.7 fl  Mean Cell Hemoglobin : 34.7 pg  Mean Cell Hemoglobin Concentration : 32.2 gm/dL  Auto Neutrophil # : x  Auto Lymphocyte # : x  Auto Monocyte # : x  Auto Eosinophil # : x  Auto Basophil # : x  Auto Neutrophil % : x  Auto Lymphocyte % : x  Auto Monocyte % : x  Auto Eosinophil % : x  Auto Basophil % : x    06-03    138  |  110<H>  |  23  ----------------------------<  99  3.7   |  23  |  1.17    Ca    9.8      03 Jun 2024 13:45  Phos  3.1     06-03  Mg     2.0     06-03    TPro  7.3  /  Alb  3.5  /  TBili  1.3<H>  /  DBili  x   /  AST  46<H>  /  ALT  109<H>  /  AlkPhos  90  06-03      Urinalysis Basic - ( 03 Jun 2024 13:45 )    Color: x / Appearance: x / SG: x / pH: x  Gluc: 99 mg/dL / Ketone: x  / Bili: x / Urobili: x   Blood: x / Protein: x / Nitrite: x   Leuk Esterase: x / RBC: x / WBC x   Sq Epi: x / Non Sq Epi: x / Bacteria: x      CAPILLARY BLOOD GLUCOSE            LIVER FUNCTIONS - ( 03 Jun 2024 13:45 )  Alb: 3.5 g/dL / Pro: 7.3 gm/dL / ALK PHOS: 90 U/L / ALT: 109 U/L / AST: 46 U/L / GGT: x

## 2024-06-04 NOTE — PROGRESS NOTE ADULT - ASSESSMENT
A:    68yMale  HD #    Here for:    1. DT's  2. Alcoholic cirrhosis  3. Hypokalemia  4. Anemia  5. DVT  6. RA  7. ANTOINE on CKD    This patient requires critical care for support of one or more vital organ systems with a high probability of imminent or life threatening deterioration in his/her condition    P:    Severe DT's refractory to high doze BZD therapy    Precedex drip for breakthru agitation; ctively titrating this and BZD therapy; add zyprexa  HD monitoring  ISS, OOB as able  Add lactulose and rifaxamin for elevated NH3+  Replete K+  Anemia of chronic disease, no s/s active bleeding  Heme/onc note appreciated: leukemia; mgmt per oncology  DOAC for VTE disease  Gentle hydration  HCQ for RA  Folate, thiamine, MVI    Dispo: Cont critical care.    Date of entry of this note  is equal to the date of services rendered on     TOTAL CRITICAL CARE TIME:  35 minutes CCT (EXCLUSIVE of any non bundled procedures)    Note: This is a critically ill patient. Time spent has been in salvage of life, limb and vital organ systems. This time INCLUDES time spent directly as this patient's bedside with evaluation and management, review of chart including review of laboratory and imaging studies, interpretation of vital signs and cardiac output measurements, any necessary ventilator management, and time spent discussing plan of care with patient and family, including goals of care discussion. This also includes time spent in multidisciplinary discussion with care team and various consultants to optimize treatment plan. This time may NOT include various procedures, which will be noted separately

## 2024-06-04 NOTE — PROGRESS NOTE ADULT - ASSESSMENT
Chronic HFrEF, LVEF 35-40%- overall on exam appears close to euvolemia  I do not think at this time he will need diuresis since he was NPO and with concentrated urine.  He is sedated on meds now for alcohol withdrawl.  Will change losartan to entresto 24/26mg BID when he can take po meds.  Close monitoring of volume status    He is NPO currently and close monitoring of the renal function is recommended with the IV diuretics.    IV diuretics to be used only as needed.    he did not followup since last november in office at which time his cr was elevated as well as mild BNP increase.  He was supposed to repeat blood work and f/u but did not followup.     CKD- follows with Dr Herron.    Creatinine level mildly elevated   Close monitoring is recommended.    Alcohol abuse- on [facility] protocol.    Leukemia- follows with Dr Sanchez    d/w Dr river    d/w RN    Other medical issues- Management per primary team.   Thank you for allowing me to participate in the care of this patient. Please feel free to contact me with any questions.

## 2024-06-04 NOTE — DIETITIAN NUTRITION RISK NOTIFICATION - ADDITIONAL COMMENTS/DIETITIAN RECOMMENDATIONS
1) Advance diet to Regular as soon as medically feasible  2) Will add Ensure Plus High Protein when diet is advanced   3) Obtain vitamin D 25OH level to assess nutriture  4) Please obtain daily weights  5) C/w thiamine, MVI/MIN, and folic acid supplementation  6) Encourage protein-rich foods, maximize food preferences  7) Monitor bowel movements, if no BM for >3 days, consider implementing bowel regimen.  8) Monitor closely for refeeding syndrome - please obtain BMP, Mg, and Phos levels. Monitor and replete K, Phos, Mg prn if begins to downtrend. If nutrition support is initiated, recommend to check refeeding labs BID x 2-3 days upon initiation and then will reevaluate.    9) Confirm goals of care regarding nutrition support - if diet cannot be advanced within the next 24-48 hours, STRONGLY recommend placing corpak + bridle

## 2024-06-05 DIAGNOSIS — F10.931 ALCOHOL USE, UNSPECIFIED WITH WITHDRAWAL DELIRIUM: ICD-10-CM

## 2024-06-05 DIAGNOSIS — Z86.59 PERSONAL HISTORY OF OTHER MENTAL AND BEHAVIORAL DISORDERS: ICD-10-CM

## 2024-06-05 LAB
ANION GAP SERPL CALC-SCNC: 7 MMOL/L — SIGNIFICANT CHANGE UP (ref 5–17)
BUN SERPL-MCNC: 25 MG/DL — HIGH (ref 7–23)
CALCIUM SERPL-MCNC: 9.6 MG/DL — SIGNIFICANT CHANGE UP (ref 8.5–10.1)
CHLORIDE SERPL-SCNC: 111 MMOL/L — HIGH (ref 96–108)
CO2 SERPL-SCNC: 25 MMOL/L — SIGNIFICANT CHANGE UP (ref 22–31)
CREAT SERPL-MCNC: 1.24 MG/DL — SIGNIFICANT CHANGE UP (ref 0.5–1.3)
EGFR: 63 ML/MIN/1.73M2 — SIGNIFICANT CHANGE UP
GLUCOSE SERPL-MCNC: 87 MG/DL — SIGNIFICANT CHANGE UP (ref 70–99)
HCT VFR BLD CALC: 36.7 % — LOW (ref 39–50)
HGB BLD-MCNC: 12.6 G/DL — LOW (ref 13–17)
MAGNESIUM SERPL-MCNC: 1.9 MG/DL — SIGNIFICANT CHANGE UP (ref 1.6–2.6)
MCHC RBC-ENTMCNC: 34.3 GM/DL — SIGNIFICANT CHANGE UP (ref 32–36)
MCHC RBC-ENTMCNC: 34.8 PG — HIGH (ref 27–34)
MCV RBC AUTO: 101.4 FL — HIGH (ref 80–100)
PHOSPHATE SERPL-MCNC: 3.2 MG/DL — SIGNIFICANT CHANGE UP (ref 2.5–4.5)
PLATELET # BLD AUTO: 166 K/UL — SIGNIFICANT CHANGE UP (ref 150–400)
POTASSIUM SERPL-MCNC: 3.5 MMOL/L — SIGNIFICANT CHANGE UP (ref 3.5–5.3)
POTASSIUM SERPL-SCNC: 3.5 MMOL/L — SIGNIFICANT CHANGE UP (ref 3.5–5.3)
RBC # BLD: 3.62 M/UL — LOW (ref 4.2–5.8)
RBC # FLD: 14.6 % — HIGH (ref 10.3–14.5)
SODIUM SERPL-SCNC: 143 MMOL/L — SIGNIFICANT CHANGE UP (ref 135–145)
WBC # BLD: 5.55 K/UL — SIGNIFICANT CHANGE UP (ref 3.8–10.5)
WBC # FLD AUTO: 5.55 K/UL — SIGNIFICANT CHANGE UP (ref 3.8–10.5)

## 2024-06-05 PROCEDURE — 99232 SBSQ HOSP IP/OBS MODERATE 35: CPT

## 2024-06-05 PROCEDURE — 99221 1ST HOSP IP/OBS SF/LOW 40: CPT

## 2024-06-05 RX ORDER — HALOPERIDOL DECANOATE 100 MG/ML
5 INJECTION INTRAMUSCULAR EVERY 6 HOURS
Refills: 0 | Status: DISCONTINUED | OUTPATIENT
Start: 2024-06-05 | End: 2024-06-08

## 2024-06-05 RX ORDER — HALOPERIDOL DECANOATE 100 MG/ML
5 INJECTION INTRAMUSCULAR ONCE
Refills: 0 | Status: DISCONTINUED | OUTPATIENT
Start: 2024-06-05 | End: 2024-06-08

## 2024-06-05 RX ORDER — QUETIAPINE FUMARATE 200 MG/1
50 TABLET, FILM COATED ORAL AT BEDTIME
Refills: 0 | Status: DISCONTINUED | OUTPATIENT
Start: 2024-06-05 | End: 2024-06-08

## 2024-06-05 RX ORDER — QUETIAPINE FUMARATE 200 MG/1
25 TABLET, FILM COATED ORAL ONCE
Refills: 0 | Status: COMPLETED | OUTPATIENT
Start: 2024-06-05 | End: 2024-06-05

## 2024-06-05 RX ORDER — LABETALOL HCL 100 MG
10 TABLET ORAL EVERY 6 HOURS
Refills: 0 | Status: DISCONTINUED | OUTPATIENT
Start: 2024-06-05 | End: 2024-06-06

## 2024-06-05 RX ADMIN — LIDOCAINE 1 PATCH: 4 CREAM TOPICAL at 11:10

## 2024-06-05 RX ADMIN — QUETIAPINE FUMARATE 25 MILLIGRAM(S): 200 TABLET, FILM COATED ORAL at 14:48

## 2024-06-05 RX ADMIN — Medication 2 MILLIGRAM(S): at 16:58

## 2024-06-05 RX ADMIN — CHLORHEXIDINE GLUCONATE 1 APPLICATION(S): 213 SOLUTION TOPICAL at 05:52

## 2024-06-05 RX ADMIN — PANTOPRAZOLE SODIUM 40 MILLIGRAM(S): 20 TABLET, DELAYED RELEASE ORAL at 05:51

## 2024-06-05 RX ADMIN — Medication 2 MILLIGRAM(S): at 21:57

## 2024-06-05 RX ADMIN — QUETIAPINE FUMARATE 50 MILLIGRAM(S): 200 TABLET, FILM COATED ORAL at 21:57

## 2024-06-05 RX ADMIN — Medication 200 MILLIGRAM(S): at 21:57

## 2024-06-05 RX ADMIN — Medication 105 MILLIGRAM(S): at 21:57

## 2024-06-05 RX ADMIN — Medication 2 MILLIGRAM(S): at 15:28

## 2024-06-05 RX ADMIN — LOSARTAN POTASSIUM 50 MILLIGRAM(S): 100 TABLET, FILM COATED ORAL at 11:08

## 2024-06-05 RX ADMIN — LIDOCAINE 1 PATCH: 4 CREAM TOPICAL at 20:00

## 2024-06-05 RX ADMIN — MORPHINE SULFATE 2 MILLIGRAM(S): 50 CAPSULE, EXTENDED RELEASE ORAL at 02:39

## 2024-06-05 RX ADMIN — MORPHINE SULFATE 2 MILLIGRAM(S): 50 CAPSULE, EXTENDED RELEASE ORAL at 15:33

## 2024-06-05 RX ADMIN — Medication 2 MILLIGRAM(S): at 15:36

## 2024-06-05 RX ADMIN — Medication 105 MILLIGRAM(S): at 05:51

## 2024-06-05 RX ADMIN — Medication 1 MILLIGRAM(S): at 15:09

## 2024-06-05 RX ADMIN — LIDOCAINE 1 PATCH: 4 CREAM TOPICAL at 04:00

## 2024-06-05 RX ADMIN — NYSTATIN CREAM 1 APPLICATION(S): 100000 CREAM TOPICAL at 05:51

## 2024-06-05 RX ADMIN — Medication 200 MILLIGRAM(S): at 15:07

## 2024-06-05 RX ADMIN — Medication 105 MILLIGRAM(S): at 14:47

## 2024-06-05 RX ADMIN — Medication 1 TABLET(S): at 11:08

## 2024-06-05 RX ADMIN — RIVAROXABAN 20 MILLIGRAM(S): KIT at 11:08

## 2024-06-05 RX ADMIN — Medication 10 MILLIGRAM(S): at 14:53

## 2024-06-05 RX ADMIN — Medication 2 MILLIGRAM(S): at 05:51

## 2024-06-05 RX ADMIN — Medication 10 MILLIGRAM(S): at 03:40

## 2024-06-05 RX ADMIN — Medication 10 MILLIGRAM(S): at 11:09

## 2024-06-05 RX ADMIN — NYSTATIN CREAM 1 APPLICATION(S): 100000 CREAM TOPICAL at 18:33

## 2024-06-05 RX ADMIN — HALOPERIDOL DECANOATE 5 MILLIGRAM(S): 100 INJECTION INTRAMUSCULAR at 16:58

## 2024-06-05 NOTE — BH CONSULTATION LIAISON ASSESSMENT NOTE - CURRENT MEDICATION
MEDICATIONS  (STANDING):  chlorhexidine 4% Liquid 1 Application(s) Topical <User Schedule>  folic acid 1 milliGRAM(s) Oral daily  hydroxychloroquine 200 milliGRAM(s) Oral two times a day  lidocaine   4% Patch 1 Patch Transdermal daily  LORazepam   Injectable   IV Push   LORazepam   Injectable 2 milliGRAM(s) IV Push every 8 hours  losartan 50 milliGRAM(s) Oral daily  multivitamin 1 Tablet(s) Oral daily  nystatin Powder 1 Application(s) Topical two times a day  pantoprazole    Tablet 40 milliGRAM(s) Oral before breakfast  predniSONE   Tablet 10 milliGRAM(s) Oral daily  QUEtiapine 50 milliGRAM(s) Oral at bedtime  QUEtiapine 25 milliGRAM(s) Oral once  rivaroxaban 20 milliGRAM(s) Oral daily  thiamine IVPB 500 milliGRAM(s) IV Intermittent every 8 hours    MEDICATIONS  (PRN):  acetaminophen     Tablet .. 650 milliGRAM(s) Oral every 6 hours PRN Temp greater or equal to 38C (100.4F)  aluminum hydroxide/magnesium hydroxide/simethicone Suspension 30 milliLiter(s) Oral every 4 hours PRN Dyspepsia  haloperidol    Injectable 5 milliGRAM(s) IntraMuscular every 6 hours PRN Agitation  labetalol Injectable 10 milliGRAM(s) IV Push every 6 hours PRN Systolic blood pressure > 150  LORazepam   Injectable 2 milliGRAM(s) IV Push every 8 hours PRN Agitation  melatonin 3 milliGRAM(s) Oral at bedtime PRN Insomnia  morphine  - Injectable 2 milliGRAM(s) IV Push every 6 hours PRN Severe Pain (7 - 10)  oxycodone    5 mG/acetaminophen 325 mG 1 Tablet(s) Oral every 6 hours PRN Moderate Pain (4 - 6)

## 2024-06-05 NOTE — PROGRESS NOTE ADULT - SUBJECTIVE AND OBJECTIVE BOX
Date of entry of this note is equal to date of services rendered    Significant recent/past 24 hr events: HTN ON given Labetalol 10mg IVP x2. Off precedex gtt.       REVIEW OF SYSTEMS:     [X] Due to altered mental status/intubation, subjective information were not able to be obtained from the patient. History was obtained, to the extent possible, from review of the chart and collateral sources of information.      Patient is a 68y old  Male who presents with a chief complaint of ETOH withdrawal  /  posterior right leg into the knee. (04 Jun 2024 17:32)    HPI:  Patient is a 69 y/o male with PMHx of HTN, RA, Leukemia in remission since January 2023, CKD I ,  DVT and PE (on xaralto) admitted in the hospital with ETOH withdrawal and sciatica as per patient. Patient is limited historian at this time as patient appears to have a ETOH withdrawal signs of sweating, shakes. States he follows up with PCP Dr. Boxer. Has had ETOH withdrawal in the past 2-3 times. Drinks variably depending on the mood. Denies any HA, CP, SOB. NO fevers, chills or shakes. Labs and vitals reviewed. Some noted bruising in the lower extremities. CT abd/pelvis notes cirrhosis w/ hepatic steatosis, w/o any acute pathology.  ICU consulted d/t elevated CIWA s/p 12mg ativan IVP. Pt combative with SHRUTHI HART called. Pt administered 15mg Valium IVP and transferred to CCU where he was started on precedex gtt.          PAST MEDICAL & SURGICAL HISTORY:  ETOH abuse    Hyperlipidemia LDL goal <100    Restless leg syndrome    Rheumatoid arteritis    Leukemia    No significant past surgical history      FAMILY HISTORY:  FHx: rheumatoid arthritis (Mother)      Vitals   ICU Vital Signs Last 24 Hrs  T(C): 36.5 (04 Jun 2024 23:45), Max: 37.2 (04 Jun 2024 20:18)  T(F): 97.7 (04 Jun 2024 23:45), Max: 99 (04 Jun 2024 20:18)  HR: 83 (05 Jun 2024 04:00) (56 - 100)  BP: 154/104 (05 Jun 2024 04:00) (129/115 - 181/108)  BP(mean): 121 (05 Jun 2024 04:00) (102 - 130)  ABP: --  ABP(mean): --  RR: 19 (05 Jun 2024 04:00) (13 - 25)  SpO2: 98% (05 Jun 2024 00:00) (91% - 100%)    O2 Parameters below as of 04 Jun 2024 08:00  Patient On (Oxygen Delivery Method): nasal cannula  O2 Flow (L/min): 3        PHYSICAL EXAM:  General: No acute distress  HEENT: Pupils equal, reactive to light.  Symmetric.  PULM: Clear to auscultation bilaterally  CVS: Regular rate and rhythm, s1s2  ABD: Soft, distended, nontender  EXT: mild edema, nontender  NEURO: Alert, oriented x1. Hallucinations.         I&O's Detail    03 Jun 2024 07:01  -  04 Jun 2024 07:00  --------------------------------------------------------  IN:    Dexmedetomidine: 172.8 mL    IV PiggyBack: 305 mL  Total IN: 477.8 mL    OUT:    Incontinent per Condom Catheter (mL): 3800 mL  Total OUT: 3800 mL    Total NET: -3322.2 mL      04 Jun 2024 07:01  -  05 Jun 2024 05:55  --------------------------------------------------------  IN:    IV PiggyBack: 100 mL    Oral Fluid: 400 mL  Total IN: 500 mL    OUT:    Voided (mL): 1000 mL  Total OUT: 1000 mL    Total NET: -500 mL          LABS                        11.4   4.15  )-----------( 123      ( 04 Jun 2024 06:08 )             34.6     06-04    142  |  110<H>  |  27<H>  ----------------------------<  91  3.9   |  26  |  1.37<H>    Ca    9.6      04 Jun 2024 06:08  Phos  4.3     06-04  Mg     2.2     06-04    TPro  7.2  /  Alb  3.5  /  TBili  1.6<H>  /  DBili  x   /  AST  37  /  ALT  95<H>  /  AlkPhos  82  06-04    LIVER FUNCTIONS - ( 04 Jun 2024 06:08 )  Alb: 3.5 g/dL / Pro: 7.2 gm/dL / ALK PHOS: 82 U/L / ALT: 95 U/L / AST: 37 U/L / GGT: x                   Urinalysis Basic - ( 04 Jun 2024 06:08 )    Color: x / Appearance: x / SG: x / pH: x  Gluc: 91 mg/dL / Ketone: x  / Bili: x / Urobili: x   Blood: x / Protein: x / Nitrite: x   Leuk Esterase: x / RBC: x / WBC x   Sq Epi: x / Non Sq Epi: x / Bacteria: x            MEDICATIONS  (STANDING):  chlorhexidine 4% Liquid 1 Application(s) Topical <User Schedule>  folic acid 1 milliGRAM(s) Oral daily  hydroxychloroquine 200 milliGRAM(s) Oral two times a day  lidocaine   4% Patch 1 Patch Transdermal daily  LORazepam   Injectable 2 milliGRAM(s) IV Push every 6 hours  losartan 50 milliGRAM(s) Oral daily  multivitamin 1 Tablet(s) Oral daily  nystatin Powder 1 Application(s) Topical two times a day  OLANZapine Injectable 2.5 milliGRAM(s) IntraMuscular every 12 hours  pantoprazole    Tablet 40 milliGRAM(s) Oral before breakfast  predniSONE   Tablet 10 milliGRAM(s) Oral daily  rivaroxaban 20 milliGRAM(s) Oral daily  thiamine IVPB 500 milliGRAM(s) IV Intermittent every 8 hours    MEDICATIONS  (PRN):  acetaminophen     Tablet .. 650 milliGRAM(s) Oral every 6 hours PRN Temp greater or equal to 38C (100.4F)  aluminum hydroxide/magnesium hydroxide/simethicone Suspension 30 milliLiter(s) Oral every 4 hours PRN Dyspepsia  labetalol Injectable 10 milliGRAM(s) IV Push every 6 hours PRN Systolic blood pressure > 150  LORazepam   Injectable 2 milliGRAM(s) IV Push every 8 hours PRN Agitation  melatonin 3 milliGRAM(s) Oral at bedtime PRN Insomnia  morphine  - Injectable 2 milliGRAM(s) IV Push every 6 hours PRN Severe Pain (7 - 10)  OLANZapine Injectable 2.5 milliGRAM(s) IntraMuscular every 6 hours PRN severe agitation  ondansetron Injectable 4 milliGRAM(s) IV Push every 8 hours PRN Nausea and/or Vomiting  ondansetron Injectable 4 milliGRAM(s) IV Push once PRN Nausea and/or Vomiting  oxycodone    5 mG/acetaminophen 325 mG 1 Tablet(s) Oral every 6 hours PRN Moderate Pain (4 - 6)      Allergies:  Aloe Lotion (Rash)      This patient requires a high level of MDM for support of one or more organ systems with a probability of deterioration in his/her condition  Time spent on this patient encounter, which includes documenting this note in the electronic medical record, was 51 minutes including assessing the presenting problems with associated risks, reviewing the medical record to prepare for the encounter, and meeting face to face with the patient to obtain additional history. I have also performed an appropriate physical exam, made interventions listed and ordered and interpreted appropriate diagnostic studies as documented. To improve communication and patient safety, I have coordinated care with the multidisciplinary team including the bedside nurse, appropriate attending of record and consultants as needed. This time is independent of any procedures performed.

## 2024-06-05 NOTE — PROGRESS NOTE ADULT - ASSESSMENT
69 y/o male PMHx of RA on Plaquenil, HTN, leukemia in remission, CKD1, DVT/PE on Xarelto, chronic back pain (prescribed NSAID, opioid, gabapentin, prednisone previously) who is Admitted to the ICU with:    ETOH withdrawal / DTs  HTN  Alcoholic cirrhosis  ANTONIE on CKD  ?Aspiration pneumonitis vs fluid overload      NEURO: Off Precedex gtt, still hallucinating on standing ativan (start taper today), zyprexa and seroquel. OOB as able. PT/OT. Prednisone, oxy/morphine prn for back pain. Speech and swallow eval when pt more awake.   CV: HTN ON s/p Labatelol 10mg IVP x2 doses. Started on prn 6hrs. Would start oral antihypertensive regimen with Losartan if tolerating PO.  PULM: Received 20mg Lasix during the day, comfortable on RA with adequate SPO2.   GI: Started on diet. PPI. Transaminitis with h/o liver cirrhosis    RENAL: ANTOINE, monitor I/Os. Replete electrolytes prn for arrythmia suppression  ENDO: Plaquenil BID. BG wnl.   ID: no infectious etiology suspected at this time. Afebrile, wbc wnl, CXR improved from 5/31. Monitoring off abx.   HEME: Xarelto for AC in setting of DVT/PE hx      DISPO: CCU. FULL code.

## 2024-06-05 NOTE — BH CONSULTATION LIAISON ASSESSMENT NOTE - NSBHCHARTREVIEWVS_PSY_A_CORE FT
Vital Signs Last 24 Hrs  T(C): 36.5 (04 Jun 2024 23:45), Max: 37.2 (04 Jun 2024 20:18)  T(F): 97.7 (04 Jun 2024 23:45), Max: 99 (04 Jun 2024 20:18)  HR: 109 (05 Jun 2024 11:00) (60 - 109)  BP: 176/117 (05 Jun 2024 11:00) (129/115 - 188/115)  BP(mean): 133 (05 Jun 2024 11:00) (102 - 137)  RR: 19 (05 Jun 2024 04:00) (15 - 25)  SpO2: 88% (05 Jun 2024 11:00) (88% - 100%)

## 2024-06-05 NOTE — PROGRESS NOTE ADULT - ASSESSMENT
69 y/o male who follows with me at [facility] for h/o LGL s/p cyclophosphamide and prednisone completed 1/31/23 in remission, DVT and PE now on xarelto, HTN, RA prev on MTX now on HCQ and prednisone, CKD I , admitted in the hospital with ETOH withdrawal and sciatica as per patient.     # etoh withdrawal  - follow [facility] protocol   - treatment as per primary team- currently on ativan, now off precedex   - advised to refrain from drinking   - CT a/p with cirrhosis   - WBC 5.55, Hb 12.6, plt 166   - pt improving clinically     # T cell Large Granular Lymphocytic leukemia  ­ 5/27/22 Bone marrow biopsy (@ HH) showed aberrant T cell pop (65% of cells) positive for TCR alpha/beta c/w T cell  lymphoproliferative d/o of NK like T cells, CD8/CD57 positive with clonality by TCRB1 flow which can be seen in large granular lymphocyte leukemia. IgK gene status negative, TCR gamma positive  ­ 5/26/22 CT a/p with splenomegaly up to 15 cm, cirrhosis seen and hepatic steatosis, no masses as well as sigmoid diverticulosis  ­ started cyclophosphamide 100 mg qd and prednisone 7/8/22- 1/31/23  - counts outpt have been stable- last 5/17/24 WBC 6.5, Hb 12.5, mcv 104, plt 147  - WBC 5.55, Hb 12.6, plt 166     # RLE DVT/PE  ­ 9/15/22 CTA chest showed small right lower lobe pulmonary embolus­ completed 3 mo of xarelto   ­ 6/14/23 US LE doppler b/l­ showing acute DVT in right posterior tibial vein, chronic DVT in right common femoral, femoral, popliteal and gastrocnemius veins, no DVT in LLE  ­ 6/14/23 CTA chest with RLL postrior subsegmental PE, interlobular septal thickening suggestive of interstitial pulmonary edema.  ­ continue with xarelto 20 mg qd  ­ pt will need lifelong a/c as unprovoked     # RA  ­ follows with Dr. Frank­ now off MTX, on HCQ and course of prednisone     will f/u with me outpatient when withdrawal resolves

## 2024-06-05 NOTE — BH CONSULTATION LIAISON ASSESSMENT NOTE - NSBHCHARTREVIEWLAB_PSY_A_CORE FT
12.6   5.55  )-----------( 166      ( 05 Jun 2024 08:47 )             36.7   06-05    143  |  111<H>  |  25<H>  ----------------------------<  87  3.5   |  25  |  1.24    Ca    9.6      05 Jun 2024 08:47  Phos  3.2     06-05  Mg     1.9     06-05    TPro  7.2  /  Alb  3.5  /  TBili  1.6<H>  /  DBili  x   /  AST  37  /  ALT  95<H>  /  AlkPhos  82  06-04

## 2024-06-05 NOTE — BH CONSULTATION LIAISON ASSESSMENT NOTE - RISK ASSESSMENT
Low Risk-Not S/H and no prior SI/SA, denied other drug abuse, has hx of sobriety for 28 years and has been trying to stay safe.

## 2024-06-05 NOTE — BH CONSULTATION LIAISON ASSESSMENT NOTE - HPI (INCLUDE ILLNESS QUALITY, SEVERITY, DURATION, TIMING, CONTEXT, MODIFYING FACTORS, ASSOCIATED SIGNS AND SYMPTOMS)
Patient a 69 y/o male, , domiciled with wife,  no past psychiatric hx, no prior SI/SA, no aggression/agitation, hx of Alcohol abuse, denied other drug abuse, medically has HTN; HLD; RLS;  DVT on Xarelto was BIB/EMS due to severe  Right leg pain from sciatica with Alcohol withdrawal.    Patient in bed, AAOX3, endorses that he was brought in for Rt. LL Pain with alcohol withdrawal. He endorses that he drinks alcohol 2-3 times a week and drinks on weekends. He added that he likes Vodka/Rum, and drinks equally. He further added that past Memorial day he drank too much mostly 2 1/2 pints vodka with beer and thus leading to Hospitalization. He had one rehab at [place] in 1982, was clean for 28 years and later relapsed due to family stress with 3 children and other work related issues. He had 3 jobs and preferably worked as  for 36 years. He denied other drug abuse hx, and has no withdrawal symptoms e.g. Pass out/black out behavior, including DT and Seizures. He added that he would be able to control self from drinking too  much. Patient knows his meds, and has poor sleep, e.g. waking up every hour and later on he falls sleep or to go to sleep. He has been sleeping very well here probably from Seroquel effect. He feels that he is doing well now. He was advised to decrease alcohol consumption.

## 2024-06-05 NOTE — PROGRESS NOTE ADULT - SUBJECTIVE AND OBJECTIVE BOX
INTERVAL HPI/OVERNIGHT EVENTS:  Patient S&E at bedside.  Pt off precedex gtt this am  he is more calm, no longer in significant withdrawal   recognized me this am   WBC 5.55, Hb 12.6, plt 166     PAST MEDICAL & SURGICAL HISTORY:  ETOH abuse      Hyperlipidemia LDL goal <100      Restless leg syndrome      Rheumatoid arteritis      Leukemia      No significant past surgical history          FAMILY HISTORY:  FHx: rheumatoid arthritis (Mother)        VITAL SIGNS:  T(F): 96.8 (06-05-24 @ 19:07)  HR: 109 (06-05-24 @ 11:00)  BP: 158/98 (06-05-24 @ 15:00)  RR: 19 (06-05-24 @ 04:00)  SpO2: 88% (06-05-24 @ 11:00)  Wt(kg): --    PHYSICAL EXAM:    Constitutional: NAD  Eyes: EOMI,  Respiratory: CTA b/l,   Cardiovascular: RRR,   Gastrointestinal: soft, NTND,   Extremities: back pain   Neurological: AAOx3      MEDICATIONS  (STANDING):  chlorhexidine 4% Liquid 1 Application(s) Topical <User Schedule>  folic acid 1 milliGRAM(s) Oral daily  haloperidol    Concentrate 5 milliGRAM(s) Oral once  haloperidol    Concentrate 5 milliGRAM(s) Oral once  hydroxychloroquine 200 milliGRAM(s) Oral two times a day  lidocaine   4% Patch 1 Patch Transdermal daily  LORazepam   Injectable   IV Push   LORazepam   Injectable 2 milliGRAM(s) IV Push every 8 hours  losartan 50 milliGRAM(s) Oral daily  multivitamin 1 Tablet(s) Oral daily  nystatin Powder 1 Application(s) Topical two times a day  pantoprazole    Tablet 40 milliGRAM(s) Oral before breakfast  predniSONE   Tablet 10 milliGRAM(s) Oral daily  QUEtiapine 50 milliGRAM(s) Oral at bedtime  rivaroxaban 20 milliGRAM(s) Oral daily  thiamine IVPB 500 milliGRAM(s) IV Intermittent every 8 hours    MEDICATIONS  (PRN):  acetaminophen     Tablet .. 650 milliGRAM(s) Oral every 6 hours PRN Temp greater or equal to 38C (100.4F)  aluminum hydroxide/magnesium hydroxide/simethicone Suspension 30 milliLiter(s) Oral every 4 hours PRN Dyspepsia  haloperidol    Injectable 5 milliGRAM(s) IntraMuscular every 6 hours PRN Agitation  labetalol Injectable 10 milliGRAM(s) IV Push every 6 hours PRN Systolic blood pressure > 150  LORazepam   Injectable 2 milliGRAM(s) IV Push every 8 hours PRN Agitation  melatonin 3 milliGRAM(s) Oral at bedtime PRN Insomnia  morphine  - Injectable 2 milliGRAM(s) IV Push every 6 hours PRN Severe Pain (7 - 10)  oxycodone    5 mG/acetaminophen 325 mG 1 Tablet(s) Oral every 6 hours PRN Moderate Pain (4 - 6)      Allergies    Aloe Lotion (Rash)    Intolerances        LABS:                        12.6   5.55  )-----------( 166      ( 05 Jun 2024 08:47 )             36.7     06-05    143  |  111<H>  |  25<H>  ----------------------------<  87  3.5   |  25  |  1.24    Ca    9.6      05 Jun 2024 08:47  Phos  3.2     06-05  Mg     1.9     06-05    TPro  7.2  /  Alb  3.5  /  TBili  1.6<H>  /  DBili  x   /  AST  37  /  ALT  95<H>  /  AlkPhos  82  06-04      Urinalysis Basic - ( 05 Jun 2024 08:47 )    Color: x / Appearance: x / SG: x / pH: x  Gluc: 87 mg/dL / Ketone: x  / Bili: x / Urobili: x   Blood: x / Protein: x / Nitrite: x   Leuk Esterase: x / RBC: x / WBC x   Sq Epi: x / Non Sq Epi: x / Bacteria: x        RADIOLOGY & ADDITIONAL TESTS:  Studies reviewed.

## 2024-06-05 NOTE — BH CONSULTATION LIAISON ASSESSMENT NOTE - LEVEL OF CONSCIOUSNESS
Spoke to pt, gave her # for central scheduling. She has not made an appt w/[facility] yet. Pt agreed to call them today to schedule this appt.     Alert

## 2024-06-05 NOTE — BH CONSULTATION LIAISON ASSESSMENT NOTE - SUMMARY
Patient a 69 y/o male, , domiciled with wife,  no past psychiatric hx, no prior SI/SA, no aggression/agitation, hx of Alcohol abuse, denied other drug abuse, medically has HTN; HLD; RLS;  DVT on Xarelto was BIB/EMS due to severe  Right leg pain from sciatica with Alcohol withdrawal.    Patient in bed, AAOX3, endorses that he was brought in for Rt. LL Pain with alcohol withdrawal. He endorses that he drinks alcohol 2-3 times a week and drinks on weekends. He added that he likes Vodka/Rum, and drinks equally. He further added that past Memorial day he drank too much mostly 2 1/2 pints vodka with beer and thus leading to Hospitalization. He had one rehab at [place] in 1982, was clean for 28 years and later relapsed due to family stress with 3 children and other work related issues. He had 3 jobs and preferably worked as  for 36 years. He denied other drug abuse hx, and has no withdrawal symptoms e.g. Pass out/black out behavior, including DT and Seizures. He added that he would be able to control self from drinking too  much. Patient knows his meds, and has poor sleep, e.g. waking up every hour and later on he falls sleep or to go to sleep. He has been sleeping very well here probably from Seroquel effect. He feels that he is doing well now. He was advised to decrease alcohol consumption.    Plan: To continue Seroquel 50 mg HS, seems helping with sleep, intact orientation and other social/necessary issues.           No other Psych intervention needed.

## 2024-06-06 LAB
ALBUMIN SERPL ELPH-MCNC: 3.5 G/DL — SIGNIFICANT CHANGE UP (ref 3.3–5)
ALP SERPL-CCNC: 74 U/L — SIGNIFICANT CHANGE UP (ref 40–120)
ALT FLD-CCNC: 71 U/L — SIGNIFICANT CHANGE UP (ref 12–78)
ANION GAP SERPL CALC-SCNC: 8 MMOL/L — SIGNIFICANT CHANGE UP (ref 5–17)
AST SERPL-CCNC: 39 U/L — HIGH (ref 15–37)
BILIRUB SERPL-MCNC: 1.9 MG/DL — HIGH (ref 0.2–1.2)
BUN SERPL-MCNC: 22 MG/DL — SIGNIFICANT CHANGE UP (ref 7–23)
CALCIUM SERPL-MCNC: 9.8 MG/DL — SIGNIFICANT CHANGE UP (ref 8.5–10.1)
CHLORIDE SERPL-SCNC: 110 MMOL/L — HIGH (ref 96–108)
CO2 SERPL-SCNC: 24 MMOL/L — SIGNIFICANT CHANGE UP (ref 22–31)
CREAT SERPL-MCNC: 1.23 MG/DL — SIGNIFICANT CHANGE UP (ref 0.5–1.3)
EGFR: 64 ML/MIN/1.73M2 — SIGNIFICANT CHANGE UP
GLUCOSE SERPL-MCNC: 89 MG/DL — SIGNIFICANT CHANGE UP (ref 70–99)
HCT VFR BLD CALC: 36.8 % — LOW (ref 39–50)
HGB BLD-MCNC: 12.6 G/DL — LOW (ref 13–17)
MAGNESIUM SERPL-MCNC: 2 MG/DL — SIGNIFICANT CHANGE UP (ref 1.6–2.6)
MCHC RBC-ENTMCNC: 34.1 PG — HIGH (ref 27–34)
MCHC RBC-ENTMCNC: 34.2 GM/DL — SIGNIFICANT CHANGE UP (ref 32–36)
MCV RBC AUTO: 99.5 FL — SIGNIFICANT CHANGE UP (ref 80–100)
PHOSPHATE SERPL-MCNC: 3.2 MG/DL — SIGNIFICANT CHANGE UP (ref 2.5–4.5)
PLATELET # BLD AUTO: 182 K/UL — SIGNIFICANT CHANGE UP (ref 150–400)
POTASSIUM SERPL-MCNC: 3.5 MMOL/L — SIGNIFICANT CHANGE UP (ref 3.5–5.3)
POTASSIUM SERPL-SCNC: 3.5 MMOL/L — SIGNIFICANT CHANGE UP (ref 3.5–5.3)
PROT SERPL-MCNC: 7 GM/DL — SIGNIFICANT CHANGE UP (ref 6–8.3)
RBC # BLD: 3.7 M/UL — LOW (ref 4.2–5.8)
RBC # FLD: 14.5 % — SIGNIFICANT CHANGE UP (ref 10.3–14.5)
SODIUM SERPL-SCNC: 142 MMOL/L — SIGNIFICANT CHANGE UP (ref 135–145)
WBC # BLD: 5.42 K/UL — SIGNIFICANT CHANGE UP (ref 3.8–10.5)
WBC # FLD AUTO: 5.42 K/UL — SIGNIFICANT CHANGE UP (ref 3.8–10.5)

## 2024-06-06 PROCEDURE — 99232 SBSQ HOSP IP/OBS MODERATE 35: CPT

## 2024-06-06 RX ORDER — POTASSIUM CHLORIDE 20 MEQ
40 PACKET (EA) ORAL ONCE
Refills: 0 | Status: COMPLETED | OUTPATIENT
Start: 2024-06-06 | End: 2024-06-06

## 2024-06-06 RX ORDER — LABETALOL HCL 100 MG
200 TABLET ORAL THREE TIMES A DAY
Refills: 0 | Status: DISCONTINUED | OUTPATIENT
Start: 2024-06-06 | End: 2024-06-10

## 2024-06-06 RX ADMIN — Medication 5 MILLIGRAM(S): at 13:13

## 2024-06-06 RX ADMIN — LOSARTAN POTASSIUM 50 MILLIGRAM(S): 100 TABLET, FILM COATED ORAL at 10:45

## 2024-06-06 RX ADMIN — Medication 105 MILLIGRAM(S): at 06:21

## 2024-06-06 RX ADMIN — QUETIAPINE FUMARATE 50 MILLIGRAM(S): 200 TABLET, FILM COATED ORAL at 22:38

## 2024-06-06 RX ADMIN — Medication 1 TABLET(S): at 10:45

## 2024-06-06 RX ADMIN — Medication 40 MILLIEQUIVALENT(S): at 10:48

## 2024-06-06 RX ADMIN — Medication 1 MILLIGRAM(S): at 10:45

## 2024-06-06 RX ADMIN — Medication 200 MILLIGRAM(S): at 13:09

## 2024-06-06 RX ADMIN — LIDOCAINE 1 PATCH: 4 CREAM TOPICAL at 13:10

## 2024-06-06 RX ADMIN — NYSTATIN CREAM 1 APPLICATION(S): 100000 CREAM TOPICAL at 06:21

## 2024-06-06 RX ADMIN — Medication 2 MILLIGRAM(S): at 06:20

## 2024-06-06 RX ADMIN — HALOPERIDOL DECANOATE 5 MILLIGRAM(S): 100 INJECTION INTRAMUSCULAR at 02:45

## 2024-06-06 RX ADMIN — CHLORHEXIDINE GLUCONATE 1 APPLICATION(S): 213 SOLUTION TOPICAL at 06:21

## 2024-06-06 RX ADMIN — PANTOPRAZOLE SODIUM 40 MILLIGRAM(S): 20 TABLET, DELAYED RELEASE ORAL at 10:45

## 2024-06-06 RX ADMIN — RIVAROXABAN 20 MILLIGRAM(S): KIT at 13:12

## 2024-06-06 RX ADMIN — NYSTATIN CREAM 1 APPLICATION(S): 100000 CREAM TOPICAL at 17:40

## 2024-06-06 RX ADMIN — Medication 2 MILLIGRAM(S): at 17:13

## 2024-06-06 RX ADMIN — Medication 10 MILLIGRAM(S): at 10:46

## 2024-06-06 RX ADMIN — Medication 10 MILLIGRAM(S): at 06:58

## 2024-06-06 RX ADMIN — HALOPERIDOL DECANOATE 5 MILLIGRAM(S): 100 INJECTION INTRAMUSCULAR at 19:42

## 2024-06-06 RX ADMIN — Medication 200 MILLIGRAM(S): at 22:38

## 2024-06-06 NOTE — PROGRESS NOTE ADULT - ASSESSMENT
Chronic HFrEF, LVEF 35-40%- overall on exam appears close to euvolemia  IV diuretics to be used only as needed.  Cr stable for now  NPO now    CKD- follows with Dr Herron.    Creatinine level normal  Close monitoring is recommended.    Alcohol abuse- on [facility] protocol.    Leukemia- follows with Dr Sanchez  they have been following pt.     d/w Dr river    d/w RN    Other medical issues- Management per primary team.   Thank you for allowing me to participate in the care of this patient. Please feel free to contact me with any questions.

## 2024-06-06 NOTE — PROGRESS NOTE ADULT - ASSESSMENT
68m w/ HTN, RA, Leukemia in remission since January 2023, CKD I ,  DVT and PE (on xaralto) admitted in the hospital with ETOH withdrawal and sciatica as per patient. required precedex gtt.    Alcohol intoxication   Alcohol dependence resulting in alcohol withdrawal   -no history of complicated withdrawal   -c/w [facility] protocol   -c/w folic acid and thiamine   -substance abuse counseling  -quetipane bedtime    acute metabolic encephalpathy due to etoh w/d  -s/s when more awake    transaminitis due to etoh  -resolving    dvt/pe-xarelto    RA-hydroxychloroquine , predniSONE       htn-losartan     DVT proph:   DOAC  Code Status: FULL code  dispo: discharge pending improvement in clinical status    Outpatient follow-up:   with PMD

## 2024-06-06 NOTE — PROGRESS NOTE ADULT - SUBJECTIVE AND OBJECTIVE BOX
Patient is a 68y old  Male who presents with a chief complaint of ETOH withdrawal  /  posterior right leg into the knee.       HPI:  Patient is a 67 y/o male with PMHx of HTN, RA, Leukemia in remission since January 2023, CKD I ,  DVT and PE (on xaralto) admitted in the hospital with ETOH withdrawal and sciatica as per patient. Patient is limited historian at this time as patient appears to have a ETOH withdrawal signs of sweating, shakes.  He was admitted to CCU for alcohol withdrawl.  Cardiology consulted for CHF evaluation   6/2/24:   Mr Hatfield well known to me outpt.  He is asleep on meds  6/4/24: Mr Hatfield Was seen and examined by me this morning.    He was agitated last night.     he is currently on IV drip with 2 medications for DT   yesterday he received a dose of Lasix.    6/6- Mr Hatfield was seen and examined by me this am.  He was agiated and combative last night.  He is lying flat in bed this am. Sedated.  D/w RN at length    PAST MEDICAL & SURGICAL HISTORY:  ETOH abuse      Hyperlipidemia LDL goal <100      Restless leg syndrome      Rheumatoid arteritis      Leukemia      No significant past surgical history          MEDICATIONS  (STANDING):  albuterol/ipratropium for Nebulization 3 milliLiter(s) Nebulizer every 6 hours  albuterol/ipratropium for Nebulization 3 milliLiter(s) Nebulizer every 6 hours  chlorhexidine 4% Liquid 1 Application(s) Topical <User Schedule>  dexMEDEtomidine Infusion 1 MICROgram(s)/kG/Hr (22.6 mL/Hr) IV Continuous <Continuous>  folic acid 1 milliGRAM(s) Oral daily  hydroxychloroquine 200 milliGRAM(s) Oral two times a day  lidocaine   4% Patch 1 Patch Transdermal daily  LORazepam   Injectable 2 milliGRAM(s) IV Push every 6 hours  losartan 50 milliGRAM(s) Oral daily  multivitamin 1 Tablet(s) Oral daily  nystatin Powder 1 Application(s) Topical two times a day  pantoprazole    Tablet 40 milliGRAM(s) Oral before breakfast  potassium chloride    Tablet ER 40 milliEquivalent(s) Oral every 4 hours  predniSONE   Tablet 10 milliGRAM(s) Oral daily  QUEtiapine 25 milliGRAM(s) Oral once  rivaroxaban 20 milliGRAM(s) Oral daily  thiamine 100 milliGRAM(s) Oral daily    MEDICATIONS  (PRN):  acetaminophen     Tablet .. 650 milliGRAM(s) Oral every 6 hours PRN Temp greater or equal to 38C (100.4F)  aluminum hydroxide/magnesium hydroxide/simethicone Suspension 30 milliLiter(s) Oral every 4 hours PRN Dyspepsia  ibuprofen  Tablet. 400 milliGRAM(s) Oral every 6 hours PRN Mild Pain (1 - 3)  LORazepam   Injectable 2 milliGRAM(s) IV Push every 8 hours PRN Agitation  melatonin 3 milliGRAM(s) Oral at bedtime PRN Insomnia  morphine  - Injectable 2 milliGRAM(s) IV Push every 6 hours PRN Severe Pain (7 - 10)  ondansetron Injectable 4 milliGRAM(s) IV Push once PRN Nausea and/or Vomiting  ondansetron Injectable 4 milliGRAM(s) IV Push every 8 hours PRN Nausea and/or Vomiting  oxycodone    5 mG/acetaminophen 325 mG 1 Tablet(s) Oral every 6 hours PRN Moderate Pain (4 - 6)      FAMILY HISTORY:  FHx: rheumatoid arthritis (Mother)        SOCIAL HISTORY: alcohol abuse    REVIEW OF SYSTEMS: obtained from chart  CONSTITUTIONAL:    No fatigue, malaise, lethargy.  No fever or chills.  RESPIRATORY:  No cough.  No wheeze.  No hemoptysis.    CARDIOVASCULAR:  No chest pains.  No palpitations. No shortness of breath, No orthopnea or PND.  GASTROINTESTINAL:  No abdominal pain.  No nausea or vomiting.    GENITOURINARY:    No hematuria.    MUSCULOSKELETAL:  No musculoskeletal pain.  No joint swelling.  No arthritis.  NEUROLOGICAL:  tremors  PSYCHIATRIC:  No confusion  SKIN:  No rashes.        ICU Vital Signs Last 24 Hrs  T(C): 36.2 (06 Jun 2024 07:20), Max: 37.2 (05 Jun 2024 17:00)  T(F): 97.2 (06 Jun 2024 07:20), Max: 99 (05 Jun 2024 17:00)  HR: 90 (05 Jun 2024 22:10) (88 - 109)  BP: 178/98 (06 Jun 2024 06:30) (158/98 - 188/115)  BP(mean): 122 (06 Jun 2024 06:30) (113 - 137)  ABP: --  ABP(mean): --  RR: --  SpO2: 88% (05 Jun 2024 11:00) (88% - 88%)        PHYSICAL EXAM-    Constitutional:  no acute distress , sedated on meds    Head: Head is normocephalic and atraumatic.      Neck:  No JVD.     Cardiovascular: Regular rate and rhythm without S3, S4. No murmurs or rubs are appreciated.      Respiratory: Breath sounds are normal. No rales. No wheezing.    Abdomen: Soft, nontender, nondistended with positive bowel sounds.      Extremity: No tenderness. trace b/l pedal   pitting edema     Neurologic: The patient is alert and oriented.      Skin: No rash, no obvious lesions noted.      Psychiatric: The patient appears to be emotionally stable.      INTERPRETATION OF TELEMETRY: now off tele. pulling out the tele    ECG: Sinus rythm ,  no ST T changes.     I&O's Detail    01 Jun 2024 07:01  -  02 Jun 2024 07:00  --------------------------------------------------------  IN:    Dexmedetomidine: 331 mL    IV PiggyBack: 100 mL    Oral Fluid: 1400 mL  Total IN: 1831 mL    OUT:    Indwelling Catheter - Urethral (mL): 1925 mL  Total OUT: 1925 mL    Total NET: -94 mL          LABS:                                   12.6   5.42  )-----------( 182      ( 06 Jun 2024 05:33 )             36.8     06-06    142  |  110<H>  |  22  ----------------------------<  89  3.5   |  24  |  1.23    Ca    9.8      06 Jun 2024 05:33  Phos  3.2     06-06  Mg     2.0     06-06    TPro  7.0  /  Alb  3.5  /  TBili  1.9<H>  /  DBili  x   /  AST  39<H>  /  ALT  71  /  AlkPhos  74  06-06        LIVER FUNCTIONS - ( 06 Jun 2024 05:33 )  Alb: 3.5 g/dL / Pro: 7.0 gm/dL / ALK PHOS: 74 U/L / ALT: 71 U/L / AST: 39 U/L / GGT: x                           LIVER FUNCTIONS - ( 04 Jun 2024 06:08 )  Alb: 3.5 g/dL / Pro: 7.2 gm/dL / ALK PHOS: 82 U/L / ALT: 95 U/L / AST: 37 U/L / GGT: x           Urinalysis Basic - ( 02 Jun 2024 05:40 )    Color: x / Appearance: x / SG: x / pH: x  Gluc: 110 mg/dL / Ketone: x  / Bili: x / Urobili: x   Blood: x / Protein: x / Nitrite: x   Leuk Esterase: x / RBC: x / WBC x   Sq Epi: x / Non Sq Epi: x / Bacteria: x      I&O's Summary    01 Jun 2024 07:01  -  02 Jun 2024 07:00  --------------------------------------------------------  IN: 1831 mL / OUT: 1925 mL / NET: -94 mL      BNP  RADIOLOGY & ADDITIONAL STUDIES:  < from: TTE W or WO Ultrasound Enhancing Agent (06.01.24 @ 08:49) >      1. Left ventricular cavity is normal in size. Left ventricular wall thickness is mildly increased. Left ventricular systolic function is moderately to severely decreased with an ejection fraction visually estimated at 35 to 40 %. Global left ventricular hypokinesis.   2. Moderate mitral regurgitation.   3. Mild to moderate tricuspid regurgitation.   4. Mild aortic regurgitation.   5. Mild left ventricular hypertrophy.   6. Moderate aortic stenosis.   7. Moderate pulmonary hypertension.   8. Technically difficult image quality.    ________________________________________________________________________________________  FINDINGS:     Left Ventricle:  The left ventricular cavity is normal in size. Left ventricular wall thickness is mildly increased. Left ventricular systolic function is moderately to severely decreased with an ejection fraction visually estimated at 35 to 40%. There is global left ventricular hypokinesis. There is normal left ventricular diastolic function. Mild left ventricular hypertrophy.     Right Ventricle:  The right ventricular cavity is normal in size and normal systolic function. Tricuspid annular plane systolic excursion (TAPSE) is 1.8 cm (normal >=1.7 cm).    Left Atrium:  The left atrium is mildly dilated with an indexed volume of 39 ml/m².     Right Atrium:  The right atrium is normal in size.     Interatrial Septum:  The interatrial septum appears intact.     Aortic Valve:  There is moderate calcification of the aortic valve leaflets. There is restriction to the opening of the aortic valve leaflets. There is moderate aortic stenosis. There is mild aortic regurgitation.     Mitral Valve:  Structurally normal mitral valve with normal leaflet excursion. There is mitral valve thickening of the anterior and posterior leaflets. There is moderate mitral regurgitation.     Tricuspid Valve:  Structurally normal tricuspid valve with normal leaflet excursion. There is mild to moderate tricuspid regurgitation. Estimated pulmonary artery systolic pressure is 47 mmHg, consistent with moderate pulmonary hypertension.     Pulmonic Valve:  Structurally normal pulmonic valve with normal leaflet excursion.     Aorta:  The aortic root at the sinuses of Valsalva is normal in size, measuring 2.70 cm (indexed 1.32 cm/m²). The ascending aorta diameter is normal in size, measuring 3.80 cm (indexed 1.86 cm/m²).     Pericardium:  No pericardial effusion seen.     Systemic Veins:  The inferior vena cava is dilated (dilated >2.1cm) with abnormal inspiratory collapse (abnormal <50%) consistent with elevated right atrial pressure (~15, range 10-20mmHg).  ____________________________________________________________________  QUANTITATIVE DATA:    < end of copied text >

## 2024-06-06 NOTE — PROGRESS NOTE ADULT - SUBJECTIVE AND OBJECTIVE BOX
no overnight events. didnt wake up for me    Physical Exam  T(C): 36.4 (06-06-24 @ 15:50), Max: 37.2 (06-05-24 @ 17:00)  HR: 86 (06-06-24 @ 15:02) (86 - 90)  BP: 158/95 (06-06-24 @ 15:02) (142/113 - 178/98)  RR: --  SpO2: --  General Appearance nad sleeping, snoring  EXTREMITIES no clubbing, no cyanosis, no edema.   Skin normal skin, limited exam.     Pertinent Labs/Imaging:  hgb 12.6

## 2024-06-07 LAB
ANION GAP SERPL CALC-SCNC: 5 MMOL/L — SIGNIFICANT CHANGE UP (ref 5–17)
BUN SERPL-MCNC: 22 MG/DL — SIGNIFICANT CHANGE UP (ref 7–23)
CALCIUM SERPL-MCNC: 9.5 MG/DL — SIGNIFICANT CHANGE UP (ref 8.5–10.1)
CHLORIDE SERPL-SCNC: 113 MMOL/L — HIGH (ref 96–108)
CO2 SERPL-SCNC: 26 MMOL/L — SIGNIFICANT CHANGE UP (ref 22–31)
CREAT SERPL-MCNC: 1.31 MG/DL — HIGH (ref 0.5–1.3)
EGFR: 59 ML/MIN/1.73M2 — LOW
GLUCOSE SERPL-MCNC: 95 MG/DL — SIGNIFICANT CHANGE UP (ref 70–99)
HCT VFR BLD CALC: 36.5 % — LOW (ref 39–50)
HGB BLD-MCNC: 12.4 G/DL — LOW (ref 13–17)
MCHC RBC-ENTMCNC: 33.9 PG — SIGNIFICANT CHANGE UP (ref 27–34)
MCHC RBC-ENTMCNC: 34 GM/DL — SIGNIFICANT CHANGE UP (ref 32–36)
MCV RBC AUTO: 99.7 FL — SIGNIFICANT CHANGE UP (ref 80–100)
PLATELET # BLD AUTO: 188 K/UL — SIGNIFICANT CHANGE UP (ref 150–400)
POTASSIUM SERPL-MCNC: 3.8 MMOL/L — SIGNIFICANT CHANGE UP (ref 3.5–5.3)
POTASSIUM SERPL-SCNC: 3.8 MMOL/L — SIGNIFICANT CHANGE UP (ref 3.5–5.3)
RBC # BLD: 3.66 M/UL — LOW (ref 4.2–5.8)
RBC # FLD: 14.6 % — HIGH (ref 10.3–14.5)
SODIUM SERPL-SCNC: 144 MMOL/L — SIGNIFICANT CHANGE UP (ref 135–145)
WBC # BLD: 4.89 K/UL — SIGNIFICANT CHANGE UP (ref 3.8–10.5)
WBC # FLD AUTO: 4.89 K/UL — SIGNIFICANT CHANGE UP (ref 3.8–10.5)

## 2024-06-07 PROCEDURE — 99232 SBSQ HOSP IP/OBS MODERATE 35: CPT

## 2024-06-07 RX ADMIN — Medication 2 MILLIGRAM(S): at 05:52

## 2024-06-07 RX ADMIN — PANTOPRAZOLE SODIUM 40 MILLIGRAM(S): 20 TABLET, DELAYED RELEASE ORAL at 10:36

## 2024-06-07 RX ADMIN — RIVAROXABAN 20 MILLIGRAM(S): KIT at 12:41

## 2024-06-07 RX ADMIN — LIDOCAINE 1 PATCH: 4 CREAM TOPICAL at 10:35

## 2024-06-07 RX ADMIN — NYSTATIN CREAM 1 APPLICATION(S): 100000 CREAM TOPICAL at 17:53

## 2024-06-07 RX ADMIN — QUETIAPINE FUMARATE 50 MILLIGRAM(S): 200 TABLET, FILM COATED ORAL at 23:16

## 2024-06-07 RX ADMIN — Medication 100 MILLIGRAM(S): at 10:36

## 2024-06-07 RX ADMIN — Medication 200 MILLIGRAM(S): at 23:16

## 2024-06-07 RX ADMIN — CHLORHEXIDINE GLUCONATE 1 APPLICATION(S): 213 SOLUTION TOPICAL at 05:58

## 2024-06-07 RX ADMIN — Medication 1 MILLIGRAM(S): at 10:36

## 2024-06-07 RX ADMIN — LOSARTAN POTASSIUM 50 MILLIGRAM(S): 100 TABLET, FILM COATED ORAL at 10:36

## 2024-06-07 RX ADMIN — Medication 1 TABLET(S): at 10:36

## 2024-06-07 RX ADMIN — Medication 200 MILLIGRAM(S): at 10:36

## 2024-06-07 RX ADMIN — NYSTATIN CREAM 1 APPLICATION(S): 100000 CREAM TOPICAL at 05:52

## 2024-06-07 RX ADMIN — Medication 10 MILLIGRAM(S): at 10:36

## 2024-06-07 NOTE — PROGRESS NOTE ADULT - ASSESSMENT
69 y/o male who follows with me at [facility] for h/o LGL s/p cyclophosphamide and prednisone completed 1/31/23 in remission, DVT and PE now on xarelto, HTN, RA prev on MTX now on HCQ and prednisone, CKD I , admitted in the hospital with ETOH withdrawal and sciatica as per patient.     # etoh withdrawal  - treatment as per primary team- currently on ativan, now off precedex   - advised to refrain from drinking   - CT a/p with cirrhosis   - Today, WBC 4.89, Hb 12.4, plt 188k   - pt improving clinically     # T cell Large Granular Lymphocytic leukemia  ­ 5/27/22 Bone marrow biopsy (@ [facility]) showed aberrant T cell pop (65% of cells) positive for TCR alpha/beta c/w T cell  lymphoproliferative d/o of NK like T cells, CD8/CD57 positive with clonality by TCRB1 flow which can be seen in large granular lymphocyte leukemia. IgK gene status negative, TCR gamma positive  ­ 5/26/22 CT a/p with splenomegaly up to 15 cm, cirrhosis seen and hepatic steatosis, no masses as well as sigmoid diverticulosis  ­ completed cyclophosphamide 100 mg qd and prednisone 7/8/22- 1/31/23  - counts outpt have been stable  - Today, WBC 4.89, Hb 12.4, plt 188 (plt increased likely 2/2 steroid effect as well)    # RLE DVT/PE  ­ 9/15/22 CTA chest showed small right lower lobe pulmonary embolus­ completed 3 mo of xarelto   ­ 6/14/23 US LE doppler b/l­ showing acute DVT in right posterior tibial vein, chronic DVT in right common femoral, femoral, popliteal and gastrocnemius veins, no DVT in LLE  ­ 6/14/23 CTA chest with RLL postrior subsegmental PE, interlobular septal thickening suggestive of interstitial pulmonary edema.  ­ continue with xarelto 20 mg qd  ­ pt will need lifelong a/c as unprovoked     # RA  ­ follows with Dr. Frank­ now off MTX, on HCQ and course of prednisone     will f/u with me outpatient when withdrawal resolves

## 2024-06-07 NOTE — PROGRESS NOTE ADULT - SUBJECTIVE AND OBJECTIVE BOX
no overnight events. doing well overall.    Physical Exam  T(C): 36.4 (06-07-24 @ 10:00), Max: 36.4 (06-06-24 @ 15:50)  HR: 78 (06-07-24 @ 13:00) (75 - 90)  BP: 146/68 (06-07-24 @ 13:00) (115/81 - 173/92)  RR: 17 (06-07-24 @ 13:00) (16 - 18)  SpO2: 95% (06-07-24 @ 08:00) (95% - 95%)  General Appearance lethargic.   EYES no scleral icterus.   CHEST clear to auscultation bilaterally, no wheezes/rhonchi/rales.   HEART RRR, normal S1 S2, no murmurs, clicks or rubs.   ABDOMEN soft NT/ND, BS present.   EXTREMITIES no clubbing, no cyanosis, no edema.   Skin normal skin, limited exam.   Psych normal affect.     Pertinent Labs/Imaging:  hgb 12.4  creat 1.31

## 2024-06-07 NOTE — PROGRESS NOTE ADULT - SUBJECTIVE AND OBJECTIVE BOX
INTERVAL HPI/OVERNIGHT EVENTS:  Patient S&E at bedside. No o/n events,   still w back pain on morphine, percocet,   mild tachycardia, bp improved      PAST MEDICAL & SURGICAL HISTORY:  ETOH abuse      Hyperlipidemia LDL goal <100      Restless leg syndrome      Rheumatoid arteritis      Leukemia      No significant past surgical history          FAMILY HISTORY:  FHx: rheumatoid arthritis (Mother)        VITAL SIGNS:  T(F): 97.2 (06-07-24 @ 05:00)  HR: 75 (06-07-24 @ 05:55)  BP: 147/95 (06-07-24 @ 05:55)  RR: --  SpO2: 95% (06-07-24 @ 05:55)  Wt(kg): --    PHYSICAL EXAM:    Constitutional: NAD  Eyes: EOMI,  Respiratory: CTA b/l,   Cardiovascular: RRR,   Gastrointestinal: soft, NTND,   Extremities: back pain   Neurological: AAOx3      MEDICATIONS  (STANDING):  chlorhexidine 4% Liquid 1 Application(s) Topical <User Schedule>  folic acid 1 milliGRAM(s) Oral daily  haloperidol    Concentrate 5 milliGRAM(s) Oral once  haloperidol    Concentrate 5 milliGRAM(s) Oral once  hydroxychloroquine 200 milliGRAM(s) Oral two times a day  lidocaine   4% Patch 1 Patch Transdermal daily  LORazepam   Injectable 1 milliGRAM(s) IV Push every 12 hours  LORazepam   Injectable   IV Push   losartan 50 milliGRAM(s) Oral daily  multivitamin 1 Tablet(s) Oral daily  nystatin Powder 1 Application(s) Topical two times a day  pantoprazole    Tablet 40 milliGRAM(s) Oral before breakfast  predniSONE   Tablet 10 milliGRAM(s) Oral daily  QUEtiapine 50 milliGRAM(s) Oral at bedtime  rivaroxaban 20 milliGRAM(s) Oral daily  thiamine 100 milliGRAM(s) Oral daily    MEDICATIONS  (PRN):  acetaminophen     Tablet .. 650 milliGRAM(s) Oral every 6 hours PRN Temp greater or equal to 38C (100.4F)  aluminum hydroxide/magnesium hydroxide/simethicone Suspension 30 milliLiter(s) Oral every 4 hours PRN Dyspepsia  haloperidol    Injectable 5 milliGRAM(s) IntraMuscular every 6 hours PRN Agitation  labetalol 200 milliGRAM(s) Oral three times a day PRN sbp >150. hold for hr less than 60  LORazepam   Injectable 2 milliGRAM(s) IV Push every 8 hours PRN Agitation  melatonin 3 milliGRAM(s) Oral at bedtime PRN Insomnia  morphine  - Injectable 2 milliGRAM(s) IV Push every 6 hours PRN Severe Pain (7 - 10)  oxycodone    5 mG/acetaminophen 325 mG 1 Tablet(s) Oral every 6 hours PRN Moderate Pain (4 - 6)      Allergies    Aloe Lotion (Rash)    Intolerances        LABS:                        12.4   4.89  )-----------( 188      ( 07 Jun 2024 06:00 )             36.5     06-07    144  |  113<H>  |  22  ----------------------------<  95  3.8   |  26  |  1.31<H>    Ca    9.5      07 Jun 2024 06:00  Phos  3.2     06-06  Mg     2.0     06-06    TPro  7.0  /  Alb  3.5  /  TBili  1.9<H>  /  DBili  x   /  AST  39<H>  /  ALT  71  /  AlkPhos  74  06-06      Urinalysis Basic - ( 07 Jun 2024 06:00 )    Color: x / Appearance: x / SG: x / pH: x  Gluc: 95 mg/dL / Ketone: x  / Bili: x / Urobili: x   Blood: x / Protein: x / Nitrite: x   Leuk Esterase: x / RBC: x / WBC x   Sq Epi: x / Non Sq Epi: x / Bacteria: x        RADIOLOGY & ADDITIONAL TESTS:  Studies reviewed.

## 2024-06-08 LAB
ANION GAP SERPL CALC-SCNC: 5 MMOL/L — SIGNIFICANT CHANGE UP (ref 5–17)
BUN SERPL-MCNC: 23 MG/DL — SIGNIFICANT CHANGE UP (ref 7–23)
CALCIUM SERPL-MCNC: 9.4 MG/DL — SIGNIFICANT CHANGE UP (ref 8.5–10.1)
CHLORIDE SERPL-SCNC: 112 MMOL/L — HIGH (ref 96–108)
CO2 SERPL-SCNC: 26 MMOL/L — SIGNIFICANT CHANGE UP (ref 22–31)
CREAT SERPL-MCNC: 1.33 MG/DL — HIGH (ref 0.5–1.3)
EGFR: 58 ML/MIN/1.73M2 — LOW
GLUCOSE SERPL-MCNC: 111 MG/DL — HIGH (ref 70–99)
HCT VFR BLD CALC: 36.8 % — LOW (ref 39–50)
HGB BLD-MCNC: 12.4 G/DL — LOW (ref 13–17)
MCHC RBC-ENTMCNC: 33.6 PG — SIGNIFICANT CHANGE UP (ref 27–34)
MCHC RBC-ENTMCNC: 33.7 GM/DL — SIGNIFICANT CHANGE UP (ref 32–36)
MCV RBC AUTO: 99.7 FL — SIGNIFICANT CHANGE UP (ref 80–100)
PLATELET # BLD AUTO: 220 K/UL — SIGNIFICANT CHANGE UP (ref 150–400)
POTASSIUM SERPL-MCNC: 3.7 MMOL/L — SIGNIFICANT CHANGE UP (ref 3.5–5.3)
POTASSIUM SERPL-SCNC: 3.7 MMOL/L — SIGNIFICANT CHANGE UP (ref 3.5–5.3)
RBC # BLD: 3.69 M/UL — LOW (ref 4.2–5.8)
RBC # FLD: 14.6 % — HIGH (ref 10.3–14.5)
SODIUM SERPL-SCNC: 143 MMOL/L — SIGNIFICANT CHANGE UP (ref 135–145)
WBC # BLD: 6.53 K/UL — SIGNIFICANT CHANGE UP (ref 3.8–10.5)
WBC # FLD AUTO: 6.53 K/UL — SIGNIFICANT CHANGE UP (ref 3.8–10.5)

## 2024-06-08 PROCEDURE — 99232 SBSQ HOSP IP/OBS MODERATE 35: CPT

## 2024-06-08 RX ADMIN — NYSTATIN CREAM 1 APPLICATION(S): 100000 CREAM TOPICAL at 06:48

## 2024-06-08 RX ADMIN — NYSTATIN CREAM 1 APPLICATION(S): 100000 CREAM TOPICAL at 18:38

## 2024-06-08 RX ADMIN — Medication 200 MILLIGRAM(S): at 09:18

## 2024-06-08 RX ADMIN — Medication 3 MILLIGRAM(S): at 01:28

## 2024-06-08 RX ADMIN — PANTOPRAZOLE SODIUM 40 MILLIGRAM(S): 20 TABLET, DELAYED RELEASE ORAL at 06:47

## 2024-06-08 RX ADMIN — CHLORHEXIDINE GLUCONATE 1 APPLICATION(S): 213 SOLUTION TOPICAL at 06:47

## 2024-06-08 RX ADMIN — Medication 100 MILLIGRAM(S): at 09:17

## 2024-06-08 RX ADMIN — RIVAROXABAN 20 MILLIGRAM(S): KIT at 12:49

## 2024-06-08 RX ADMIN — LOSARTAN POTASSIUM 50 MILLIGRAM(S): 100 TABLET, FILM COATED ORAL at 09:17

## 2024-06-08 RX ADMIN — Medication 200 MILLIGRAM(S): at 22:02

## 2024-06-08 RX ADMIN — Medication 1 TABLET(S): at 09:17

## 2024-06-08 RX ADMIN — Medication 10 MILLIGRAM(S): at 09:17

## 2024-06-08 RX ADMIN — Medication 1 MILLIGRAM(S): at 09:18

## 2024-06-08 NOTE — PROGRESS NOTE ADULT - SUBJECTIVE AND OBJECTIVE BOX
no overnight events. doing well overall. more awake today.     Physical Exam  T(C): 36.3 (06-08-24 @ 07:40), Max: 36.7 (06-07-24 @ 17:13)  HR: 77 (06-08-24 @ 07:40) (71 - 81)  BP: 137/92 (06-08-24 @ 07:40) (127/82 - 158/97)  RR: 17 (06-08-24 @ 11:36) (16 - 18)  SpO2: 93% (06-08-24 @ 11:36) (93% - 99%)  General Appearance nad, no head trauma.  opened eyes, answered a few questions  EYES no scleral icterus.   EXTREMITIES no clubbing, no cyanosis, no edema.   Skin normal skin, limited exam.   Psych normal affect.     Pertinent Labs/Imaging:  hgb 12.4

## 2024-06-08 NOTE — PROGRESS NOTE ADULT - ASSESSMENT
68m w/ HTN, RA, Leukemia in remission since January 2023, CKD,  DVT and PE (on xaralto) admitted in the hospital with ETOH withdrawal and sciatica as per patient. required precedex gtt.    Alcohol intoxication   Alcohol dependence resulting in alcohol withdrawal   -no history of complicated withdrawal   -c/w CIWA protocol   -c/w folic acid and thiamine   -substance abuse counseling  [facility]    acute metabolic encephalpathy due to etoh w/d-improving today  -s/s when more awake  -reduce sedating medications-stop all standing meds, give prn only.     transaminitis due to etoh  -resolving    dvt/pe-xarelto    RA-hydroxychloroquine , predniSONE       htn-losartan     DVT proph:   DOAC  Code Status: FULL code  dispo: discharge pending improvement in clinical status    Outpatient follow-up:   with PMD   with Dr. Frank

## 2024-06-09 PROCEDURE — 99232 SBSQ HOSP IP/OBS MODERATE 35: CPT

## 2024-06-09 RX ADMIN — LIDOCAINE 1 PATCH: 4 CREAM TOPICAL at 09:04

## 2024-06-09 RX ADMIN — Medication 1 TABLET(S): at 09:02

## 2024-06-09 RX ADMIN — Medication 3 MILLIGRAM(S): at 21:32

## 2024-06-09 RX ADMIN — CHLORHEXIDINE GLUCONATE 1 APPLICATION(S): 213 SOLUTION TOPICAL at 06:05

## 2024-06-09 RX ADMIN — Medication 10 MILLIGRAM(S): at 09:02

## 2024-06-09 RX ADMIN — Medication 200 MILLIGRAM(S): at 09:04

## 2024-06-09 RX ADMIN — Medication 1 MILLIGRAM(S): at 09:03

## 2024-06-09 RX ADMIN — LOSARTAN POTASSIUM 50 MILLIGRAM(S): 100 TABLET, FILM COATED ORAL at 09:03

## 2024-06-09 RX ADMIN — PANTOPRAZOLE SODIUM 40 MILLIGRAM(S): 20 TABLET, DELAYED RELEASE ORAL at 06:05

## 2024-06-09 RX ADMIN — RIVAROXABAN 20 MILLIGRAM(S): KIT at 14:19

## 2024-06-09 RX ADMIN — NYSTATIN CREAM 1 APPLICATION(S): 100000 CREAM TOPICAL at 06:05

## 2024-06-09 RX ADMIN — Medication 200 MILLIGRAM(S): at 21:32

## 2024-06-09 RX ADMIN — Medication 100 MILLIGRAM(S): at 09:02

## 2024-06-09 NOTE — PROGRESS NOTE ADULT - ASSESSMENT
68m w/ HTN, RA, Leukemia in remission since January 2023, CKD,  DVT and PE (on xaralto) admitted in the hospital with ETOH withdrawal and sciatica as per patient. required precedex gtt.    Alcohol intoxication   Alcohol dependence resulting in alcohol withdrawal was on precedex gtt  -s/p CIWA protocol   -c/w folic acid and thiamine   -substance abuse counseling  -quetipane bedtime stopped    acute metabolic encephalpathy due to etoh w/d-improving/stable  -reduce sedating medications-stop all standing meds, give prn only.     transaminitis due to etoh-resolved    dvt/pe-xarelto    RA-hydroxychloroquine , predniSONE       htn-losartan     DVT proph:   DOAC  Code Status: FULL code  dispo: discharge pending improvement in clinical status, will need CM/SW assistance    Outpatient follow-up:   with PMD   with Dr. Frank

## 2024-06-09 NOTE — PROGRESS NOTE ADULT - SUBJECTIVE AND OBJECTIVE BOX
no overnight events. doing well overall.  sleeping, esaily arousable    Physical Exam  T(C): 36.7 (06-09-24 @ 09:10), Max: 36.9 (06-08-24 @ 15:32)  HR: 73 (06-09-24 @ 09:10) (70 - 75)  BP: 131/92 (06-09-24 @ 09:10) (126/92 - 151/99)  RR: 18 (06-09-24 @ 09:10) (17 - 19)  SpO2: 94% (06-09-24 @ 09:10) (91% - 96%)  General Appearance nad, no head trauma. somnolent  EYES no scleral icterus.    ABDOMEN soft NT/ND, BS present.   EXTREMITIES no clubbing, no cyanosis, no edema.   Skin normal skin, limited exam.     Pertinent Labs/Imaging:  none today

## 2024-06-10 ENCOUNTER — TRANSCRIPTION ENCOUNTER (OUTPATIENT)
Age: 69
End: 2024-06-10

## 2024-06-10 VITALS
HEART RATE: 87 BPM | TEMPERATURE: 98 F | SYSTOLIC BLOOD PRESSURE: 125 MMHG | RESPIRATION RATE: 18 BRPM | OXYGEN SATURATION: 95 % | DIASTOLIC BLOOD PRESSURE: 79 MMHG

## 2024-06-10 PROCEDURE — 99239 HOSP IP/OBS DSCHRG MGMT >30: CPT

## 2024-06-10 RX ORDER — METOPROLOL TARTRATE 50 MG
1 TABLET ORAL
Qty: 0 | Refills: 0 | DISCHARGE
Start: 2024-06-10

## 2024-06-10 RX ORDER — FOLIC ACID 0.8 MG
1 TABLET ORAL
Qty: 0 | Refills: 0 | DISCHARGE
Start: 2024-06-10

## 2024-06-10 RX ORDER — THIAMINE MONONITRATE (VIT B1) 100 MG
1 TABLET ORAL
Qty: 0 | Refills: 0 | DISCHARGE
Start: 2024-06-10

## 2024-06-10 RX ORDER — SACUBITRIL AND VALSARTAN 24; 26 MG/1; MG/1
1 TABLET, FILM COATED ORAL
Refills: 0 | Status: DISCONTINUED | OUTPATIENT
Start: 2024-06-10 | End: 2024-06-10

## 2024-06-10 RX ORDER — PANTOPRAZOLE SODIUM 20 MG/1
1 TABLET, DELAYED RELEASE ORAL
Qty: 0 | Refills: 0 | DISCHARGE
Start: 2024-06-10

## 2024-06-10 RX ORDER — LOSARTAN POTASSIUM 100 MG/1
1 TABLET, FILM COATED ORAL
Refills: 0 | DISCHARGE

## 2024-06-10 RX ORDER — METOPROLOL TARTRATE 50 MG
50 TABLET ORAL DAILY
Refills: 0 | Status: DISCONTINUED | OUTPATIENT
Start: 2024-06-10 | End: 2024-06-10

## 2024-06-10 RX ORDER — FUROSEMIDE 40 MG
1 TABLET ORAL
Refills: 0 | DISCHARGE

## 2024-06-10 RX ORDER — SACUBITRIL AND VALSARTAN 24; 26 MG/1; MG/1
1 TABLET, FILM COATED ORAL
Qty: 0 | Refills: 0 | DISCHARGE
Start: 2024-06-10

## 2024-06-10 RX ADMIN — Medication 1 MILLIGRAM(S): at 09:18

## 2024-06-10 RX ADMIN — Medication 1 TABLET(S): at 09:21

## 2024-06-10 RX ADMIN — Medication 10 MILLIGRAM(S): at 09:22

## 2024-06-10 RX ADMIN — PANTOPRAZOLE SODIUM 40 MILLIGRAM(S): 20 TABLET, DELAYED RELEASE ORAL at 06:11

## 2024-06-10 RX ADMIN — LOSARTAN POTASSIUM 50 MILLIGRAM(S): 100 TABLET, FILM COATED ORAL at 09:22

## 2024-06-10 RX ADMIN — Medication 100 MILLIGRAM(S): at 09:22

## 2024-06-10 RX ADMIN — LIDOCAINE 1 PATCH: 4 CREAM TOPICAL at 09:29

## 2024-06-10 RX ADMIN — Medication 200 MILLIGRAM(S): at 09:22

## 2024-06-10 RX ADMIN — CHLORHEXIDINE GLUCONATE 1 APPLICATION(S): 213 SOLUTION TOPICAL at 06:12

## 2024-06-10 RX ADMIN — Medication 50 MILLIGRAM(S): at 13:15

## 2024-06-10 RX ADMIN — NYSTATIN CREAM 1 APPLICATION(S): 100000 CREAM TOPICAL at 06:12

## 2024-06-10 NOTE — PROGRESS NOTE ADULT - SUBJECTIVE AND OBJECTIVE BOX
Patient is a 68y old  Male who presents with a chief complaint of ETOH withdrawal  /  posterior right leg into the knee.       HPI:  Patient is a 69 y/o male with PMHx of HTN, RA, Leukemia in remission since January 2023, CKD I ,  DVT and PE (on xaralto) admitted in the hospital with ETOH withdrawal and sciatica as per patient. Patient is limited historian at this time as patient appears to have a ETOH withdrawal signs of sweating, shakes.  He was admitted to CCU for alcohol withdrawl.  Cardiology consulted for CHF evaluation   6/2/24:   Mr Hatfield well known to me outpt.  He is asleep on meds  6/4/24: Mr Hatfield Was seen and examined by me this morning.    He was agitated last night.     he is currently on IV drip with 2 medications for DT   yesterday he received a dose of Lasix.    6/6- Mr Hatfield was seen and examined by me this am.  He was agiated and combative last night.  He is lying flat in bed this am. Sedated.  D/w RN at [facility]    6/10- Mr Hatfield was seen and examined by me this am.   PAST MEDICAL & SURGICAL HISTORY:  ETOH abuse      Hyperlipidemia LDL goal <100      Restless leg syndrome      Rheumatoid arteritis      Leukemia      No significant past surgical history          MEDICATIONS  (STANDING):  albuterol/ipratropium for Nebulization 3 milliLiter(s) Nebulizer every 6 hours  albuterol/ipratropium for Nebulization 3 milliLiter(s) Nebulizer every 6 hours  chlorhexidine 4% Liquid 1 Application(s) Topical <User Schedule>  dexMEDEtomidine Infusion 1 MICROgram(s)/kG/Hr (22.6 mL/Hr) IV Continuous <Continuous>  folic acid 1 milliGRAM(s) Oral daily  hydroxychloroquine 200 milliGRAM(s) Oral two times a day  lidocaine   4% Patch 1 Patch Transdermal daily  LORazepam   Injectable 2 milliGRAM(s) IV Push every 6 hours  losartan 50 milliGRAM(s) Oral daily  multivitamin 1 Tablet(s) Oral daily  nystatin Powder 1 Application(s) Topical two times a day  pantoprazole    Tablet 40 milliGRAM(s) Oral before breakfast  potassium chloride    Tablet ER 40 milliEquivalent(s) Oral every 4 hours  predniSONE   Tablet 10 milliGRAM(s) Oral daily  QUEtiapine 25 milliGRAM(s) Oral once  rivaroxaban 20 milliGRAM(s) Oral daily  thiamine 100 milliGRAM(s) Oral daily    MEDICATIONS  (PRN):  acetaminophen     Tablet .. 650 milliGRAM(s) Oral every 6 hours PRN Temp greater or equal to 38C (100.4F)  aluminum hydroxide/magnesium hydroxide/simethicone Suspension 30 milliLiter(s) Oral every 4 hours PRN Dyspepsia  ibuprofen  Tablet. 400 milliGRAM(s) Oral every 6 hours PRN Mild Pain (1 - 3)  LORazepam   Injectable 2 milliGRAM(s) IV Push every 8 hours PRN Agitation  melatonin 3 milliGRAM(s) Oral at bedtime PRN Insomnia  morphine  - Injectable 2 milliGRAM(s) IV Push every 6 hours PRN Severe Pain (7 - 10)  ondansetron Injectable 4 milliGRAM(s) IV Push once PRN Nausea and/or Vomiting  ondansetron Injectable 4 milliGRAM(s) IV Push every 8 hours PRN Nausea and/or Vomiting  oxycodone    5 mG/acetaminophen 325 mG 1 Tablet(s) Oral every 6 hours PRN Moderate Pain (4 - 6)      FAMILY HISTORY:  FHx: rheumatoid arthritis (Mother)        SOCIAL HISTORY: alcohol abuse    REVIEW OF SYSTEMS: obtained from chart  CONSTITUTIONAL:    No fatigue, malaise, lethargy.  No fever or chills.  RESPIRATORY:  No cough.  No wheeze.  No hemoptysis.    CARDIOVASCULAR:  No chest pains.  No palpitations. No shortness of breath, No orthopnea or PND.  GASTROINTESTINAL:  No abdominal pain.  No nausea or vomiting.    GENITOURINARY:    No hematuria.    MUSCULOSKELETAL:  No musculoskeletal pain.  No joint swelling.  No arthritis.  NEUROLOGICAL:  tremors  PSYCHIATRIC:  No confusion  SKIN:  No rashes.        ICU Vital Signs Last 24 Hrs  T(C): 36.8 (10 Keith 2024 07:44), Max: 37.1 (09 Jun 2024 21:03)  T(F): 98.2 (10 Keith 2024 07:44), Max: 98.8 (09 Jun 2024 21:03)  HR: 74 (10 Keith 2024 07:44) (72 - 77)  BP: 130/83 (10 Keith 2024 07:44) (105/83 - 159/90)  BP(mean): 92 (09 Jun 2024 21:03) (92 - 92)  ABP: --  ABP(mean): --  RR: 17 (10 Keith 2024 07:44) (17 - 18)  SpO2: 97% (10 Keith 2024 07:44) (92% - 97%)    O2 Parameters below as of 10 Keith 2024 07:44  Patient On (Oxygen Delivery Method): room air              PHYSICAL EXAM-    Constitutional:  no acute distress , sedated on meds    Head: Head is normocephalic and atraumatic.      Neck:  No JVD.     Cardiovascular: Regular rate and rhythm without S3, S4. No murmurs or rubs are appreciated.      Respiratory: Breath sounds are normal. No rales. No wheezing.    Abdomen: Soft, nontender, nondistended with positive bowel sounds.      Extremity: No tenderness. trace b/l pedal   pitting edema     Neurologic: The patient is alert and oriented.      Skin: No rash, no obvious lesions noted.      Psychiatric: The patient appears to be emotionally stable.      INTERPRETATION OF TELEMETRY: now off tele. pulling out the tele    ECG: Sinus rythm ,  no ST T changes.     I&O's Detail    01 Jun 2024 07:01  -  02 Jun 2024 07:00  --------------------------------------------------------  IN:    Dexmedetomidine: 331 mL    IV PiggyBack: 100 mL    Oral Fluid: 1400 mL  Total IN: 1831 mL    OUT:    Indwelling Catheter - Urethral (mL): 1925 mL  Total OUT: 1925 mL    Total NET: -94 mL          LABS:                 LIVER FUNCTIONS - ( 04 Jun 2024 06:08 )  Alb: 3.5 g/dL / Pro: 7.2 gm/dL / ALK PHOS: 82 U/L / ALT: 95 U/L / AST: 37 U/L / GGT: x           Urinalysis Basic - ( 02 Jun 2024 05:40 )    Color: x / Appearance: x / SG: x / pH: x  Gluc: 110 mg/dL / Ketone: x  / Bili: x / Urobili: x   Blood: x / Protein: x / Nitrite: x   Leuk Esterase: x / RBC: x / WBC x   Sq Epi: x / Non Sq Epi: x / Bacteria: x      I&O's Summary    01 Jun 2024 07:01 - 02 Jun 2024 07:00  --------------------------------------------------------  IN: 1831 mL / OUT: 1925 mL / NET: -94 mL      BNP  RADIOLOGY & ADDITIONAL STUDIES:  < from: TTE W or WO Ultrasound Enhancing Agent (06.01.24 @ 08:49) >      1. Left ventricular cavity is normal in size. Left ventricular wall thickness is mildly increased. Left ventricular systolic function is moderately to severely decreased with an ejection fraction visually estimated at 35 to 40 %. Global left ventricular hypokinesis.   2. Moderate mitral regurgitation.   3. Mild to moderate tricuspid regurgitation.   4. Mild aortic regurgitation.   5. Mild left ventricular hypertrophy.   6. Moderate aortic stenosis.   7. Moderate pulmonary hypertension.   8. Technically difficult image quality.    ________________________________________________________________________________________  FINDINGS:     Left Ventricle:  The left ventricular cavity is normal in size. Left ventricular wall thickness is mildly increased. Left ventricular systolic function is moderately to severely decreased with an ejection fraction visually estimated at 35 to 40%. There is global left ventricular hypokinesis. There is normal left ventricular diastolic function. Mild left ventricular hypertrophy.     Right Ventricle:  The right ventricular cavity is normal in size and normal systolic function. Tricuspid annular plane systolic excursion (TAPSE) is 1.8 cm (normal >=1.7 cm).    Left Atrium:  The left atrium is mildly dilated with an indexed volume of 39 ml/m².     Right Atrium:  The right atrium is normal in size.     Interatrial Septum:  The interatrial septum appears intact.     Aortic Valve:  There is moderate calcification of the aortic valve leaflets. There is restriction to the opening of the aortic valve leaflets. There is moderate aortic stenosis. There is mild aortic regurgitation.     Mitral Valve:  Structurally normal mitral valve with normal leaflet excursion. There is mitral valve thickening of the anterior and posterior leaflets. There is moderate mitral regurgitation.     Tricuspid Valve:  Structurally normal tricuspid valve with normal leaflet excursion. There is mild to moderate tricuspid regurgitation. Estimated pulmonary artery systolic pressure is 47 mmHg, consistent with moderate pulmonary hypertension.     Pulmonic Valve:  Structurally normal pulmonic valve with normal leaflet excursion.     Aorta:  The aortic root at the sinuses of Valsalva is normal in size, measuring 2.70 cm (indexed 1.32 cm/m²). The ascending aorta diameter is normal in size, measuring 3.80 cm (indexed 1.86 cm/m²).     Pericardium:  No pericardial effusion seen.     Systemic Veins:  The inferior vena cava is dilated (dilated >2.1cm) with abnormal inspiratory collapse (abnormal <50%) consistent with elevated right atrial pressure (~15, range 10-20mmHg).  ____________________________________________________________________  QUANTITATIVE DATA:    < end of copied text >   Patient is a 68y old  Male who presents with a chief complaint of ETOH withdrawal  /  posterior right leg into the knee.       HPI:  Patient is a 69 y/o male with PMHx of HTN, RA, Leukemia in remission since January 2023, CKD I ,  DVT and PE (on xaralto) admitted in the hospital with ETOH withdrawal and sciatica as per patient. Patient is limited historian at this time as patient appears to have a ETOH withdrawal signs of sweating, shakes.  He was admitted to CCU for alcohol withdrawl.  Cardiology consulted for CHF evaluation   6/2/24:   Mr Hatfield well known to me outpt.  He is asleep on meds  6/4/24: Mr Hatfield Was seen and examined by me this morning.    He was agitated last night.     he is currently on IV drip with 2 medications for DT   yesterday he received a dose of Lasix.    6/6- Mr Hatfield was seen and examined by me this am.  He was agiated and combative last night.  He is lying flat in bed this am. Sedated.  D/w RN at [facility]    6/10- Mr Hatfield was seen and examined by me this am.  He was sitting in the chair   He denies any symptoms.    He states he might have had excessive alcohol intake over the long weekend.    He denies any chest pain or pressure.    He denies any dyspnea on exertion walking to the restroom.      PAST MEDICAL & SURGICAL HISTORY:  ETOH abuse      Hyperlipidemia LDL goal <100      Restless leg syndrome      Rheumatoid arteritis      Leukemia      No significant past surgical history          MEDICATIONS  (STANDING):  albuterol/ipratropium for Nebulization 3 milliLiter(s) Nebulizer every 6 hours  albuterol/ipratropium for Nebulization 3 milliLiter(s) Nebulizer every 6 hours  chlorhexidine 4% Liquid 1 Application(s) Topical <User Schedule>  dexMEDEtomidine Infusion 1 MICROgram(s)/kG/Hr (22.6 mL/Hr) IV Continuous <Continuous>  folic acid 1 milliGRAM(s) Oral daily  hydroxychloroquine 200 milliGRAM(s) Oral two times a day  lidocaine   4% Patch 1 Patch Transdermal daily  LORazepam   Injectable 2 milliGRAM(s) IV Push every 6 hours  losartan 50 milliGRAM(s) Oral daily  multivitamin 1 Tablet(s) Oral daily  nystatin Powder 1 Application(s) Topical two times a day  pantoprazole    Tablet 40 milliGRAM(s) Oral before breakfast  potassium chloride    Tablet ER 40 milliEquivalent(s) Oral every 4 hours  predniSONE   Tablet 10 milliGRAM(s) Oral daily  QUEtiapine 25 milliGRAM(s) Oral once  rivaroxaban 20 milliGRAM(s) Oral daily  thiamine 100 milliGRAM(s) Oral daily    MEDICATIONS  (PRN):  acetaminophen     Tablet .. 650 milliGRAM(s) Oral every 6 hours PRN Temp greater or equal to 38C (100.4F)  aluminum hydroxide/magnesium hydroxide/simethicone Suspension 30 milliLiter(s) Oral every 4 hours PRN Dyspepsia  ibuprofen  Tablet. 400 milliGRAM(s) Oral every 6 hours PRN Mild Pain (1 - 3)  LORazepam   Injectable 2 milliGRAM(s) IV Push every 8 hours PRN Agitation  melatonin 3 milliGRAM(s) Oral at bedtime PRN Insomnia  morphine  - Injectable 2 milliGRAM(s) IV Push every 6 hours PRN Severe Pain (7 - 10)  ondansetron Injectable 4 milliGRAM(s) IV Push once PRN Nausea and/or Vomiting  ondansetron Injectable 4 milliGRAM(s) IV Push every 8 hours PRN Nausea and/or Vomiting  oxycodone    5 mG/acetaminophen 325 mG 1 Tablet(s) Oral every 6 hours PRN Moderate Pain (4 - 6)      FAMILY HISTORY:  FHx: rheumatoid arthritis (Mother)        SOCIAL HISTORY: alcohol abuse    REVIEW OF SYSTEMS:   CONSTITUTIONAL:    No fatigue, malaise, lethargy.  No fever or chills.  RESPIRATORY:  No cough.  No wheeze.  No hemoptysis.    CARDIOVASCULAR:  No chest pains.  No palpitations. No shortness of breath, No orthopnea or PND.  GASTROINTESTINAL:  No abdominal pain.  No nausea or vomiting.    GENITOURINARY:    No hematuria.    MUSCULOSKELETAL:  No musculoskeletal pain.  No joint swelling.  No arthritis.  NEUROLOGICAL:  tremors  PSYCHIATRIC:  No confusion  SKIN:  No rashes.        ICU Vital Signs Last 24 Hrs  T(C): 36.8 (10 Keith 2024 07:44), Max: 37.1 (09 Jun 2024 21:03)  T(F): 98.2 (10 Keith 2024 07:44), Max: 98.8 (09 Jun 2024 21:03)  HR: 74 (10 Keith 2024 07:44) (72 - 77)  BP: 130/83 (10 Keith 2024 07:44) (105/83 - 159/90)  BP(mean): 92 (09 Jun 2024 21:03) (92 - 92)  ABP: --  ABP(mean): --  RR: 17 (10 Keith 2024 07:44) (17 - 18)  SpO2: 97% (10 Keith 2024 07:44) (92% - 97%)    O2 Parameters below as of 10 Keith 2024 07:44  Patient On (Oxygen Delivery Method): room air              PHYSICAL EXAM-    Constitutional:  no acute distress ,     Head: Head is normocephalic and atraumatic.      Neck:  No JVD.     Cardiovascular: Regular rate and rhythm without S3, S4. No murmurs or rubs are appreciated.      Respiratory: Breath sounds are normal. No rales. No wheezing.    Abdomen: Soft, nontender, nondistended with positive bowel sounds.      Extremity: No tenderness. trace b/l pedal   pitting edema     Neurologic: The patient is alert and oriented.      Skin: No rash, no obvious lesions noted.      Psychiatric: The patient appears to be emotionally stable.      INTERPRETATION OF TELEMETRY: Not on    ECG: Sinus rythm ,  no ST T changes.     I&O's Detail    01 Jun 2024 07:01  -  02 Jun 2024 07:00  --------------------------------------------------------  IN:    Dexmedetomidine: 331 mL    IV PiggyBack: 100 mL    Oral Fluid: 1400 mL  Total IN: 1831 mL    OUT:    Indwelling Catheter - Urethral (mL): 1925 mL  Total OUT: 1925 mL    Total NET: -94 mL          LABS:                 LIVER FUNCTIONS - ( 04 Jun 2024 06:08 )  Alb: 3.5 g/dL / Pro: 7.2 gm/dL / ALK PHOS: 82 U/L / ALT: 95 U/L / AST: 37 U/L / GGT: x           Urinalysis Basic - ( 02 Jun 2024 05:40 )    Color: x / Appearance: x / SG: x / pH: x  Gluc: 110 mg/dL / Ketone: x  / Bili: x / Urobili: x   Blood: x / Protein: x / Nitrite: x   Leuk Esterase: x / RBC: x / WBC x   Sq Epi: x / Non Sq Epi: x / Bacteria: x      I&O's Summary    01 Jun 2024 07:01  -  02 Jun 2024 07:00  --------------------------------------------------------  IN: 1831 mL / OUT: 1925 mL / NET: -94 mL      BNP  RADIOLOGY & ADDITIONAL STUDIES:  < from: TTE W or WO Ultrasound Enhancing Agent (06.01.24 @ 08:49) >      1. Left ventricular cavity is normal in size. Left ventricular wall thickness is mildly increased. Left ventricular systolic function is moderately to severely decreased with an ejection fraction visually estimated at 35 to 40 %. Global left ventricular hypokinesis.   2. Moderate mitral regurgitation.   3. Mild to moderate tricuspid regurgitation.   4. Mild aortic regurgitation.   5. Mild left ventricular hypertrophy.   6. Moderate aortic stenosis.   7. Moderate pulmonary hypertension.   8. Technically difficult image quality.    ________________________________________________________________________________________  FINDINGS:     Left Ventricle:  The left ventricular cavity is normal in size. Left ventricular wall thickness is mildly increased. Left ventricular systolic function is moderately to severely decreased with an ejection fraction visually estimated at 35 to 40%. There is global left ventricular hypokinesis. There is normal left ventricular diastolic function. Mild left ventricular hypertrophy.     Right Ventricle:  The right ventricular cavity is normal in size and normal systolic function. Tricuspid annular plane systolic excursion (TAPSE) is 1.8 cm (normal >=1.7 cm).    Left Atrium:  The left atrium is mildly dilated with an indexed volume of 39 ml/m².     Right Atrium:  The right atrium is normal in size.     Interatrial Septum:  The interatrial septum appears intact.     Aortic Valve:  There is moderate calcification of the aortic valve leaflets. There is restriction to the opening of the aortic valve leaflets. There is moderate aortic stenosis. There is mild aortic regurgitation.     Mitral Valve:  Structurally normal mitral valve with normal leaflet excursion. There is mitral valve thickening of the anterior and posterior leaflets. There is moderate mitral regurgitation.     Tricuspid Valve:  Structurally normal tricuspid valve with normal leaflet excursion. There is mild to moderate tricuspid regurgitation. Estimated pulmonary artery systolic pressure is 47 mmHg, consistent with moderate pulmonary hypertension.     Pulmonic Valve:  Structurally normal pulmonic valve with normal leaflet excursion.     Aorta:  The aortic root at the sinuses of Valsalva is normal in size, measuring 2.70 cm (indexed 1.32 cm/m²). The ascending aorta diameter is normal in size, measuring 3.80 cm (indexed 1.86 cm/m²).     Pericardium:  No pericardial effusion seen.     Systemic Veins:  The inferior vena cava is dilated (dilated >2.1cm) with abnormal inspiratory collapse (abnormal <50%) consistent with elevated right atrial pressure (~15, range 10-20mmHg).  ____________________________________________________________________  QUANTITATIVE DATA:    < end of copied text >

## 2024-06-10 NOTE — DISCHARGE NOTE PROVIDER - CARE PROVIDER_API CALL
Boxer, Jonathan A  Internal Medicine  [address] [place] [id number]  Phone: [phone]  Fax: [phone]  Follow Up Time:

## 2024-06-10 NOTE — PROGRESS NOTE ADULT - NUTRITIONAL ASSESSMENT
This patient has been assessed with a concern for Malnutrition and has been determined to have a diagnosis/diagnoses of Severe protein-calorie malnutrition.    This patient is being managed with:   Diet Regular-  Entered: Jun 4 2024  4:46PM  

## 2024-06-10 NOTE — PROGRESS NOTE ADULT - ASSESSMENT
Chronic HFrEF, LVEF 35-40%- overall on exam appears close to euvolemia  IV diuretics to be used only as needed.  Cr stable for now  NPO now    CKD- follows with Dr Herron.    Creatinine level normal  Close monitoring is recommended.    Alcohol abuse- on [facility] protocol.    Leukemia- follows with Dr Sanchez  they have been following pt.     d/w Dr river    d/w RN    Other medical issues- Management per primary team.   Thank you for allowing me to participate in the care of this patient. Please feel free to contact me with any questions.  Chronic HFrEF, LVEF 35-40%- overall on exam appears euvolemic   discontinued the losartan and labetalol.    Started him on Toprol-XL 50 mg once a day as well as Entresto 24/26 mg twice a day for guideline directed medical therapy for heart failure with reduced ejection fraction.    Will follow-up in the office with ischemic workup.    Monitoring of the creatinine is recommended  He will not need any diuretics at this point of time.        CKD- follows with Dr Herron.    Creatinine level Stable  Close monitoring is recommended.    Alcohol abuse- on [facility] protocol.    Leukemia- follows with Dr Sanchez  they have been following pt.         Other medical issues- Management per primary team.   Thank you for allowing me to participate in the care of this patient. Please feel free to contact me with any questions.

## 2024-06-10 NOTE — DISCHARGE NOTE NURSING/CASE MANAGEMENT/SOCIAL WORK - NSDCPEFALRISK_GEN_ALL_CORE
For information on Fall & Injury Prevention, visit: [email or web address] OR  [email or web address] OR  https://www.cdc.gov/steadi/patient.html

## 2024-06-10 NOTE — DISCHARGE NOTE PROVIDER - NSDCMRMEDTOKEN_GEN_ALL_CORE_FT
folic acid 1 mg oral tablet: 1 tab(s) orally once a day  hydroxychloroquine 200 mg oral tablet: 1 tab(s) orally 2 times a day  Livalo 2 mg oral tablet: 1 tab(s) orally once a day  metoprolol succinate 50 mg oral tablet, extended release: 1 tab(s) orally once a day  Multiple Vitamins oral tablet: 1 tab(s) orally once a day  pantoprazole 40 mg oral delayed release tablet: 1 tab(s) orally once a day (before a meal)  predniSONE 5 mg oral tablet: 4 tab(s) orally once a day  sacubitril-valsartan 24 mg-26 mg oral tablet: 1 tab(s) orally 2 times a day  thiamine 100 mg oral tablet: 1 tab(s) orally once a day  Xarelto 20 mg oral tablet: 1 tab(s) orally once a day (at bedtime) ***last filled 1/4/24 for 90 day supply***

## 2024-06-10 NOTE — PROGRESS NOTE ADULT - SUBJECTIVE AND OBJECTIVE BOX
6/10: no complaints      PHYSICAL EXAM:    Daily     Daily     Vital Signs Last 24 Hrs  T(C): 36.6 (10 Keith 2024 09:15), Max: 37.1 (09 Jun 2024 21:03)  T(F): 97.9 (10 Keith 2024 09:15), Max: 98.8 (09 Jun 2024 21:03)  HR: 70 (10 Keith 2024 09:15) (70 - 77)  BP: 129/89 (10 Keith 2024 09:15) (105/83 - 159/90)  BP(mean): 92 (09 Jun 2024 21:03) (92 - 92)  RR: 18 (10 Keith 2024 09:15) (17 - 18)  SpO2: 95% (10 Keith 2024 09:15) (92% - 97%)    Constitutional: Weak and ill appearing  HEENT: Atraumatic, BRIDGETT,   Respiratory: Breath Sounds normal, no rhonchi/wheeze  Cardiovascular: N S1S2;   Gastrointestinal: Abdomen soft, non tender, Bowel Sounds present  Extremities: No edema, peripheral pulses present  Neurological: AAO x 3, no gross focal motor deficits  Skin: Non cellulitic, no rash, ulcers  Lymph Nodes: No lymphadenopathy noted  Back: No CVA tenderness   Musculoskeletal: non tender  Breasts: Deferred  Genitourinary: deferred  Rectal: Deferred    All Labs/EKG/Radiology/Meds reviewed by me      MEDICATIONS  (STANDING):  chlorhexidine 4% Liquid 1 Application(s) Topical <User Schedule>  folic acid 1 milliGRAM(s) Oral daily  hydroxychloroquine 200 milliGRAM(s) Oral two times a day  lidocaine   4% Patch 1 Patch Transdermal daily  losartan 50 milliGRAM(s) Oral daily  multivitamin 1 Tablet(s) Oral daily  nystatin Powder 1 Application(s) Topical two times a day  pantoprazole    Tablet 40 milliGRAM(s) Oral before breakfast  predniSONE   Tablet 10 milliGRAM(s) Oral daily  rivaroxaban 20 milliGRAM(s) Oral daily  thiamine 100 milliGRAM(s) Oral daily    MEDICATIONS  (PRN):  acetaminophen     Tablet .. 650 milliGRAM(s) Oral every 6 hours PRN Temp greater or equal to 38C (100.4F)  aluminum hydroxide/magnesium hydroxide/simethicone Suspension 30 milliLiter(s) Oral every 4 hours PRN Dyspepsia  labetalol 200 milliGRAM(s) Oral three times a day PRN sbp >150. hold for hr less than 60  melatonin 3 milliGRAM(s) Oral at bedtime PRN Insomnia

## 2024-06-10 NOTE — PROGRESS NOTE ADULT - PROVIDER SPECIALTY LIST ADULT
Cardiology
Critical Care
Critical Care
Hospitalist
Hospitalist
MICU
Critical Care
Heme/Onc
Critical Care
Heme/Onc
Heme/Onc
Hospitalist
Cardiology
Cardiology
Heme/Onc
Critical Care
Hospitalist
Hospitalist
Critical Care

## 2024-06-10 NOTE — DISCHARGE NOTE PROVIDER - HOSPITAL COURSE
PHYSICAL EXAM:    Daily     Daily     Vital Signs Last 24 Hrs  T(C): 36.6 (10 Keith 2024 09:15), Max: 37.1 (09 Jun 2024 21:03)  T(F): 97.9 (10 Keith 2024 09:15), Max: 98.8 (09 Jun 2024 21:03)  HR: 70 (10 Keith 2024 09:15) (70 - 77)  BP: 129/89 (10 Keith 2024 09:15) (105/83 - 159/90)  BP(mean): 92 (09 Jun 2024 21:03) (92 - 92)  RR: 18 (10 Keith 2024 09:15) (17 - 18)  SpO2: 95% (10 Keith 2024 09:15) (92% - 97%)    Constitutional: Weak  appearing  HEENT: Atraumatic, BRIDGETT, Normal, No congestion  Respiratory: Breath Sounds normal, no rhonchi/wheeze  Cardiovascular: N S1S2;   Gastrointestinal: Abdomen soft, non tender, Bowel Sounds present  Extremities: No edema, peripheral pulses present  Neurological: AAO x 3, no gross focal motor deficits  Skin: Non cellulitic, no rash, ulcers  Lymph Nodes: No lymphadenopathy noted  Back: No CVA tenderness   Musculoskeletal: non tender  Breasts: Deferred  Genitourinary: deferred  Rectal: Deferred      68m w/ HTN, RA, Leukemia in remission since January 2023, CKD,  DVT and PE (on xaralto) admitted in the hospital with ETOH withdrawal and sciatica as per patient. required precedex gtt. Pt was downgraded from ICU service.     Alcohol intoxication   Alcohol dependence resulting in alcohol withdrawal was on precedex gtt  -s/p [facility] protocol   -c/w folic acid and thiamine   -substance abuse counseling  Quetiapine bedtime stopped    acute metabolic encephalpathy due to etoh w/d-improving/stable  -reduce sedating medications-stop all standing meds, give prn only.     transaminitis due to etoh-resolved    dvt/pe-xarelto    RA-hydroxychloroquine , predniSONE       htn-losartan     Code Status: FULL code    Dispo;  PEDRO LUIS    time spent 45 min

## 2024-06-10 NOTE — DISCHARGE NOTE NURSING/CASE MANAGEMENT/SOCIAL WORK - PATIENT PORTAL LINK FT
You can access the FollowMyHealth Patient Portal offered by [facility] by registering at the following website: [email or web address]. By joining [employer]’s FollowMyHealth portal, you will also be able to view your health information using other applications (apps) compatible with our system.

## 2024-06-10 NOTE — PROGRESS NOTE ADULT - ASSESSMENT
68m w/ HTN, RA, Leukemia in remission since January 2023, CKD,  DVT and PE (on xaralto) admitted in the hospital with ETOH withdrawal and sciatica as per patient. required precedex gtt. Pt was downgraded from ICU service.     Alcohol intoxication   Alcohol dependence resulting in alcohol withdrawal was on precedex gtt  -s/p [facility] protocol   -c/w folic acid and thiamine   -substance abuse counseling  Quetiapine bedtime stopped    acute metabolic encephalpathy due to etoh w/d-improving/stable  -reduce sedating medications-stop all standing meds, give prn only.     transaminitis due to etoh-resolved    dvt/pe-xarelto    RA-hydroxychloroquine , predniSONE       htn-losartan     DVT proph:   DOAC  Code Status: FULL code    Dispo; await PEDRO LUIS

## 2024-06-10 NOTE — PROGRESS NOTE ADULT - ASSESSMENT
69 y/o male who follows with me at [facility] for h/o LGL s/p cyclophosphamide and prednisone completed 1/31/23 in remission, DVT and PE now on xarelto, HTN, RA prev on MTX now on HCQ and prednisone, CKD I , admitted in the hospital with ETOH withdrawal and sciatica as per patient.     # etoh withdrawal  - s/p icu course on precedex and ativan- now off all - no longer withdrawing   - CT a/p with cirrhosis   - pt improving clinically     # T cell Large Granular Lymphocytic leukemia  ­ 5/27/22 Bone marrow biopsy (@ HH) showed aberrant T cell pop (65% of cells) positive for TCR alpha/beta c/w T cell  lymphoproliferative d/o of NK like T cells, CD8/CD57 positive with clonality by TCRB1 flow which can be seen in large granular lymphocyte leukemia. IgK gene status negative, TCR gamma positive  ­ 5/26/22 CT a/p with splenomegaly up to 15 cm, cirrhosis seen and hepatic steatosis, no masses as well as sigmoid diverticulosis  ­ completed cyclophosphamide 100 mg qd and prednisone 7/8/22- 1/31/23  - counts outpt have been stable  - labs from 6/8 WBC 6.53, Hbv 12.4, plt 220  (plt increased likely 2/2 steroid effect as well)    # RLE DVT/PE  ­ 9/15/22 CTA chest showed small right lower lobe pulmonary embolus­ completed 3 mo of xarelto   ­ 6/14/23 US LE doppler b/l­ showing acute DVT in right posterior tibial vein, chronic DVT in right common femoral, femoral, popliteal and gastrocnemius veins, no DVT in LLE  ­ 6/14/23 CTA chest with RLL postrior subsegmental PE, interlobular septal thickening suggestive of interstitial pulmonary edema.  ­ continue with xarelto 20 mg qd  ­ pt will need lifelong a/c as unprovoked     # RA  ­ follows with Dr. Frank­ now off MTX, on HCQ and course of prednisone     will f/u with me outpatient   dc today

## 2024-06-10 NOTE — DISCHARGE NOTE PROVIDER - NSDCCPCAREPLAN_GEN_ALL_CORE_FT
PRINCIPAL DISCHARGE DIAGNOSIS  Diagnosis: Alcohol dependence with withdrawal  Assessment and Plan of Treatment: resolved

## 2024-06-10 NOTE — PROGRESS NOTE ADULT - SUBJECTIVE AND OBJECTIVE BOX
INTERVAL HPI/OVERNIGHT EVENTS:  Patient S&E at bedside. No o/n events,   pt reports feeling better- sitting at chair at bedside  states back pain is significantly improved  no further tremor, aox3  vss on RA  labs from 6/8 WBC 6.5, Hb 12.4, plt 220, cr 1.33    PAST MEDICAL & SURGICAL HISTORY:  ETOH abuse      Hyperlipidemia LDL goal <100      Restless leg syndrome      Rheumatoid arteritis      Leukemia      No significant past surgical history          FAMILY HISTORY:  FHx: rheumatoid arthritis (Mother)        VITAL SIGNS:  T(F): 98.2 (06-10-24 @ 07:44)  HR: 74 (06-10-24 @ 07:44)  BP: 130/83 (06-10-24 @ 07:44)  RR: 17 (06-10-24 @ 07:44)  SpO2: 97% (06-10-24 @ 07:44)  Wt(kg): --    PHYSICAL EXAM:    Constitutional: NAD  Eyes: EOMI,   Respiratory: CTA b/l  Cardiovascular: RRR,   Gastrointestinal: soft, NTND,   Neurological: AAOx3      MEDICATIONS  (STANDING):  chlorhexidine 4% Liquid 1 Application(s) Topical <User Schedule>  folic acid 1 milliGRAM(s) Oral daily  hydroxychloroquine 200 milliGRAM(s) Oral two times a day  lidocaine   4% Patch 1 Patch Transdermal daily  losartan 50 milliGRAM(s) Oral daily  multivitamin 1 Tablet(s) Oral daily  nystatin Powder 1 Application(s) Topical two times a day  pantoprazole    Tablet 40 milliGRAM(s) Oral before breakfast  predniSONE   Tablet 10 milliGRAM(s) Oral daily  rivaroxaban 20 milliGRAM(s) Oral daily  thiamine 100 milliGRAM(s) Oral daily    MEDICATIONS  (PRN):  acetaminophen     Tablet .. 650 milliGRAM(s) Oral every 6 hours PRN Temp greater or equal to 38C (100.4F)  aluminum hydroxide/magnesium hydroxide/simethicone Suspension 30 milliLiter(s) Oral every 4 hours PRN Dyspepsia  labetalol 200 milliGRAM(s) Oral three times a day PRN sbp >150. hold for hr less than 60  melatonin 3 milliGRAM(s) Oral at bedtime PRN Insomnia      Allergies    Aloe Lotion (Rash)    Intolerances        LABS:                RADIOLOGY & ADDITIONAL TESTS:  Studies reviewed.

## 2024-06-10 NOTE — PROGRESS NOTE ADULT - REASON FOR ADMISSION
ETOH withdrawal  /  posterior right leg into the knee.
ETOH withdrawal
ETOH withdrawal  /  posterior right leg into the knee.

## 2024-06-17 DIAGNOSIS — F10.231 ALCOHOL DEPENDENCE WITH WITHDRAWAL DELIRIUM: ICD-10-CM

## 2024-06-17 DIAGNOSIS — G93.41 METABOLIC ENCEPHALOPATHY: ICD-10-CM

## 2024-06-17 DIAGNOSIS — E78.5 HYPERLIPIDEMIA, UNSPECIFIED: ICD-10-CM

## 2024-06-17 DIAGNOSIS — E43 UNSPECIFIED SEVERE PROTEIN-CALORIE MALNUTRITION: ICD-10-CM

## 2024-06-17 DIAGNOSIS — I27.20 PULMONARY HYPERTENSION, UNSPECIFIED: ICD-10-CM

## 2024-06-17 DIAGNOSIS — Z79.01 LONG TERM (CURRENT) USE OF ANTICOAGULANTS: ICD-10-CM

## 2024-06-17 DIAGNOSIS — F10.229 ALCOHOL DEPENDENCE WITH INTOXICATION, UNSPECIFIED: ICD-10-CM

## 2024-06-17 DIAGNOSIS — D63.1 ANEMIA IN CHRONIC KIDNEY DISEASE: ICD-10-CM

## 2024-06-17 DIAGNOSIS — C91.Z1: ICD-10-CM

## 2024-06-17 DIAGNOSIS — I13.0 HYPERTENSIVE HEART AND CHRONIC KIDNEY DISEASE WITH HEART FAILURE AND STAGE 1 THROUGH STAGE 4 CHRONIC KIDNEY DISEASE, OR UNSPECIFIED CHRONIC KIDNEY DISEASE: ICD-10-CM

## 2024-06-17 DIAGNOSIS — M54.31 SCIATICA, RIGHT SIDE: ICD-10-CM

## 2024-06-17 DIAGNOSIS — Z86.711 PERSONAL HISTORY OF PULMONARY EMBOLISM: ICD-10-CM

## 2024-06-17 DIAGNOSIS — N18.30 CHRONIC KIDNEY DISEASE, STAGE 3 UNSPECIFIED: ICD-10-CM

## 2024-06-17 DIAGNOSIS — M06.9 RHEUMATOID ARTHRITIS, UNSPECIFIED: ICD-10-CM

## 2024-06-17 DIAGNOSIS — F10.239 ALCOHOL DEPENDENCE WITH WITHDRAWAL, UNSPECIFIED: ICD-10-CM

## 2024-06-17 DIAGNOSIS — K70.30 ALCOHOLIC CIRRHOSIS OF LIVER WITHOUT ASCITES: ICD-10-CM

## 2024-06-17 DIAGNOSIS — J69.0 PNEUMONITIS DUE TO INHALATION OF FOOD AND VOMIT: ICD-10-CM

## 2024-06-17 DIAGNOSIS — M54.32 SCIATICA, LEFT SIDE: ICD-10-CM

## 2024-06-17 DIAGNOSIS — G25.81 RESTLESS LEGS SYNDROME: ICD-10-CM

## 2024-06-17 DIAGNOSIS — I50.22 CHRONIC SYSTOLIC (CONGESTIVE) HEART FAILURE: ICD-10-CM

## 2024-06-17 DIAGNOSIS — K76.0 FATTY (CHANGE OF) LIVER, NOT ELSEWHERE CLASSIFIED: ICD-10-CM

## 2024-06-17 DIAGNOSIS — Z86.718 PERSONAL HISTORY OF OTHER VENOUS THROMBOSIS AND EMBOLISM: ICD-10-CM

## 2024-06-17 DIAGNOSIS — E87.6 HYPOKALEMIA: ICD-10-CM

## 2024-09-03 NOTE — ED ADULT NURSE REASSESSMENT NOTE - NS ED NURSE REASSESS COMMENT FT1
Pt resting in stretcher with safety maintained. Respirations even and unlabored, no distress noted at this time. Pt appears calm. [FreeTextEntry1] : Please refer to URO Consult Note.

## 2024-09-21 RX ORDER — TOBRAMYCIN AND DEXAMETHASONE 3; 1 MG/ML; MG/ML
1 SUSPENSION OPHTHALMIC
Refills: 0 | DISCHARGE
Start: 2024-09-21 | End: 2024-09-28

## 2024-10-01 ENCOUNTER — INPATIENT (INPATIENT)
Facility: HOSPITAL | Age: 69
LOS: 5 days | Discharge: HOME | DRG: 897 | End: 2024-10-07
Attending: INTERNAL MEDICINE | Admitting: INTERNAL MEDICINE
Payer: MEDICARE

## 2024-10-01 VITALS
OXYGEN SATURATION: 97 % | DIASTOLIC BLOOD PRESSURE: 114 MMHG | SYSTOLIC BLOOD PRESSURE: 185 MMHG | RESPIRATION RATE: 18 BRPM | HEART RATE: 118 BPM | TEMPERATURE: 98 F

## 2024-10-01 DIAGNOSIS — F10.239 ALCOHOL DEPENDENCE WITH WITHDRAWAL, UNSPECIFIED: ICD-10-CM

## 2024-10-01 LAB
ALBUMIN SERPL ELPH-MCNC: 3.8 G/DL — SIGNIFICANT CHANGE UP (ref 3.3–5)
ALP SERPL-CCNC: 40 U/L — SIGNIFICANT CHANGE UP (ref 40–120)
ALT FLD-CCNC: 67 U/L — SIGNIFICANT CHANGE UP (ref 12–78)
ANION GAP SERPL CALC-SCNC: 12 MMOL/L — SIGNIFICANT CHANGE UP (ref 5–17)
ANISOCYTOSIS BLD QL: SLIGHT — SIGNIFICANT CHANGE UP
AST SERPL-CCNC: 73 U/L — HIGH (ref 15–37)
BASOPHILS # BLD AUTO: 0.02 K/UL — SIGNIFICANT CHANGE UP (ref 0–0.2)
BASOPHILS NFR BLD AUTO: 0.7 % — SIGNIFICANT CHANGE UP (ref 0–2)
BILIRUB SERPL-MCNC: 0.5 MG/DL — SIGNIFICANT CHANGE UP (ref 0.2–1.2)
BUN SERPL-MCNC: 22 MG/DL — SIGNIFICANT CHANGE UP (ref 7–23)
CALCIUM SERPL-MCNC: 9.4 MG/DL — SIGNIFICANT CHANGE UP (ref 8.5–10.1)
CHLORIDE SERPL-SCNC: 105 MMOL/L — SIGNIFICANT CHANGE UP (ref 96–108)
CO2 SERPL-SCNC: 20 MMOL/L — LOW (ref 22–31)
CREAT SERPL-MCNC: 1.53 MG/DL — HIGH (ref 0.5–1.3)
D DIMER BLD IA.RAPID-MCNC: 217 NG/ML DDU — SIGNIFICANT CHANGE UP
DACRYOCYTES BLD QL SMEAR: SLIGHT — SIGNIFICANT CHANGE UP
EGFR: 49 ML/MIN/1.73M2 — LOW
EOSINOPHIL # BLD AUTO: 0.01 K/UL — SIGNIFICANT CHANGE UP (ref 0–0.5)
EOSINOPHIL NFR BLD AUTO: 0.4 % — SIGNIFICANT CHANGE UP (ref 0–6)
GLUCOSE SERPL-MCNC: 159 MG/DL — HIGH (ref 70–99)
HCT VFR BLD CALC: 32 % — LOW (ref 39–50)
HGB BLD-MCNC: 11 G/DL — LOW (ref 13–17)
IMM GRANULOCYTES NFR BLD AUTO: 0.7 % — SIGNIFICANT CHANGE UP (ref 0–0.9)
LYMPHOCYTES # BLD AUTO: 0.33 K/UL — LOW (ref 1–3.3)
LYMPHOCYTES # BLD AUTO: 11.7 % — LOW (ref 13–44)
MACROCYTES BLD QL: SLIGHT — SIGNIFICANT CHANGE UP
MAGNESIUM SERPL-MCNC: 1.2 MG/DL — LOW (ref 1.6–2.6)
MANUAL SMEAR VERIFICATION: SIGNIFICANT CHANGE UP
MCHC RBC-ENTMCNC: 34.1 PG — HIGH (ref 27–34)
MCHC RBC-ENTMCNC: 34.4 GM/DL — SIGNIFICANT CHANGE UP (ref 32–36)
MCV RBC AUTO: 99.1 FL — SIGNIFICANT CHANGE UP (ref 80–100)
MICROCYTES BLD QL: SLIGHT — SIGNIFICANT CHANGE UP
MONOCYTES # BLD AUTO: 0.06 K/UL — SIGNIFICANT CHANGE UP (ref 0–0.9)
MONOCYTES NFR BLD AUTO: 2.1 % — SIGNIFICANT CHANGE UP (ref 2–14)
NEUTROPHILS # BLD AUTO: 2.38 K/UL — SIGNIFICANT CHANGE UP (ref 1.8–7.4)
NEUTROPHILS NFR BLD AUTO: 84.4 % — HIGH (ref 43–77)
NT-PROBNP SERPL-SCNC: 556 PG/ML — HIGH (ref 0–125)
OVALOCYTES BLD QL SMEAR: SLIGHT — SIGNIFICANT CHANGE UP
PHOSPHATE SERPL-MCNC: 3.6 MG/DL — SIGNIFICANT CHANGE UP (ref 2.5–4.5)
PLAT MORPH BLD: NORMAL — SIGNIFICANT CHANGE UP
PLATELET # BLD AUTO: 179 K/UL — SIGNIFICANT CHANGE UP (ref 150–400)
POIKILOCYTOSIS BLD QL AUTO: SLIGHT — SIGNIFICANT CHANGE UP
POTASSIUM SERPL-MCNC: 4.6 MMOL/L — SIGNIFICANT CHANGE UP (ref 3.5–5.3)
POTASSIUM SERPL-SCNC: 4.6 MMOL/L — SIGNIFICANT CHANGE UP (ref 3.5–5.3)
PROT SERPL-MCNC: 7.4 GM/DL — SIGNIFICANT CHANGE UP (ref 6–8.3)
RBC # BLD: 3.23 M/UL — LOW (ref 4.2–5.8)
RBC # FLD: 14.2 % — SIGNIFICANT CHANGE UP (ref 10.3–14.5)
RBC BLD AUTO: ABNORMAL
ROULEAUX BLD QL SMEAR: PRESENT
SODIUM SERPL-SCNC: 137 MMOL/L — SIGNIFICANT CHANGE UP (ref 135–145)
TROPONIN I, HIGH SENSITIVITY RESULT: 23.36 NG/L — SIGNIFICANT CHANGE UP
TROPONIN I, HIGH SENSITIVITY RESULT: 29.29 NG/L — SIGNIFICANT CHANGE UP
WBC # BLD: 2.82 K/UL — LOW (ref 3.8–10.5)
WBC # FLD AUTO: 2.82 K/UL — LOW (ref 3.8–10.5)

## 2024-10-01 PROCEDURE — 80053 COMPREHEN METABOLIC PANEL: CPT

## 2024-10-01 PROCEDURE — 97162 PT EVAL MOD COMPLEX 30 MIN: CPT | Mod: GP

## 2024-10-01 PROCEDURE — 97116 GAIT TRAINING THERAPY: CPT | Mod: GP

## 2024-10-01 PROCEDURE — 87640 STAPH A DNA AMP PROBE: CPT

## 2024-10-01 PROCEDURE — 85027 COMPLETE CBC AUTOMATED: CPT

## 2024-10-01 PROCEDURE — 87641 MR-STAPH DNA AMP PROBE: CPT

## 2024-10-01 PROCEDURE — 85025 COMPLETE CBC W/AUTO DIFF WBC: CPT

## 2024-10-01 PROCEDURE — 84100 ASSAY OF PHOSPHORUS: CPT

## 2024-10-01 PROCEDURE — 71045 X-RAY EXAM CHEST 1 VIEW: CPT | Mod: 26

## 2024-10-01 PROCEDURE — 99223 1ST HOSP IP/OBS HIGH 75: CPT

## 2024-10-01 PROCEDURE — 82140 ASSAY OF AMMONIA: CPT

## 2024-10-01 PROCEDURE — 80048 BASIC METABOLIC PNL TOTAL CA: CPT

## 2024-10-01 PROCEDURE — 83735 ASSAY OF MAGNESIUM: CPT

## 2024-10-01 PROCEDURE — 36415 COLL VENOUS BLD VENIPUNCTURE: CPT

## 2024-10-01 PROCEDURE — 84484 ASSAY OF TROPONIN QUANT: CPT

## 2024-10-01 PROCEDURE — 80076 HEPATIC FUNCTION PANEL: CPT

## 2024-10-01 PROCEDURE — 97530 THERAPEUTIC ACTIVITIES: CPT | Mod: GP

## 2024-10-01 PROCEDURE — 99285 EMERGENCY DEPT VISIT HI MDM: CPT | Mod: FS

## 2024-10-01 PROCEDURE — 93010 ELECTROCARDIOGRAM REPORT: CPT | Mod: 76

## 2024-10-01 RX ORDER — FOLIC ACID 1 MG/1
1 TABLET ORAL DAILY
Refills: 0 | Status: DISCONTINUED | OUTPATIENT
Start: 2024-10-01 | End: 2024-10-02

## 2024-10-01 RX ORDER — THIAMINE HYDROCHLORIDE 100 MG/ML
100 INJECTION, SOLUTION INTRAMUSCULAR; INTRAVENOUS DAILY
Refills: 0 | Status: DISCONTINUED | OUTPATIENT
Start: 2024-10-01 | End: 2024-10-02

## 2024-10-01 RX ORDER — MULTI VITAMIN/MINERAL SUPPLEMENT WITH ASCORBIC ACID, NIACIN, PYRIDOXINE, PANTOTHENIC ACID, FOLIC ACID, RIBOFLAVIN, THIAMIN, BIOTIN, COBALAMIN AND ZINC. 60; 20; 12.5; 10; 10; 1.7; 1.5; 1; .3; .006 MG/1; MG/1; MG/1; MG/1; MG/1; MG/1; MG/1; MG/1; MG/1; MG/1
1 TABLET, COATED ORAL DAILY
Refills: 0 | Status: DISCONTINUED | OUTPATIENT
Start: 2024-10-01 | End: 2024-10-02

## 2024-10-01 RX ORDER — THIAMINE HYDROCHLORIDE 100 MG/ML
100 INJECTION, SOLUTION INTRAMUSCULAR; INTRAVENOUS ONCE
Refills: 0 | Status: COMPLETED | OUTPATIENT
Start: 2024-10-01 | End: 2024-10-02

## 2024-10-01 RX ORDER — RIVAROXABAN 10 MG/1
20 TABLET, FILM COATED ORAL
Refills: 0 | Status: DISCONTINUED | OUTPATIENT
Start: 2024-10-02 | End: 2024-10-02

## 2024-10-01 RX ORDER — MAG HYDROX/ALUMINUM HYD/SIMETH 200-200-20
30 SUSPENSION, ORAL (FINAL DOSE FORM) ORAL EVERY 4 HOURS
Refills: 0 | Status: DISCONTINUED | OUTPATIENT
Start: 2024-10-01 | End: 2024-10-07

## 2024-10-01 RX ORDER — PREDNISONE 5 MG/1
20 TABLET ORAL DAILY
Refills: 0 | Status: DISCONTINUED | OUTPATIENT
Start: 2024-10-01 | End: 2024-10-07

## 2024-10-01 RX ORDER — METOPROLOL TARTRATE 50 MG
5 TABLET ORAL ONCE
Refills: 0 | Status: DISCONTINUED | OUTPATIENT
Start: 2024-10-01 | End: 2024-10-01

## 2024-10-01 RX ORDER — SODIUM CHLORIDE IRRIG SOLUTION 0.9 %
1000 SOLUTION, IRRIGATION IRRIGATION
Refills: 0 | Status: DISCONTINUED | OUTPATIENT
Start: 2024-10-01 | End: 2024-10-02

## 2024-10-01 RX ORDER — MAGNESIUM SULFATE 500 MG/ML
2 VIAL (ML) INJECTION ONCE
Refills: 0 | Status: COMPLETED | OUTPATIENT
Start: 2024-10-01 | End: 2024-10-01

## 2024-10-01 RX ORDER — ACETAMINOPHEN 325 MG
650 TABLET ORAL EVERY 6 HOURS
Refills: 0 | Status: DISCONTINUED | OUTPATIENT
Start: 2024-10-01 | End: 2024-10-07

## 2024-10-01 RX ORDER — CLONIDINE HYDROCHLORIDE 0.2 MG/1
0.1 TABLET ORAL ONCE
Refills: 0 | Status: COMPLETED | OUTPATIENT
Start: 2024-10-01 | End: 2024-10-01

## 2024-10-01 RX ORDER — HYDROXYCHLOROQUINE SULFATE 200 MG/1
200 TABLET, FILM COATED ORAL
Refills: 0 | Status: DISCONTINUED | OUTPATIENT
Start: 2024-10-01 | End: 2024-10-07

## 2024-10-01 RX ORDER — ATORVASTATIN CALCIUM 10 MG/1
10 TABLET, FILM COATED ORAL AT BEDTIME
Refills: 0 | Status: DISCONTINUED | OUTPATIENT
Start: 2024-10-01 | End: 2024-10-07

## 2024-10-01 RX ORDER — METOPROLOL TARTRATE 50 MG
5 TABLET ORAL ONCE
Refills: 0 | Status: COMPLETED | OUTPATIENT
Start: 2024-10-01 | End: 2024-10-01

## 2024-10-01 RX ORDER — METOPROLOL TARTRATE 50 MG
50 TABLET ORAL DAILY
Refills: 0 | Status: DISCONTINUED | OUTPATIENT
Start: 2024-10-01 | End: 2024-10-07

## 2024-10-01 RX ADMIN — Medication 5 MILLIGRAM(S): at 20:21

## 2024-10-01 RX ADMIN — CLONIDINE HYDROCHLORIDE 0.1 MILLIGRAM(S): 0.2 TABLET ORAL at 18:42

## 2024-10-01 RX ADMIN — Medication 2 MILLIGRAM(S): at 18:48

## 2024-10-01 RX ADMIN — Medication 5 MILLIGRAM(S): at 22:52

## 2024-10-01 NOTE — ED PROVIDER NOTE - PHYSICAL EXAMINATION
PA NOTE: GEN: AOX3, NAD. HEENT: Throat clear. Airway is patent. EYES: PERRLA. EOMI. Head: NC/AT. NECK: Supple, No JVD. FROM. C-spine non-tender. CV:S1S2, RRR, LUNGS: Non-labored breathing, no tachypnea. O2sat 100% RA. CTA b/l. No w/r/r. CHEST: Equal chest expansion and rise. No deformity. ABD: Soft, NT/ND, no rebound, no guarding. No CVAT. EXT: No e/c/c. 2+ distal pulses. SKIN: No rashes. NEURO: No focal deficits. CN II-XII intact. FROM. 5/5 motor and sensory. ~Arnaldo Sy PA-C

## 2024-10-01 NOTE — ED PROVIDER NOTE - CLINICAL SUMMARY MEDICAL DECISION MAKING FREE TEXT BOX
Dr. Gutierrez ED attending -69 y/o male with PMHx of HTN, RA, Leukemia in remission since January 2023, Hx of ETOH abuse last drink Sunday,  who presents to [facility] c/o elevated BP while getting IV infusion for rheumatoid arthritis at infusion center today. pt endorses mild sob. No CP, Fevers, chills, abdominal pain, nausea vomiting diarrhea, leg pain or swelling, headache, vision changes, numbness tingling weakness.  States that they told him his blood pressure was 205 SBP.  States that he has not taken his blood pressure medications in at least 2 days  Constitutional: well appearing,+tremulous, NAD AAOx3  Eyes: EOMI, PERRL  Head: Normocephalic atraumatic  Mouth: no airway obstruction, posterior oropharynx clear without erythema or exudate  Neck: supple  Cardiac:  tachycardic, regular rhythm, no MRG  Resp: Lungs CTAB  GI: Abd s/nt/nd  Neuro: CN2-12 intact, strength 5/5x4, sensation grossly intact  Skin: No rashes  MDM: presentation most concerning for acute alcohol withdrawal.  CIWA 10, given Ativan IV 2 mg  which improved tremor and blood pressure and HR.  EKG shows sinus tachycardia rate 109, normal intervals, no ST or T elevations or depressions.  Labs significant for normal troponin, mildly elevated BNP.  Patient admitted to Dr. Robbins hospitalist attending for acute alcohol withdrawal, CIWA protocol in place

## 2024-10-01 NOTE — ED ADULT TRIAGE NOTE - CHIEF COMPLAINT QUOTE
pt bib ambulance from infusion center for HTN. pt was receiving an infusion for his arthritis and his bp was elevated. as per ems, "infusion center felt uncomfortable letting him go home with his systolic pressure in the 200s". pt endorsing sob, denies cp. sent for STAT EKG.

## 2024-10-01 NOTE — PHARMACOTHERAPY INTERVENTION NOTE - COMMENTS
Medication reconciliation completed.  Pt unable to provide names of meds that he takes, and when pt was read the names of meds off Dr. First report he answers "yeah, I think so.  If it's on there I must take it."  Several meds have not been filled since June 2024 for 1 month supply.  When asked if there is anyone at home we can confirm the list with he answered no, that he sets up his meds at home and stated "I'll let you guys know after I'm home."

## 2024-10-01 NOTE — ED ADULT NURSE NOTE - OBJECTIVE STATEMENT
Pt is a 68 year old male who was sent to ER from rheumatologist office for HTN after receiving infusion for RA. Pt states he does not know name of medication he received and it was the first time he got it but it was over 5 hours and his B/P kept getting higher during infusion. Pt with hx HTN, HLD, leukemia, DVT

## 2024-10-01 NOTE — H&P ADULT - NSHPPHYSICALEXAM_GEN_ALL_CORE
Vital Signs Last 24 Hrs  T(C): 36.8 (01 Oct 2024 17:19), Max: 36.8 (01 Oct 2024 17:19)  T(F): 98.3 (01 Oct 2024 17:19), Max: 98.3 (01 Oct 2024 17:19)  HR: 87 (01 Oct 2024 22:52) (87 - 157)  BP: 180/104 (01 Oct 2024 22:52) (159/125 - 223/118)  BP(mean): 138 (01 Oct 2024 18:12) (138 - 138)  RR: 18 (01 Oct 2024 20:20) (18 - 18)  SpO2: 98% (01 Oct 2024 20:20) (96% - 98%)    Parameters below as of 01 Oct 2024 20:20  Patient On (Oxygen Delivery Method): room air

## 2024-10-01 NOTE — ED ADULT NURSE NOTE - NSFALLOOBATTEMPT_ED_ALL_ED
Daily Note     Today's date: 11/15/2022  Patient name: Sophia Ramon  : 1965  MRN: [id number]  Referring provider: Marcelina Baird MD  Dx:   Encounter Diagnosis     ICD-10-CM    1  Subacromial impingement of left shoulder  M75 42    2  Rotator cuff tendinitis, left  M75 82    3  Chronic left shoulder pain  M25 512     G89 29        Start Time: 1700  Stop Time: 1735  Total time in clinic (min): 35 minutes    Subjective: Patient continues with pain in (L) shoulder in biceps with abduction, but notes that pain levels are improving  Objective: See treatment diary below    Assessment: Patient demonstrates gains with sunita-scapular strength as program progressed with min tactile and VCs for correct scapular motion  Good response to manuals with pain free end range at all planes  Patient would benefit from continued PT  Plan: Continue per plan of care  Precautions: L shoulder impingement     10/4 10/11 10/18 10/27  11/8 11/15    Manuals           Supine thoracic Gr  V HVLA mobilization EM EM         Posterior capsule mobs  2x40 Gr  III  EM Gr  III 3x30 EM Gr   III 2x50 PROM 10' PROM 10'    IR stretch  2' 4'                   Neuro Re-Ed           Prone retraction w/ shoulder extension 20x5" HEP 20x5" 20x5"        Prone I Weight NV 3x10 2# 2# 3x10 2# 3x10 2# 3x12 2#  3x12 2#  3x12    Prone T 5x PT facilitation  2x10 PT facilitation 2x10 PT  faciltation 2x10 PT facil Pball B/L 2x12 Pball  B/L  3x12 Pball  B/L  3x12    Abduction w/ scapular facilitation  5x D/C  10x, 10x starting in scaption and moving to abd 10x, 10x 10x  10x 10x  10x                                     Ther Ex           Thoracic extension w/ towel roll 20x3" HEP 20x3"  20x3"  NV      Rows  3x15 GTB HEP 3x15 BlkTB Black  3x15        Low trap ER (no moneys)  3x10 YTB HEP YTB  3x10 YTB 3x10 dec range 3x20 YTB  YTB  3x20 YTB  3x20    Straight arm extension           pec stretch doorway           AAROM abduction w/ eccentric lowering  2x10 HEP 2x10        Horizontal B/L abduction     3x10 GTB  3x15 GTB  3x15  GTB 3x15  GTB    sidelying posterior capsule stretch    4x15" HEP       Seated rows (at UE bike)    2x10 25#        Straight arm extension focus on retraction    RTB 3x10 HEP  RTB 3x10 RTB  3x10 RTB  3x10    Ther Activity                                 Gait Training                                 Modalities                                 Assessment IE, POC, Prognosis          Education HEP, POC, Prognosis UHEP No

## 2024-10-01 NOTE — ED PROVIDER NOTE - WR INTERPRETED BY 1
Progress Notes by Peabody, Diane, RN at 05/19/17 11:23 AM     Author:  Peabody, Diane, RN Service:  (none) Author Type:  Registered Nurse     Filed:  05/19/17 11:51 AM Encounter Date:  5/19/2017 Status:  Signed     :  Peabody, Diane, RN (Registered Nurse)              Sonia Ashby received Hydroxyprogesterone Caproate  mg/ml IM given in[DP1.1T] RUOQ[DP1.2M] which was administered uneventfully.[DP1.1T]  Pt waited in office 15 minutes after injection and left in stable condition.  Daughter present with patient during visit.[DP1.2M]    Lot #[DP1.1T] [id number][DP1.1M].   Expires[DP1.1T] 06/2019[DP1.1M].        Electronically Signed by:    Diane Peabody, RN , 5/19/2017[DP1.1T]         Revision History        User Key Date/Time User Provider Type Action    > DP1.2 05/19/17 11:51 AM Peabody, Diane, RN Registered Nurse Sign     DP1.1 05/19/17 11:20 AM Peabody, Diane, RN Registered Nurse     M - Manual, T - Template             Gloria Gutierrez

## 2024-10-01 NOTE — H&P ADULT - TIME BILLING
The necessity of the time spent during the encounter on this date of service was due to:     I spent a total of 80 minutes on the date of this encounter coordinating the patient's care. This includes reviewing documentation pertinent to this admission, results and imaging in addition to completing a history and physical examination on the patient. Further tests, medications, and procedures have been ordered as indicated. Laboratory results and the plan of care were communicated to the patient . Supporting documentation was completed and added to the patient's chart.

## 2024-10-01 NOTE — ED PROVIDER NOTE - PROGRESS NOTE DETAILS
PA: Patient seen and evaluated. Will get cardiac labs, EKG, CXR, tx BP. Reassess. ~Arnaldo Sy PA-C patient now reports last drink was 2 days ago and he maybe in withdrawal. HR elevated.  Will repeat EKG, admit patient. ~Arnaldo Sy PA-C

## 2024-10-01 NOTE — ED PROVIDER NOTE - ENMT NEGATIVE STATEMENT, MLM
Ears: no ear pain and no hearing problems. Nose: no nasal congestion and no nasal drainage. Mouth/Throat: no dysphagia, no hoarseness and no throat pain. Neck: no lumps, no pain, no stiffness and no swollen glands. Mirvaso Counseling: Mirvaso is a topical medication which can decrease superficial blood flow where applied. Side effects are uncommon and include stinging, redness and allergic reactions.

## 2024-10-01 NOTE — ED ADULT NURSE NOTE - NSFALLHARMRISKINTERV_ED_ALL_ED

## 2024-10-01 NOTE — H&P ADULT - HISTORY OF PRESENT ILLNESS
HTN, RA, Leukemia in remission since January 2023, Hx of ETOH abuse, DVT/PE  who presents to [facility] c/o elevated BP while getting IV infusion for rheumatoid arthritis at infusion center today. SBP was found to be over 200    +mild SOB and HORN  DENIES CP or chest pressure.   forgot to take his BP meds x 2 days   as per EMS, "infusion center felt uncomfortable letting him go home with his systolic pressure in the 200s"        In ED /114      RR 18   T 98.3   97% sat RA              PAST MEDICAL HX  Alcohol abuse   ATN due to NSAID overuse   HLD hyperlipidemia LDL goal <100   Leukemia   Restless leg syndrome   RA rheumatoid arteritis.     PAST SURGICAL HX  No significant past surgical history.     FAMILY HX  rheumatoid arthritis : Mother   68 year old male w  alcohol abuse, CKD 3a,  DVT  on xarelto, HTN, RA, Leukemia in remission January 2023,  BIBEMS HHED c/o elevated BP during IV infusion for RA at infusion center today.       +mild SOB and HORN  denied CP or chest pressure.   forgot to take his BP meds x 2 days   as per EMS, "infusion center felt uncomfortable letting him go home with his systolic pressure in the 200s"  Pt does not recall name of med, 1st infusion today      In ED /114      RR 18   T 98.3   97% sat RA  lopressor 5 mg IV, clonidine, ativan, nl d-dimer, trop neg x 2  EKG  w repeat       Being admitted for alcohol dependence w withdrawl        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      PAST SURGICAL HX  No significant past surgical history      FAMILY HX  rheumatoid arthritis : Mother        SOCIAL HX  alcohol : a few glasses of wine not every day; more on weekend w friends  nonsmoker

## 2024-10-01 NOTE — ED PROVIDER NOTE - OBJECTIVE STATEMENT
PA: Patient is a 69 y/o male with PMHx of HTN, RA, Leukemia in remission since January 2023, Hx of ETOH abuse,  who presents to [facility] c/o elevated BP while getting IV infusion for rheumatoid arthritis at infusion center today. SBP was found to be over 200. Patient c/o mild SOB and HORN, but DENIES CP or chest pressure. No cough/congestion, no HA. No fever. Patient forgot to take his BP meds x 2 days.  ~Arnaldo Sy PA-C

## 2024-10-01 NOTE — H&P ADULT - ASSESSMENT
68 year old male w  alcohol abuse, CKD 3a,  DVT  on xarelto, HTN, RA, Leukemia in remission January 2023,  BIBEMS HHED c/o elevated BP during IV infusion for RA at infusion center today.         #HTN uncontrolled  noncompliant w BP meds for 2 days  #Tachycardia  #Alcohol dependence w withdrawl  1. admit to med  2. s/p lopressor 5 mg IV and clonidine po in ED  3. CIWA protocol high risk w lorazepam taper  4. LR @ 125 /hr  5. fall and SZ precautions      #SOB  CXR clear d-dimer neg  1.monitor sx 68 year old male w  alcohol abuse, CKD 3a,  DVT  on xarelto, HTN, RA, Leukemia in remission January 2023,  BIBEMS HHED c/o elevated BP during IV infusion for RA at infusion center today.       #Alcohol dependence w withdrawl  #HTN uncontrolled  noncompliant w BP meds for 2 days  #Tachycardia : sinus  -109 on 2 EKGs  dehydrated on exam  1. admit to med  2. s/p lopressor 5 mg IV and clonidine po in ED  3. CIWA protocol high risk w lorazepam taper  4. starting IVF as LR @ 125 /hr  5. fall and SZ precautions  6. ativan prn Sz  7. stat ativan 2 mg now  8. thiamine 100 mg IV then po  9. folate      #Hypomagnesemia  1.2  1. Mg 2 G IV  2 trend Mg, P        #SOB  CXR clear d-dimer neg  1. monitor sx      #Anemia  stable when old labs reviewed over several months  likely d/t CKD  1. trend        #CKD 3a  no fluids given in ED  1. LR  2. trend Cr        #DVT hx  2018  1. xarelto        #GI prophylaxis w  1. protonix        #HTN  1. not sure if he takes entresto  2. continue BB  3. hydralazine 5 mg IV x 1 for       #RA multiple sites w + RF  1. continue steroids  2. infusion unknown  3. plaquenil        #VTE  AC        #GOC  FULL code    HCP son Guillermo

## 2024-10-02 LAB
ALBUMIN SERPL ELPH-MCNC: 3.8 G/DL — SIGNIFICANT CHANGE UP (ref 3.3–5)
ALP SERPL-CCNC: 37 U/L — LOW (ref 40–120)
ALT FLD-CCNC: 60 U/L — SIGNIFICANT CHANGE UP (ref 12–78)
ANION GAP SERPL CALC-SCNC: 10 MMOL/L — SIGNIFICANT CHANGE UP (ref 5–17)
AST SERPL-CCNC: 60 U/L — HIGH (ref 15–37)
BASOPHILS # BLD AUTO: 0.01 K/UL — SIGNIFICANT CHANGE UP (ref 0–0.2)
BASOPHILS NFR BLD AUTO: 0.3 % — SIGNIFICANT CHANGE UP (ref 0–2)
BILIRUB DIRECT SERPL-MCNC: 0.3 MG/DL — SIGNIFICANT CHANGE UP (ref 0–0.3)
BILIRUB INDIRECT FLD-MCNC: 0.5 MG/DL — SIGNIFICANT CHANGE UP (ref 0.2–1)
BILIRUB SERPL-MCNC: 0.8 MG/DL — SIGNIFICANT CHANGE UP (ref 0.2–1.2)
BUN SERPL-MCNC: 25 MG/DL — HIGH (ref 7–23)
CALCIUM SERPL-MCNC: 9.5 MG/DL — SIGNIFICANT CHANGE UP (ref 8.5–10.1)
CHLORIDE SERPL-SCNC: 101 MMOL/L — SIGNIFICANT CHANGE UP (ref 96–108)
CO2 SERPL-SCNC: 24 MMOL/L — SIGNIFICANT CHANGE UP (ref 22–31)
CREAT SERPL-MCNC: 1.62 MG/DL — HIGH (ref 0.5–1.3)
EGFR: 46 ML/MIN/1.73M2 — LOW
EOSINOPHIL # BLD AUTO: 0 K/UL — SIGNIFICANT CHANGE UP (ref 0–0.5)
EOSINOPHIL NFR BLD AUTO: 0 % — SIGNIFICANT CHANGE UP (ref 0–6)
GLUCOSE SERPL-MCNC: 142 MG/DL — HIGH (ref 70–99)
HCT VFR BLD CALC: 30 % — LOW (ref 39–50)
HGB BLD-MCNC: 10.4 G/DL — LOW (ref 13–17)
IMM GRANULOCYTES NFR BLD AUTO: 0.8 % — SIGNIFICANT CHANGE UP (ref 0–0.9)
LYMPHOCYTES # BLD AUTO: 0.3 K/UL — LOW (ref 1–3.3)
LYMPHOCYTES # BLD AUTO: 8.2 % — LOW (ref 13–44)
MAGNESIUM SERPL-MCNC: 1.8 MG/DL — SIGNIFICANT CHANGE UP (ref 1.6–2.6)
MCHC RBC-ENTMCNC: 34.1 PG — HIGH (ref 27–34)
MCHC RBC-ENTMCNC: 34.7 GM/DL — SIGNIFICANT CHANGE UP (ref 32–36)
MCV RBC AUTO: 98.4 FL — SIGNIFICANT CHANGE UP (ref 80–100)
MONOCYTES # BLD AUTO: 0.28 K/UL — SIGNIFICANT CHANGE UP (ref 0–0.9)
MONOCYTES NFR BLD AUTO: 7.7 % — SIGNIFICANT CHANGE UP (ref 2–14)
NEUTROPHILS # BLD AUTO: 3.03 K/UL — SIGNIFICANT CHANGE UP (ref 1.8–7.4)
NEUTROPHILS NFR BLD AUTO: 83 % — HIGH (ref 43–77)
PHOSPHATE SERPL-MCNC: 3 MG/DL — SIGNIFICANT CHANGE UP (ref 2.5–4.5)
PLATELET # BLD AUTO: 177 K/UL — SIGNIFICANT CHANGE UP (ref 150–400)
POTASSIUM SERPL-MCNC: 4.3 MMOL/L — SIGNIFICANT CHANGE UP (ref 3.5–5.3)
POTASSIUM SERPL-SCNC: 4.3 MMOL/L — SIGNIFICANT CHANGE UP (ref 3.5–5.3)
PROT SERPL-MCNC: 7.2 GM/DL — SIGNIFICANT CHANGE UP (ref 6–8.3)
RBC # BLD: 3.05 M/UL — LOW (ref 4.2–5.8)
RBC # FLD: 13.9 % — SIGNIFICANT CHANGE UP (ref 10.3–14.5)
SODIUM SERPL-SCNC: 135 MMOL/L — SIGNIFICANT CHANGE UP (ref 135–145)
WBC # BLD: 3.65 K/UL — LOW (ref 3.8–10.5)
WBC # FLD AUTO: 3.65 K/UL — LOW (ref 3.8–10.5)

## 2024-10-02 PROCEDURE — 99233 SBSQ HOSP IP/OBS HIGH 50: CPT

## 2024-10-02 RX ORDER — AMLODIPINE BESYLATE 5 MG
10 TABLET ORAL DAILY
Refills: 0 | Status: DISCONTINUED | OUTPATIENT
Start: 2024-10-02 | End: 2024-10-07

## 2024-10-02 RX ORDER — CHLORHEXIDINE GLUCONATE ORAL RINSE 1.2 MG/ML
1 SOLUTION DENTAL
Refills: 0 | Status: DISCONTINUED | OUTPATIENT
Start: 2024-10-02 | End: 2024-10-07

## 2024-10-02 RX ORDER — FOLIC ACID 1 MG/1
1 TABLET ORAL DAILY
Refills: 0 | Status: DISCONTINUED | OUTPATIENT
Start: 2024-10-02 | End: 2024-10-07

## 2024-10-02 RX ORDER — RIVAROXABAN 10 MG/1
20 TABLET, FILM COATED ORAL
Refills: 0 | Status: DISCONTINUED | OUTPATIENT
Start: 2024-10-02 | End: 2024-10-07

## 2024-10-02 RX ORDER — HYDRALAZINE HYDROCHLORIDE 100 MG/1
5 TABLET ORAL EVERY 6 HOURS
Refills: 0 | Status: DISCONTINUED | OUTPATIENT
Start: 2024-10-02 | End: 2024-10-07

## 2024-10-02 RX ORDER — HYDRALAZINE HYDROCHLORIDE 100 MG/1
5 TABLET ORAL ONCE
Refills: 0 | Status: COMPLETED | OUTPATIENT
Start: 2024-10-02 | End: 2024-10-02

## 2024-10-02 RX ORDER — MULTI VITAMIN/MINERAL SUPPLEMENT WITH ASCORBIC ACID, NIACIN, PYRIDOXINE, PANTOTHENIC ACID, FOLIC ACID, RIBOFLAVIN, THIAMIN, BIOTIN, COBALAMIN AND ZINC. 60; 20; 12.5; 10; 10; 1.7; 1.5; 1; .3; .006 MG/1; MG/1; MG/1; MG/1; MG/1; MG/1; MG/1; MG/1; MG/1; MG/1
1 TABLET, COATED ORAL DAILY
Refills: 0 | Status: DISCONTINUED | OUTPATIENT
Start: 2024-10-02 | End: 2024-10-02

## 2024-10-02 RX ORDER — THIAMINE HYDROCHLORIDE 100 MG/ML
100 INJECTION, SOLUTION INTRAMUSCULAR; INTRAVENOUS DAILY
Refills: 0 | Status: DISCONTINUED | OUTPATIENT
Start: 2024-10-02 | End: 2024-10-07

## 2024-10-02 RX ORDER — HYDRALAZINE HYDROCHLORIDE 100 MG/1
50 TABLET ORAL THREE TIMES A DAY
Refills: 0 | Status: DISCONTINUED | OUTPATIENT
Start: 2024-10-02 | End: 2024-10-07

## 2024-10-02 RX ORDER — SODIUM CHLORIDE IRRIG SOLUTION 0.9 %
1000 SOLUTION, IRRIGATION IRRIGATION
Refills: 0 | Status: DISCONTINUED | OUTPATIENT
Start: 2024-10-02 | End: 2024-10-04

## 2024-10-02 RX ADMIN — THIAMINE HYDROCHLORIDE 100 MILLIGRAM(S): 100 INJECTION, SOLUTION INTRAMUSCULAR; INTRAVENOUS at 10:16

## 2024-10-02 RX ADMIN — Medication 2 MILLIGRAM(S): at 08:26

## 2024-10-02 RX ADMIN — Medication 2 MILLIGRAM(S): at 06:37

## 2024-10-02 RX ADMIN — HYDRALAZINE HYDROCHLORIDE 50 MILLIGRAM(S): 100 TABLET ORAL at 22:10

## 2024-10-02 RX ADMIN — Medication 25 GRAM(S): at 00:14

## 2024-10-02 RX ADMIN — Medication 2 MILLIGRAM(S): at 11:40

## 2024-10-02 RX ADMIN — Medication 2 MILLIGRAM(S): at 19:21

## 2024-10-02 RX ADMIN — Medication 2 MILLIGRAM(S): at 13:54

## 2024-10-02 RX ADMIN — PREDNISONE 20 MILLIGRAM(S): 5 TABLET ORAL at 10:16

## 2024-10-02 RX ADMIN — Medication 90 MILLILITER(S): at 17:16

## 2024-10-02 RX ADMIN — Medication 2 MILLIGRAM(S): at 00:14

## 2024-10-02 RX ADMIN — Medication 2 MILLIGRAM(S): at 10:16

## 2024-10-02 RX ADMIN — Medication 10 MILLIGRAM(S): at 10:15

## 2024-10-02 RX ADMIN — ATORVASTATIN CALCIUM 10 MILLIGRAM(S): 10 TABLET, FILM COATED ORAL at 22:10

## 2024-10-02 RX ADMIN — RIVAROXABAN 20 MILLIGRAM(S): 10 TABLET, FILM COATED ORAL at 17:16

## 2024-10-02 RX ADMIN — HYDRALAZINE HYDROCHLORIDE 50 MILLIGRAM(S): 100 TABLET ORAL at 10:33

## 2024-10-02 RX ADMIN — Medication 125 MILLILITER(S): at 04:22

## 2024-10-02 RX ADMIN — Medication 2 MILLIGRAM(S): at 17:52

## 2024-10-02 RX ADMIN — Medication 2 MILLIGRAM(S): at 05:13

## 2024-10-02 RX ADMIN — FOLIC ACID 1 MILLIGRAM(S): 1 TABLET ORAL at 10:16

## 2024-10-02 RX ADMIN — Medication 2 MILLIGRAM(S): at 22:11

## 2024-10-02 RX ADMIN — THIAMINE HYDROCHLORIDE 100 MILLIGRAM(S): 100 INJECTION, SOLUTION INTRAMUSCULAR; INTRAVENOUS at 13:55

## 2024-10-02 RX ADMIN — MULTI VITAMIN/MINERAL SUPPLEMENT WITH ASCORBIC ACID, NIACIN, PYRIDOXINE, PANTOTHENIC ACID, FOLIC ACID, RIBOFLAVIN, THIAMIN, BIOTIN, COBALAMIN AND ZINC. 1 TABLET(S): 60; 20; 12.5; 10; 10; 1.7; 1.5; 1; .3; .006 TABLET, COATED ORAL at 13:55

## 2024-10-02 RX ADMIN — Medication 50 MILLIGRAM(S): at 08:24

## 2024-10-02 RX ADMIN — HYDROXYCHLOROQUINE SULFATE 200 MILLIGRAM(S): 200 TABLET, FILM COATED ORAL at 22:10

## 2024-10-02 RX ADMIN — HYDRALAZINE HYDROCHLORIDE 5 MILLIGRAM(S): 100 TABLET ORAL at 00:47

## 2024-10-02 RX ADMIN — Medication 125 MILLILITER(S): at 06:44

## 2024-10-02 RX ADMIN — HYDRALAZINE HYDROCHLORIDE 5 MILLIGRAM(S): 100 TABLET ORAL at 14:39

## 2024-10-02 NOTE — DIETITIAN INITIAL EVALUATION ADULT - ORAL INTAKE PTA/DIET HISTORY
Pt agitated and exhibiting signs/symptoms of ETOH withdrawal (shaking, sweating) at time of RD interview. Pt reports lives at home with family, he does most of the food shopping and cooking with his son. Reports eat 2-3 meals a day, consumes ETOH regularly. Reports last drank on Sunday; ~12 drinks. Pt reports random bouts of nausea. Denies any V/D/C. Pt is likely meeting <75% ENN 2/2 ETOH consumption.

## 2024-10-02 NOTE — CONSULT NOTE ADULT - ASSESSMENT
67 y/o male with significant past medical history of ETOH abuse, CKD stage III, DVT on Xarelto RA, leukemia in remission presenting to ED with c/o elevated blood pressure during IV infusion for RA at infusion center yesterday 10/01.   Patient admitted to medicine for EOTH dependence with withdrawal on CIWA order set  ICU consulted for CIWA score of 13 and agitation    Problem List  #Alcohol dependence with withdrawal  #Normocytic hyperchromic anemia   #ANTOINE on CKD  #HTN  #RA    PLAN:  Continue with CIWA scoring and treat with Ativan as per taper/PRN.   If benzodiazepines are not sufficient, may have to consider phenobarbital.  Continue with Thiamine/Folic acid/MVIT and isotonic fluids. Trend lytes and replete as/if needed.   Provide a well-balanced diet.  Continue with BB tx for HTN and autonomic hyperactivity.  Patient does not require ICU level care at this time, but if he develops worsening mental status, uncontrollable agitation, hemodynamic instability, DT's, severe withdrawal symptoms or respiratory compromise please reconsult.   Primary plan as per medicine team.  69 y/o male with significant past medical history of ETOH abuse, CKD stage III, DVT on Xarelto RA, leukemia in remission presenting to ED with c/o elevated blood pressure during IV infusion for RA at infusion center yesterday 10/01.   Patient admitted to medicine for EOTH dependence with withdrawal on CIWA order set  ICU consulted for CIWA score of 13 and agitation    Problem List  #Alcohol dependence with withdrawal  #Normocytic hyperchromic anemia   #ANTOINE on CKD  #HTN  #RA    PLAN:  Continue with CIWA scoring and treat with Ativan as per taper/PRN.   If benzodiazepines are not sufficient, may have to consider phenobarbital.  Continue with Thiamine/Folic acid/MVIT and isotonic fluids. Trend lytes and replete as/if needed.   Provide a well-balanced diet.  Continue with BB tx for HTN and autonomic hyperactivity.  Patient requiring ICU level care at this time.  67 y/o male with significant past medical history of ETOH abuse, CKD stage III, DVT on Xarelto RA, leukemia in remission presenting to ED with c/o elevated blood pressure during IV infusion for RA at infusion center yesterday 10/01.   Patient admitted to medicine for EOTH dependence with withdrawal on CIWA order set  ICU consulted for CIWA score of 13 and agitation    Problem List  #Alcohol dependence with withdrawal  #Normocytic hyperchromic anemia   #ANTOINE on CKD  #HTN  #RA    PLAN:  Refractory alcohol withdrawal requiring ICU level of care.   Precedex for breakthrough agitation which requires close monitoring and titration.  Optimize BZD therapy, standing Ativan + PRN  Continue with Thiamine/Folic acid/MVIT and isotonic fluids for hydration.  Trend lytes and replete as needed.   Well balanced diet, DASH  Ambulate and OOB as tolerated.  Incentive spirometry for lung hygiene  Continue with BB tx for HTN and autonomic hyperactivity.  On DOAC for hx VTE  Goal BGL < 18    Dispo: Patient accepted to ICU.

## 2024-10-02 NOTE — CONSULT NOTE ADULT - ASSESSMENT
A:    68M    Here for:    1. Alcohol withdrawal syndrome/DT   3. HTN urgency  3. RA  4. 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    Date of entry of this note is equal to the date of services rendered    TOTAL CRITICAL CARE TIME: 30 minutes  (EXCLUSIVE of any non bundled procedures)    Note: This is a critically ill patient. Time spent has been in salvage of life, limb and vital organ systems. This time INCLUDES time spent directly as this patient's bedside with evaluation and management, review of chart including review of laboratory and imaging studies, interpretation of vital signs and cardiac output measurements, any necessary ventilator management, and time spent discussing plan of care with patient and family, including goals of care discussion. This also includes time spent in multidisciplinary discussion with care team and various consultants to optimize treatment plan. This time may NOT include various procedures, which will be noted seperately.

## 2024-10-02 NOTE — PATIENT PROFILE ADULT - NSPROGENSOURCEINFO_GEN_A_NUR
Profile copied forward, patient confused, lethargic, unable to answer questions for himself. To the best of my knowledge this past information is still correct./patient/health record

## 2024-10-02 NOTE — DIETITIAN INITIAL EVALUATION ADULT - PERTINENT LABORATORY DATA
10-02    135  |  101  |  25[H]  ----------------------------<  142[H]  4.3   |  24  |  1.62[H]    Ca    9.5      02 Oct 2024 07:42  Phos  3.0     10-02  Mg     1.8     10-02     TPro  7.2  /  Alb  3.8  /  TBili  0.8  /  DBili  0.3  /  AST  60[H]  /  ALT  60  /  AlkPhos  37[L]  10-02

## 2024-10-02 NOTE — DIETITIAN INITIAL EVALUATION ADULT - ADD RECOMMEND
1. Consider liberalizing diet to regular to maximize caloric and nutrient intake  2. C/w MVI with minerals 1x daily to ensure 100% RDAs met  3. C/w thiamine 100 mg daily 2/2 poor PO intake/ malnutrition /ETOH abuse - at risk for RFS  4. Monitor BM; if no BM > 3 days consider implementing bowel regimen  5. Monitor lytes; replete prn - K, Phos, Mg; at risk for RFS  6. Monitor wts track/trend changes  7. Encourage protein-rich foods, maximize food preferences   RD will continue to monitor PO intake, labs, hydration, and wt prn.  1. Consider liberalizing diet to regular to maximize caloric and nutrient intake  2. C/w MVI with minerals 1x daily to ensure 100% RDAs met  3. C/w thiamine 100 mg daily 2/2 poor PO intake/ malnutrition /ETOH abuse - at risk for RFS  4. Monitor BM; if no BM > 3 days consider implementing bowel regimen  5. Monitor lytes; replete prn - K, Phos, Mg; at risk for RFS  6. Monitor wts track/trend changes  7. Encourage protein-rich foods, maximize food preferences   8. Declined all ONS options   RD will continue to monitor PO intake, labs, hydration, and wt prn.

## 2024-10-02 NOTE — DIETITIAN INITIAL EVALUATION ADULT - ETIOLOGY
r/t decreased ability to meet increased nutrient needs 2/2 ETOH dependence r/t decreased ability to meet increased nutrient needs 2/2 ETOH dependence/ withdrawal

## 2024-10-02 NOTE — DIETITIAN INITIAL EVALUATION ADULT - PERTINENT MEDS FT
MEDICATIONS  (STANDING):  atorvastatin 10 milliGRAM(s) Oral at bedtime  folic acid 1 milliGRAM(s) Oral daily  hydroxychloroquine 200 milliGRAM(s) Oral two times a day  LORazepam   Injectable 1.5 milliGRAM(s) IV Push every 4 hours  LORazepam   Injectable   IV Push   LORazepam   Injectable 2 milliGRAM(s) IV Push every 4 hours  metoprolol succinate ER 50 milliGRAM(s) Oral daily  multivitamin 1 Tablet(s) Oral daily  predniSONE   Tablet 20 milliGRAM(s) Oral daily  rivaroxaban 10 milliGRAM(s) Oral with dinner  thiamine 100 milliGRAM(s) Oral daily  thiamine Injectable 100 milliGRAM(s) IV Push once    MEDICATIONS  (PRN):  acetaminophen     Tablet .. 650 milliGRAM(s) Oral every 6 hours PRN Temp greater or equal to 38C (100.4F), Mild Pain (1 - 3)  aluminum hydroxide/magnesium hydroxide/simethicone Suspension 30 milliLiter(s) Oral every 4 hours PRN Dyspepsia  hydrALAZINE Injectable 5 milliGRAM(s) IV Push every 6 hours PRN BP Systolic >180  LORazepam   Injectable 2 milliGRAM(s) IV Push every 2 hours PRN Symptom-triggered: 2 point increase in CIWA -Ar score and a total score of 7 or LESS  LORazepam   Injectable 2 milliGRAM(s) IV Push every 1 hour PRN Symptom-triggered: each CIWA -Ar score 8 or GREATER  LORazepam   Injectable 2 milliGRAM(s) IV Push once PRN seizure   MEDICATIONS  (STANDING):  atorvastatin 10 milliGRAM(s) Oral at bedtime  folic acid 1 milliGRAM(s) Oral daily  hydroxychloroquine 200 milliGRAM(s) Oral two times a day  LORazepam   Injectable 1.5 milliGRAM(s) IV Push every 4 hours  LORazepam   Injectable   IV Push   LORazepam   Injectable 2 milliGRAM(s) IV Push every 4 hours  metoprolol succinate ER 50 milliGRAM(s) Oral daily  multivitamin 1 Tablet(s) Oral daily  predniSONE   Tablet 20 milliGRAM(s) Oral daily  rivaroxaban 10 milliGRAM(s) Oral with dinner  thiamine 100 milliGRAM(s) Oral daily  thiamine Injectable 100 milliGRAM(s) IV Push once    MEDICATIONS  (PRN):  acetaminophen     Tablet .. 650 milliGRAM(s) Oral every 6 hours PRN Temp greater or equal to 38C (100.4F), Mild Pain (1 - 3)  aluminum hydroxide/magnesium hydroxide/simethicone Suspension 30 milliLiter(s) Oral every 4 hours PRN Dyspepsia  hydrALAZINE Injectable 5 milliGRAM(s) IV Push every 6 hours PRN BP Systolic >180  LORazepam   Injectable 2 milliGRAM(s) IV Push every 2 hours PRN Symptom-triggered: 2 point increase in CIWA -Ar score and a total score of 7 or LESS  LORazepam   Injectable 2 milliGRAM(s) IV Push every 1 hour PRN Symptom-triggered: each CIWA -Ar score 8 or GREATER  LORazepam   Injectable 2 milliGRAM(s) IV Push once PRN seizure    Home Medications:  erythromycin 0.5% ophthalmic ointment: 1 gram(s) in each eye once a day (at bedtime) for 7 days ***COMPLETE*** (01 Oct 2024 22:15)  folic acid 1 mg oral tablet: 1 tab(s) orally once a day ***pt not sure if he is still taking, or if he has supply at home*** (01 Oct 2024 22:12)  hydroxychloroquine 200 mg oral tablet: 1 tab(s) orally 2 times a day (01 Oct 2024 22:13)  Livalo 2 mg oral tablet: 1 tab(s) orally once a day (01 Oct 2024 22:13)  metoprolol succinate 50 mg oral tablet, extended release: 1 tab(s) orally once a day ***pt not sure if he is still taking, or if he has supply at home*** (01 Oct 2024 22:12)  predniSONE 5 mg oral tablet: 4 tab(s) orally once a day (01 Oct 2024 22:13)  sacubitril-valsartan 24 mg-26 mg oral tablet: 1 tab(s) orally 2 times a day ***pt not sure if he is still taking, or if he has supply at home*** (01 Oct 2024 22:13)  tobramycin-dexamethasone 0.3%-0.1% ophthalmic suspension: 1 drop(s) in each eye 3 times a day for 7 days ***COMPLETE*** (01 Oct 2024 22:15)  Xarelto 20 mg oral tablet: 1 tab(s) orally once a day (at bedtime) ***last filled 6/14/24 for 90 day supply*** (01 Oct 2024 22:15)

## 2024-10-02 NOTE — DIETITIAN INITIAL EVALUATION ADULT - OTHER INFO
67 y/o M w/ ETOH abuse, CKD 3a, DVT on xarelto, HTN, RA, Leukemia in remission January 2023,  BIB[facility]S [facility] c/o elevated BP during IV infusion for RA at infusion center today +mild SOB and HORN; as per EMS, "infusion center felt uncomfortable letting him go home with his systolic pressure in the 200s"; denied CP or chest pressure. Forgot to take his BP meds x 2 days. Hypomagnesemia; trend Mg, P. Seizure precautions, ativan prn. Admitting diagnosis: ETOH, hypomagnesemia, HTN uncontrolled.     Pt known to nutr services previously met criteria for PCM. Pt with breakfast tray at time of RD visit; RD reviewed tray pt ate <50% of breakfast. Pt denies any N/V/D/C since admit. Pt reports UBW ~181-191#; states wt is pretty stable - endorses no know wt changes at this time. Bed scale wt obtained by # on 10/2. Wt history reviewed: RD obtained bed scale wt on 6/4 - 191#; per EMR admit wt on 5/30 - 199#. 13# wt loss/ 6.8% x 4 month not clin sig. Monitor closely for refeeding syndrome - please obtain BMP, Mg, and Phos levels. Monitor and replete K, Phos, Mg prn if begins to downtrend, especially if IV dextrose started. Consider adding 200mg of thiamine and MVI w/ minerals daily. Pt appears overweight; NFPE reveals mild-mod muscle/fat wasting. Consider liberalizing diet to regular to maximize caloric and nutrient intake. Pt denied all ONS at this time; stated "they are not good for you" despite RD advising of nutritional value. See additional recommendations below.  69 y/o M w/ ETOH abuse, CKD 3a, DVT on xarelto, HTN, RA, Leukemia in remission January 2023, BIB[facility]S [facility] c/o elevated BP during IV infusion for RA at infusion center today +mild SOB and HORN; as per EMS, "infusion center felt uncomfortable letting him go home with his systolic pressure in the 200s." Forgot to take his BP meds x 2 days. Hypomagnesemia; trend Mg, P. Seizure precautions, ativan prn. Admitting diagnosis: ETOH, hypomagnesemia, HTN uncontrolled.     Pt known to nutr services previously met criteria for PCM. Pt with breakfast tray at time of RD visit; RD reviewed tray pt ate <50% of breakfast. Pt denies any N/V/D/C since admit. Pt reports UBW ~181-191#; states wt is pretty stable - endorses no know wt changes at this time. Bed scale wt obtained by # on 10/2. Wt history reviewed: RD obtained bed scale wt on 6/4 - 191#; per EMR admit wt on 5/30 - 199#. 13# wt loss/ 6.8% x 4 month not clin sig. Monitor closely for refeeding syndrome - please obtain BMP, Mg, and Phos levels. Monitor and replete K, Phos, Mg prn if begins to downtrend. C/w thiamine, folic acid, and MVI supplementation. Pt appears overweight; NFPE reveals mild-mod muscle/fat wasting. Consider liberalizing diet to regular to maximize caloric and nutrient intake. Pt denied all ONS at this time; stated "they are not good for you" despite RD advising of nutritional value. See additional recommendations below.

## 2024-10-02 NOTE — DIETITIAN INITIAL EVALUATION ADULT - NAME AND PHONE
Darlene Preciado, PhD, MS, RDN  Darlene Preciado, PhD, MS, RDN   Sujey Fuentes, MS, RDN, CDN, [facility] [phone]  Certified Nutrition

## 2024-10-02 NOTE — CONSULT NOTE ADULT - SUBJECTIVE AND OBJECTIVE BOX
Patient is a 68y old  Male who presents with a chief complaint of alcohol dependence w withdrawl (01 Oct 2024 23:09)      HPI:  68 year old male w  alcohol abuse, CKD 3a,  DVT  on xarelto, HTN, RA, Leukemia in remission January 2023,  BIBEMS HHED c/o elevated BP during IV infusion for RA at infusion center today.       +mild SOB and HORN  denied CP or chest pressure.   forgot to take his BP meds x 2 days   as per EMS, "infusion center felt uncomfortable letting him go home with his systolic pressure in the 200s"  Pt does not recall name of med, 1st infusion today      In ED /114      RR 18   T 98.3   97% sat RA  lopressor 5 mg IV, clonidine, ativan, nl d-dimer, trop neg x 2  EKG  w repeat       Being admitted for alcohol dependence w withdrawl        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      PAST SURGICAL HX  No significant past surgical history      FAMILY HX  rheumatoid arthritis : Mother        SOCIAL HX  alcohol : a few glasses of wine not every day; more on weekend w friends  nonsmoker (01 Oct 2024 23:09)      PAST MEDICAL & SURGICAL HISTORY:  ETOH abuse      Hyperlipidemia LDL goal <100      Restless leg syndrome      Rheumatoid arteritis      Leukemia      No significant past surgical history          MEDICATIONS  (STANDING):  atorvastatin 10 milliGRAM(s) Oral at bedtime  folic acid 1 milliGRAM(s) Oral daily  hydroxychloroquine 200 milliGRAM(s) Oral two times a day  LORazepam   Injectable 1.5 milliGRAM(s) IV Push every 4 hours  LORazepam   Injectable 2 milliGRAM(s) IV Push every 4 hours  LORazepam   Injectable   IV Push   metoprolol succinate ER 50 milliGRAM(s) Oral daily  multivitamin 1 Tablet(s) Oral daily  predniSONE   Tablet 20 milliGRAM(s) Oral daily  rivaroxaban 10 milliGRAM(s) Oral with dinner  thiamine 100 milliGRAM(s) Oral daily  thiamine Injectable 100 milliGRAM(s) IV Push once    MEDICATIONS  (PRN):  acetaminophen     Tablet .. 650 milliGRAM(s) Oral every 6 hours PRN Temp greater or equal to 38C (100.4F), Mild Pain (1 - 3)  aluminum hydroxide/magnesium hydroxide/simethicone Suspension 30 milliLiter(s) Oral every 4 hours PRN Dyspepsia  hydrALAZINE Injectable 5 milliGRAM(s) IV Push every 6 hours PRN BP Systolic >180  LORazepam   Injectable 2 milliGRAM(s) IV Push once PRN seizure  LORazepam   Injectable 2 milliGRAM(s) IV Push every 1 hour PRN Symptom-triggered: each CIWA -Ar score 8 or GREATER  LORazepam   Injectable 2 milliGRAM(s) IV Push every 2 hours PRN Symptom-triggered: 2 point increase in CIWA -Ar score and a total score of 7 or LESS      FAMILY HISTORY:  FHx: rheumatoid arthritis (Mother)        SOCIAL HISTORY:    REVIEW OF SYSTEMS:  CONSTITUTIONAL:    No fatigue, malaise, lethargy.  No fever or chills.  RESPIRATORY:  No cough.  No wheeze.  No hemoptysis.    CARDIOVASCULAR:  No chest pains.  No palpitations. No shortness of breath, No orthopnea or PND.  GASTROINTESTINAL:  No abdominal pain.  No nausea or vomiting.    GENITOURINARY:    No hematuria.    MUSCULOSKELETAL:  No musculoskeletal pain.  No joint swelling.  No arthritis.  NEUROLOGICAL:  No tingling or numbness or weakness.  PSYCHIATRIC:  No confusion  SKIN:  No rashes.            Vital Signs Last 24 Hrs  T(C): 36.3 (02 Oct 2024 06:32), Max: 36.8 (01 Oct 2024 17:19)  T(F): 97.3 (02 Oct 2024 06:32), Max: 98.3 (01 Oct 2024 17:19)  HR: 62 (02 Oct 2024 06:32) (62 - 157)  BP: 188/91 (02 Oct 2024 06:32) (157/89 - 223/118)  BP(mean): 138 (01 Oct 2024 18:12) (138 - 138)  RR: 18 (02 Oct 2024 06:32) (18 - 19)  SpO2: 97% (02 Oct 2024 06:32) (96% - 99%)    Parameters below as of 02 Oct 2024 06:32  Patient On (Oxygen Delivery Method): room air        PHYSICAL EXAM-    Constitutional:  no acute distress     Head: Head is normocephalic and atraumatic.      Neck:  No JVD.     Cardiovascular: Regular rate and rhythm without S3, S4. No murmurs or rubs are appreciated.      Respiratory: Breathsounds are normal. No rales. No wheezing.    Abdomen: Soft, nontender, nondistended with positive bowel sounds.      Extremity: No tenderness. No  pitting edema     Neurologic: The patient is alert and oriented.      Skin: No rash, no obvious lesions noted.      Psychiatric: The patient appears to be emotionally stable.      INTERPRETATION OF TELEMETRY:    ECG: Sinus rythm ,  no ST T changes.     I&O's Detail      LABS:                        10.4   3.65  )-----------( 177      ( 02 Oct 2024 07:42 )             30.0     10-02    135  |  101  |  25[H]  ----------------------------<  142[H]  4.3   |  24  |  1.62[H]    Ca    9.5      02 Oct 2024 07:42  Phos  3.0     10-02  Mg     1.8     10-02    TPro  7.2  /  Alb  3.8  /  TBili  0.8  /  DBili  0.3  /  AST  60[H]  /  ALT  60  /  AlkPhos  37[L]  10-02          Urinalysis Basic - ( 02 Oct 2024 07:42 )    Color: x / Appearance: x / SG: x / pH: x  Gluc: 142 mg/dL / Ketone: x  / Bili: x / Urobili: x   Blood: x / Protein: x / Nitrite: x   Leuk Esterase: x / RBC: x / WBC x   Sq Epi: x / Non Sq Epi: x / Bacteria: x      I&O's Summary    BNP  RADIOLOGY & ADDITIONAL STUDIES: Patient is a 68y old  Male who presents with a chief complaint of alcohol dependence w withdrawl      HPI:  68 year old male w  alcohol abuse, CKD 3a,  DVT  on xarelto, HTN, RA, Leukemia in remission January 2023,  BIBEMS HHED c/o elevated BP during IV infusion for RA at infusion center.    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      PAST SURGICAL HX  No significant past surgical history      FAMILY HX  rheumatoid arthritis : Mother        SOCIAL HX  alcohol : a few glasses of wine not every day; more on weekend w friends  nonsmoker (01 Oct 2024 23:09)      PAST MEDICAL & SURGICAL HISTORY:  ETOH abuse      Hyperlipidemia LDL goal <100      Restless leg syndrome      Rheumatoid arteritis      Leukemia      No significant past surgical history          MEDICATIONS  (STANDING):  atorvastatin 10 milliGRAM(s) Oral at bedtime  folic acid 1 milliGRAM(s) Oral daily  hydroxychloroquine 200 milliGRAM(s) Oral two times a day  LORazepam   Injectable 1.5 milliGRAM(s) IV Push every 4 hours  LORazepam   Injectable 2 milliGRAM(s) IV Push every 4 hours  LORazepam   Injectable   IV Push   metoprolol succinate ER 50 milliGRAM(s) Oral daily  multivitamin 1 Tablet(s) Oral daily  predniSONE   Tablet 20 milliGRAM(s) Oral daily  rivaroxaban 10 milliGRAM(s) Oral with dinner  thiamine 100 milliGRAM(s) Oral daily  thiamine Injectable 100 milliGRAM(s) IV Push once    MEDICATIONS  (PRN):  acetaminophen     Tablet .. 650 milliGRAM(s) Oral every 6 hours PRN Temp greater or equal to 38C (100.4F), Mild Pain (1 - 3)  aluminum hydroxide/magnesium hydroxide/simethicone Suspension 30 milliLiter(s) Oral every 4 hours PRN Dyspepsia  hydrALAZINE Injectable 5 milliGRAM(s) IV Push every 6 hours PRN BP Systolic >180  LORazepam   Injectable 2 milliGRAM(s) IV Push once PRN seizure  LORazepam   Injectable 2 milliGRAM(s) IV Push every 1 hour PRN Symptom-triggered: each CIWA -Ar score 8 or GREATER  LORazepam   Injectable 2 milliGRAM(s) IV Push every 2 hours PRN Symptom-triggered: 2 point increase in CIWA -Ar score and a total score of 7 or LESS      FAMILY HISTORY:  FHx: rheumatoid arthritis (Mother)        SOCIAL HISTORY:    REVIEW OF SYSTEMS:  CONSTITUTIONAL:    No fatigue, malaise, lethargy.  No fever or chills.  RESPIRATORY:  No cough.  No wheeze.  No hemoptysis.    CARDIOVASCULAR:  No chest pains.  No palpitations. No shortness of breath, No orthopnea or PND.  GASTROINTESTINAL:  No abdominal pain.  No nausea or vomiting.    GENITOURINARY:    No hematuria.    MUSCULOSKELETAL:  No musculoskeletal pain.  No joint swelling.  No arthritis.  NEUROLOGICAL:  No tingling or numbness or weakness.  PSYCHIATRIC:  No confusion  SKIN:  No rashes.            Vital Signs Last 24 Hrs  T(C): 36.3 (02 Oct 2024 06:32), Max: 36.8 (01 Oct 2024 17:19)  T(F): 97.3 (02 Oct 2024 06:32), Max: 98.3 (01 Oct 2024 17:19)  HR: 62 (02 Oct 2024 06:32) (62 - 157)  BP: 188/91 (02 Oct 2024 06:32) (157/89 - 223/118)  BP(mean): 138 (01 Oct 2024 18:12) (138 - 138)  RR: 18 (02 Oct 2024 06:32) (18 - 19)  SpO2: 97% (02 Oct 2024 06:32) (96% - 99%)    Parameters below as of 02 Oct 2024 06:32  Patient On (Oxygen Delivery Method): room air        PHYSICAL EXAM-    Constitutional:  no acute distress     Head: Head is normocephalic and atraumatic.      Neck:  No JVD.     Cardiovascular: Regular rate and rhythm without S3, S4. No murmurs or rubs are appreciated.      Respiratory: Breathsounds are normal. No rales. No wheezing.    Abdomen: Soft, nontender, nondistended with positive bowel sounds.      Extremity: No tenderness. No  pitting edema     Neurologic: The patient is alert and oriented.      Skin: No rash, no obvious lesions noted.      Psychiatric: The patient appears to be emotionally stable.      INTERPRETATION OF TELEMETRY:    ECG: Sinus rythm ,  no ST T changes.     I&O's Detail      LABS:                        10.4   3.65  )-----------( 177      ( 02 Oct 2024 07:42 )             30.0     10-02    135  |  101  |  25[H]  ----------------------------<  142[H]  4.3   |  24  |  1.62[H]    Ca    9.5      02 Oct 2024 07:42  Phos  3.0     10-02  Mg     1.8     10-02    TPro  7.2  /  Alb  3.8  /  TBili  0.8  /  DBili  0.3  /  AST  60[H]  /  ALT  60  /  AlkPhos  37[L]  10-02          Urinalysis Basic - ( 02 Oct 2024 07:42 )    Color: x / Appearance: x / SG: x / pH: x  Gluc: 142 mg/dL / Ketone: x  / Bili: x / Urobili: x   Blood: x / Protein: x / Nitrite: x   Leuk Esterase: x / RBC: x / WBC x   Sq Epi: x / Non Sq Epi: x / Bacteria: x      I&O's Summary    BNP  RADIOLOGY & ADDITIONAL STUDIES: Patient is a 68y old  Male who presents with a chief complaint of alcohol dependence w withdrawl      HPI:  68 year old male w  alcohol abuse, CKD 3a,  DVT  on xarelto, HTN, RA, Leukemia in remission January 2023,  BIBEMS HHED c/o elevated BP during IV infusion for RA at infusion center.    Patient was evaluated by me at the infusion center and sent to the ER  His SBP was 210 and he did not take meds in am that day per son.   EKG- no ischemic changes    In ED /114      RR 18   T 98.3   97% sat RA  lopressor 5 mg IV, clonidine, ativan, nl d-dimer, trop neg x 2  EKG  w repeat     10/2- Mr Hatfield was seen and examined by me this am around 730 am.  He was alert but confused.  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      PAST SURGICAL HX  No significant past surgical history      FAMILY HX  rheumatoid arthritis : Mother        SOCIAL HX  alcohol : a few glasses of wine not every day; more on weekend w friends  nonsmoker (01 Oct 2024 23:09)      PAST MEDICAL & SURGICAL HISTORY:  ETOH abuse      Hyperlipidemia LDL goal <100      Restless leg syndrome      Rheumatoid arteritis      Leukemia      No significant past surgical history          MEDICATIONS  (STANDING):  atorvastatin 10 milliGRAM(s) Oral at bedtime  folic acid 1 milliGRAM(s) Oral daily  hydroxychloroquine 200 milliGRAM(s) Oral two times a day  LORazepam   Injectable 1.5 milliGRAM(s) IV Push every 4 hours  LORazepam   Injectable 2 milliGRAM(s) IV Push every 4 hours  LORazepam   Injectable   IV Push   metoprolol succinate ER 50 milliGRAM(s) Oral daily  multivitamin 1 Tablet(s) Oral daily  predniSONE   Tablet 20 milliGRAM(s) Oral daily  rivaroxaban 10 milliGRAM(s) Oral with dinner  thiamine 100 milliGRAM(s) Oral daily  thiamine Injectable 100 milliGRAM(s) IV Push once    MEDICATIONS  (PRN):  acetaminophen     Tablet .. 650 milliGRAM(s) Oral every 6 hours PRN Temp greater or equal to 38C (100.4F), Mild Pain (1 - 3)  aluminum hydroxide/magnesium hydroxide/simethicone Suspension 30 milliLiter(s) Oral every 4 hours PRN Dyspepsia  hydrALAZINE Injectable 5 milliGRAM(s) IV Push every 6 hours PRN BP Systolic >180  LORazepam   Injectable 2 milliGRAM(s) IV Push once PRN seizure  LORazepam   Injectable 2 milliGRAM(s) IV Push every 1 hour PRN Symptom-triggered: each CIWA -Ar score 8 or GREATER  LORazepam   Injectable 2 milliGRAM(s) IV Push every 2 hours PRN Symptom-triggered: 2 point increase in CIWA -Ar score and a total score of 7 or LESS      FAMILY HISTORY:  FHx: rheumatoid arthritis (Mother)        SOCIAL HISTORY:    REVIEW OF SYSTEMS:  CONSTITUTIONAL:    No fatigue, malaise, lethargy.  No fever or chills.  RESPIRATORY:  No cough.  No wheeze.  No hemoptysis.    CARDIOVASCULAR:  No chest pains.  No palpitations. No shortness of breath, No orthopnea or PND.  GASTROINTESTINAL:  No abdominal pain.  No nausea or vomiting.    GENITOURINARY:    No hematuria.    MUSCULOSKELETAL:  No musculoskeletal pain.  No joint swelling.  No arthritis.  NEUROLOGICAL:  No tingling or numbness or weakness.  PSYCHIATRIC:  c/o confusion  SKIN:  No rashes.            Vital Signs Last 24 Hrs  T(C): 36.3 (02 Oct 2024 06:32), Max: 36.8 (01 Oct 2024 17:19)  T(F): 97.3 (02 Oct 2024 06:32), Max: 98.3 (01 Oct 2024 17:19)  HR: 62 (02 Oct 2024 06:32) (62 - 157)  BP: 188/91 (02 Oct 2024 06:32) (157/89 - 223/118)  BP(mean): 138 (01 Oct 2024 18:12) (138 - 138)  RR: 18 (02 Oct 2024 06:32) (18 - 19)  SpO2: 97% (02 Oct 2024 06:32) (96% - 99%)    Parameters below as of 02 Oct 2024 06:32  Patient On (Oxygen Delivery Method): room air        PHYSICAL EXAM-    Constitutional:  no acute distress     Head: Head is normocephalic and atraumatic.      Neck:  No JVD.     Cardiovascular: Regular rate and rhythm without S3, S4. No murmurs or rubs are appreciated.      Respiratory: Breathsounds are normal. No rales. No wheezing.    Abdomen: Soft, nontender, nondistended with positive bowel sounds.      Extremity: No tenderness. No  pitting edema , tremulous    Neurologic: The patient is alert but not oriented    Skin: No rash, no obvious lesions noted.      Psychiatric: The patient appears to be emotionally stable.      INTERPRETATION OF TELEMETRY: not on     ECG: Sinus rythm ,  no ST T changes.     I&O's Detail      LABS:                        10.4   3.65  )-----------( 177      ( 02 Oct 2024 07:42 )             30.0     10-02    135  |  101  |  25[H]  ----------------------------<  142[H]  4.3   |  24  |  1.62[H]    Ca    9.5      02 Oct 2024 07:42  Phos  3.0     10-02  Mg     1.8     10-02    TPro  7.2  /  Alb  3.8  /  TBili  0.8  /  DBili  0.3  /  AST  60[H]  /  ALT  60  /  AlkPhos  37[L]  10-02          Urinalysis Basic - ( 02 Oct 2024 07:42 )    Color: x / Appearance: x / SG: x / pH: x  Gluc: 142 mg/dL / Ketone: x  / Bili: x / Urobili: x   Blood: x / Protein: x / Nitrite: x   Leuk Esterase: x / RBC: x / WBC x   Sq Epi: x / Non Sq Epi: x / Bacteria: x      I&O's Summary    BNP  RADIOLOGY & ADDITIONAL STUDIES:  < from: Xray Chest 1 View- PORTABLE-Urgent (10.01.24 @ 19:34) >  Clear lungs.    --- End of Report ---            STEFANIA HORTON MD; Attending Radiologist  This document has been electronically signed. Oct  1 2024  9:57PM    < end of copied text >  < from: TTE W or WO Ultrasound Enhancing Agent (06.01.24 @ 08:49) >      1. Left ventricular cavity is normal in size. Left ventricular wall thickness is mildly increased. Left ventricular systolic function is moderately to severely decreased with an ejection fraction visually estimated at 35 to 40 %. Global left ventricular hypokinesis.   2. Moderate mitral regurgitation.   3. Mild to moderate tricuspid regurgitation.   4. Mild aortic regurgitation.   5. Mild left ventricular hypertrophy.   6. Moderate aortic stenosis.   7. Moderate pulmonary hypertension.   8. Technically difficult image quality.    < end of copied text >

## 2024-10-02 NOTE — CONSULT NOTE ADULT - SUBJECTIVE AND OBJECTIVE BOX
CHIEF COMPLAINT: HTN while at infusion center    HPI:  67 y/o male with significant past medical history of ETOH abuse, CKD stage III, DVT on Xarelto RA, leukemia in remission presenting to ED with c/o elevated blood pressure during IV infusion for RA at infusion center yesterday 10/01.    PAST MEDICAL & SURGICAL HISTORY:  ETOH abuse  Hyperlipidemia LDL goal <100  Restless leg syndrome   Rheumatoid arteritis  Leukemia    No significant past surgical history    FAMILY HISTORY:  FHx: rheumatoid arthritis (Mother)    Allergies  Aloe Lotion (Rash)    REVIEW OF SYSTEMS:  UTO, patient medicated @ 1140 AM with 2mg of Ativan for agitation and had just fallen asleep as per bedside 1:1 staff member.     OBJECTIVE:  ICU Vital Signs Last 24 Hrs  T(C): 36.6 (02 Oct 2024 10:00), Max: 36.8 (01 Oct 2024 17:19)  T(F): 97.9 (02 Oct 2024 10:00), Max: 98.3 (01 Oct 2024 17:19)  HR: 89 (02 Oct 2024 11:00) (62 - 157)  BP: 155/90 (02 Oct 2024 11:00) (155/90 - 223/118)  BP(mean): 138 (01 Oct 2024 18:12) (138 - 138)  RR: 18 (02 Oct 2024 11:00) (18 - 20)  SpO2: 95% (02 Oct 2024 11:00) (94% - 99%)    O2 Parameters below as of 02 Oct 2024 11:00  Patient On (Oxygen Delivery Method): room air    PHYSICAL EXAM:  Patient sleeping    HOSPITAL MEDICATIONS:  MEDICATIONS  (STANDING):  amLODIPine   Tablet 10 milliGRAM(s) Oral daily  atorvastatin 10 milliGRAM(s) Oral at bedtime  folic acid 1 milliGRAM(s) Oral daily  hydrALAZINE 50 milliGRAM(s) Oral three times a day  hydroxychloroquine 200 milliGRAM(s) Oral two times a day  lactated ringers. 1000 milliLiter(s) (90 mL/Hr) IV Continuous <Continuous>  LORazepam   Injectable 1.5 milliGRAM(s) IV Push every 4 hours  LORazepam   Injectable   IV Push   LORazepam   Injectable 2 milliGRAM(s) IV Push every 4 hours  metoprolol succinate ER 50 milliGRAM(s) Oral daily  multivitamin 1 Tablet(s) Oral daily  predniSONE   Tablet 20 milliGRAM(s) Oral daily  rivaroxaban 10 milliGRAM(s) Oral with dinner  thiamine 100 milliGRAM(s) Oral daily    MEDICATIONS  (PRN):  acetaminophen     Tablet .. 650 milliGRAM(s) Oral every 6 hours PRN Temp greater or equal to 38C (100.4F), Mild Pain (1 - 3)  aluminum hydroxide/magnesium hydroxide/simethicone Suspension 30 milliLiter(s) Oral every 4 hours PRN Dyspepsia  hydrALAZINE Injectable 5 milliGRAM(s) IV Push every 6 hours PRN BP Systolic >180  LORazepam   Injectable 2 milliGRAM(s) IV Push every 1 hour PRN Symptom-triggered: each CIWA -Ar score 8 or GREATER  LORazepam   Injectable 2 milliGRAM(s) IV Push every 2 hours PRN Symptom-triggered: 2 point increase in CIWA -Ar score and a total score of 7 or LESS  LORazepam   Injectable 2 milliGRAM(s) IV Push once PRN seizure    LABS:                        10.4   3.65  )-----------( 177      ( 02 Oct 2024 07:42 )             30.0     10-02    135  |  101  |  25[H]  ----------------------------<  142[H]  4.3   |  24  |  1.62[H]    Ca    9.5      02 Oct 2024 07:42  Phos  3.0     10-02  Mg     1.8     10-02    TPro  7.2  /  Alb  3.8  /  TBili  0.8  /  DBili  0.3  /  AST  60[H]  /  ALT  60  /  AlkPhos  37[L]  10-02    Urinalysis Basic - ( 02 Oct 2024 07:42 )  Color: x / Appearance: x / SG: x / pH: x  Gluc: 142 mg/dL / Ketone: x  / Bili: x / Urobili: x   Blood: x / Protein: x / Nitrite: x   Leuk Esterase: x / RBC: x / WBC x   Sq Epi: x / Non Sq Epi: x / Bacteria: x    RADIOLOGY:  < from: Xray Chest 1 View- PORTABLE-Urgent (10.01.24 @ 19:34) >  IMPRESSION:  Clear lungs.  --- End of Report ---  < end of copied text >    EKG:  < from: 12 Lead ECG (10.01.24 @ 18:53) >  Ventricular Rate 119 BPM  Atrial Rate 119 BPM  P-R Interval 208 ms  QRS Duration 90 ms  Q-T Interval 322 ms  QTC Calculation(Bazett) 452 ms  P Axis 48 degrees  R Axis -16 degrees  T Axis 35 degrees    Diagnosis Line Sinus tachycardia  Otherwise normal ECG  When compared with ECG of 01-OCT-2024 17:26,  No significant change was found  Confirmed by Palla MD, Anthony ([id number]) on 10/2/2024 7:35:39 AM    < end of copied text > CHIEF COMPLAINT: HTN while at infusion center    HPI:  69 y/o male with significant past medical history of ETOH abuse, CKD stage III, DVT on Xarelto RA, leukemia in remission presenting to ED with c/o elevated blood pressure during IV infusion for RA at infusion center yesterday 10/01.    PAST MEDICAL & SURGICAL HISTORY:  ETOH abuse  Hyperlipidemia LDL goal <100  Restless leg syndrome   Rheumatoid arteritis  Leukemia    No significant past surgical history    FAMILY HISTORY:  FHx: rheumatoid arthritis (Mother)    Allergies  Aloe Lotion (Rash)    REVIEW OF SYSTEMS:      OBJECTIVE:  ICU Vital Signs Last 24 Hrs  T(C): 36.6 (02 Oct 2024 10:00), Max: 36.8 (01 Oct 2024 17:19)  T(F): 97.9 (02 Oct 2024 10:00), Max: 98.3 (01 Oct 2024 17:19)  HR: 89 (02 Oct 2024 11:00) (62 - 157)  BP: 155/90 (02 Oct 2024 11:00) (155/90 - 223/118)  BP(mean): 138 (01 Oct 2024 18:12) (138 - 138)  RR: 18 (02 Oct 2024 11:00) (18 - 20)  SpO2: 95% (02 Oct 2024 11:00) (94% - 99%)    O2 Parameters below as of 02 Oct 2024 11:00  Patient On (Oxygen Delivery Method): room air    PHYSICAL EXAM:      HOSPITAL MEDICATIONS:  MEDICATIONS  (STANDING):  amLODIPine   Tablet 10 milliGRAM(s) Oral daily  atorvastatin 10 milliGRAM(s) Oral at bedtime  folic acid 1 milliGRAM(s) Oral daily  hydrALAZINE 50 milliGRAM(s) Oral three times a day  hydroxychloroquine 200 milliGRAM(s) Oral two times a day  lactated ringers. 1000 milliLiter(s) (90 mL/Hr) IV Continuous <Continuous>  LORazepam   Injectable 1.5 milliGRAM(s) IV Push every 4 hours  LORazepam   Injectable   IV Push   LORazepam   Injectable 2 milliGRAM(s) IV Push every 4 hours  metoprolol succinate ER 50 milliGRAM(s) Oral daily  multivitamin 1 Tablet(s) Oral daily  predniSONE   Tablet 20 milliGRAM(s) Oral daily  rivaroxaban 10 milliGRAM(s) Oral with dinner  thiamine 100 milliGRAM(s) Oral daily    MEDICATIONS  (PRN):  acetaminophen     Tablet .. 650 milliGRAM(s) Oral every 6 hours PRN Temp greater or equal to 38C (100.4F), Mild Pain (1 - 3)  aluminum hydroxide/magnesium hydroxide/simethicone Suspension 30 milliLiter(s) Oral every 4 hours PRN Dyspepsia  hydrALAZINE Injectable 5 milliGRAM(s) IV Push every 6 hours PRN BP Systolic >180  LORazepam   Injectable 2 milliGRAM(s) IV Push every 1 hour PRN Symptom-triggered: each CIWA -Ar score 8 or GREATER  LORazepam   Injectable 2 milliGRAM(s) IV Push every 2 hours PRN Symptom-triggered: 2 point increase in CIWA -Ar score and a total score of 7 or LESS  LORazepam   Injectable 2 milliGRAM(s) IV Push once PRN seizure    LABS:                        10.4   3.65  )-----------( 177      ( 02 Oct 2024 07:42 )             30.0     10-02    135  |  101  |  25[H]  ----------------------------<  142[H]  4.3   |  24  |  1.62[H]    Ca    9.5      02 Oct 2024 07:42  Phos  3.0     10-02  Mg     1.8     10-02    TPro  7.2  /  Alb  3.8  /  TBili  0.8  /  DBili  0.3  /  AST  60[H]  /  ALT  60  /  AlkPhos  37[L]  10-02    Urinalysis Basic - ( 02 Oct 2024 07:42 )  Color: x / Appearance: x / SG: x / pH: x  Gluc: 142 mg/dL / Ketone: x  / Bili: x / Urobili: x   Blood: x / Protein: x / Nitrite: x   Leuk Esterase: x / RBC: x / WBC x   Sq Epi: x / Non Sq Epi: x / Bacteria: x    RADIOLOGY:  < from: Xray Chest 1 View- PORTABLE-Urgent (10.01.24 @ 19:34) >  IMPRESSION:  Clear lungs.  --- End of Report ---  < end of copied text >    EKG:  < from: 12 Lead ECG (10.01.24 @ 18:53) >  Ventricular Rate 119 BPM  Atrial Rate 119 BPM  P-R Interval 208 ms  QRS Duration 90 ms  Q-T Interval 322 ms  QTC Calculation(Bazett) 452 ms  P Axis 48 degrees  R Axis -16 degrees  T Axis 35 degrees    Diagnosis Line Sinus tachycardia  Otherwise normal ECG  When compared with ECG of 01-OCT-2024 17:26,  No significant change was found  Confirmed by Palla MD, Anthony ([id number]) on 10/2/2024 7:35:39 AM    < end of copied text > CHIEF COMPLAINT: HTN while at infusion center    HPI:  67 y/o male with significant past medical history of ETOH abuse, CKD stage III, DVT on Xarelto RA, leukemia in remission presenting to ED with c/o elevated blood pressure during IV infusion for RA at infusion center yesterday 10/01.    10/02: ICU consult called for elevated CIWA, hallucinations, confusion    PAST MEDICAL & SURGICAL HISTORY:  ETOH abuse  Hyperlipidemia LDL goal <100  Restless leg syndrome   Rheumatoid arteritis  Leukemia    No significant past surgical history    FAMILY HISTORY:  FHx: rheumatoid arthritis (Mother)    Allergies  Aloe Lotion (Rash)    REVIEW OF SYSTEMS:  UTO 2/2 confusion    OBJECTIVE:  ICU Vital Signs Last 24 Hrs  T(C): 36.6 (02 Oct 2024 10:00), Max: 36.8 (01 Oct 2024 17:19)  T(F): 97.9 (02 Oct 2024 10:00), Max: 98.3 (01 Oct 2024 17:19)  HR: 89 (02 Oct 2024 11:00) (62 - 157)  BP: 155/90 (02 Oct 2024 11:00) (155/90 - 223/118)  BP(mean): 138 (01 Oct 2024 18:12) (138 - 138)  RR: 18 (02 Oct 2024 11:00) (18 - 20)  SpO2: 95% (02 Oct 2024 11:00) (94% - 99%)    O2 Parameters below as of 02 Oct 2024 11:00  Patient On (Oxygen Delivery Method): room air    PHYSICAL EXAM:  Adult lying in bed   Neck supple, no JVD, trachea midline   Normocephalic, atraumatic  S1S2 +  CTA b/l  Abdomen soft, NTND,  No lower extremity edema  Confused, agitated  Skin pink, warm    HOSPITAL MEDICATIONS:  MEDICATIONS  (STANDING):  amLODIPine   Tablet 10 milliGRAM(s) Oral daily  atorvastatin 10 milliGRAM(s) Oral at bedtime  folic acid 1 milliGRAM(s) Oral daily  hydrALAZINE 50 milliGRAM(s) Oral three times a day  hydroxychloroquine 200 milliGRAM(s) Oral two times a day  lactated ringers. 1000 milliLiter(s) (90 mL/Hr) IV Continuous <Continuous>  LORazepam   Injectable 1.5 milliGRAM(s) IV Push every 4 hours  LORazepam   Injectable   IV Push   LORazepam   Injectable 2 milliGRAM(s) IV Push every 4 hours  metoprolol succinate ER 50 milliGRAM(s) Oral daily  multivitamin 1 Tablet(s) Oral daily  predniSONE   Tablet 20 milliGRAM(s) Oral daily  rivaroxaban 10 milliGRAM(s) Oral with dinner  thiamine 100 milliGRAM(s) Oral daily    MEDICATIONS  (PRN):  acetaminophen     Tablet .. 650 milliGRAM(s) Oral every 6 hours PRN Temp greater or equal to 38C (100.4F), Mild Pain (1 - 3)  aluminum hydroxide/magnesium hydroxide/simethicone Suspension 30 milliLiter(s) Oral every 4 hours PRN Dyspepsia  hydrALAZINE Injectable 5 milliGRAM(s) IV Push every 6 hours PRN BP Systolic >180  LORazepam   Injectable 2 milliGRAM(s) IV Push every 1 hour PRN Symptom-triggered: each CIWA -Ar score 8 or GREATER  LORazepam   Injectable 2 milliGRAM(s) IV Push every 2 hours PRN Symptom-triggered: 2 point increase in CIWA -Ar score and a total score of 7 or LESS  LORazepam   Injectable 2 milliGRAM(s) IV Push once PRN seizure    LABS:                        10.4   3.65  )-----------( 177      ( 02 Oct 2024 07:42 )             30.0     10-02    135  |  101  |  25[H]  ----------------------------<  142[H]  4.3   |  24  |  1.62[H]    Ca    9.5      02 Oct 2024 07:42  Phos  3.0     10-02  Mg     1.8     10-02    TPro  7.2  /  Alb  3.8  /  TBili  0.8  /  DBili  0.3  /  AST  60[H]  /  ALT  60  /  AlkPhos  37[L]  10-02    Urinalysis Basic - ( 02 Oct 2024 07:42 )  Color: x / Appearance: x / SG: x / pH: x  Gluc: 142 mg/dL / Ketone: x  / Bili: x / Urobili: x   Blood: x / Protein: x / Nitrite: x   Leuk Esterase: x / RBC: x / WBC x   Sq Epi: x / Non Sq Epi: x / Bacteria: x    RADIOLOGY:  < from: Xray Chest 1 View- PORTABLE-Urgent (10.01.24 @ 19:34) >  IMPRESSION:  Clear lungs.  --- End of Report ---  < end of copied text >    EKG:  < from: 12 Lead ECG (10.01.24 @ 18:53) >  Ventricular Rate 119 BPM  Atrial Rate 119 BPM  P-R Interval 208 ms  QRS Duration 90 ms  Q-T Interval 322 ms  QTC Calculation(Bazett) 452 ms  P Axis 48 degrees  R Axis -16 degrees  T Axis 35 degrees    Diagnosis Line Sinus tachycardia  Otherwise normal ECG  When compared with ECG of 01-OCT-2024 17:26,  No significant change was found  Confirmed by Palla MD, Anthony ([id number]) on 10/2/2024 7:35:39 AM    < end of copied text >

## 2024-10-02 NOTE — CONSULT NOTE ADULT - ASSESSMENT
Hypertensive urgency- ordered amlodipine 10 mg daily as well as hydralazine 50mg three times a day.  He was on metoprolol already. d/w RN.    Alcohol withdrawl- when I saw pt was on 5 S floor and later transferred to ICU for DT.    CKD- recommend close monitoring of renal function and hydration.    Chronic compensated HFPEF- euvolemic.  Low sodium diet.    Other medical issues- Management per primary team.   Thank you for allowing me to participate in the care of this patient. Please feel free to contact me with any questions.

## 2024-10-02 NOTE — PATIENT PROFILE ADULT - FALL HARM RISK - HARM RISK INTERVENTIONS

## 2024-10-02 NOTE — SBIRT NOTE ADULT - NSSBIRTBRIEFINTDET_GEN_A_CORE
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 Pt denies concerns with his consumption and declines treatment resources.

## 2024-10-02 NOTE — PATIENT PROFILE ADULT - DEAF OR HARD OF HEARING?
I called and informed the patient to take 10 Mg of coumadin tonight then resume her regular dose. Re-test in one week.   no

## 2024-10-02 NOTE — PROGRESS NOTE ADULT - SUBJECTIVE AND OBJECTIVE BOX
CC: Patient is a 68y old  Male who presents with a chief complaint of Alcohol dependence with withdrawal     (02 Oct 2024 09:38)      INTERVAL EVENTS: WALDEMAR    SUBJECTIVE / INTERVAL HPI: Patient seen and examined at bedside. Jeff murphy he has been acting up and drinking lately, last drink was sunday.    ROS: negative unless otherwise stated above.    VITAL SIGNS:  Vital Signs Last 24 Hrs  T(C): 36.6 (02 Oct 2024 10:00), Max: 36.8 (01 Oct 2024 17:19)  T(F): 97.9 (02 Oct 2024 10:00), Max: 98.3 (01 Oct 2024 17:19)  HR: 89 (02 Oct 2024 11:00) (62 - 157)  BP: 155/90 (02 Oct 2024 11:00) (155/90 - 223/118)  BP(mean): 138 (01 Oct 2024 18:12) (138 - 138)  RR: 18 (02 Oct 2024 11:00) (18 - 20)  SpO2: 95% (02 Oct 2024 11:00) (94% - 99%)    Parameters below as of 02 Oct 2024 11:00  Patient On (Oxygen Delivery Method): room air            PHYSICAL EXAM:    General: slightly anxious  HEENT: dry mucous membranes   Neck: supple  Cardiovascular: +S1/S2; RRR  Respiratory: CTA B/L; no W/R/R  Gastrointestinal: soft, NT/ND  Extremities: WWP; no edema, clubbing or cyanosis   Vascular: 2+ radial, DP/PT pulses B/L  Neurological: AAOx3; no focal deficits mild tremors at rest      MEDICATIONS:  MEDICATIONS  (STANDING):  amLODIPine   Tablet 10 milliGRAM(s) Oral daily  atorvastatin 10 milliGRAM(s) Oral at bedtime  folic acid 1 milliGRAM(s) Oral daily  hydrALAZINE 50 milliGRAM(s) Oral three times a day  hydroxychloroquine 200 milliGRAM(s) Oral two times a day  lactated ringers. 1000 milliLiter(s) (90 mL/Hr) IV Continuous <Continuous>  LORazepam   Injectable 1.5 milliGRAM(s) IV Push every 4 hours  LORazepam   Injectable   IV Push   LORazepam   Injectable 2 milliGRAM(s) IV Push every 4 hours  metoprolol succinate ER 50 milliGRAM(s) Oral daily  multivitamin 1 Tablet(s) Oral daily  predniSONE   Tablet 20 milliGRAM(s) Oral daily  rivaroxaban 10 milliGRAM(s) Oral with dinner  thiamine 100 milliGRAM(s) Oral daily    MEDICATIONS  (PRN):  acetaminophen     Tablet .. 650 milliGRAM(s) Oral every 6 hours PRN Temp greater or equal to 38C (100.4F), Mild Pain (1 - 3)  aluminum hydroxide/magnesium hydroxide/simethicone Suspension 30 milliLiter(s) Oral every 4 hours PRN Dyspepsia  hydrALAZINE Injectable 5 milliGRAM(s) IV Push every 6 hours PRN BP Systolic >180  LORazepam   Injectable 2 milliGRAM(s) IV Push once PRN seizure  LORazepam   Injectable 2 milliGRAM(s) IV Push every 1 hour PRN Symptom-triggered: each CIWA -Ar score 8 or GREATER  LORazepam   Injectable 2 milliGRAM(s) IV Push every 2 hours PRN Symptom-triggered: 2 point increase in CIWA -Ar score and a total score of 7 or LESS      ALLERGIES:  Allergies    Aloe Lotion (Rash)    Intolerances        LABS:                        10.4   3.65  )-----------( 177      ( 02 Oct 2024 07:42 )             30.0     10-02    135  |  101  |  25[H]  ----------------------------<  142[H]  4.3   |  24  |  1.62[H]    Ca    9.5      02 Oct 2024 07:42  Phos  3.0     10-02  Mg     1.8     10-02    TPro  7.2  /  Alb  3.8  /  TBili  0.8  /  DBili  0.3  /  AST  60[H]  /  ALT  60  /  AlkPhos  37[L]  10-02      Urinalysis Basic - ( 02 Oct 2024 07:42 )    Color: x / Appearance: x / SG: x / pH: x  Gluc: 142 mg/dL / Ketone: x  / Bili: x / Urobili: x   Blood: x / Protein: x / Nitrite: x   Leuk Esterase: x / RBC: x / WBC x   Sq Epi: x / Non Sq Epi: x / Bacteria: x      CAPILLARY BLOOD GLUCOSE          RADIOLOGY & ADDITIONAL TESTS: Reviewed.

## 2024-10-02 NOTE — CONSULT NOTE ADULT - SUBJECTIVE AND OBJECTIVE BOX
ICU Note    HPI:    S:    Pt seen and examined  68 year old male w  alcohol abuse, CKD 3a,  DVT  on xarelto, HTN, RA, Leukemia in remission January 2023,  BIBEMS HHED c/o elevated BP during IV infusion for RA at infusion center today.    Admitted for etoh witdrawal  She is refractory to BZD therapy, ICU consult called    10/2: Elevated CIWA, agitated, hallucinations, confused    ROS: UTO 2/2 confusion        Allergies    Aloe Lotion (Rash)    Intolerances        MEDICATIONS  (STANDING):  amLODIPine   Tablet 10 milliGRAM(s) Oral daily  atorvastatin 10 milliGRAM(s) Oral at bedtime  folic acid 1 milliGRAM(s) Oral daily  hydrALAZINE 50 milliGRAM(s) Oral three times a day  hydroxychloroquine 200 milliGRAM(s) Oral two times a day  lactated ringers. 1000 milliLiter(s) (90 mL/Hr) IV Continuous <Continuous>  LORazepam   Injectable 1.5 milliGRAM(s) IV Push every 4 hours  LORazepam   Injectable   IV Push   LORazepam   Injectable 2 milliGRAM(s) IV Push every 4 hours  metoprolol succinate ER 50 milliGRAM(s) Oral daily  multivitamin 1 Tablet(s) Oral daily  predniSONE   Tablet 20 milliGRAM(s) Oral daily  rivaroxaban 10 milliGRAM(s) Oral with dinner  thiamine 100 milliGRAM(s) Oral daily    MEDICATIONS  (PRN):  acetaminophen     Tablet .. 650 milliGRAM(s) Oral every 6 hours PRN Temp greater or equal to 38C (100.4F), Mild Pain (1 - 3)  aluminum hydroxide/magnesium hydroxide/simethicone Suspension 30 milliLiter(s) Oral every 4 hours PRN Dyspepsia  hydrALAZINE Injectable 5 milliGRAM(s) IV Push every 6 hours PRN BP Systolic >180  LORazepam   Injectable 2 milliGRAM(s) IV Push every 1 hour PRN Symptom-triggered: each CIWA -Ar score 8 or GREATER  LORazepam   Injectable 2 milliGRAM(s) IV Push every 2 hours PRN Symptom-triggered: 2 point increase in CIWA -Ar score and a total score of 7 or LESS  LORazepam   Injectable 2 milliGRAM(s) IV Push once PRN seizure      Drug Dosing Weight  Height (cm): 172.7 (16 Feb 2024 17:36)  Weight (kg): 90.3 (29 May 2024 08:21)  BMI (kg/m2): 30.3 (29 May 2024 08:21)  BSA (m2): 2.04 (29 May 2024 08:21)      PAST MEDICAL & SURGICAL HISTORY:  ETOH abuse      Hyperlipidemia LDL goal <100      Restless leg syndrome      Rheumatoid arteritis      Leukemia      No significant past surgical history          FAMILY HISTORY:  FHx: rheumatoid arthritis (Mother)          REVIEW OF SYSTEMS    UNLESS OTHERWISE NOTED IN HPI above:    Constitutional:  No Weight Change, No Fever, No Chills, No Night Sweats, No Fatigue, No Malaise  ENT/Mouth:  No Hearing Changes, No Ear Pain, No Nasal Congestion, No  Sinus Pain, No Hoarseness, No sore throat, No Rhinorrhea, No Swallowing  Difficulty  Eyes:  No Eye Pain, No Swelling, No Redness, No Foreign Body, No Discharge, No Vision Changes  Cardiovascular:  No Chest Pain, No SOB, No PND, No Dyspnea on Exertion,  No Orthopnea, No Claudication, No Edema, No Palpitations  Respiratory:  No Cough, No Sputum, No Wheezing, No Smoke Exposure, No Dyspnea  Gastrointestinal:  No Nausea, No Vomiting, No Diarrhea, No  Constipation, No Pain, No Heartburn, No Anorexia, No Dysphagia, No  Hematochezia, No Melena, No Flatulence, No Jaundice  Genitourinary:  No Dysmenorrhea, No DUB, No Dyspareunia, No Dysuria, No  Urinary Frequency, No Hematuria, No Urinary Incontinence, No Urgency,  No Flank Pain, No Urinary Flow Changes, No Hesitancy  Musculoskeletal:  No Arthralgias, No Myalgias, No Joint Swelling, No  Joint Stiffness, No Back Pain, No Neck Pain, No Injury History  Skin:  No Skin Lesions, No Pruritis, No Hair Changes, No Breast/Skin Changes, No Nipple Discharge  Neuro:  No Weakness, No Numbness, No Paresthesias, No Loss of  Consciousness, No Syncope, No Dizziness, No Headache, No Coordination  Changes, No Recent Falls  Psych:  No Anxiety/Panic, No Depression, No Insomnia, No Personality  Changes, No Delusions, No Rumination, No SI/HI/AH/VH, No Social Issues,  No Memory Changes, No Violence/Abuse Hx., No Eating Concerns  Heme/Lymph:  No Bruising, No Bleeding, No Transfusions History, No Lymphadenopathy  Endocrine:  No Polyuria, No Polydipsia, No Temperature Intolerance    O:    ICU Vital Signs Last 24 Hrs  T(C): 36.6 (02 Oct 2024 10:00), Max: 36.8 (01 Oct 2024 17:19)  T(F): 97.9 (02 Oct 2024 10:00), Max: 98.3 (01 Oct 2024 17:19)  HR: 89 (02 Oct 2024 11:00) (62 - 157)  BP: 155/90 (02 Oct 2024 11:00) (155/90 - 223/118)  BP(mean): 138 (01 Oct 2024 18:12) (138 - 138)  ABP: --  ABP(mean): --  RR: 18 (02 Oct 2024 11:00) (18 - 20)  SpO2: 95% (02 Oct 2024 11:00) (94% - 99%)    O2 Parameters below as of 02 Oct 2024 11:00  Patient On (Oxygen Delivery Method): room air    I&O's Detail    PE:    Adult lying in bed  No JVD trachea midline  Normocephalic, atraumatic  S1S2+  CTA B/L  Abd soft NTND  No leg swelling/edema noted  Confused, + AH/VH, agitated  Skin pink, warm    LABS:    CBC Full  -  ( 02 Oct 2024 07:42 )  WBC Count : 3.65 K/uL  RBC Count : 3.05 M/uL  Hemoglobin : 10.4 g/dL  Hematocrit : 30.0 %  Platelet Count - Automated : 177 K/uL  Mean Cell Volume : 98.4 fl  Mean Cell Hemoglobin : 34.1 pg  Mean Cell Hemoglobin Concentration : 34.7 gm/dL  Auto Neutrophil # : 3.03 K/uL  Auto Lymphocyte # : 0.30 K/uL  Auto Monocyte # : 0.28 K/uL  Auto Eosinophil # : 0.00 K/uL  Auto Basophil # : 0.01 K/uL  Auto Neutrophil % : 83.0 %  Auto Lymphocyte % : 8.2 %  Auto Monocyte % : 7.7 %  Auto Eosinophil % : 0.0 %  Auto Basophil % : 0.3 %    10-02    135  |  101  |  25[H]  ----------------------------<  142[H]  4.3   |  24  |  1.62[H]    Ca    9.5      02 Oct 2024 07:42  Phos  3.0     10-02  Mg     1.8     10-02    TPro  7.2  /  Alb  3.8  /  TBili  0.8  /  DBili  0.3  /  AST  60[H]  /  ALT  60  /  AlkPhos  37[L]  10-02      Urinalysis Basic - ( 02 Oct 2024 07:42 )    Color: x / Appearance: x / SG: x / pH: x  Gluc: 142 mg/dL / Ketone: x  / Bili: x / Urobili: x   Blood: x / Protein: x / Nitrite: x   Leuk Esterase: x / RBC: x / WBC x   Sq Epi: x / Non Sq Epi: x / Bacteria: x      CAPILLARY BLOOD GLUCOSE            LIVER FUNCTIONS - ( 02 Oct 2024 07:42 )  Alb: 3.8 g/dL / Pro: 7.2 gm/dL / ALK PHOS: 37 U/L / ALT: 60 U/L / AST: 60 U/L / GGT: x

## 2024-10-03 LAB
AMMONIA BLD-MCNC: 30 UMOL/L — SIGNIFICANT CHANGE UP (ref 11–32)
ANION GAP SERPL CALC-SCNC: 7 MMOL/L — SIGNIFICANT CHANGE UP (ref 5–17)
BUN SERPL-MCNC: 30 MG/DL — HIGH (ref 7–23)
CALCIUM SERPL-MCNC: 9.1 MG/DL — SIGNIFICANT CHANGE UP (ref 8.5–10.1)
CHLORIDE SERPL-SCNC: 105 MMOL/L — SIGNIFICANT CHANGE UP (ref 96–108)
CO2 SERPL-SCNC: 27 MMOL/L — SIGNIFICANT CHANGE UP (ref 22–31)
CREAT SERPL-MCNC: 1.59 MG/DL — HIGH (ref 0.5–1.3)
EGFR: 47 ML/MIN/1.73M2 — LOW
GLUCOSE SERPL-MCNC: 107 MG/DL — HIGH (ref 70–99)
HCT VFR BLD CALC: 29.5 % — LOW (ref 39–50)
HGB BLD-MCNC: 10.3 G/DL — LOW (ref 13–17)
MAGNESIUM SERPL-MCNC: 1.8 MG/DL — SIGNIFICANT CHANGE UP (ref 1.6–2.6)
MCHC RBC-ENTMCNC: 34.3 PG — HIGH (ref 27–34)
MCHC RBC-ENTMCNC: 34.9 GM/DL — SIGNIFICANT CHANGE UP (ref 32–36)
MCV RBC AUTO: 98.3 FL — SIGNIFICANT CHANGE UP (ref 80–100)
MRSA PCR RESULT.: SIGNIFICANT CHANGE UP
PHOSPHATE SERPL-MCNC: 3.3 MG/DL — SIGNIFICANT CHANGE UP (ref 2.5–4.5)
PLATELET # BLD AUTO: 140 K/UL — LOW (ref 150–400)
POTASSIUM SERPL-MCNC: 4.3 MMOL/L — SIGNIFICANT CHANGE UP (ref 3.5–5.3)
POTASSIUM SERPL-SCNC: 4.3 MMOL/L — SIGNIFICANT CHANGE UP (ref 3.5–5.3)
RBC # BLD: 3 M/UL — LOW (ref 4.2–5.8)
RBC # FLD: 13.7 % — SIGNIFICANT CHANGE UP (ref 10.3–14.5)
S AUREUS DNA NOSE QL NAA+PROBE: SIGNIFICANT CHANGE UP
SODIUM SERPL-SCNC: 139 MMOL/L — SIGNIFICANT CHANGE UP (ref 135–145)
WBC # BLD: 5.64 K/UL — SIGNIFICANT CHANGE UP (ref 3.8–10.5)
WBC # FLD AUTO: 5.64 K/UL — SIGNIFICANT CHANGE UP (ref 3.8–10.5)

## 2024-10-03 PROCEDURE — 99291 CRITICAL CARE FIRST HOUR: CPT

## 2024-10-03 RX ORDER — HYDROMORPHONE HYDROCHLORIDE 1 MG/ML
0.5 INJECTION, SOLUTION INTRAMUSCULAR; INTRAVENOUS; SUBCUTANEOUS ONCE
Refills: 0 | Status: DISCONTINUED | OUTPATIENT
Start: 2024-10-03 | End: 2024-10-03

## 2024-10-03 RX ADMIN — CHLORHEXIDINE GLUCONATE ORAL RINSE 1 APPLICATION(S): 1.2 SOLUTION DENTAL at 05:08

## 2024-10-03 RX ADMIN — Medication 1.5 MILLIGRAM(S): at 17:56

## 2024-10-03 RX ADMIN — FOLIC ACID 1 MILLIGRAM(S): 1 TABLET ORAL at 09:25

## 2024-10-03 RX ADMIN — Medication 650 MILLIGRAM(S): at 09:26

## 2024-10-03 RX ADMIN — RIVAROXABAN 20 MILLIGRAM(S): 10 TABLET, FILM COATED ORAL at 17:56

## 2024-10-03 RX ADMIN — Medication 2 MILLIGRAM(S): at 05:08

## 2024-10-03 RX ADMIN — Medication 50 MILLIGRAM(S): at 09:25

## 2024-10-03 RX ADMIN — HYDRALAZINE HYDROCHLORIDE 50 MILLIGRAM(S): 100 TABLET ORAL at 22:22

## 2024-10-03 RX ADMIN — Medication 1.5 MILLIGRAM(S): at 22:22

## 2024-10-03 RX ADMIN — Medication 10 MILLIGRAM(S): at 09:25

## 2024-10-03 RX ADMIN — Medication 650 MILLIGRAM(S): at 09:56

## 2024-10-03 RX ADMIN — Medication 2 MILLIGRAM(S): at 09:25

## 2024-10-03 RX ADMIN — PREDNISONE 20 MILLIGRAM(S): 5 TABLET ORAL at 09:25

## 2024-10-03 RX ADMIN — Medication 2 MILLIGRAM(S): at 00:23

## 2024-10-03 RX ADMIN — Medication 650 MILLIGRAM(S): at 22:24

## 2024-10-03 RX ADMIN — Medication 2 MILLIGRAM(S): at 01:15

## 2024-10-03 RX ADMIN — Medication 90 MILLILITER(S): at 02:31

## 2024-10-03 RX ADMIN — HYDROXYCHLOROQUINE SULFATE 200 MILLIGRAM(S): 200 TABLET, FILM COATED ORAL at 09:25

## 2024-10-03 RX ADMIN — ATORVASTATIN CALCIUM 10 MILLIGRAM(S): 10 TABLET, FILM COATED ORAL at 22:22

## 2024-10-03 RX ADMIN — HYDROXYCHLOROQUINE SULFATE 200 MILLIGRAM(S): 200 TABLET, FILM COATED ORAL at 22:22

## 2024-10-03 RX ADMIN — Medication 90 MILLILITER(S): at 12:18

## 2024-10-03 RX ADMIN — HYDROMORPHONE HYDROCHLORIDE 0.5 MILLIGRAM(S): 1 INJECTION, SOLUTION INTRAMUSCULAR; INTRAVENOUS; SUBCUTANEOUS at 12:59

## 2024-10-03 RX ADMIN — HYDROMORPHONE HYDROCHLORIDE 0.5 MILLIGRAM(S): 1 INJECTION, SOLUTION INTRAMUSCULAR; INTRAVENOUS; SUBCUTANEOUS at 12:29

## 2024-10-03 RX ADMIN — THIAMINE HYDROCHLORIDE 100 MILLIGRAM(S): 100 INJECTION, SOLUTION INTRAMUSCULAR; INTRAVENOUS at 09:26

## 2024-10-03 RX ADMIN — HYDRALAZINE HYDROCHLORIDE 50 MILLIGRAM(S): 100 TABLET ORAL at 05:08

## 2024-10-03 RX ADMIN — Medication 650 MILLIGRAM(S): at 23:24

## 2024-10-03 NOTE — PROGRESS NOTE ADULT - SUBJECTIVE AND OBJECTIVE BOX
OVERNIGHT EVENTS / SUBJECTIVE: Patient seen and examined at bedside.     No acute events overnight. symptoms well-controlled on ativan 2mg IV q4h. This AM pt calm but still confused, making non-sensical statements.    OBJECTIVE:    VITAL SIGNS:  ICU Vital Signs Last 24 Hrs  T(C): 36.6 (03 Oct 2024 17:00), Max: 36.8 (02 Oct 2024 20:00)  T(F): 97.8 (03 Oct 2024 17:00), Max: 98.3 (02 Oct 2024 20:00)  HR: 69 (03 Oct 2024 18:00) (59 - 93)  BP: 145/92 (03 Oct 2024 18:00) (114/65 - 156/76)  BP(mean): 108 (03 Oct 2024 18:00) (71 - 109)  ABP: --  ABP(mean): --  RR: 15 (03 Oct 2024 18:00) (11 - 27)  SpO2: 100% (03 Oct 2024 18:00) (91% - 100%)    O2 Parameters below as of 03 Oct 2024 18:00  Patient On (Oxygen Delivery Method): room air              10-02 @ 07:01  -  10-03 @ 07:00  --------------------------------------------------------  IN: 0 mL / OUT: 2000 mL / NET: -2000 mL    10-03 @ 07:01  -  10-03 @ 18:40  --------------------------------------------------------  IN: 2180 mL / OUT: 1300 mL / NET: 880 mL      CAPILLARY BLOOD GLUCOSE    PHYSICAL EXAM:    General: NAD, laying in stretcher  HEENT: NC/AT; clear conjunctiva  Neck: supple  Respiratory: CTA b/l  Cardiovascular: +S1/S2; RRR  Abdomen: soft, NT/ND  Extremities: Warm, no LE edema  Skin: normal color and turgor; no rash  Neurological: Somnolent, confused, not agitated, moving all extremities    MEDICATIONS:  MEDICATIONS  (STANDING):  amLODIPine   Tablet 10 milliGRAM(s) Oral daily  atorvastatin 10 milliGRAM(s) Oral at bedtime  chlorhexidine 4% Liquid 1 Application(s) Topical <User Schedule>  folic acid 1 milliGRAM(s) Oral daily  hydrALAZINE 50 milliGRAM(s) Oral three times a day  hydroxychloroquine 200 milliGRAM(s) Oral two times a day  lactated ringers. 1000 milliLiter(s) (90 mL/Hr) IV Continuous <Continuous>  LORazepam   Injectable 1.5 milliGRAM(s) IV Push every 4 hours  metoprolol succinate ER 50 milliGRAM(s) Oral daily  predniSONE   Tablet 20 milliGRAM(s) Oral daily  rivaroxaban 20 milliGRAM(s) Oral with dinner  thiamine Injectable 100 milliGRAM(s) IV Push daily    MEDICATIONS  (PRN):  acetaminophen     Tablet .. 650 milliGRAM(s) Oral every 6 hours PRN Temp greater or equal to 38C (100.4F), Mild Pain (1 - 3)  aluminum hydroxide/magnesium hydroxide/simethicone Suspension 30 milliLiter(s) Oral every 4 hours PRN Dyspepsia  hydrALAZINE Injectable 5 milliGRAM(s) IV Push every 6 hours PRN BP Systolic >180  LORazepam   Injectable 2 milliGRAM(s) IV Push every 1 hour PRN breakthrough agitation      ALLERGIES:  Allergies    Aloe Lotion (Rash)    Intolerances        LABS:                        10.3   5.64  )-----------( 140      ( 03 Oct 2024 05:16 )             29.5     Hemoglobin: 10.3 g/dL (10-03 @ 05:16)  Hemoglobin: 10.4 g/dL (10-02 @ 07:42)  Hemoglobin: 11.0 g/dL (10-01 @ 18:30)    CBC Full  -  ( 03 Oct 2024 05:16 )  WBC Count : 5.64 K/uL  RBC Count : 3.00 M/uL  Hemoglobin : 10.3 g/dL  Hematocrit : 29.5 %  Platelet Count - Automated : 140 K/uL  Mean Cell Volume : 98.3 fl  Mean Cell Hemoglobin : 34.3 pg  Mean Cell Hemoglobin Concentration : 34.9 gm/dL  Auto Neutrophil # : x  Auto Lymphocyte # : x  Auto Monocyte # : x  Auto Eosinophil # : x  Auto Basophil # : x  Auto Neutrophil % : x  Auto Lymphocyte % : x  Auto Monocyte % : x  Auto Eosinophil % : x  Auto Basophil % : x    10-03    139  |  105  |  30[H]  ----------------------------<  107[H]  4.3   |  27  |  1.59[H]    Ca    9.1      03 Oct 2024 05:16  Phos  3.3     10-03  Mg     1.8     10-03    TPro  7.2  /  Alb  3.8  /  TBili  0.8  /  DBili  0.3  /  AST  60[H]  /  ALT  60  /  AlkPhos  37[L]  10-02    Creatinine Trend: 1.59<--, 1.62<--, 1.53<--  LIVER FUNCTIONS - ( 02 Oct 2024 07:42 )  Alb: 3.8 g/dL / Pro: 7.2 gm/dL / ALK PHOS: 37 U/L / ALT: 60 U/L / AST: 60 U/L / GGT: x               hs Troponin:    Urinalysis Basic - ( 03 Oct 2024 05:16 )    Color: x / Appearance: x / SG: x / pH: x  Gluc: 107 mg/dL / Ketone: x  / Bili: x / Urobili: x   Blood: x / Protein: x / Nitrite: x   Leuk Esterase: x / RBC: x / WBC x   Sq Epi: x / Non Sq Epi: x / Bacteria: x    CSF:    EKG:   MICROBIOLOGY:    IMAGING:      Labs, imaging, EKG personally reviewed    RADIOLOGY & ADDITIONAL TESTS: Reviewed.

## 2024-10-03 NOTE — PROGRESS NOTE ADULT - SUBJECTIVE AND OBJECTIVE BOX
Patient is a 68y old  Male who presents with a chief complaint of alcohol dependence w withdrawl      HPI:  68 year old male w  alcohol abuse, CKD 3a,  DVT  on xarelto, HTN, RA, Leukemia in remission January 2023,  BIBEMS HHED c/o elevated BP during IV infusion for RA at infusion center.    Patient was evaluated by me at the infusion center and sent to the ER  His SBP was 210 and he did not take meds in am that day per son.   EKG- no ischemic changes    In ED /114      RR 18   T 98.3   97% sat RA  lopressor 5 mg IV, clonidine, ativan, nl d-dimer, trop neg x 2  EKG  w repeat     10/2- Mr Hatfield was seen and examined by me this am around 730 am.  He was alert but confused.  Spoke to RN and noted hisBP  running high overnight    10/3- Mr Hatfield Was seen and examined by me.    Discussed with his RN   He is still receiving Ativan.        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      PAST SURGICAL HX  No significant past surgical history      FAMILY HX  rheumatoid arthritis : Mother        SOCIAL HX  alcohol : a few glasses of wine not every day; more on weekend w friends  nonsmoker (01 Oct 2024 23:09)      PAST MEDICAL & SURGICAL HISTORY:  ETOH abuse      Hyperlipidemia LDL goal <100      Restless leg syndrome      Rheumatoid arteritis      Leukemia      No significant past surgical history          MEDICATIONS  (STANDING):  atorvastatin 10 milliGRAM(s) Oral at bedtime  folic acid 1 milliGRAM(s) Oral daily  hydroxychloroquine 200 milliGRAM(s) Oral two times a day  LORazepam   Injectable 1.5 milliGRAM(s) IV Push every 4 hours  LORazepam   Injectable 2 milliGRAM(s) IV Push every 4 hours  LORazepam   Injectable   IV Push   metoprolol succinate ER 50 milliGRAM(s) Oral daily  multivitamin 1 Tablet(s) Oral daily  predniSONE   Tablet 20 milliGRAM(s) Oral daily  rivaroxaban 10 milliGRAM(s) Oral with dinner  thiamine 100 milliGRAM(s) Oral daily  thiamine Injectable 100 milliGRAM(s) IV Push once    MEDICATIONS  (PRN):  acetaminophen     Tablet .. 650 milliGRAM(s) Oral every 6 hours PRN Temp greater or equal to 38C (100.4F), Mild Pain (1 - 3)  aluminum hydroxide/magnesium hydroxide/simethicone Suspension 30 milliLiter(s) Oral every 4 hours PRN Dyspepsia  hydrALAZINE Injectable 5 milliGRAM(s) IV Push every 6 hours PRN BP Systolic >180  LORazepam   Injectable 2 milliGRAM(s) IV Push once PRN seizure  LORazepam   Injectable 2 milliGRAM(s) IV Push every 1 hour PRN Symptom-triggered: each CIWA -Ar score 8 or GREATER  LORazepam   Injectable 2 milliGRAM(s) IV Push every 2 hours PRN Symptom-triggered: 2 point increase in CIWA -Ar score and a total score of 7 or LESS      FAMILY HISTORY:  FHx: rheumatoid arthritis (Mother)        SOCIAL HISTORY:No recent smoking   Alcohol abuse present    REVIEW OF SYSTEMS:  CONSTITUTIONAL:    No fatigue, malaise, lethargy.  No fever or chills.  RESPIRATORY:  No cough.  No wheeze.  No hemoptysis.    CARDIOVASCULAR:  No chest pains.  No palpitations. No shortness of breath, No orthopnea or PND.  GASTROINTESTINAL:  No abdominal pain.  No nausea or vomiting.    GENITOURINARY:    No hematuria.    MUSCULOSKELETAL:  No musculoskeletal pain.  No joint swelling.  No arthritis.  NEUROLOGICAL:  No tingling or numbness or weakness.  PSYCHIATRIC:  c/o confusion  SKIN:  No rashes.        ICU Vital Signs Last 24 Hrs  T(C): 36.7 (03 Oct 2024 12:00), Max: 36.8 (02 Oct 2024 20:00)  T(F): 98 (03 Oct 2024 12:00), Max: 98.3 (02 Oct 2024 20:00)  HR: 59 (03 Oct 2024 15:00) (59 - 93)  BP: 114/65 (03 Oct 2024 15:00) (114/65 - 160/81)  BP(mean): 81 (03 Oct 2024 15:00) (71 - 115)  ABP: --  ABP(mean): --  RR: 11 (03 Oct 2024 15:00) (11 - 27)  SpO2: 100% (03 Oct 2024 15:00) (91% - 100%)    O2 Parameters below as of 03 Oct 2024 15:00  Patient On (Oxygen Delivery Method): room air                PHYSICAL EXAM-    Constitutional:  no acute distress     Head: Head is normocephalic and atraumatic.      Neck:  No JVD.     Cardiovascular: Regular rate and rhythm without S3, S4. No murmurs or rubs are appreciated.      Respiratory: Breath sounds are normal. No rales. No wheezing.    Abdomen: Soft, nontender, nondistended with positive bowel sounds.      Extremity: No tenderness. No  pitting edema , tremulous    Neurologic: The patient is alert but not oriented    Skin: No rash, no obvious lesions noted.      Psychiatric: The patient appears to be emotionally stable.      INTERPRETATION OF TELEMETRY: not on     ECG: Sinus rythm ,  no ST T changes.     I&O's Detail      LABS:                                   10.3   5.64  )-----------( 140      ( 03 Oct 2024 05:16 )             29.5     10-03    139  |  105  |  30[H]  ----------------------------<  107[H]  4.3   |  27  |  1.59[H]    Ca    9.1      03 Oct 2024 05:16  Phos  3.3     10-03  Mg     1.8     10-03    TPro  7.2  /  Alb  3.8  /  TBili  0.8  /  DBili  0.3  /  AST  60[H]  /  ALT  60  /  AlkPhos  37[L]  10-02        LIVER FUNCTIONS - ( 02 Oct 2024 07:42 )  Alb: 3.8 g/dL / Pro: 7.2 gm/dL / ALK PHOS: 37 U/L / ALT: 60 U/L / AST: 60 U/L / GGT: x                       Urinalysis Basic - ( 02 Oct 2024 07:42 )    Color: x / Appearance: x / SG: x / pH: x  Gluc: 142 mg/dL / Ketone: x  / Bili: x / Urobili: x   Blood: x / Protein: x / Nitrite: x   Leuk Esterase: x / RBC: x / WBC x   Sq Epi: x / Non Sq Epi: x / Bacteria: x      I&O's Summary    BNP  RADIOLOGY & ADDITIONAL STUDIES:  < from: Xray Chest 1 View- PORTABLE-Urgent (10.01.24 @ 19:34) >  Clear lungs.    --- End of Report ---            STEFANIA HORTON MD; Attending Radiologist  This document has been electronically signed. Oct  1 2024  9:57PM    < end of copied text >  < from: TTE W or WO Ultrasound Enhancing Agent (06.01.24 @ 08:49) >      1. Left ventricular cavity is normal in size. Left ventricular wall thickness is mildly increased. Left ventricular systolic function is moderately to severely decreased with an ejection fraction visually estimated at 35 to 40 %. Global left ventricular hypokinesis.   2. Moderate mitral regurgitation.   3. Mild to moderate tricuspid regurgitation.   4. Mild aortic regurgitation.   5. Mild left ventricular hypertrophy.   6. Moderate aortic stenosis.   7. Moderate pulmonary hypertension.   8. Technically difficult image quality.    < end of copied text >

## 2024-10-04 LAB
ALBUMIN SERPL ELPH-MCNC: 3.3 G/DL — SIGNIFICANT CHANGE UP (ref 3.3–5)
ALP SERPL-CCNC: 30 U/L — LOW (ref 40–120)
ALT FLD-CCNC: 58 U/L — SIGNIFICANT CHANGE UP (ref 12–78)
ANION GAP SERPL CALC-SCNC: 7 MMOL/L — SIGNIFICANT CHANGE UP (ref 5–17)
ANISOCYTOSIS BLD QL: SLIGHT — SIGNIFICANT CHANGE UP
AST SERPL-CCNC: 57 U/L — HIGH (ref 15–37)
BASOPHILS # BLD AUTO: 0.01 K/UL — SIGNIFICANT CHANGE UP (ref 0–0.2)
BASOPHILS NFR BLD AUTO: 0.2 % — SIGNIFICANT CHANGE UP (ref 0–2)
BILIRUB SERPL-MCNC: 0.7 MG/DL — SIGNIFICANT CHANGE UP (ref 0.2–1.2)
BUN SERPL-MCNC: 27 MG/DL — HIGH (ref 7–23)
CALCIUM SERPL-MCNC: 9.2 MG/DL — SIGNIFICANT CHANGE UP (ref 8.5–10.1)
CHLORIDE SERPL-SCNC: 106 MMOL/L — SIGNIFICANT CHANGE UP (ref 96–108)
CO2 SERPL-SCNC: 24 MMOL/L — SIGNIFICANT CHANGE UP (ref 22–31)
CREAT SERPL-MCNC: 1.27 MG/DL — SIGNIFICANT CHANGE UP (ref 0.5–1.3)
EGFR: 62 ML/MIN/1.73M2 — SIGNIFICANT CHANGE UP
EOSINOPHIL # BLD AUTO: 0.02 K/UL — SIGNIFICANT CHANGE UP (ref 0–0.5)
EOSINOPHIL NFR BLD AUTO: 0.5 % — SIGNIFICANT CHANGE UP (ref 0–6)
GLUCOSE SERPL-MCNC: 97 MG/DL — SIGNIFICANT CHANGE UP (ref 70–99)
HCT VFR BLD CALC: 31.4 % — LOW (ref 39–50)
HGB BLD-MCNC: 10.8 G/DL — LOW (ref 13–17)
IMM GRANULOCYTES NFR BLD AUTO: 2.1 % — HIGH (ref 0–0.9)
LYMPHOCYTES # BLD AUTO: 0.84 K/UL — LOW (ref 1–3.3)
LYMPHOCYTES # BLD AUTO: 19.4 % — SIGNIFICANT CHANGE UP (ref 13–44)
MACROCYTES BLD QL: SLIGHT — SIGNIFICANT CHANGE UP
MAGNESIUM SERPL-MCNC: 1.8 MG/DL — SIGNIFICANT CHANGE UP (ref 1.6–2.6)
MANUAL SMEAR VERIFICATION: SIGNIFICANT CHANGE UP
MCHC RBC-ENTMCNC: 34.4 GM/DL — SIGNIFICANT CHANGE UP (ref 32–36)
MCHC RBC-ENTMCNC: 34.6 PG — HIGH (ref 27–34)
MCV RBC AUTO: 100.6 FL — HIGH (ref 80–100)
MONOCYTES # BLD AUTO: 0.54 K/UL — SIGNIFICANT CHANGE UP (ref 0–0.9)
MONOCYTES NFR BLD AUTO: 12.4 % — SIGNIFICANT CHANGE UP (ref 2–14)
NEUTROPHILS # BLD AUTO: 2.84 K/UL — SIGNIFICANT CHANGE UP (ref 1.8–7.4)
NEUTROPHILS NFR BLD AUTO: 65.4 % — SIGNIFICANT CHANGE UP (ref 43–77)
PHOSPHATE SERPL-MCNC: 2.8 MG/DL — SIGNIFICANT CHANGE UP (ref 2.5–4.5)
PLAT MORPH BLD: NORMAL — SIGNIFICANT CHANGE UP
PLATELET # BLD AUTO: 118 K/UL — LOW (ref 150–400)
POTASSIUM SERPL-MCNC: 3.6 MMOL/L — SIGNIFICANT CHANGE UP (ref 3.5–5.3)
POTASSIUM SERPL-SCNC: 3.6 MMOL/L — SIGNIFICANT CHANGE UP (ref 3.5–5.3)
PROT SERPL-MCNC: 6.3 GM/DL — SIGNIFICANT CHANGE UP (ref 6–8.3)
RBC # BLD: 3.12 M/UL — LOW (ref 4.2–5.8)
RBC # FLD: 14 % — SIGNIFICANT CHANGE UP (ref 10.3–14.5)
RBC BLD AUTO: ABNORMAL
SODIUM SERPL-SCNC: 137 MMOL/L — SIGNIFICANT CHANGE UP (ref 135–145)
WBC # BLD: 4.34 K/UL — SIGNIFICANT CHANGE UP (ref 3.8–10.5)
WBC # FLD AUTO: 4.34 K/UL — SIGNIFICANT CHANGE UP (ref 3.8–10.5)

## 2024-10-04 PROCEDURE — 99233 SBSQ HOSP IP/OBS HIGH 50: CPT

## 2024-10-04 PROCEDURE — 99232 SBSQ HOSP IP/OBS MODERATE 35: CPT

## 2024-10-04 RX ADMIN — Medication 1 MILLIGRAM(S): at 16:08

## 2024-10-04 RX ADMIN — PREDNISONE 20 MILLIGRAM(S): 5 TABLET ORAL at 11:34

## 2024-10-04 RX ADMIN — RIVAROXABAN 20 MILLIGRAM(S): 10 TABLET, FILM COATED ORAL at 16:48

## 2024-10-04 RX ADMIN — Medication 1 MILLIGRAM(S): at 20:28

## 2024-10-04 RX ADMIN — FOLIC ACID 1 MILLIGRAM(S): 1 TABLET ORAL at 11:34

## 2024-10-04 RX ADMIN — Medication 1.5 MILLIGRAM(S): at 11:33

## 2024-10-04 RX ADMIN — HYDROXYCHLOROQUINE SULFATE 200 MILLIGRAM(S): 200 TABLET, FILM COATED ORAL at 11:33

## 2024-10-04 RX ADMIN — ATORVASTATIN CALCIUM 10 MILLIGRAM(S): 10 TABLET, FILM COATED ORAL at 20:29

## 2024-10-04 RX ADMIN — HYDRALAZINE HYDROCHLORIDE 50 MILLIGRAM(S): 100 TABLET ORAL at 20:29

## 2024-10-04 RX ADMIN — Medication 50 MILLIGRAM(S): at 11:34

## 2024-10-04 RX ADMIN — HYDRALAZINE HYDROCHLORIDE 50 MILLIGRAM(S): 100 TABLET ORAL at 14:05

## 2024-10-04 RX ADMIN — THIAMINE HYDROCHLORIDE 100 MILLIGRAM(S): 100 INJECTION, SOLUTION INTRAMUSCULAR; INTRAVENOUS at 11:33

## 2024-10-04 RX ADMIN — CHLORHEXIDINE GLUCONATE ORAL RINSE 1 APPLICATION(S): 1.2 SOLUTION DENTAL at 05:14

## 2024-10-04 RX ADMIN — HYDRALAZINE HYDROCHLORIDE 50 MILLIGRAM(S): 100 TABLET ORAL at 05:13

## 2024-10-04 RX ADMIN — Medication 10 MILLIGRAM(S): at 11:33

## 2024-10-04 RX ADMIN — Medication 1.5 MILLIGRAM(S): at 01:04

## 2024-10-04 RX ADMIN — Medication 1.5 MILLIGRAM(S): at 05:13

## 2024-10-04 NOTE — PROGRESS NOTE ADULT - SUBJECTIVE AND OBJECTIVE BOX
OVERNIGHT EVENTS / SUBJECTIVE: Patient seen and examined at bedside.     No acute events overnight. Pt calm this morning, somewhat somnolent, but more lucid than yesterday.    OBJECTIVE:    VITAL SIGNS:  ICU Vital Signs Last 24 Hrs  T(C): 36.6 (04 Oct 2024 08:00), Max: 37.1 (04 Oct 2024 05:07)  T(F): 97.9 (04 Oct 2024 08:00), Max: 98.8 (04 Oct 2024 05:07)  HR: 68 (04 Oct 2024 12:00) (59 - 80)  BP: 121/73 (04 Oct 2024 12:00) (113/69 - 161/83)  BP(mean): 90 (04 Oct 2024 12:00) (80 - 108)  ABP: --  ABP(mean): --  RR: 14 (04 Oct 2024 12:00) (11 - 20)  SpO2: 95% (04 Oct 2024 12:00) (92% - 100%)    O2 Parameters below as of 04 Oct 2024 12:00  Patient On (Oxygen Delivery Method): room air      10-03 @ 07:01  -  10-04 @ 07:00  --------------------------------------------------------  IN: 2180 mL / OUT: 2150 mL / NET: 30 mL      CAPILLARY BLOOD GLUCOSE          PHYSICAL EXAM:    General: NAD, laying in stretcher, sleeping  HEENT: NC/AT; clear conjunctiva  Neck: supple  Respiratory: CTA b/l  Cardiovascular: +S1/S2; RRR  Abdomen: soft, NT/ND  Extremities: Warm, no LE edema  Skin: normal color and turgor; no rash  Neurological: More lucid today, not agitated, following commands, moving all extremities    MEDICATIONS:  MEDICATIONS  (STANDING):  amLODIPine   Tablet 10 milliGRAM(s) Oral daily  atorvastatin 10 milliGRAM(s) Oral at bedtime  chlorhexidine 4% Liquid 1 Application(s) Topical <User Schedule>  folic acid 1 milliGRAM(s) Oral daily  hydrALAZINE 50 milliGRAM(s) Oral three times a day  hydroxychloroquine 200 milliGRAM(s) Oral two times a day  LORazepam   Injectable   IV Push   LORazepam   Injectable 1 milliGRAM(s) IV Push every 4 hours  metoprolol succinate ER 50 milliGRAM(s) Oral daily  predniSONE   Tablet 20 milliGRAM(s) Oral daily  rivaroxaban 20 milliGRAM(s) Oral with dinner  thiamine Injectable 100 milliGRAM(s) IV Push daily    MEDICATIONS  (PRN):  acetaminophen     Tablet .. 650 milliGRAM(s) Oral every 6 hours PRN Temp greater or equal to 38C (100.4F), Mild Pain (1 - 3)  aluminum hydroxide/magnesium hydroxide/simethicone Suspension 30 milliLiter(s) Oral every 4 hours PRN Dyspepsia  hydrALAZINE Injectable 5 milliGRAM(s) IV Push every 6 hours PRN BP Systolic >180  LORazepam   Injectable 1 milliGRAM(s) IV Push every 2 hours PRN CIWA-Ar score increase by 2 points and a total score of 7 or less  LORazepam   Injectable 1 milliGRAM(s) IV Push every 1 hour PRN CIWA-Ar score 8 or greater  oxycodone    5 mG/acetaminophen 325 mG 1 Tablet(s) Oral every 4 hours PRN Moderate Pain (4 - 6)      ALLERGIES:  Allergies    Aloe Lotion (Rash)    Intolerances        LABS:                        10.8   4.34  )-----------( 118      ( 04 Oct 2024 05:50 )             31.4     Hemoglobin: 10.8 g/dL (10-04 @ 05:50)  Hemoglobin: 10.3 g/dL (10-03 @ 05:16)  Hemoglobin: 10.4 g/dL (10-02 @ 07:42)  Hemoglobin: 11.0 g/dL (10-01 @ 18:30)    CBC Full  -  ( 04 Oct 2024 05:50 )  WBC Count : 4.34 K/uL  RBC Count : 3.12 M/uL  Hemoglobin : 10.8 g/dL  Hematocrit : 31.4 %  Platelet Count - Automated : 118 K/uL  Mean Cell Volume : 100.6 fl  Mean Cell Hemoglobin : 34.6 pg  Mean Cell Hemoglobin Concentration : 34.4 gm/dL  Auto Neutrophil # : 2.84 K/uL  Auto Lymphocyte # : 0.84 K/uL  Auto Monocyte # : 0.54 K/uL  Auto Eosinophil # : 0.02 K/uL  Auto Basophil # : 0.01 K/uL  Auto Neutrophil % : 65.4 %  Auto Lymphocyte % : 19.4 %  Auto Monocyte % : 12.4 %  Auto Eosinophil % : 0.5 %  Auto Basophil % : 0.2 %    10-04    137  |  106  |  27[H]  ----------------------------<  97  3.6   |  24  |  1.27    Ca    9.2      04 Oct 2024 05:50  Phos  2.8     10-04  Mg     1.8     10-04    TPro  6.3  /  Alb  3.3  /  TBili  0.7  /  DBili  x   /  AST  57[H]  /  ALT  58  /  AlkPhos  30[L]  10-04    Creatinine Trend: 1.27<--, 1.59<--, 1.62<--, 1.53<--  LIVER FUNCTIONS - ( 04 Oct 2024 05:50 )  Alb: 3.3 g/dL / Pro: 6.3 gm/dL / ALK PHOS: 30 U/L / ALT: 58 U/L / AST: 57 U/L / GGT: x               hs Troponin:            Urinalysis Basic - ( 04 Oct 2024 05:50 )    Color: x / Appearance: x / SG: x / pH: x  Gluc: 97 mg/dL / Ketone: x  / Bili: x / Urobili: x   Blood: x / Protein: x / Nitrite: x   Leuk Esterase: x / RBC: x / WBC x   Sq Epi: x / Non Sq Epi: x / Bacteria: x      CSF:                      EKG:   MICROBIOLOGY:    IMAGING:      Labs, imaging, EKG personally reviewed    RADIOLOGY & ADDITIONAL TESTS: Reviewed.

## 2024-10-04 NOTE — PROGRESS NOTE ADULT - SUBJECTIVE AND OBJECTIVE BOX
Patient is a 68y old  Male who presents with a chief complaint of alcohol dependence w withdrawl      HPI:  68 year old male w  alcohol abuse, CKD 3a,  DVT  on xarelto, HTN, RA, Leukemia in remission January 2023,  BIBEMS HHED c/o elevated BP during IV infusion for RA at infusion center.    Patient was evaluated by me at the infusion center and sent to the ER  His SBP was 210 and he did not take meds in am that day per son.   EKG- no ischemic changes    In ED /114      RR 18   T 98.3   97% sat RA  lopressor 5 mg IV, clonidine, ativan, nl d-dimer, trop neg x 2  EKG  w repeat     10/2- Mr Hatfield was seen and examined by me this am around 730 am.  He was alert but confused.  Spoke to RN and noted hisBP  running high overnight    10/3- Mr Hatfiedl Was seen and examined by me.    Discussed with his RN   He is still receiving Ativan.      10/4- Mr Hatfield Was seen and examined.    He was comfortable sleeping.    He is on Ativan.    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      PAST SURGICAL HX  No significant past surgical history      FAMILY HX  rheumatoid arthritis : Mother        SOCIAL HX  alcohol : a few glasses of wine not every day; more on weekend w friends  nonsmoker (01 Oct 2024 23:09)      PAST MEDICAL & SURGICAL HISTORY:  ETOH abuse      Hyperlipidemia LDL goal <100      Restless leg syndrome      Rheumatoid arteritis      Leukemia      No significant past surgical history          MEDICATIONS  (STANDING):  atorvastatin 10 milliGRAM(s) Oral at bedtime  folic acid 1 milliGRAM(s) Oral daily  hydroxychloroquine 200 milliGRAM(s) Oral two times a day  LORazepam   Injectable 1.5 milliGRAM(s) IV Push every 4 hours  LORazepam   Injectable 2 milliGRAM(s) IV Push every 4 hours  LORazepam   Injectable   IV Push   metoprolol succinate ER 50 milliGRAM(s) Oral daily  multivitamin 1 Tablet(s) Oral daily  predniSONE   Tablet 20 milliGRAM(s) Oral daily  rivaroxaban 10 milliGRAM(s) Oral with dinner  thiamine 100 milliGRAM(s) Oral daily  thiamine Injectable 100 milliGRAM(s) IV Push once    MEDICATIONS  (PRN):  acetaminophen     Tablet .. 650 milliGRAM(s) Oral every 6 hours PRN Temp greater or equal to 38C (100.4F), Mild Pain (1 - 3)  aluminum hydroxide/magnesium hydroxide/simethicone Suspension 30 milliLiter(s) Oral every 4 hours PRN Dyspepsia  hydrALAZINE Injectable 5 milliGRAM(s) IV Push every 6 hours PRN BP Systolic >180  LORazepam   Injectable 2 milliGRAM(s) IV Push once PRN seizure  LORazepam   Injectable 2 milliGRAM(s) IV Push every 1 hour PRN Symptom-triggered: each CIWA -Ar score 8 or GREATER  LORazepam   Injectable 2 milliGRAM(s) IV Push every 2 hours PRN Symptom-triggered: 2 point increase in CIWA -Ar score and a total score of 7 or LESS      FAMILY HISTORY:  FHx: rheumatoid arthritis (Mother)        SOCIAL HISTORY:No recent smoking   Alcohol abuse present    REVIEW OF SYSTEMS:  CONSTITUTIONAL:    No fatigue, malaise, lethargy.  No fever or chills.  RESPIRATORY:  No cough.  No wheeze.  No hemoptysis.    CARDIOVASCULAR:  No chest pains.  No palpitations. No shortness of breath, No orthopnea or PND.  GASTROINTESTINAL:  No abdominal pain.  No nausea or vomiting.    GENITOURINARY:    No hematuria.    MUSCULOSKELETAL:  No musculoskeletal pain.  No joint swelling.  No arthritis.  NEUROLOGICAL:  No tingling or numbness or weakness.  PSYCHIATRIC:  c/o confusion  SKIN:  No rashes.      ICU Vital Signs Last 24 Hrs  T(C): 36.6 (04 Oct 2024 08:00), Max: 37.1 (04 Oct 2024 05:07)  T(F): 97.9 (04 Oct 2024 08:00), Max: 98.8 (04 Oct 2024 05:07)  HR: 68 (04 Oct 2024 12:00) (59 - 80)  BP: 121/73 (04 Oct 2024 12:00) (113/69 - 161/83)  BP(mean): 90 (04 Oct 2024 12:00) (80 - 108)  ABP: --  ABP(mean): --  RR: 14 (04 Oct 2024 12:00) (11 - 20)  SpO2: 95% (04 Oct 2024 12:00) (92% - 100%)    O2 Parameters below as of 04 Oct 2024 12:00  Patient On (Oxygen Delivery Method): room air            PHYSICAL EXAM-    Constitutional:  no acute distress     Head: Head is normocephalic and atraumatic.      Neck:  No JVD.     Cardiovascular: Regular rate and rhythm without S3, S4. No murmurs or rubs are appreciated.      Respiratory: Breath sounds are normal. No rales. No wheezing.    Abdomen: Soft, nontender, nondistended with positive bowel sounds.      Extremity: No tenderness. No  pitting edema , tremulous    Neurologic: The patient is   Arousable    Skin: No rash, no obvious lesions noted.      Psychiatric: The patient appears to be emotionally stable.      INTERPRETATION OF TELEMETRY:  sinus rhythm    ECG: Sinus rythm ,  no ST T changes.     I&O's Detail      LABS:                                   10.3   5.64  )-----------( 140      ( 03 Oct 2024 05:16 )             29.5                           10.8   4.34  )-----------( 118      ( 04 Oct 2024 05:50 )             31.4     10-04    137  |  106  |  27[H]  ----------------------------<  97  3.6   |  24  |  1.27    Ca    9.2      04 Oct 2024 05:50  Phos  2.8     10-04  Mg     1.8     10-04    TPro  6.3  /  Alb  3.3  /  TBili  0.7  /  DBili  x   /  AST  57[H]  /  ALT  58  /  AlkPhos  30[L]  10-04        LIVER FUNCTIONS - ( 04 Oct 2024 05:50 )  Alb: 3.3 g/dL / Pro: 6.3 gm/dL / ALK PHOS: 30 U/L / ALT: 58 U/L / AST: 57 U/L / GGT: x                           Urinalysis Basic - ( 02 Oct 2024 07:42 )    Color: x / Appearance: x / SG: x / pH: x  Gluc: 142 mg/dL / Ketone: x  / Bili: x / Urobili: x   Blood: x / Protein: x / Nitrite: x   Leuk Esterase: x / RBC: x / WBC x   Sq Epi: x / Non Sq Epi: x / Bacteria: x      I&O's Summary    BNP  RADIOLOGY & ADDITIONAL STUDIES:  < from: Xray Chest 1 View- PORTABLE-Urgent (10.01.24 @ 19:34) >  Clear lungs.    --- End of Report ---            STEFANIA HORTON MD; Attending Radiologist  This document has been electronically signed. Oct  1 2024  9:57PM    < end of copied text >  < from: TTE W or WO Ultrasound Enhancing Agent (06.01.24 @ 08:49) >      1. Left ventricular cavity is normal in size. Left ventricular wall thickness is mildly increased. Left ventricular systolic function is moderately to severely decreased with an ejection fraction visually estimated at 35 to 40 %. Global left ventricular hypokinesis.   2. Moderate mitral regurgitation.   3. Mild to moderate tricuspid regurgitation.   4. Mild aortic regurgitation.   5. Mild left ventricular hypertrophy.   6. Moderate aortic stenosis.   7. Moderate pulmonary hypertension.   8. Technically difficult image quality.    < end of copied text >

## 2024-10-04 NOTE — PROGRESS NOTE ADULT - SUBJECTIVE AND OBJECTIVE BOX
CHIEF COMPLAINT/INTERVAL HISTORY:    Patient is a 68y old  Male who presents with a chief complaint of alcohol dependence w withdrawl (03 Oct 2024 18:40)      HPI:  68 year old male w  alcohol abuse, CKD 3a,  DVT  on xarelto, HTN, RA, Leukemia in remission January 2023,  BIBEMS HHED c/o elevated BP during IV infusion for RA at infusion center today.       +mild SOB and HORN  denied CP or chest pressure.   forgot to take his BP meds x 2 days   as per EMS, "infusion center felt uncomfortable letting him go home with his systolic pressure in the 200s"  Pt does not recall name of med, 1st infusion today      In ED /114      RR 18   T 98.3   97% sat RA  lopressor 5 mg IV, clonidine, ativan, nl d-dimer, trop neg x 2  EKG  w repeat       Being admitted for alcohol dependence w withdrawl        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     ICU Vital Signs Last 24 Hrs  T(C): 36.6 (04 Oct 2024 08:00), Max: 37.1 (04 Oct 2024 05:07)  T(F): 97.9 (04 Oct 2024 08:00), Max: 98.8 (04 Oct 2024 05:07)  HR: 68 (04 Oct 2024 12:00) (59 - 80)  BP: 121/73 (04 Oct 2024 12:00) (113/69 - 161/83)  BP(mean): 90 (04 Oct 2024 12:00) (80 - 108)  ABP: --  ABP(mean): --  RR: 14 (04 Oct 2024 12:00) (11 - 20)  SpO2: 95% (04 Oct 2024 12:00) (92% - 100%)    O2 Parameters below as of 04 Oct 2024 12:00  Patient On (Oxygen Delivery Method): room air      PHYSICAL EXAM:      Constitutional: NAD  Eyes: perrl, no conjunctival changes  ENMT: no exudates, moist oral muc, uvula midline  Neck: no JVD, no LAD  Back: no cva tenderness  Respiratory: CTA, no exp wheezes  Cardiovascular: S1S2 reg, no murmur gallop or rub  Gastrointestinal: abd soft, NT/ND + BS  Genitourinary: voiding  Extremities: FROM, no joint effusions, no edema, no clubbing , no cyanosis  Vascular: pedal pulses + bilateral, warm extremities  Neurological: non focal, mot str 5/5/ all extr  Skin: no rashes  Lymph Nodes: no LAD              LABS:                        10.8   4.34  )-----------( 118      ( 04 Oct 2024 05:50 )             31.4     10-04    137  |  106  |  27[H]  ----------------------------<  97  3.6   |  24  |  1.27    Ca    9.2      04 Oct 2024 05:50  Phos  2.8     10-04  Mg     1.8     10-04    TPro  6.3  /  Alb  3.3  /  TBili  0.7  /  DBili  x   /  AST  57[H]  /  ALT  58  /  AlkPhos  30[L]  10-04      RADIOLOGY & ADDITIONAL TESTS: personally reviewed    69 y/o M with PMH alcohol abuse, CKD 3, DVT on xarelto, HTN, RA on IV infusion, leukemia in remission January 2023, BIBEMS HHED c/o due to elevated BP during IV infusion for RA at infusion center today, notes last alcoholic drink was 2 days prior, found to be hypertensive, tachycardic, tremulous admitted to medicine for alcohol withdrawal with persistent elevated CIWA scores despite ativan upgraded to ICU for further evaluation and management.     Problems:  #Alcohol withdrawal with delirium  #Uncontrolled HTN    Plan:  - Withdrawal well-controlled on ativan 2mg IV q4h, will being taper to 1.5mg q4h   - thiamine, folic acid  - c/w metoprolol, hydralazine and amlodipine for BP  - C/w maintenance IVF while not taking good PO  - DASH diet  - C/w plaquenil and steroid for RA  - C/w home xarelto        time spent: 45min     CHIEF COMPLAINT/INTERVAL HISTORY:    Patient is a 68y old  Male who presents with a chief complaint of alcohol dependence w withdrawl (03 Oct 2024 18:40)      HPI:  68 year old male w  alcohol abuse, CKD 3a,  DVT  on xarelto, HTN, RA, Leukemia in remission January 2023,  BIBEMS HHED c/o elevated BP during IV infusion for RA at infusion center today.       +mild SOB and HORN  denied CP or chest pressure.   forgot to take his BP meds x 2 days   as per EMS, "infusion center felt uncomfortable letting him go home with his systolic pressure in the 200s"  Pt does not recall name of med, 1st infusion today      In ED /114      RR 18   T 98.3   97% sat RA  lopressor 5 mg IV, clonidine, ativan, nl d-dimer, trop neg x 2  EKG  w repeat       Being admitted for alcohol dependence w withdrawl        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 Pleasant, no c/o     ICU Vital Signs Last 24 Hrs  T(C): 36.6 (04 Oct 2024 08:00), Max: 37.1 (04 Oct 2024 05:07)  T(F): 97.9 (04 Oct 2024 08:00), Max: 98.8 (04 Oct 2024 05:07)  HR: 68 (04 Oct 2024 12:00) (59 - 80)  BP: 121/73 (04 Oct 2024 12:00) (113/69 - 161/83)  BP(mean): 90 (04 Oct 2024 12:00) (80 - 108)  ABP: --  ABP(mean): --  RR: 14 (04 Oct 2024 12:00) (11 - 20)  SpO2: 95% (04 Oct 2024 12:00) (92% - 100%)    O2 Parameters below as of 04 Oct 2024 12:00  Patient On (Oxygen Delivery Method): room air      PHYSICAL EXAM:      Constitutional: NAD  Eyes: perrl, no conjunctival changes  ENMT: no exudates, moist oral muc, uvula midline  Neck: no JVD, no LAD  Back: no cva tenderness  Respiratory: CTA, no exp wheezes  Cardiovascular: S1S2 reg, no murmur gallop or rub  Gastrointestinal: abd soft, NT/ND + BS  Genitourinary: voiding  Extremities: FROM, no joint effusions, no edema, no clubbing , no cyanosis  Vascular: pedal pulses + bilateral, warm extremities  Neurological: non focal, mot str 5/5/ all extr  Skin: no rashes  Lymph Nodes: no LAD              LABS:                        10.8   4.34  )-----------( 118      ( 04 Oct 2024 05:50 )             31.4     10-04    137  |  106  |  27[H]  ----------------------------<  97  3.6   |  24  |  1.27    Ca    9.2      04 Oct 2024 05:50  Phos  2.8     10-04  Mg     1.8     10-04    TPro  6.3  /  Alb  3.3  /  TBili  0.7  /  DBili  x   /  AST  57[H]  /  ALT  58  /  AlkPhos  30[L]  10-04      RADIOLOGY & ADDITIONAL TESTS: personally reviewed    69 y/o M with PMH alcohol abuse, CKD 3, DVT on xarelto, HTN, RA on IV infusion, leukemia in remission January 2023, BIBEMS HHED c/o due to elevated BP during IV infusion for RA at infusion center today, notes last alcoholic drink was 2 days prior, found to be hypertensive, tachycardic, tremulous admitted to medicine for alcohol withdrawal with persistent elevated CIWA scores despite ativan upgraded to ICU for further evaluation and management.     Problems:  #Alcohol withdrawal with delirium  #Uncontrolled HTN    Plan:  - Withdrawal well-controlled on ativan 2mg IV q4h, will being taper to 1.5mg q4h   - thiamine, folic acid  - c/w metoprolol, hydralazine and amlodipine for BP  - C/w maintenance IVF while not taking good PO  - DASH diet  - C/w plaquenil and steroid for RA  - C/w home xarelto    case d/w son dc to Rehab    time spent: 45min

## 2024-10-05 LAB
ALBUMIN SERPL ELPH-MCNC: 3.5 G/DL — SIGNIFICANT CHANGE UP (ref 3.3–5)
ALP SERPL-CCNC: 30 U/L — LOW (ref 40–120)
ALT FLD-CCNC: 64 U/L — SIGNIFICANT CHANGE UP (ref 12–78)
ANION GAP SERPL CALC-SCNC: 6 MMOL/L — SIGNIFICANT CHANGE UP (ref 5–17)
AST SERPL-CCNC: 43 U/L — HIGH (ref 15–37)
BILIRUB SERPL-MCNC: 0.6 MG/DL — SIGNIFICANT CHANGE UP (ref 0.2–1.2)
BUN SERPL-MCNC: 24 MG/DL — HIGH (ref 7–23)
CALCIUM SERPL-MCNC: 9.7 MG/DL — SIGNIFICANT CHANGE UP (ref 8.5–10.1)
CHLORIDE SERPL-SCNC: 104 MMOL/L — SIGNIFICANT CHANGE UP (ref 96–108)
CO2 SERPL-SCNC: 26 MMOL/L — SIGNIFICANT CHANGE UP (ref 22–31)
CREAT SERPL-MCNC: 1.2 MG/DL — SIGNIFICANT CHANGE UP (ref 0.5–1.3)
EGFR: 66 ML/MIN/1.73M2 — SIGNIFICANT CHANGE UP
GLUCOSE SERPL-MCNC: 97 MG/DL — SIGNIFICANT CHANGE UP (ref 70–99)
HCT VFR BLD CALC: 32.5 % — LOW (ref 39–50)
HGB BLD-MCNC: 11.1 G/DL — LOW (ref 13–17)
MAGNESIUM SERPL-MCNC: 1.8 MG/DL — SIGNIFICANT CHANGE UP (ref 1.6–2.6)
MCHC RBC-ENTMCNC: 34 PG — SIGNIFICANT CHANGE UP (ref 27–34)
MCHC RBC-ENTMCNC: 34.2 GM/DL — SIGNIFICANT CHANGE UP (ref 32–36)
MCV RBC AUTO: 99.7 FL — SIGNIFICANT CHANGE UP (ref 80–100)
PHOSPHATE SERPL-MCNC: 3 MG/DL — SIGNIFICANT CHANGE UP (ref 2.5–4.5)
PLATELET # BLD AUTO: 126 K/UL — LOW (ref 150–400)
POTASSIUM SERPL-MCNC: 3.5 MMOL/L — SIGNIFICANT CHANGE UP (ref 3.5–5.3)
POTASSIUM SERPL-SCNC: 3.5 MMOL/L — SIGNIFICANT CHANGE UP (ref 3.5–5.3)
PROT SERPL-MCNC: 6.6 GM/DL — SIGNIFICANT CHANGE UP (ref 6–8.3)
RBC # BLD: 3.26 M/UL — LOW (ref 4.2–5.8)
RBC # FLD: 13.8 % — SIGNIFICANT CHANGE UP (ref 10.3–14.5)
SODIUM SERPL-SCNC: 136 MMOL/L — SIGNIFICANT CHANGE UP (ref 135–145)
WBC # BLD: 5.74 K/UL — SIGNIFICANT CHANGE UP (ref 3.8–10.5)
WBC # FLD AUTO: 5.74 K/UL — SIGNIFICANT CHANGE UP (ref 3.8–10.5)

## 2024-10-05 PROCEDURE — 99232 SBSQ HOSP IP/OBS MODERATE 35: CPT

## 2024-10-05 RX ADMIN — HYDROXYCHLOROQUINE SULFATE 200 MILLIGRAM(S): 200 TABLET, FILM COATED ORAL at 10:54

## 2024-10-05 RX ADMIN — Medication 10 MILLIGRAM(S): at 10:53

## 2024-10-05 RX ADMIN — CHLORHEXIDINE GLUCONATE ORAL RINSE 1 APPLICATION(S): 1.2 SOLUTION DENTAL at 06:01

## 2024-10-05 RX ADMIN — Medication 1 MILLIGRAM(S): at 00:31

## 2024-10-05 RX ADMIN — Medication 1 MILLIGRAM(S): at 02:47

## 2024-10-05 RX ADMIN — THIAMINE HYDROCHLORIDE 100 MILLIGRAM(S): 100 INJECTION, SOLUTION INTRAMUSCULAR; INTRAVENOUS at 10:55

## 2024-10-05 RX ADMIN — HYDRALAZINE HYDROCHLORIDE 50 MILLIGRAM(S): 100 TABLET ORAL at 18:38

## 2024-10-05 RX ADMIN — HYDRALAZINE HYDROCHLORIDE 50 MILLIGRAM(S): 100 TABLET ORAL at 05:57

## 2024-10-05 RX ADMIN — ATORVASTATIN CALCIUM 10 MILLIGRAM(S): 10 TABLET, FILM COATED ORAL at 21:18

## 2024-10-05 RX ADMIN — FOLIC ACID 1 MILLIGRAM(S): 1 TABLET ORAL at 10:54

## 2024-10-05 RX ADMIN — HYDROXYCHLOROQUINE SULFATE 200 MILLIGRAM(S): 200 TABLET, FILM COATED ORAL at 00:31

## 2024-10-05 RX ADMIN — Medication 1 MILLIGRAM(S): at 18:37

## 2024-10-05 RX ADMIN — Medication 50 MILLIGRAM(S): at 10:54

## 2024-10-05 RX ADMIN — Medication 1 MILLIGRAM(S): at 12:20

## 2024-10-05 RX ADMIN — RIVAROXABAN 20 MILLIGRAM(S): 10 TABLET, FILM COATED ORAL at 18:36

## 2024-10-05 RX ADMIN — PREDNISONE 20 MILLIGRAM(S): 5 TABLET ORAL at 10:54

## 2024-10-05 RX ADMIN — Medication 1 MILLIGRAM(S): at 08:37

## 2024-10-05 RX ADMIN — HYDROXYCHLOROQUINE SULFATE 200 MILLIGRAM(S): 200 TABLET, FILM COATED ORAL at 21:18

## 2024-10-05 NOTE — PROGRESS NOTE ADULT - SUBJECTIVE AND OBJECTIVE BOX
CHIEF COMPLAINT/INTERVAL HISTORY:    Patient is a 68y old  Male who presents with a chief complaint of alcohol dependence w withdrawl (03 Oct 2024 18:40)      HPI:  68 year old male w  alcohol abuse, CKD 3a,  DVT  on xarelto, HTN, RA, Leukemia in remission January 2023,  BIBEMS HHED c/o elevated BP during IV infusion for RA at infusion center today.       +mild SOB and HORN  denied CP or chest pressure.   forgot to take his BP meds x 2 days   as per EMS, "infusion center felt uncomfortable letting him go home with his systolic pressure in the 200s"  Pt does not recall name of med, 1st infusion today  In ED /114      RR 18   T 98.3   97% sat RA  lopressor 5 mg IV, clonidine, ativan, nl d-dimer, trop neg x 2  EKG  w repeat   Being admitted for alcohol dependence w withdrawl        Vital Signs Last 24 Hrs  T(C): 36.7 (05 Oct 2024 04:00), Max: 37 (04 Oct 2024 16:09)  T(F): 98.1 (05 Oct 2024 04:00), Max: 98.6 (04 Oct 2024 16:09)  HR: 72 (05 Oct 2024 04:00) (61 - 80)  BP: 138/74 (05 Oct 2024 04:00) (120/70 - 140/90)  BP(mean): 92 (04 Oct 2024 14:00) (80 - 101)  RR: 17 (05 Oct 2024 04:00) (12 - 18)  SpO2: 98% (05 Oct 2024 04:00) (93% - 100%)    Parameters below as of 05 Oct 2024 04:00  Patient On (Oxygen Delivery Method): room air          PHYSICAL EXAM:      Constitutional: NAD  Eyes: perrl, no conjunctival changes  ENMT: no exudates, moist oral muc, uvula midline  Neck: no JVD, no LAD  Back: no cva tenderness  Respiratory: CTA, no exp wheezes  Cardiovascular: S1S2 reg, no murmur gallop or rub  Gastrointestinal: abd soft, NT/ND + BS  Genitourinary: voiding  Extremities: FROM, no joint effusions, no edema, no clubbing , no cyanosis  Vascular: pedal pulses + bilateral, warm extremities  Neurological: non focal, mot str 5/5/ all extr  Skin: no rashes  Lymph Nodes: no LAD              LABS:                        10.8   4.34  )-----------( 118      ( 04 Oct 2024 05:50 )             31.4     10-04    137  |  106  |  27[H]  ----------------------------<  97  3.6   |  24  |  1.27    Ca    9.2      04 Oct 2024 05:50  Phos  2.8     10-04  Mg     1.8     10-04    TPro  6.3  /  Alb  3.3  /  TBili  0.7  /  DBili  x   /  AST  57[H]  /  ALT  58  /  AlkPhos  30[L]  10-04      RADIOLOGY & ADDITIONAL TESTS: personally reviewed    69 y/o M with PMH alcohol abuse, CKD 3, DVT on xarelto, HTN, RA on IV infusion, leukemia in remission January 2023, BIBEMS HHED c/o due to elevated BP during IV infusion for RA at infusion center today, notes last alcoholic drink was 2 days prior, found to be hypertensive, tachycardic, tremulous admitted to medicine for alcohol withdrawal with persistent elevated CIWA scores despite ativan upgraded to ICU for further evaluation and management.     Problems:  #Alcohol withdrawal with delirium  #Uncontrolled HTN    Plan:  - Withdrawal well-controlled on ativan 2mg IV q4h, will being taper to 1.5mg q4h   - thiamine, folic acid  - c/w metoprolol, hydralazine and amlodipine for BP  - C/w maintenance IVF while not taking good PO  - DASH diet  - C/w plaquenil and steroid for RA  - C/w home xarelto    case d/w son dc to Rehab    time spent: 45min     CHIEF COMPLAINT/INTERVAL HISTORY:    Patient is a 68y old  Male who presents with a chief complaint of alcohol dependence w withdrawl (03 Oct 2024 18:40)      HPI:  68 year old male w  alcohol abuse, CKD 3a,  DVT  on xarelto, HTN, RA, Leukemia in remission January 2023,  BIBEMS HHED c/o elevated BP during IV infusion for RA at infusion center   +mild SOB and HORN  denied CP or chest pressure.   forgot to take his BP meds x 2 days   as per EMS, "infusion center felt uncomfortable letting him go home with his systolic pressure in the 200s"  Pt does not recall name of med, 1st infusion today  In ED /114      RR 18   T 98.3   97% sat RA  lopressor 5 mg IV, clonidine, ativan, nl d-dimer, trop neg x 2  EKG  w repeat   Being admitted for alcohol dependence w withdrawl    downgraded from ICU    Vital Signs Last 24 Hrs  T(C): 36.7 (05 Oct 2024 04:00), Max: 37 (04 Oct 2024 16:09)  T(F): 98.1 (05 Oct 2024 04:00), Max: 98.6 (04 Oct 2024 16:09)  HR: 72 (05 Oct 2024 04:00) (61 - 80)  BP: 138/74 (05 Oct 2024 04:00) (120/70 - 140/90)  BP(mean): 92 (04 Oct 2024 14:00) (80 - 101)  RR: 17 (05 Oct 2024 04:00) (12 - 18)  SpO2: 98% (05 Oct 2024 04:00) (93% - 100%)    Parameters below as of 05 Oct 2024 04:00  Patient On (Oxygen Delivery Method): room air          PHYSICAL EXAM:      Constitutional: NAD  Eyes: perrl, no conjunctival changes  ENMT: no exudates, moist oral muc, uvula midline  Neck: no JVD, no LAD  Back: no cva tenderness  Respiratory: CTA, no exp wheezes  Cardiovascular: S1S2 reg, III/VI KATHIE  Gastrointestinal: abd soft, NT/ND + BS  Genitourinary: voiding  Extremities: FROM, no joint effusions, no edema, no clubbing , no cyanosis  Vascular: pedal pulses + bilateral, warm extremities  Neurological: non focal, mot str 5/5/ all extr  Skin: no rashes  Lymph Nodes: no LAD        TTE 6/24  1. Left ventricular cavity is normal in size. Left ventricular wall thickness is mildly increased. Left ventricular systolic function is moderately to severely decreased with an ejection fraction visually estimated at 35 to 40 %. Global left ventricular hypokinesis.   2. Moderate mitral regurgitation.   3. Mild to moderate tricuspid regurgitation.   4. Mild aortic regurgitation.   5. Mild left ventricular hypertrophy.   6. Moderate aortic stenosis.   7. Moderate pulmonary hypertension.   8. Technically difficult image quality.        LABS:                        10.8   4.34  )-----------( 118      ( 04 Oct 2024 05:50 )             31.4     10-04    137  |  106  |  27[H]  ----------------------------<  97  3.6   |  24  |  1.27    Ca    9.2      04 Oct 2024 05:50  Phos  2.8     10-04  Mg     1.8     10-04    TPro  6.3  /  Alb  3.3  /  TBili  0.7  /  DBili  x   /  AST  57[H]  /  ALT  58  /  AlkPhos  30[L]  10-04      RADIOLOGY & ADDITIONAL TESTS: personally reviewed    67 y/o M with PMH alcohol abuse, CKD 3, DVT on xarelto, HTN, RA on IV infusion, leukemia in remission January 2023, BIBEMS HHED c/o due to elevated BP during IV infusion for RA at infusion center today, notes last alcoholic drink was 2 days prior, found to be hypertensive, tachycardic, tremulous admitted to medicine for alcohol withdrawal with persistent elevated CIWA scores despite ativan upgraded to ICU for further evaluation and management.       #Alcohol withdrawal with delirium  resolved    #Uncontrolled HTN  resolved    # Heart murmur  abnormal TTE low ef and AS  OP f/up  reconsult cardio re chf meds adjustment    # RA  - C/w plaquenil and steroid for RA    # PE  - C/w home xarelto    case d/w son dc to Rehab    time spent: 45min

## 2024-10-06 PROCEDURE — 99232 SBSQ HOSP IP/OBS MODERATE 35: CPT

## 2024-10-06 RX ORDER — SACUBITRIL AND VALSARTAN 97; 103 MG/1; MG/1
1 TABLET, FILM COATED ORAL
Refills: 0 | Status: DISCONTINUED | OUTPATIENT
Start: 2024-10-06 | End: 2024-10-07

## 2024-10-06 RX ADMIN — FOLIC ACID 1 MILLIGRAM(S): 1 TABLET ORAL at 08:49

## 2024-10-06 RX ADMIN — HYDROXYCHLOROQUINE SULFATE 200 MILLIGRAM(S): 200 TABLET, FILM COATED ORAL at 21:39

## 2024-10-06 RX ADMIN — HYDROXYCHLOROQUINE SULFATE 200 MILLIGRAM(S): 200 TABLET, FILM COATED ORAL at 08:50

## 2024-10-06 RX ADMIN — CHLORHEXIDINE GLUCONATE ORAL RINSE 1 APPLICATION(S): 1.2 SOLUTION DENTAL at 08:50

## 2024-10-06 RX ADMIN — Medication 10 MILLIGRAM(S): at 08:50

## 2024-10-06 RX ADMIN — Medication 650 MILLIGRAM(S): at 00:57

## 2024-10-06 RX ADMIN — RIVAROXABAN 20 MILLIGRAM(S): 10 TABLET, FILM COATED ORAL at 17:22

## 2024-10-06 RX ADMIN — SACUBITRIL AND VALSARTAN 1 TABLET(S): 97; 103 TABLET, FILM COATED ORAL at 10:54

## 2024-10-06 RX ADMIN — Medication 1 MILLIGRAM(S): at 05:06

## 2024-10-06 RX ADMIN — Medication 650 MILLIGRAM(S): at 00:27

## 2024-10-06 RX ADMIN — SACUBITRIL AND VALSARTAN 1 TABLET(S): 97; 103 TABLET, FILM COATED ORAL at 21:39

## 2024-10-06 RX ADMIN — Medication 0.5 MILLIGRAM(S): at 23:47

## 2024-10-06 RX ADMIN — HYDRALAZINE HYDROCHLORIDE 50 MILLIGRAM(S): 100 TABLET ORAL at 14:40

## 2024-10-06 RX ADMIN — Medication 50 MILLIGRAM(S): at 08:50

## 2024-10-06 RX ADMIN — HYDRALAZINE HYDROCHLORIDE 50 MILLIGRAM(S): 100 TABLET ORAL at 05:08

## 2024-10-06 RX ADMIN — Medication 1 MILLIGRAM(S): at 12:24

## 2024-10-06 RX ADMIN — PREDNISONE 20 MILLIGRAM(S): 5 TABLET ORAL at 08:49

## 2024-10-06 RX ADMIN — THIAMINE HYDROCHLORIDE 100 MILLIGRAM(S): 100 INJECTION, SOLUTION INTRAMUSCULAR; INTRAVENOUS at 08:50

## 2024-10-06 RX ADMIN — Medication 1 MILLIGRAM(S): at 00:23

## 2024-10-06 RX ADMIN — Medication 0.5 MILLIGRAM(S): at 17:22

## 2024-10-06 RX ADMIN — HYDRALAZINE HYDROCHLORIDE 50 MILLIGRAM(S): 100 TABLET ORAL at 21:39

## 2024-10-06 RX ADMIN — ATORVASTATIN CALCIUM 10 MILLIGRAM(S): 10 TABLET, FILM COATED ORAL at 21:39

## 2024-10-06 NOTE — PHYSICAL THERAPY INITIAL EVALUATION ADULT - PERTINENT HX OF CURRENT PROBLEM, REHAB EVAL
68 year old male w  alcohol abuse, CKD 3a,  DVT  on xarelto, HTN, RA, Leukemia in remission January 2023,  BIBEMS HHED c/o elevated BP during IV infusion for RA at infusion center.  Patient was evaluated by me at the infusion center and sent to the ER  His SBP was 210 and he did not take meds in am that day per son.   EKG- no ischemic changes  In ED /114      RR 18   T 98.3   97% sat RA  lopressor 5 mg IV, clonidine, ativan, nl d-dimer, trop neg x 2  EKG  w repeat

## 2024-10-06 NOTE — PHYSICAL THERAPY INITIAL EVALUATION ADULT - NSACTIVITYREC_GEN_A_PT
Stair Training: Patient will be able to negotiate 5 steps with bilateral handrails independently by discharge.

## 2024-10-06 NOTE — PHYSICAL THERAPY INITIAL EVALUATION ADULT - GENERAL OBSERVATIONS, REHAB EVAL
Pt found supine in bed on 5E in NAD, agreeable to participate in PT Evaluation. Pt is able to perform bed mobility and sit<>stand transfers with SBA. Pt is able to ambulate 100 feet with RW and CGA. Pt is then able to ambulate an additional 100 feet with CGA without assistive device. Pt returned to bedside chair with chair alarm on, call bell and phone in reach, BRIANNE Villegas made aware.

## 2024-10-06 NOTE — PHYSICAL THERAPY INITIAL EVALUATION ADULT - GAIT TRAINING, PT EVAL
Patient will be able to safely ambulate 300 feet without assistive device by discharge from acute care setting.

## 2024-10-06 NOTE — PROGRESS NOTE ADULT - SUBJECTIVE AND OBJECTIVE BOX
CHIEF COMPLAINT/INTERVAL HISTORY:    Patient is a 68y old  Male who presents with a chief complaint of alcohol dependence w withdrawl (03 Oct 2024 18:40)      HPI:  68 year old male w  alcohol abuse, CKD 3a,  DVT  on xarelto, HTN, RA, Leukemia in remission January 2023,  BIBEMS HHED c/o elevated BP during IV infusion for RA at infusion center   +mild SOB and HORN  denied CP or chest pressure.   forgot to take his BP meds x 2 days   as per EMS, "infusion center felt uncomfortable letting him go home with his systolic pressure in the 200s"  Pt does not recall name of med, 1st infusion today  In ED /114      RR 18   T 98.3   97% sat RA  lopressor 5 mg IV, clonidine, ativan, nl d-dimer, trop neg x 2  EKG  w repeat   Being admitted for alcohol dependence w withdrawal    downgraded from ICU    Vital Signs Last 24 Hrs  T(C): 36.7 (06 Oct 2024 07:45), Max: 36.8 (05 Oct 2024 15:18)  T(F): 98 (06 Oct 2024 07:45), Max: 98.3 (05 Oct 2024 15:18)  HR: 77 (06 Oct 2024 07:45) (71 - 82)  BP: 160/83 (06 Oct 2024 07:45) (119/62 - 160/83)  BP(mean): 88 (06 Oct 2024 04:56) (87 - 97)  RR: 18 (06 Oct 2024 07:45) (18 - 19)  SpO2: 95% (06 Oct 2024 07:45) (95% - 96%)    Parameters below as of 06 Oct 2024 07:45  Patient On (Oxygen Delivery Method): room air              PHYSICAL EXAM:      Constitutional: NAD  Eyes: perrl, no conjunctival changes  ENMT: no exudates, moist oral muc, uvula midline  Neck: no JVD, no LAD  Back: no cva tenderness  Respiratory: CTA, no exp wheezes  Cardiovascular: S1S2 reg, III/VI KATHIE  Gastrointestinal: abd soft, NT/ND + BS  Genitourinary: voiding  Extremities: FROM, no joint effusions, no edema, no clubbing , no cyanosis  Vascular: pedal pulses + bilateral, warm extremities  Neurological: non focal, mot str 5/5/ all extr  Skin: no rashes  Lymph Nodes: no LAD        TTE 6/24  1. Left ventricular cavity is normal in size. Left ventricular wall thickness is mildly increased. Left ventricular systolic function is moderately to severely decreased with an ejection fraction visually estimated at 35 to 40 %. Global left ventricular hypokinesis.   2. Moderate mitral regurgitation.   3. Mild to moderate tricuspid regurgitation.   4. Mild aortic regurgitation.   5. Mild left ventricular hypertrophy.   6. Moderate aortic stenosis.   7. Moderate pulmonary hypertension.   8. Technically difficult image quality.        LABS:                        10.8   4.34  )-----------( 118      ( 04 Oct 2024 05:50 )             31.4     10-04    137  |  106  |  27[H]  ----------------------------<  97  3.6   |  24  |  1.27    Ca    9.2      04 Oct 2024 05:50  Phos  2.8     10-04  Mg     1.8     10-04    TPro  6.3  /  Alb  3.3  /  TBili  0.7  /  DBili  x   /  AST  57[H]  /  ALT  58  /  AlkPhos  30[L]  10-04      RADIOLOGY & ADDITIONAL TESTS: personally reviewed    69 y/o M with PMH alcohol abuse, CKD 3, DVT on xarelto, HTN, RA on IV infusion, leukemia in remission January 2023, BIBEMS HHED c/o due to elevated BP during IV infusion for RA at infusion center today, notes last alcoholic drink was 2 days prior, found to be hypertensive, tachycardic, tremulous admitted to medicine for alcohol withdrawal with persistent elevated CIWA scores despite ativan upgraded to ICU for further evaluation and management.       #Alcohol withdrawal with delirium  resolved    #Uncontrolled HTN  resolved    # Heart murmur  abnormal TTE low ef and AS  OP f/up  reconsult cardio re chf meds adjustment  needs fup for the AS was present on tte in June    # RA  - C/w plaquenil and steroid for RA    # PE  - C/w home xarelto    case d/w son dc to Rehab    time spent: 45min     CHIEF COMPLAINT/INTERVAL HISTORY:    Patient is a 68y old  Male who presents with a chief complaint of alcohol dependence w withdrawl (03 Oct 2024 18:40)      HPI:  68 year old male w  alcohol abuse, CKD 3a,  DVT  on xarelto, HTN, RA, Leukemia in remission January 2023,  BIBEMS HHED c/o elevated BP during IV infusion for RA at infusion center   +mild SOB and HORN  denied CP or chest pressure.   forgot to take his BP meds x 2 days   as per EMS, "infusion center felt uncomfortable letting him go home with his systolic pressure in the 200s"  Pt does not recall name of med, 1st infusion today  In ED /114      RR 18   T 98.3   97% sat RA  lopressor 5 mg IV, clonidine, ativan, nl d-dimer, trop neg x 2  EKG  w repeat   Being admitted for alcohol dependence w withdrawal    downgraded from ICU    Vital Signs Last 24 Hrs  T(C): 36.7 (06 Oct 2024 07:45), Max: 36.8 (05 Oct 2024 15:18)  T(F): 98 (06 Oct 2024 07:45), Max: 98.3 (05 Oct 2024 15:18)  HR: 77 (06 Oct 2024 07:45) (71 - 82)  BP: 160/83 (06 Oct 2024 07:45) (119/62 - 160/83)  BP(mean): 88 (06 Oct 2024 04:56) (87 - 97)  RR: 18 (06 Oct 2024 07:45) (18 - 19)  SpO2: 95% (06 Oct 2024 07:45) (95% - 96%)    Parameters below as of 06 Oct 2024 07:45  Patient On (Oxygen Delivery Method): room air              PHYSICAL EXAM:      Constitutional: NAD  Eyes: perrl, no conjunctival changes  ENMT: no exudates, moist oral muc, uvula midline  Neck: no JVD, no LAD  Back: no cva tenderness  Respiratory: CTA, no exp wheezes  Cardiovascular: S1S2 reg, III/VI KATHIE  Gastrointestinal: abd soft, NT/ND + BS  Genitourinary: voiding  Extremities: FROM, no joint effusions, no edema, no clubbing , no cyanosis  Vascular: pedal pulses + bilateral, warm extremities  Neurological: non focal, mot str 5/5/ all extr  Skin: no rashes  Lymph Nodes: no LAD        TTE 6/24  1. Left ventricular cavity is normal in size. Left ventricular wall thickness is mildly increased. Left ventricular systolic function is moderately to severely decreased with an ejection fraction visually estimated at 35 to 40 %. Global left ventricular hypokinesis.   2. Moderate mitral regurgitation.   3. Mild to moderate tricuspid regurgitation.   4. Mild aortic regurgitation.   5. Mild left ventricular hypertrophy.   6. Moderate aortic stenosis.   7. Moderate pulmonary hypertension.   8. Technically difficult image quality.        LABS:                        10.8   4.34  )-----------( 118      ( 04 Oct 2024 05:50 )             31.4     10-04    137  |  106  |  27[H]  ----------------------------<  97  3.6   |  24  |  1.27    Ca    9.2      04 Oct 2024 05:50  Phos  2.8     10-04  Mg     1.8     10-04    TPro  6.3  /  Alb  3.3  /  TBili  0.7  /  DBili  x   /  AST  57[H]  /  ALT  58  /  AlkPhos  30[L]  10-04      RADIOLOGY & ADDITIONAL TESTS: personally reviewed    67 y/o M with PMH alcohol abuse, CKD 3, DVT on xarelto, HTN, RA on IV infusion, leukemia in remission January 2023, BIBEMS HHED c/o due to elevated BP during IV infusion for RA at infusion center today, notes last alcoholic drink was 2 days prior, found to be hypertensive, tachycardic, tremulous admitted to medicine for alcohol withdrawal with persistent elevated CIWA scores despite ativan upgraded to ICU for further evaluation and management.       #Alcohol withdrawal with delirium  resolved    #Uncontrolled HTN  resolved    # Heart murmur  abnormal TTE low ef and AS  OP f/up  reconsult cardio re chf meds adjustment  needs fup for the AS was present on tte in June  add entresto    # RA  - C/w plaquenil and steroid for RA    # PE  - C/w home xarelto    case d/w son dc to Rehab    time spent: 45min

## 2024-10-07 ENCOUNTER — TRANSCRIPTION ENCOUNTER (OUTPATIENT)
Age: 69
End: 2024-10-07

## 2024-10-07 VITALS
SYSTOLIC BLOOD PRESSURE: 136 MMHG | DIASTOLIC BLOOD PRESSURE: 70 MMHG | OXYGEN SATURATION: 97 % | TEMPERATURE: 98 F | HEART RATE: 66 BPM

## 2024-10-07 PROCEDURE — 99239 HOSP IP/OBS DSCHRG MGMT >30: CPT

## 2024-10-07 RX ORDER — AMLODIPINE BESYLATE 5 MG
1 TABLET ORAL
Qty: 0 | Refills: 0 | DISCHARGE
Start: 2024-10-07

## 2024-10-07 RX ORDER — HYDRALAZINE HYDROCHLORIDE 100 MG/1
1 TABLET ORAL
Qty: 90 | Refills: 0
Start: 2024-10-07 | End: 2024-11-05

## 2024-10-07 RX ADMIN — Medication 650 MILLIGRAM(S): at 00:45

## 2024-10-07 RX ADMIN — SACUBITRIL AND VALSARTAN 1 TABLET(S): 97; 103 TABLET, FILM COATED ORAL at 09:37

## 2024-10-07 RX ADMIN — FOLIC ACID 1 MILLIGRAM(S): 1 TABLET ORAL at 09:37

## 2024-10-07 RX ADMIN — PREDNISONE 20 MILLIGRAM(S): 5 TABLET ORAL at 09:37

## 2024-10-07 RX ADMIN — Medication 0.5 MILLIGRAM(S): at 05:06

## 2024-10-07 RX ADMIN — Medication 10 MILLIGRAM(S): at 09:38

## 2024-10-07 RX ADMIN — HYDRALAZINE HYDROCHLORIDE 50 MILLIGRAM(S): 100 TABLET ORAL at 05:06

## 2024-10-07 RX ADMIN — Medication 50 MILLIGRAM(S): at 09:37

## 2024-10-07 RX ADMIN — HYDROXYCHLOROQUINE SULFATE 200 MILLIGRAM(S): 200 TABLET, FILM COATED ORAL at 09:37

## 2024-10-07 RX ADMIN — Medication 650 MILLIGRAM(S): at 01:15

## 2024-10-07 RX ADMIN — CHLORHEXIDINE GLUCONATE ORAL RINSE 1 APPLICATION(S): 1.2 SOLUTION DENTAL at 09:39

## 2024-10-07 NOTE — DISCHARGE NOTE PROVIDER - NSDCFUADDAPPT_GEN_ALL_CORE_FT
APPTS ARE READY TO BE MADE: [X] YES    Best Family or Patient Contact (if needed):    Additional Information about above appointments (if needed):    1:  2:  3:  APPTS ARE READY TO BE MADE: [X] YES    Best Family or Patient Contact (if needed):    Additional Information about above appointments (if needed):    1:  2:  3:     Patient was outreached but did not answer. A voicemail was left for the patient to return our call.

## 2024-10-07 NOTE — DISCHARGE NOTE PROVIDER - NSDCCPCAREPLAN_GEN_ALL_CORE_FT
PRINCIPAL DISCHARGE DIAGNOSIS  Diagnosis: Alcohol dependence with withdrawal  Assessment and Plan of Treatment: quit drinking; you need fup with cardio for the aortic stenosis that needs to be repaired; see cardio asap and take meds as prescribed

## 2024-10-07 NOTE — PROGRESS NOTE ADULT - PROVIDER SPECIALTY LIST ADULT
Cardiology
Hospitalist
Critical Care
Hospitalist
Cardiology
Cardiology
Critical Care
Hospitalist

## 2024-10-07 NOTE — PROGRESS NOTE ADULT - NUTRITIONAL ASSESSMENT
This patient has been assessed with a concern for Malnutrition and has been determined to have a diagnosis/diagnoses of Moderate protein-calorie malnutrition.    This patient is being managed with:   Diet DASH/TLC-  Sodium & Cholesterol Restricted  Entered: Oct  1 2024  8:32PM    The following pending diet order is being considered for treatment of Moderate protein-calorie malnutrition:  Diet Regular-  Entered: Oct  2 2024 12:12PM  

## 2024-10-07 NOTE — PROGRESS NOTE ADULT - REASON FOR ADMISSION
alcohol dependence w withdrawl

## 2024-10-07 NOTE — PROGRESS NOTE ADULT - ASSESSMENT
69 y/o M with PMH alcohol abuse, CKD 3, DVT on xarelto, HTN, RA on IV infusion, leukemia in remission January 2023, BIBEMS HHED c/o due to elevated BP during IV infusion for RA at infusion center today, notes last alcoholic drink was 2 days prior, found to be hypertensive, tachycardic, tremulous admitted to medicine for alcohol withdrawal with persistent elevated CIWA scores despite ativan upgraded to ICU for further evaluation and management.     Problems:  #Alcohol withdrawal with delirium  #Uncontrolled HTN    Plan:  - Withdrawal well-controlled on ativan 2mg IV q4h, will being taper to 1.5mg q4h   - thiamine, folic acid  - c/w metoprolol, hydralazine and amlodipine for BP  - C/w maintenance IVF while not taking good PO  - DASH diet  - C/w plaquenil and steroid for RA  - C/w home xarelto    Dispo: ICU, critical care services required for severe alcohol withdrawal requiring frequent reassessment and intervention to prevent deterioration, full code
Hypertensive urgency-  currently his blood pressure has significantly improved.    Recommend continuation of the amlodipine, hydralazine as well as metoprolol.    Low-sodium diet   His blood pressure might be higher once he is off the Ativan.        Alcohol withdrawl-  delirium tremens   Currently on Ativan    CKD- recommend close monitoring of renal function and hydration.    Chronic compensated HFPEF- euvolemic.  Low sodium diet.    Alcohol abuse   Social work consult    Other medical issues- Management per primary team.   Thank you for allowing me to participate in the care of this patient. Please feel free to contact me with any questions. 
Hypertensive urgency-  currently his blood pressure has significantly improved.   blood pressure this a.m. was normal.  Recommend continuation of the amlodipine, hydralazine as well as metoprolol.    Low-sodium diet   His blood pressure might be higher once he is off the Ativan.        Alcohol withdrawl-  delirium tremens   Currently on Ativan    I see team closely following the patient   No arrhythmias on tele.    CKD- recommend close monitoring of renal function and hydration.    Chronic compensated HFPEF- euvolemic.  Low sodium diet.    Alcohol abuse   Social work consult    Other medical issues- Management per primary team.   Thank you for allowing me to participate in the care of this patient. Please feel free to contact me with any questions. 
68 year old male w  alcohol abuse, CKD 3a,  DVT  on xarelto, HTN, RA, Leukemia in remission January 2023,  BIBEMS HHED c/o elevated BP during IV infusion for RA at infusion center today.     #Alcohol dependence w withdrawl  #HTN uncontrolled  noncompliant w BP meds for 2 days  #Tachycardia : sinus  -109 on 2 EKGs  dehydrated on exam  CIWA protocol high risk w lorazepam taper -- continues to score above 10 after ativan, ICU consulte  IVF as LR @ 125 /hr   fall and SZ precautions  ativan prn Sz  thiamine 100 mg IV then po  folate      #Anemia  stable when old labs reviewed over several months  likely d/t CKD  1. trend    #CKD 3a  being given maintenance fluids   has urinated multiple times here  trend cr    #DVT hx  2018  -xarelto        #GI prophylaxis w  -protonix      #HTN  - continue BB  - hydralazine 5 mg IV PRN BP >180 however likely due to withdrawal       #RA multiple sites w + RF  1. continue steroids  2. infusion unknown  3. plaquenil      #VTE  xarelto    #GOC  FULL code    HCP son Guillermo      
69 y/o M with PMH alcohol abuse, CKD 3, DVT on xarelto, HTN, RA on IV infusion, leukemia in remission January 2023, BIBEMS HHED c/o due to elevated BP during IV infusion for RA at infusion center today, notes last alcoholic drink was 2 days prior, found to be hypertensive, tachycardic, tremulous admitted to medicine for alcohol withdrawal with persistent elevated CIWA scores despite ativan upgraded to ICU for further evaluation and management.     Problems:  #Alcohol withdrawal with delirium  #Uncontrolled HTN    Plan:  - Withdrawal still well-controlled on lowered dose of ativan 1.5mg IV q4h, will continue tapering daily, 1.5mg => 1mg q4h today  - thiamine, folic acid  - c/w metoprolol, hydralazine and amlodipine for BP  - IVF d/c'd, encourage PO intake  - DASH diet  - C/w plaquenil and steroid for RA  - C/w home xarelto    Dispo: Pt stable for transfer to med-surg, case discussed with Dr. Randhawa, full code
Hypertensive urgency- Resolved   Recommend continuation of the current medical regimen.    Advised compliance with medications even as an outpatient.          Alcohol withdrawl-  delirium tremens   He needs help before discharge from the hospital regarding this.    He has alcohol abuse history.    He had multiple admissions to the hospital.    Recommend social work consult   I had extensive conversation with the patient regarding the risks of alcohol use.    I advised him about alcohol anonymous as well.  He expressed understanding of this.      CKD-   Renal function improved   Encouraged p.o. intake.        Chronic compensated HFPEF- euvolemic.  Low sodium diet.  Follow-up with me as an outpatient in 1 week in office    aortic stenosis  He will need follow up as an outpatient with me.          Other medical issues- Management per primary team.   Thank you for allowing me to participate in the care of this patient. Please feel free to contact me with any questions.

## 2024-10-07 NOTE — PROGRESS NOTE ADULT - SUBJECTIVE AND OBJECTIVE BOX
Patient is a 68y old  Male who presents with a chief complaint of alcohol dependence w withdrawl      HPI:  68 year old male w  alcohol abuse, CKD 3a,  DVT  on xarelto, HTN, RA, Leukemia in remission January 2023,  BIBEMS HHED c/o elevated BP during IV infusion for RA at infusion center.    Patient was evaluated by me at the infusion center and sent to the ER  His SBP was 210 and he did not take meds in am that day per son.   EKG- no ischemic changes    In ED /114      RR 18   T 98.3   97% sat RA  lopressor 5 mg IV, clonidine, ativan, nl d-dimer, trop neg x 2  EKG  w repeat     10/2- Mr Hatfield was seen and examined by me this am around 730 am.  He was alert but confused.  Spoke to RN and noted hisBP  running high overnight    10/3- Mr Hatfield Was seen and examined by me.    Discussed with his RN   He is still receiving Ativan.      10/4- Mr Hatfield Was seen and examined.    He was comfortable sleeping.    He is on Ativan.    10/7- Mr Hatfield Was seen and examined by me this morning.    He was sitting on the side of the bed eating breakfast.    He denies any chest pain or pressure.    He denies any dyspnea on exertion or tremors.      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      PAST SURGICAL HX  No significant past surgical history      FAMILY HX  rheumatoid arthritis : Mother        SOCIAL HX  alcohol : a few glasses of wine not every day; more on weekend w friends  nonsmoker (01 Oct 2024 23:09)      PAST MEDICAL & SURGICAL HISTORY:  ETOH abuse      Hyperlipidemia LDL goal <100      Restless leg syndrome      Rheumatoid arteritis      Leukemia      No significant past surgical history          MEDICATIONS  (STANDING):  atorvastatin 10 milliGRAM(s) Oral at bedtime  folic acid 1 milliGRAM(s) Oral daily  hydroxychloroquine 200 milliGRAM(s) Oral two times a day  LORazepam   Injectable 1.5 milliGRAM(s) IV Push every 4 hours  LORazepam   Injectable 2 milliGRAM(s) IV Push every 4 hours  LORazepam   Injectable   IV Push   metoprolol succinate ER 50 milliGRAM(s) Oral daily  multivitamin 1 Tablet(s) Oral daily  predniSONE   Tablet 20 milliGRAM(s) Oral daily  rivaroxaban 10 milliGRAM(s) Oral with dinner  thiamine 100 milliGRAM(s) Oral daily  thiamine Injectable 100 milliGRAM(s) IV Push once    MEDICATIONS  (PRN):  acetaminophen     Tablet .. 650 milliGRAM(s) Oral every 6 hours PRN Temp greater or equal to 38C (100.4F), Mild Pain (1 - 3)  aluminum hydroxide/magnesium hydroxide/simethicone Suspension 30 milliLiter(s) Oral every 4 hours PRN Dyspepsia  hydrALAZINE Injectable 5 milliGRAM(s) IV Push every 6 hours PRN BP Systolic >180  LORazepam   Injectable 2 milliGRAM(s) IV Push once PRN seizure  LORazepam   Injectable 2 milliGRAM(s) IV Push every 1 hour PRN Symptom-triggered: each CIWA -Ar score 8 or GREATER  LORazepam   Injectable 2 milliGRAM(s) IV Push every 2 hours PRN Symptom-triggered: 2 point increase in CIWA -Ar score and a total score of 7 or LESS      FAMILY HISTORY:  FHx: rheumatoid arthritis (Mother)        SOCIAL HISTORY:No recent smoking   Alcohol abuse present    REVIEW OF SYSTEMS:  CONSTITUTIONAL:    No fatigue, malaise, lethargy.  No fever or chills.  RESPIRATORY:  No cough.  No wheeze.  No hemoptysis.    CARDIOVASCULAR:  No chest pains.  No palpitations. No shortness of breath, No orthopnea or PND.  GASTROINTESTINAL:  No abdominal pain.  No nausea or vomiting.    GENITOURINARY:    No hematuria.    MUSCULOSKELETAL:  No musculoskeletal pain.  No joint swelling.  No arthritis.  NEUROLOGICAL:  No tingling or numbness or weakness.  PSYCHIATRIC:  c/o confusion  SKIN:  No rashes.      ICU Vital Signs Last 24 Hrs  T(C): 36.6 (07 Oct 2024 07:50), Max: 36.9 (07 Oct 2024 04:02)  T(F): 97.8 (07 Oct 2024 07:50), Max: 98.5 (07 Oct 2024 04:02)  HR: 66 (07 Oct 2024 07:50) (66 - 69)  BP: 136/70 (07 Oct 2024 07:50) (122/68 - 140/71)  BP(mean): 99 (07 Oct 2024 04:02) (81 - 99)  ABP: --  ABP(mean): --  RR: 18 (07 Oct 2024 04:02) (18 - 19)  SpO2: 97% (07 Oct 2024 07:50) (97% - 97%)    O2 Parameters below as of 07 Oct 2024 07:50  Patient On (Oxygen Delivery Method): room air    PHYSICAL EXAM-    Constitutional:  no acute distress     Head: Head is normocephalic and atraumatic.      Neck:  No JVD.     Cardiovascular: Regular rate and rhythm without S3, S4. No murmurs or rubs are appreciated.      Respiratory: Breath sounds are normal. No rales. No wheezing.    Abdomen: Soft, nontender, nondistended with positive bowel sounds.      Extremity: No tenderness. No  pitting edema , tremulous    Neurologic: The patient is   Arousable    Skin: No rash, no obvious lesions noted.      Psychiatric: The patient appears to be emotionally stable.      INTERPRETATION OF TELEMETRY:  not on     ECG: Sinus rythm ,  no ST T changes.     I&O's Detail      LABS:    Alb: 3.3 g/dL / Pro: 6.3 gm/dL / ALK PHOS: 30 U/L / ALT: 58 U/L / AST: 57 U/L / GGT: x                           Urinalysis Basic - ( 02 Oct 2024 07:42 )    Color: x / Appearance: x / SG: x / pH: x  Gluc: 142 mg/dL / Ketone: x  / Bili: x / Urobili: x   Blood: x / Protein: x / Nitrite: x   Leuk Esterase: x / RBC: x / WBC x   Sq Epi: x / Non Sq Epi: x / Bacteria: x      I&O's Summary    BNP  RADIOLOGY & ADDITIONAL STUDIES:  < from: Xray Chest 1 View- PORTABLE-Urgent (10.01.24 @ 19:34) >  Clear lungs.    --- End of Report ---            STEFANIA HORTON MD; Attending Radiologist  This document has been electronically signed. Oct  1 2024  9:57PM    < end of copied text >  < from: TTE W or WO Ultrasound Enhancing Agent (06.01.24 @ 08:49) >      1. Left ventricular cavity is normal in size. Left ventricular wall thickness is mildly increased. Left ventricular systolic function is moderately to severely decreased with an ejection fraction visually estimated at 35 to 40 %. Global left ventricular hypokinesis.   2. Moderate mitral regurgitation.   3. Mild to moderate tricuspid regurgitation.   4. Mild aortic regurgitation.   5. Mild left ventricular hypertrophy.   6. Moderate aortic stenosis.   7. Moderate pulmonary hypertension.   8. Technically difficult image quality.    < end of copied text >

## 2024-10-07 NOTE — DISCHARGE NOTE PROVIDER - CARE PROVIDER_API CALL
Charmaine Dallas  Cardiovascular Disease  [address] [place] [id number]  Phone: [phone]  Fax: [phone]  Follow Up Time: 1 week

## 2024-10-07 NOTE — DISCHARGE NOTE PROVIDER - HOSPITAL COURSE
68 year old male w  alcohol abuse, CKD 3a,  DVT  on xarelto, HTN, RA, Leukemia in remission January 2023,  BIBEMS HHED c/o elevated BP during IV infusion for RA at infusion center   +mild SOB and HORN  denied CP or chest pressure.   forgot to take his BP meds x 2 days   as per EMS, "infusion center felt uncomfortable letting him go home with his systolic pressure in the 200s"  Pt does not recall name of med, 1st infusion today  In ED /114      RR 18   T 98.3   97% sat RA  lopressor 5 mg IV, clonidine, ativan, nl d-dimer, trop neg x 2  EKG  w repeat   Being admitted for alcohol dependence w withdrawal    downgraded from ICU    TTE 6/24  1. Left ventricular cavity is normal in size. Left ventricular wall thickness is mildly increased. Left ventricular systolic function is moderately to severely decreased with an ejection fraction visually estimated at 35 to 40 %. Global left ventricular hypokinesis.   2. Moderate mitral regurgitation.   3. Mild to moderate tricuspid regurgitation.   4. Mild aortic regurgitation.   5. Mild left ventricular hypertrophy.   6. Moderate aortic stenosis.   7. Moderate pulmonary hypertension.   8. Technically difficult image quality.        LABS:                        10.8   4.34  )-----------( 118      ( 04 Oct 2024 05:50 )             31.4     10-04    137  |  106  |  27[H]  ----------------------------<  97  3.6   |  24  |  1.27    Ca    9.2      04 Oct 2024 05:50  Phos  2.8     10-04  Mg     1.8     10-04    TPro  6.3  /  Alb  3.3  /  TBili  0.7  /  DBili  x   /  AST  57[H]  /  ALT  58  /  AlkPhos  30[L]  10-04      RADIOLOGY & ADDITIONAL TESTS: personally reviewed    69 y/o M with PMH alcohol abuse, CKD 3, DVT on xarelto, HTN, RA on IV infusion, leukemia in remission January 2023, BIBEMS HHED c/o due to elevated BP during IV infusion for RA at infusion center today, notes last alcoholic drink was 2 days prior, found to be hypertensive, tachycardic, tremulous admitted to medicine for alcohol withdrawal with persistent elevated CIWA scores despite ativan upgraded to ICU for further evaluation and management.       #Alcohol withdrawal with delirium  resolved    #Uncontrolled HTN  resolved    # Heart murmur  abnormal TTE low ef and AS  OP f/up  reconsult cardio re chf meds adjustment  needs fup for the AS was present on tte in June  add entresto    # RA  - C/w plaquenil and steroid for RA    # PE  - C/w home xarelto    case d/w son dc to Rehab    time spent: 45min

## 2024-10-07 NOTE — DISCHARGE NOTE NURSING/CASE MANAGEMENT/SOCIAL WORK - PATIENT PORTAL LINK FT
You can access the FollowMyHealth Patient Portal offered by [facility] by registering at the following website: [email or web address]. By joining [facility]’s FollowMyHealth portal, you will also be able to view your health information using other applications (apps) compatible with our system.

## 2024-10-07 NOTE — DISCHARGE NOTE NURSING/CASE MANAGEMENT/SOCIAL WORK - NSDCPEFALRISK_GEN_ALL_CORE
For information on Fall & Injury Prevention, visit: [email or web address] OR  [email or web address] OR  https://www.cdc.gov/steadi/patient.html

## 2024-10-07 NOTE — PROGRESS NOTE ADULT - SUBJECTIVE AND OBJECTIVE BOX
CHIEF COMPLAINT/INTERVAL HISTORY:    Patient is a 68y old  Male who presents with a chief complaint of alcohol dependence w withdrawl (03 Oct 2024 18:40)      HPI:  68 year old male w  alcohol abuse, CKD 3a,  DVT  on xarelto, HTN, RA, Leukemia in remission January 2023,  BIBEMS HHED c/o elevated BP during IV infusion for RA at infusion center   +mild SOB and HORN  denied CP or chest pressure.   forgot to take his BP meds x 2 days   as per EMS, "infusion center felt uncomfortable letting him go home with his systolic pressure in the 200s"  Pt does not recall name of med, 1st infusion today  In ED /114      RR 18   T 98.3   97% sat RA  lopressor 5 mg IV, clonidine, ativan, nl d-dimer, trop neg x 2  EKG  w repeat   Being admitted for alcohol dependence w withdrawal    downgraded from ICU    Vital Signs Last 24 Hrs  T(C): 36.6 (07 Oct 2024 07:50), Max: 36.9 (07 Oct 2024 04:02)  T(F): 97.8 (07 Oct 2024 07:50), Max: 98.5 (07 Oct 2024 04:02)  HR: 66 (07 Oct 2024 07:50) (66 - 69)  BP: 136/70 (07 Oct 2024 07:50) (122/68 - 140/71)  BP(mean): 99 (07 Oct 2024 04:02) (81 - 99)  RR: 18 (07 Oct 2024 04:02) (18 - 19)  SpO2: 97% (07 Oct 2024 07:50) (97% - 97%)    Parameters below as of 07 Oct 2024 07:50  Patient On (Oxygen Delivery Method): room air            PHYSICAL EXAM:      Constitutional: NAD  Eyes: perrl, no conjunctival changes  ENMT: no exudates, moist oral muc, uvula midline  Neck: no JVD, no LAD  Back: no cva tenderness  Respiratory: CTA, no exp wheezes  Cardiovascular: S1S2 reg, III/VI KATHIE  Gastrointestinal: abd soft, NT/ND + BS  Genitourinary: voiding  Extremities: FROM, no joint effusions, no edema, no clubbing , no cyanosis  Vascular: pedal pulses + bilateral, warm extremities  Neurological: non focal, mot str 5/5/ all extr  Skin: no rashes  Lymph Nodes: no LAD        TTE 6/24  1. Left ventricular cavity is normal in size. Left ventricular wall thickness is mildly increased. Left ventricular systolic function is moderately to severely decreased with an ejection fraction visually estimated at 35 to 40 %. Global left ventricular hypokinesis.   2. Moderate mitral regurgitation.   3. Mild to moderate tricuspid regurgitation.   4. Mild aortic regurgitation.   5. Mild left ventricular hypertrophy.   6. Moderate aortic stenosis.   7. Moderate pulmonary hypertension.   8. Technically difficult image quality.        LABS:                        10.8   4.34  )-----------( 118      ( 04 Oct 2024 05:50 )             31.4     10-04    137  |  106  |  27[H]  ----------------------------<  97  3.6   |  24  |  1.27    Ca    9.2      04 Oct 2024 05:50  Phos  2.8     10-04  Mg     1.8     10-04    TPro  6.3  /  Alb  3.3  /  TBili  0.7  /  DBili  x   /  AST  57[H]  /  ALT  58  /  AlkPhos  30[L]  10-04      RADIOLOGY & ADDITIONAL TESTS: personally reviewed    69 y/o M with PMH alcohol abuse, CKD 3, DVT on xarelto, HTN, RA on IV infusion, leukemia in remission January 2023, BIBEMS HHED c/o due to elevated BP during IV infusion for RA at infusion center today, notes last alcoholic drink was 2 days prior, found to be hypertensive, tachycardic, tremulous admitted to medicine for alcohol withdrawal with persistent elevated CIWA scores despite ativan upgraded to ICU for further evaluation and management.       #Alcohol withdrawal with delirium  resolved    #Uncontrolled HTN  resolved    # Heart murmur  abnormal TTE low ef and AS  OP f/up  reconsult cardio re chf meds adjustment  needs fup for the AS was present on tte in June  add entresto    # RA  - C/w plaquenil and steroid for RA    # PE  - C/w home xarelto    case d/w son dc to Rehab    time spent: 45min

## 2024-10-07 NOTE — DISCHARGE NOTE PROVIDER - NSDCMRMEDTOKEN_GEN_ALL_CORE_FT
amLODIPine 10 mg oral tablet: 1 tab(s) orally once a day  erythromycin 0.5% ophthalmic ointment: 1 gram(s) in each eye once a day (at bedtime) for 7 days ***COMPLETE***  folic acid 1 mg oral tablet: 1 tab(s) orally once a day ***pt not sure if he is still taking, or if he has supply at home***  hydrALAZINE 50 mg oral tablet: 1 tab(s) orally 3 times a day  hydroxychloroquine 200 mg oral tablet: 1 tab(s) orally 2 times a day  Livalo 2 mg oral tablet: 1 tab(s) orally once a day  metoprolol succinate 50 mg oral tablet, extended release: 1 tab(s) orally once a day ***pt not sure if he is still taking, or if he has supply at home***  predniSONE 5 mg oral tablet: 4 tab(s) orally once a day  sacubitril-valsartan 24 mg-26 mg oral tablet: 1 tab(s) orally 2 times a day ***pt not sure if he is still taking, or if he has supply at home***  tobramycin-dexamethasone 0.3%-0.1% ophthalmic suspension: 1 drop(s) in each eye 3 times a day for 7 days ***COMPLETE***  Xarelto 20 mg oral tablet: 1 tab(s) orally once a day (at bedtime) ***last filled 6/14/24 for 90 day supply***

## 2024-10-12 DIAGNOSIS — F10.221 ALCOHOL DEPENDENCE WITH INTOXICATION DELIRIUM: ICD-10-CM

## 2024-10-12 DIAGNOSIS — Z79.52 LONG TERM (CURRENT) USE OF SYSTEMIC STEROIDS: ICD-10-CM

## 2024-10-12 DIAGNOSIS — T46.5X6A UNDERDOSING OF OTHER ANTIHYPERTENSIVE DRUGS, INITIAL ENCOUNTER: ICD-10-CM

## 2024-10-12 DIAGNOSIS — G25.81 RESTLESS LEGS SYNDROME: ICD-10-CM

## 2024-10-12 DIAGNOSIS — N17.9 ACUTE KIDNEY FAILURE, UNSPECIFIED: ICD-10-CM

## 2024-10-12 DIAGNOSIS — D63.1 ANEMIA IN CHRONIC KIDNEY DISEASE: ICD-10-CM

## 2024-10-12 DIAGNOSIS — I16.0 HYPERTENSIVE URGENCY: ICD-10-CM

## 2024-10-12 DIAGNOSIS — C91.Z1: ICD-10-CM

## 2024-10-12 DIAGNOSIS — R06.02 SHORTNESS OF BREATH: ICD-10-CM

## 2024-10-12 DIAGNOSIS — E11.22 TYPE 2 DIABETES MELLITUS WITH DIABETIC CHRONIC KIDNEY DISEASE: ICD-10-CM

## 2024-10-12 DIAGNOSIS — E44.0 MODERATE PROTEIN-CALORIE MALNUTRITION: ICD-10-CM

## 2024-10-12 DIAGNOSIS — I27.20 PULMONARY HYPERTENSION, UNSPECIFIED: ICD-10-CM

## 2024-10-12 DIAGNOSIS — Z91.148 PATIENT'S OTHER NONCOMPLIANCE WITH MEDICATION REGIMEN FOR OTHER REASON: ICD-10-CM

## 2024-10-12 DIAGNOSIS — Z79.01 LONG TERM (CURRENT) USE OF ANTICOAGULANTS: ICD-10-CM

## 2024-10-12 DIAGNOSIS — F10.231 ALCOHOL DEPENDENCE WITH WITHDRAWAL DELIRIUM: ICD-10-CM

## 2024-10-12 DIAGNOSIS — I08.3 COMBINED RHEUMATIC DISORDERS OF MITRAL, AORTIC AND TRICUSPID VALVES: ICD-10-CM

## 2024-10-12 DIAGNOSIS — I50.30 UNSPECIFIED DIASTOLIC (CONGESTIVE) HEART FAILURE: ICD-10-CM

## 2024-10-12 DIAGNOSIS — I13.0 HYPERTENSIVE HEART AND CHRONIC KIDNEY DISEASE WITH HEART FAILURE AND STAGE 1 THROUGH STAGE 4 CHRONIC KIDNEY DISEASE, OR UNSPECIFIED CHRONIC KIDNEY DISEASE: ICD-10-CM

## 2024-10-12 DIAGNOSIS — M81.0 AGE-RELATED OSTEOPOROSIS WITHOUT CURRENT PATHOLOGICAL FRACTURE: ICD-10-CM

## 2024-10-12 DIAGNOSIS — M05.9 RHEUMATOID ARTHRITIS WITH RHEUMATOID FACTOR, UNSPECIFIED: ICD-10-CM

## 2024-10-12 DIAGNOSIS — Z86.718 PERSONAL HISTORY OF OTHER VENOUS THROMBOSIS AND EMBOLISM: ICD-10-CM

## 2024-10-12 DIAGNOSIS — E83.42 HYPOMAGNESEMIA: ICD-10-CM

## 2024-10-12 DIAGNOSIS — N18.31 CHRONIC KIDNEY DISEASE, STAGE 3A: ICD-10-CM

## 2024-10-12 DIAGNOSIS — R00.0 TACHYCARDIA, UNSPECIFIED: ICD-10-CM

## 2024-10-12 DIAGNOSIS — Z86.711 PERSONAL HISTORY OF PULMONARY EMBOLISM: ICD-10-CM

## 2024-10-12 DIAGNOSIS — Z91.048 OTHER NONMEDICINAL SUBSTANCE ALLERGY STATUS: ICD-10-CM

## 2024-11-26 NOTE — ED PROVIDER NOTE - NS ED MD DISPO ISOLATION TYPES
The sensitive parts of the examination were performed with Homa Pitts MA as a chaperone.  Homa was present during the entirety of the sensitive parts of the examination.   None

## 2024-12-30 ENCOUNTER — APPOINTMENT (OUTPATIENT)
Facility: CLINIC | Age: 69
End: 2024-12-30

## 2025-01-06 NOTE — ED STATDOCS - RESPIRATORY, MLM
Consult inpatient Pediatric Nutrition Service as soon as circumstance may necessitate. breath sounds clear and equal bilaterally.

## 2025-02-27 ENCOUNTER — NON-APPOINTMENT (OUTPATIENT)
Age: 70
End: 2025-02-27

## 2025-02-27 ENCOUNTER — APPOINTMENT (OUTPATIENT)
Dept: CARDIOLOGY | Facility: CLINIC | Age: 70
End: 2025-02-27
Payer: MEDICARE

## 2025-02-27 VITALS
SYSTOLIC BLOOD PRESSURE: 130 MMHG | BODY MASS INDEX: 26.83 KG/M2 | WEIGHT: 177 LBS | HEIGHT: 68 IN | HEART RATE: 81 BPM | OXYGEN SATURATION: 100 % | DIASTOLIC BLOOD PRESSURE: 83 MMHG

## 2025-02-27 DIAGNOSIS — I82.409 ACUTE EMBOLISM AND THROMBOSIS OF UNSPECIFIED DEEP VEINS OF UNSPECIFIED LOWER EXTREMITY: ICD-10-CM

## 2025-02-27 DIAGNOSIS — I34.0 NONRHEUMATIC MITRAL (VALVE) INSUFFICIENCY: ICD-10-CM

## 2025-02-27 DIAGNOSIS — I42.9 CARDIOMYOPATHY, UNSPECIFIED: ICD-10-CM

## 2025-02-27 DIAGNOSIS — I35.0 NONRHEUMATIC AORTIC (VALVE) STENOSIS: ICD-10-CM

## 2025-02-27 DIAGNOSIS — Z01.810 ENCOUNTER FOR PREPROCEDURAL CARDIOVASCULAR EXAMINATION: ICD-10-CM

## 2025-02-27 PROCEDURE — 93000 ELECTROCARDIOGRAM COMPLETE: CPT

## 2025-02-27 PROCEDURE — 99205 OFFICE O/P NEW HI 60 MIN: CPT

## 2025-02-27 RX ORDER — RIVAROXABAN 20 MG/1
20 TABLET, FILM COATED ORAL
Qty: 30 | Refills: 1 | Status: ACTIVE | COMMUNITY

## 2025-02-27 RX ORDER — PREDNISONE 5 MG/1
5 TABLET ORAL DAILY
Refills: 0 | Status: ACTIVE | COMMUNITY

## 2025-02-27 RX ORDER — HYDRALAZINE HYDROCHLORIDE 50 MG/1
50 TABLET ORAL
Qty: 90 | Refills: 5 | Status: ACTIVE | COMMUNITY

## 2025-02-27 RX ORDER — FUROSEMIDE 40 MG/1
40 TABLET ORAL
Refills: 0 | Status: ACTIVE | COMMUNITY

## 2025-02-27 RX ORDER — LOSARTAN POTASSIUM 50 MG/1
50 TABLET, FILM COATED ORAL DAILY
Qty: 30 | Refills: 3 | Status: ACTIVE | COMMUNITY

## 2025-03-06 ENCOUNTER — APPOINTMENT (OUTPATIENT)
Dept: CARDIOLOGY | Facility: CLINIC | Age: 70
End: 2025-03-06
Payer: COMMERCIAL

## 2025-03-06 PROCEDURE — 93306 TTE W/DOPPLER COMPLETE: CPT

## 2025-03-19 ENCOUNTER — APPOINTMENT (OUTPATIENT)
Facility: CLINIC | Age: 70
End: 2025-03-19
Payer: MEDICARE

## 2025-03-19 VITALS — HEIGHT: 68 IN | BODY MASS INDEX: 26.83 KG/M2 | WEIGHT: 177 LBS

## 2025-03-19 DIAGNOSIS — M17.11 UNILATERAL PRIMARY OSTEOARTHRITIS, RIGHT KNEE: ICD-10-CM

## 2025-03-19 DIAGNOSIS — M17.12 UNILATERAL PRIMARY OSTEOARTHRITIS, LEFT KNEE: ICD-10-CM

## 2025-03-19 PROCEDURE — J3490M: CUSTOM | Mod: NC,JZ

## 2025-03-19 PROCEDURE — 20610 DRAIN/INJ JOINT/BURSA W/O US: CPT | Mod: 50

## 2025-03-19 PROCEDURE — 99214 OFFICE O/P EST MOD 30 MIN: CPT | Mod: 25

## 2025-03-19 PROCEDURE — 73564 X-RAY EXAM KNEE 4 OR MORE: CPT | Mod: 50

## 2025-04-01 ENCOUNTER — APPOINTMENT (OUTPATIENT)
Dept: CARDIOLOGY | Facility: CLINIC | Age: 70
End: 2025-04-01
Payer: MEDICARE

## 2025-04-01 VITALS
HEIGHT: 68 IN | DIASTOLIC BLOOD PRESSURE: 84 MMHG | BODY MASS INDEX: 31.22 KG/M2 | WEIGHT: 206 LBS | OXYGEN SATURATION: 95 % | SYSTOLIC BLOOD PRESSURE: 138 MMHG | HEART RATE: 95 BPM

## 2025-04-01 DIAGNOSIS — Z01.810 ENCOUNTER FOR PREPROCEDURAL CARDIOVASCULAR EXAMINATION: ICD-10-CM

## 2025-04-01 DIAGNOSIS — I34.0 NONRHEUMATIC MITRAL (VALVE) INSUFFICIENCY: ICD-10-CM

## 2025-04-01 DIAGNOSIS — I10 ESSENTIAL (PRIMARY) HYPERTENSION: ICD-10-CM

## 2025-04-01 DIAGNOSIS — I42.9 CARDIOMYOPATHY, UNSPECIFIED: ICD-10-CM

## 2025-04-01 DIAGNOSIS — I35.0 NONRHEUMATIC AORTIC (VALVE) STENOSIS: ICD-10-CM

## 2025-04-01 PROCEDURE — G2211 COMPLEX E/M VISIT ADD ON: CPT

## 2025-04-01 PROCEDURE — 93000 ELECTROCARDIOGRAM COMPLETE: CPT

## 2025-04-01 PROCEDURE — 99214 OFFICE O/P EST MOD 30 MIN: CPT

## 2025-05-14 ENCOUNTER — APPOINTMENT (OUTPATIENT)
Facility: CLINIC | Age: 70
End: 2025-05-14
Payer: MEDICARE

## 2025-05-14 VITALS — HEIGHT: 68 IN | WEIGHT: 185 LBS | BODY MASS INDEX: 28.04 KG/M2

## 2025-05-14 DIAGNOSIS — M17.12 UNILATERAL PRIMARY OSTEOARTHRITIS, LEFT KNEE: ICD-10-CM

## 2025-05-14 DIAGNOSIS — M17.11 UNILATERAL PRIMARY OSTEOARTHRITIS, RIGHT KNEE: ICD-10-CM

## 2025-05-14 PROCEDURE — 99214 OFFICE O/P EST MOD 30 MIN: CPT

## 2025-05-29 ENCOUNTER — APPOINTMENT (OUTPATIENT)
Dept: CARDIOLOGY | Facility: CLINIC | Age: 70
End: 2025-05-29
Payer: MEDICARE

## 2025-05-29 PROCEDURE — A9500: CPT

## 2025-05-29 PROCEDURE — 93015 CV STRESS TEST SUPVJ I&R: CPT

## 2025-05-29 PROCEDURE — 78452 HT MUSCLE IMAGE SPECT MULT: CPT

## 2025-06-05 ENCOUNTER — EMERGENCY (EMERGENCY)
Facility: HOSPITAL | Age: 70
LOS: 0 days | Discharge: HOME | End: 2025-06-06
Attending: EMERGENCY MEDICINE
Payer: MEDICARE

## 2025-06-05 VITALS
DIASTOLIC BLOOD PRESSURE: 67 MMHG | RESPIRATION RATE: 18 BRPM | SYSTOLIC BLOOD PRESSURE: 129 MMHG | WEIGHT: 184.97 LBS | TEMPERATURE: 98 F | HEART RATE: 87 BPM | HEIGHT: 68 IN | OXYGEN SATURATION: 93 %

## 2025-06-05 DIAGNOSIS — S20.20XA CONTUSION OF THORAX, UNSPECIFIED, INITIAL ENCOUNTER: ICD-10-CM
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
## 2025-06-05 DIAGNOSIS — Z86.718 PERSONAL HISTORY OF OTHER VENOUS THROMBOSIS AND EMBOLISM: ICD-10-CM

## 2025-06-05 DIAGNOSIS — Z79.01 LONG TERM (CURRENT) USE OF ANTICOAGULANTS: ICD-10-CM
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
## 2025-06-05 DIAGNOSIS — I10 ESSENTIAL (PRIMARY) HYPERTENSION: ICD-10-CM
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
## 2025-06-05 DIAGNOSIS — M25.511 PAIN IN RIGHT SHOULDER: ICD-10-CM

## 2025-06-05 DIAGNOSIS — Z88.8 ALLERGY STATUS TO OTHER DRUGS, MEDICAMENTS AND BIOLOGICAL SUBSTANCES: ICD-10-CM

## 2025-06-05 DIAGNOSIS — S30.1XXA CONTUSION OF ABDOMINAL WALL, INITIAL ENCOUNTER: ICD-10-CM

## 2025-06-05 DIAGNOSIS — S40.021A CONTUSION OF RIGHT UPPER ARM, INITIAL ENCOUNTER: ICD-10-CM

## 2025-06-05 DIAGNOSIS — E78.5 HYPERLIPIDEMIA, UNSPECIFIED: ICD-10-CM

## 2025-06-05 LAB
ALBUMIN SERPL ELPH-MCNC: 3.6 G/DL — SIGNIFICANT CHANGE UP (ref 3.3–5)
ALP SERPL-CCNC: 38 U/L — LOW (ref 40–120)
ALT FLD-CCNC: 66 U/L — SIGNIFICANT CHANGE UP (ref 12–78)
ANION GAP SERPL CALC-SCNC: 8 MMOL/L — SIGNIFICANT CHANGE UP (ref 5–17)
APTT BLD: 32 SEC — SIGNIFICANT CHANGE UP (ref 26.1–36.8)
AST SERPL-CCNC: 83 U/L — HIGH (ref 15–37)
BASOPHILS # BLD AUTO: 0.05 K/UL — SIGNIFICANT CHANGE UP (ref 0–0.2)
BASOPHILS NFR BLD AUTO: 0.8 % — SIGNIFICANT CHANGE UP (ref 0–2)
BILIRUB SERPL-MCNC: 0.6 MG/DL — SIGNIFICANT CHANGE UP (ref 0.2–1.2)
BUN SERPL-MCNC: 21 MG/DL — SIGNIFICANT CHANGE UP (ref 7–23)
CALCIUM SERPL-MCNC: 9.2 MG/DL — SIGNIFICANT CHANGE UP (ref 8.5–10.1)
CHLORIDE SERPL-SCNC: 107 MMOL/L — SIGNIFICANT CHANGE UP (ref 96–108)
CO2 SERPL-SCNC: 24 MMOL/L — SIGNIFICANT CHANGE UP (ref 22–31)
CREAT SERPL-MCNC: 1.24 MG/DL — SIGNIFICANT CHANGE UP (ref 0.5–1.3)
EGFR: 63 ML/MIN/1.73M2 — SIGNIFICANT CHANGE UP
EGFR: 63 ML/MIN/1.73M2 — SIGNIFICANT CHANGE UP
EOSINOPHIL # BLD AUTO: 0.02 K/UL — SIGNIFICANT CHANGE UP (ref 0–0.5)
EOSINOPHIL NFR BLD AUTO: 0.3 % — SIGNIFICANT CHANGE UP (ref 0–6)
ETHANOL SERPL-MCNC: 303 MG/DL — HIGH (ref 0–10)
GLUCOSE SERPL-MCNC: 103 MG/DL — HIGH (ref 70–99)
HCT VFR BLD CALC: 23.2 % — LOW (ref 39–50)
HGB BLD-MCNC: 7.9 G/DL — LOW (ref 13–17)
IMM GRANULOCYTES # BLD AUTO: 0.18 K/UL — HIGH (ref 0–0.07)
IMM GRANULOCYTES NFR BLD AUTO: 3 % — HIGH (ref 0–0.9)
INR BLD: 1.16 RATIO — SIGNIFICANT CHANGE UP (ref 0.85–1.16)
LIDOCAIN IGE QN: 83 U/L — HIGH (ref 13–75)
LYMPHOCYTES # BLD AUTO: 1.42 K/UL — SIGNIFICANT CHANGE UP (ref 1–3.3)
LYMPHOCYTES NFR BLD AUTO: 23.7 % — SIGNIFICANT CHANGE UP (ref 13–44)
MCHC RBC-ENTMCNC: 34.1 G/DL — SIGNIFICANT CHANGE UP (ref 32–36)
MCHC RBC-ENTMCNC: 34.2 PG — HIGH (ref 27–34)
MCV RBC AUTO: 100.4 FL — HIGH (ref 80–100)
MONOCYTES # BLD AUTO: 0.79 K/UL — SIGNIFICANT CHANGE UP (ref 0–0.9)
MONOCYTES NFR BLD AUTO: 13.2 % — SIGNIFICANT CHANGE UP (ref 2–14)
NEUTROPHILS # BLD AUTO: 3.52 K/UL — SIGNIFICANT CHANGE UP (ref 1.8–7.4)
NEUTROPHILS NFR BLD AUTO: 59 % — SIGNIFICANT CHANGE UP (ref 43–77)
NRBC # BLD AUTO: 0.04 K/UL — HIGH (ref 0–0)
NRBC # FLD: 0.04 K/UL — HIGH (ref 0–0)
NRBC BLD AUTO-RTO: 0 /100 WBCS — SIGNIFICANT CHANGE UP (ref 0–0)
PLATELET # BLD AUTO: 202 K/UL — SIGNIFICANT CHANGE UP (ref 150–400)
PMV BLD: 9.4 FL — SIGNIFICANT CHANGE UP (ref 7–13)
POTASSIUM SERPL-MCNC: 4.5 MMOL/L — SIGNIFICANT CHANGE UP (ref 3.5–5.3)
POTASSIUM SERPL-SCNC: 4.5 MMOL/L — SIGNIFICANT CHANGE UP (ref 3.5–5.3)
PROT SERPL-MCNC: 6.7 GM/DL — SIGNIFICANT CHANGE UP (ref 6–8.3)
PROTHROM AB SERPL-ACNC: 13.3 SEC — SIGNIFICANT CHANGE UP (ref 9.9–13.4)
RBC # BLD: 2.31 M/UL — LOW (ref 4.2–5.8)
RBC # FLD: 14.5 % — SIGNIFICANT CHANGE UP (ref 10.3–14.5)
SODIUM SERPL-SCNC: 139 MMOL/L — SIGNIFICANT CHANGE UP (ref 135–145)
WBC # BLD: 5.98 K/UL — SIGNIFICANT CHANGE UP (ref 3.8–10.5)
WBC # FLD AUTO: 5.98 K/UL — SIGNIFICANT CHANGE UP (ref 3.8–10.5)

## 2025-06-05 PROCEDURE — 85730 THROMBOPLASTIN TIME PARTIAL: CPT

## 2025-06-05 PROCEDURE — 93005 ELECTROCARDIOGRAM TRACING: CPT

## 2025-06-05 PROCEDURE — 93010 ELECTROCARDIOGRAM REPORT: CPT

## 2025-06-05 PROCEDURE — 99285 EMERGENCY DEPT VISIT HI MDM: CPT | Mod: FS

## 2025-06-05 PROCEDURE — 74177 CT ABD & PELVIS W/CONTRAST: CPT

## 2025-06-05 PROCEDURE — 85025 COMPLETE CBC W/AUTO DIFF WBC: CPT

## 2025-06-05 PROCEDURE — 82962 GLUCOSE BLOOD TEST: CPT

## 2025-06-05 PROCEDURE — 99285 EMERGENCY DEPT VISIT HI MDM: CPT | Mod: 25

## 2025-06-05 PROCEDURE — 71260 CT THORAX DX C+: CPT | Mod: 26

## 2025-06-05 PROCEDURE — 80053 COMPREHEN METABOLIC PANEL: CPT

## 2025-06-05 PROCEDURE — 74177 CT ABD & PELVIS W/CONTRAST: CPT | Mod: 26

## 2025-06-05 PROCEDURE — 36415 COLL VENOUS BLD VENIPUNCTURE: CPT

## 2025-06-05 PROCEDURE — 83690 ASSAY OF LIPASE: CPT

## 2025-06-05 PROCEDURE — 71260 CT THORAX DX C+: CPT

## 2025-06-05 PROCEDURE — 73030 X-RAY EXAM OF SHOULDER: CPT | Mod: 26,RT

## 2025-06-05 PROCEDURE — 85610 PROTHROMBIN TIME: CPT

## 2025-06-05 PROCEDURE — 70450 CT HEAD/BRAIN W/O DYE: CPT

## 2025-06-05 PROCEDURE — 73030 X-RAY EXAM OF SHOULDER: CPT | Mod: RT

## 2025-06-05 PROCEDURE — 72125 CT NECK SPINE W/O DYE: CPT | Mod: 26

## 2025-06-05 PROCEDURE — 80307 DRUG TEST PRSMV CHEM ANLYZR: CPT

## 2025-06-05 PROCEDURE — 72125 CT NECK SPINE W/O DYE: CPT

## 2025-06-05 PROCEDURE — 70450 CT HEAD/BRAIN W/O DYE: CPT | Mod: 26

## 2025-06-05 NOTE — ED ADULT NURSE NOTE - OBJECTIVE STATEMENT
Pt presents to ED BIBEMS from home s/p mechanical trip and fall 2 days ago, -headstrike, -LOC,     Patient fell 2 days ago on xarelto.  Daughter noticed extensive bruising and called 911.  Patient c/o right shoulder pain from when he fell, states he didn't notice the bruising.  Bruising noted on right arm, chest and abdomen. Pt presents to ED BIBEMS from home s/p mechanical trip and fall 2 days ago, +Xarelto, -headstrike, -LOC, pt c/o R shoulder pain. Pt reports "I was changing my shirt and my daughter said my arm looked blue, so she called 9-1-1. I told her I'd go to the doctor tomorrow." Bruising and edema noted to R arm, bruising also noted to chest and abdomen. No meds taken PTA. Safety measures in place.

## 2025-06-05 NOTE — ED PROVIDER NOTE - PATIENT PORTAL LINK FT
You can access the FollowMyHealth Patient Portal offered by [facility] by registering at the following website: [email or web address]. By joining [facility]’s FollowMyHealth portal, you will also be able to view your health information using other applications (apps) compatible with our system.

## 2025-06-05 NOTE — ED ADULT NURSE NOTE - NSFALLHARMRISKINTERV_ED_ALL_ED

## 2025-06-05 NOTE — ED PROVIDER NOTE - CLINICAL SUMMARY MEDICAL DECISION MAKING FREE TEXT BOX
Jabari Perez MD chest wall hematoma on CT, pt does not want to stay for trauma eval, pt states symptoms are improving over passed day, unlikely active bleed, return precautions given.

## 2025-06-05 NOTE — ED PROVIDER NOTE - PHYSICAL EXAMINATION
Constitutional: NAD AAOx3  Eyes: EOMI, pupils equal  Oropharynx pink s lesions.  Neck: Cervical spine NT to palp.  Full AROM.    Cardiac: RRR   Resp: Lungs CTA Bilat.    GI: Round, Active BS x4, Soft  NT / ND.    Neuro: CN2-12 intact  MS:  R anterior shoulder/ Humeral head tender to palp.  Decreased AROM R shoulder with flexion/ Extension.  NT R Elbow, Wrist.  Full AROM.  Soft compartments to R arm.  NVI R UE.    Skin:  (+) Extensive purple ecchymosis to R arm, Right chest wall, Right abdomen / Flank.

## 2025-06-05 NOTE — ED PROVIDER NOTE - ATTENDING APP SHARED VISIT CONTRIBUTION OF CARE
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 Patient has extensive ecchymosis to chest, and upper abdomen.       Ddx: Ro ich/ fractures, intenral bleeding  Plan: Ct head, cspine, ct chest, abd/pelvis, reassess

## 2025-06-05 NOTE — ED PROVIDER NOTE - NS ED ROS FT
ROS: Constitutional- Neg fever, Neg chills. (+) Trip and fall 2 days ago.  Respiratory- Neg cough, Neg SOB  Cardiac- no chest pain, no palpitations, ENT- No rhinorrhea, no sore throat, no congestion.  Abdomen- No nausea, no vomiting, no diarrhea.  Urinary- no dysuria, no urgency, no frequency.  Skin- (+) Extensive bruising to R arm, R chest, Abdomen.   MS:  (+) R shoulder pain.

## 2025-06-05 NOTE — ED ADULT TRIAGE NOTE - CHIEF COMPLAINT QUOTE
Patient fell 2 days ago on xarelto.  Daughter noticed extensive bruising and called 911.  Patient c/o right shoulder pain from when he fell, states he didn't notice the bruising.  Bruising noted on right arm, chest and abdomen.

## 2025-06-05 NOTE — ED PROVIDER NOTE - OBJECTIVE STATEMENT
Patient was seen today for Drug screen    
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  Patient denies head strike or loss of consciousness.  Patient denies headache nausea or vomiting.  Patient reports he was able to get himself up and continue walking at that time.  Patient reports daughter noticed extensive bruising to his arm and chest today and called EMS to bring him to the hospital.  Patient reports mild pain in right shoulder with increasing pain with trying to move right arm.  Patient denies chest pain shortness of breath abdominal pain nausea vomiting or blood in urine.

## 2025-06-05 NOTE — ED ADULT NURSE NOTE - HOW OFTEN DO YOU HAVE A DRINK CONTAINING ALCOHOL?
"EMPLOYEE’S CLAIM FOR COMPENSATION/ REPORT OF INITIAL TREATMENT  FORM C-4    EMPLOYEE’S CLAIM - PROVIDE ALL INFORMATION REQUESTED   First Name  Jeanna Last Name  Andrés Birthdate                    1975                Sex  female Claim Number   Home Address  [address] Age  43 y.o. Height  1.448 m (4' 9\") Weight  57.6 kg (127 lb) [place]     [facility] [place]  [address] Telephone  [phone] (home)    Mailing Address  [address] [facility] [place]  [address] Primary Language Spoken  English    Insurer  [employer] Third Party   [employer]   Employee's Occupation (Job Title) When Injury or Occupational Disease Occurred      Employer's Name    [employer] Telephone  [phone]    Employer Address  [address]  [place]  Zip  [address]    Date of Injury  11/7/2018               Hour of Injury  3:30 PM Date Employer Notified  11/8/2018 Last Day of Work after Injury or Occupational Disease  11/8/2018 Supervisor to Whom Injury Reported  [employer]   Address or Location of Accident (if applicable)  [[facility] FLOOR]   What were you doing at the time of accident? (if applicable)  PUSHING AND PULLING LARGE TOTES    How did this injury or occupational disease occur? (Be specific an answer in detail. Use additional sheet if necessary)  I WAS SENT TO 3RD FLOOR TO HELP WITH MOVING TOTES/PALLETS DUE TO CONVEYOR SYSTEM BEING DOWN. THERE WAS NO ACUTE INJURY THAT I RECALL,PAIN STARTED ACUTELY AT HOME ABOUT 90 MINS AFTER WORK-I WAS LYING ON MY BED.   If you believe that you have an occupational disease, when did you first have knowledge of the disability and it relationship to your employment?  N/A Witnesses to the Accident  N/A      Nature of Injury or Occupational Disease  Workers' Compensation  Part(s) of Body Injured or Affected  Lower Back Area " (Lumbar Area & Lumbo-Sacral), Abdomen Including Groin,     I certify that the above is true and correct to the best of my knowledge and that I have provided this information in order to obtain the benefits of [place]  I hereby authorize any physician, chiropractor, surgeon, practitioner, or other person, any hospital, including [facility], any medical service organization, any insurance company, or other institution or organization to release to each other, any medical or other information, including benefits paid or payable, pertinent to this injury or disease, except information relative to diagnosis, treatment and/or counseling for AIDS, psychological conditions, alcohol or controlled substances, for which I must give specific authorization.  A Photostat of this authorization shall be as valid as the original.     Date 11/8/18   Place [facility]   Employee’s Signature   THIS REPORT MUST BE COMPLETED AND MAILED WITHIN 3 WORKING DAYS OF TREATMENT   Place  [facility]  Name of Facility  [facility]   Date  11/8/2018 Diagnosis  (S39.011A) Abdominal wall strain, initial encounter  (S39.012A) Low back strain, initial encounter Is there evidence the injured employee was under the influence of alcohol and/or another controlled substance at the time of accident?   Hour  11:27 AM Description of Injury or Disease  Diagnoses of Abdominal wall strain, initial encounter and Low back strain, initial encounter were pertinent to this visit. No   Treatment  May use up to 600 mg Ibuprofen as needed for pain, may use ice/heat for throbbing pain/muscle stiffness as needed for pain, may use IcyHot with lidocaine as needed for localized pain relief, may perform small range of motion exercises as needed to prevent muscle stiffness.   Have you advised the patient to remain  "off work five days or more? No   X-Ray Findings      If Yes   From Date  To Date      From information given by the employee, together with medical evidence, can you directly connect this injury or occupational disease as job incurred?  Yes If No Full Duty  No Modified Duty  Yes   Is additional medical care by a physician indicated?  Yes If Modified Duty, Specify any Limitations / Restrictions  No bend, stoop, squat, no lift/carry > 10#.   Do you know of any previous injury or disease contributing to this condition or occupational disease?                            No   Date  11/8/2018 Print Doctor’s Name [employer] [facility] [employer] [facility] I certify the employer’s copy of  this form was mailed on:   Address  [address] Insurer’s Use Only     [facility] [place] Zip  [address]    Provider’s Tax ID Number  [id number] Telephone  Dept: [phone]            e-Signature: Dr. Reji Echavarria, Medical Director Degree           ORIGINAL-TREATING PHYSICIAN OR CHIROPRACTOR    PAGE 2-INSURER/TPA    PAGE 3-EMPLOYER    PAGE 4-EMPLOYEE             Form C-4 (rev10/07)              BRIEF DESCRIPTION OF RIGHTS AND BENEFITS  (Pursuant to [place] 616C.050)    Notice of Injury or Occupational Disease (Incident Report Form C-1): If an injury or occupational disease (OD) arises out of and in the  course of employment, you must provide written notice to your employer as soon as practicable, but no later than 7 days after the accident or  OD. Your employer shall maintain a sufficient supply of the required forms.    Claim for Compensation (Form C-4): If medical treatment is sought, the form C-4 is available at the place of initial treatment. A completed  \"Claim for Compensation\" (Form C-4) must be filed within 90 days after an accident or OD. The treating physician or chiropractor must,  within 3 working days after treatment, complete and mail to the employer, the employer's insurer and third-party , the Claim " for  Compensation.    Medical Treatment: If you require medical treatment for your on-the-job injury or OD, you may be required to select a physician or  chiropractor from a list provided by your workers’ compensation insurer, if it has contracted with an Organization for Managed Care (MCO) or  Preferred Provider Organization (PPO) or providers of health care. If your employer has not entered into a contract with an MCO or PPO, you  may select a physician or chiropractor from the Panel of Physicians and Chiropractors. Any medical costs related to your industrial injury or  OD will be paid by your insurer.    Temporary Total Disability (TTD): If your doctor has certified that you are unable to work for a period of at least 5 consecutive days, or 5  cumulative days in a 20-day period, or places restrictions on you that your employer does not accommodate, you may be entitled to TTD  compensation.    Temporary Partial Disability (TPD): If the wage you receive upon reemployment is less than the compensation for TTD to which you are  entitled, the insurer may be required to pay you TPD compensation to make up the difference. TPD can only be paid for a maximum of 24  months.    Permanent Partial Disability (PPD): When your medical condition is stable and there is an indication of a PPD as a result of your injury or  OD, within 30 days, your insurer must arrange for an evaluation by a rating physician or chiropractor to determine the degree of your PPD. The  amount of your PPD award depends on the date of injury, the results of the PPD evaluation and your age and wage.    Permanent Total Disability (PTD): If you are medically certified by a treating physician or chiropractor as permanently and totally disabled  and have been granted a PTD status by your insurer, you are entitled to receive monthly benefits not to exceed 66 2/3% of your average  monthly wage. The amount of your PTD payments is subject to reduction if you  previously received a PPD award.    Vocational Rehabilitation Services: You may be eligible for vocational rehabilitation services if you are unable to return to the job due to a  permanent physical impairment or permanent restrictions as a result of your injury or occupational disease.    Transportation and Per Margarita Reimbursement: You may be eligible for travel expenses and per margarita associated with medical treatment.    Reopening: You may be able to reopen your claim if your condition worsens after claim closure.    Appeal Process: If you disagree with a written determination issued by the insurer or the insurer does not respond to your request, you may  appeal to the Department of Administration, , by following the instructions contained in your determination letter. You must  appeal the determination within 70 days from the date of the determination letter at [address], [address], [place], [place]  [address], or [address], [place], [place] [address]. If you disagree with the  decision, you may appeal to the  Department of Administration, . You must file your appeal within 30 days from the date of the  decision  letter at [address], [address], [place], [place] [address], or [address], [place], [place] [address]. If you  disagree with a decision of an , you may file a petition for judicial review with the District Court. You must do so within 30  days of the Appeal Officer’s decision. You may be represented by an  at your own expense or you may contact the [facility] for possible  representation.    [place]  for Injured Workers (NAIW): If you disagree with a  decision, you may request that NAIW represent you  without charge at an  Hearing. For information regarding denial of benefits, you may contact the [facility] at: [address],  [address], [place] [address], [phone], or [address], [address], [place] [address], [phone]    To File a Complaint with the Division: If you wish to file a complaint with the  of the Division of Industrial Relations (DIR),  please contact the Workers’ Compensation Section, [address], [address], [place], [place] [address], telephone [phone]) [phone], or  [address], [address], [place], [place] [address], telephone [phone]) [phone].    For assistance with Workers’ Compensation Issues: you may contact the Office of the Governor Consumer Health Assistance, [address] [place] [address], [address], [place], [place] [address], Toll Free 1-784.972.7167, Web site: http://govcha.[facility].nv., E-mail  [email or web address].                                                                                                                                                                                                                                   __________________________________________________________________                                                                   _____11/8/18____________                Employee Name / Signature                                                                                                                                                       Date                                                                                                                                                                                                     D-2 (rev. 10/07)                                                                                                                                                                                                                               Monthly or less

## 2025-06-06 VITALS
RESPIRATION RATE: 18 BRPM | OXYGEN SATURATION: 95 % | HEART RATE: 90 BPM | DIASTOLIC BLOOD PRESSURE: 74 MMHG | SYSTOLIC BLOOD PRESSURE: 112 MMHG

## 2025-07-07 ENCOUNTER — OUTPATIENT (OUTPATIENT)
Dept: OUTPATIENT SERVICES | Facility: HOSPITAL | Age: 70
LOS: 1 days | End: 2025-07-07
Payer: MEDICARE

## 2025-07-07 VITALS
HEART RATE: 113 BPM | RESPIRATION RATE: 16 BRPM | DIASTOLIC BLOOD PRESSURE: 85 MMHG | HEIGHT: 68 IN | SYSTOLIC BLOOD PRESSURE: 127 MMHG | TEMPERATURE: 99 F | WEIGHT: 190.04 LBS | OXYGEN SATURATION: 98 %

## 2025-07-07 DIAGNOSIS — Z90.89 ACQUIRED ABSENCE OF OTHER ORGANS: Chronic | ICD-10-CM

## 2025-07-07 DIAGNOSIS — M17.12 UNILATERAL PRIMARY OSTEOARTHRITIS, LEFT KNEE: ICD-10-CM

## 2025-07-07 DIAGNOSIS — Z98.890 OTHER SPECIFIED POSTPROCEDURAL STATES: Chronic | ICD-10-CM

## 2025-07-07 DIAGNOSIS — Z01.818 ENCOUNTER FOR OTHER PREPROCEDURAL EXAMINATION: ICD-10-CM

## 2025-07-07 LAB
A1C WITH ESTIMATED AVERAGE GLUCOSE RESULT: 4.8 % — SIGNIFICANT CHANGE UP (ref 4–5.6)
ALBUMIN SERPL ELPH-MCNC: 4.6 G/DL — SIGNIFICANT CHANGE UP (ref 3.3–5)
ALP SERPL-CCNC: 61 U/L — SIGNIFICANT CHANGE UP (ref 40–120)
ALT FLD-CCNC: 70 U/L — SIGNIFICANT CHANGE UP (ref 12–78)
ANION GAP SERPL CALC-SCNC: 14 MMOL/L — SIGNIFICANT CHANGE UP (ref 5–17)
AST SERPL-CCNC: 120 U/L — HIGH (ref 15–37)
BASOPHILS # BLD AUTO: 0.06 K/UL — SIGNIFICANT CHANGE UP (ref 0–0.2)
BASOPHILS NFR BLD AUTO: 0.9 % — SIGNIFICANT CHANGE UP (ref 0–2)
BILIRUB SERPL-MCNC: 1.4 MG/DL — HIGH (ref 0.2–1.2)
BUN SERPL-MCNC: 18 MG/DL — SIGNIFICANT CHANGE UP (ref 7–23)
CALCIUM SERPL-MCNC: 9.7 MG/DL — SIGNIFICANT CHANGE UP (ref 8.5–10.1)
CHLORIDE SERPL-SCNC: 96 MMOL/L — SIGNIFICANT CHANGE UP (ref 96–108)
CO2 SERPL-SCNC: 29 MMOL/L — SIGNIFICANT CHANGE UP (ref 22–31)
CREAT SERPL-MCNC: 2.78 MG/DL — HIGH (ref 0.5–1.3)
EGFR: 24 ML/MIN/1.73M2 — LOW
EGFR: 24 ML/MIN/1.73M2 — LOW
EOSINOPHIL # BLD AUTO: 0.03 K/UL — SIGNIFICANT CHANGE UP (ref 0–0.5)
EOSINOPHIL NFR BLD AUTO: 0.4 % — SIGNIFICANT CHANGE UP (ref 0–6)
ESTIMATED AVERAGE GLUCOSE: 91 MG/DL — SIGNIFICANT CHANGE UP (ref 68–114)
GLUCOSE SERPL-MCNC: 152 MG/DL — HIGH (ref 70–99)
HCT VFR BLD CALC: 42.6 % — SIGNIFICANT CHANGE UP (ref 39–50)
HGB BLD-MCNC: 13.9 G/DL — SIGNIFICANT CHANGE UP (ref 13–17)
IMM GRANULOCYTES # BLD AUTO: 0.04 K/UL — SIGNIFICANT CHANGE UP (ref 0–0.07)
IMM GRANULOCYTES NFR BLD AUTO: 0.6 % — SIGNIFICANT CHANGE UP (ref 0–0.9)
IMMATURE PLATELET FRACTION #: 4.6 K/UL — SIGNIFICANT CHANGE UP (ref 3.9–12.5)
IMMATURE PLATELET FRACTION %: 5 % — SIGNIFICANT CHANGE UP (ref 1.6–7.1)
LYMPHOCYTES # BLD AUTO: 1.01 K/UL — SIGNIFICANT CHANGE UP (ref 1–3.3)
LYMPHOCYTES NFR BLD AUTO: 14.8 % — SIGNIFICANT CHANGE UP (ref 13–44)
MCHC RBC-ENTMCNC: 32.6 G/DL — SIGNIFICANT CHANGE UP (ref 32–36)
MCHC RBC-ENTMCNC: 33.9 PG — SIGNIFICANT CHANGE UP (ref 27–34)
MCV RBC AUTO: 103.9 FL — HIGH (ref 80–100)
MONOCYTES # BLD AUTO: 1.02 K/UL — HIGH (ref 0–0.9)
MONOCYTES NFR BLD AUTO: 15 % — HIGH (ref 2–14)
MRSA PCR RESULT.: SIGNIFICANT CHANGE UP
NEUTROPHILS # BLD AUTO: 4.65 K/UL — SIGNIFICANT CHANGE UP (ref 1.8–7.4)
NEUTROPHILS NFR BLD AUTO: 68.3 % — SIGNIFICANT CHANGE UP (ref 43–77)
NRBC # BLD AUTO: 0 K/UL — SIGNIFICANT CHANGE UP (ref 0–0)
NRBC # FLD: 0 K/UL — SIGNIFICANT CHANGE UP (ref 0–0)
NRBC BLD AUTO-RTO: 0 /100 WBCS — SIGNIFICANT CHANGE UP (ref 0–0)
PLATELET # BLD AUTO: 92 K/UL — LOW (ref 150–400)
PMV BLD: 10.4 FL — SIGNIFICANT CHANGE UP (ref 7–13)
POTASSIUM SERPL-MCNC: 3.5 MMOL/L — SIGNIFICANT CHANGE UP (ref 3.5–5.3)
POTASSIUM SERPL-SCNC: 3.5 MMOL/L — SIGNIFICANT CHANGE UP (ref 3.5–5.3)
PROT SERPL-MCNC: 7.9 GM/DL — SIGNIFICANT CHANGE UP (ref 6–8.3)
RBC # BLD: 4.1 M/UL — LOW (ref 4.2–5.8)
RBC # FLD: 14.7 % — HIGH (ref 10.3–14.5)
S AUREUS DNA NOSE QL NAA+PROBE: DETECTED
SODIUM SERPL-SCNC: 139 MMOL/L — SIGNIFICANT CHANGE UP (ref 135–145)
WBC # BLD: 6.81 K/UL — SIGNIFICANT CHANGE UP (ref 3.8–10.5)
WBC # FLD AUTO: 6.81 K/UL — SIGNIFICANT CHANGE UP (ref 3.8–10.5)

## 2025-07-07 PROCEDURE — 87640 STAPH A DNA AMP PROBE: CPT

## 2025-07-07 PROCEDURE — 36415 COLL VENOUS BLD VENIPUNCTURE: CPT

## 2025-07-07 PROCEDURE — 85025 COMPLETE CBC W/AUTO DIFF WBC: CPT

## 2025-07-07 PROCEDURE — 83036 HEMOGLOBIN GLYCOSYLATED A1C: CPT

## 2025-07-07 PROCEDURE — 99214 OFFICE O/P EST MOD 30 MIN: CPT | Mod: 25

## 2025-07-07 PROCEDURE — 87641 MR-STAPH DNA AMP PROBE: CPT

## 2025-07-07 PROCEDURE — 80053 COMPREHEN METABOLIC PANEL: CPT

## 2025-07-07 RX ORDER — LOSARTAN POTASSIUM 100 MG/1
1 TABLET, FILM COATED ORAL
Refills: 0 | DISCHARGE

## 2025-07-07 RX ORDER — HYDROXYCHLOROQUINE SULFATE 200 MG/1
1 TABLET, FILM COATED ORAL
Refills: 0 | DISCHARGE

## 2025-07-07 RX ORDER — FUROSEMIDE 10 MG/ML
1 INJECTION INTRAMUSCULAR; INTRAVENOUS
Refills: 0 | DISCHARGE

## 2025-07-07 RX ORDER — PRAMIPEXOLE DIHYDROCHLORIDE 1 MG/1
1 TABLET ORAL
Refills: 0 | DISCHARGE

## 2025-07-07 RX ORDER — PITAVASTATIN CALCIUM 4.18 MG/1
1 TABLET, FILM COATED ORAL
Refills: 0 | DISCHARGE

## 2025-07-07 NOTE — H&P PST ADULT - GENITOURINARY MALE
Patient would need an appointment with Dr. Newton in [place] for medications unless another therapist will prescribe.   normal shape/breast exam

## 2025-07-07 NOTE — H&P PST ADULT - ASSESSMENT
Plan:  1. PST instructions given ; NPO status/  instructions to be given by ASU   2. Pt instructed to take following meds on day of surgery:   3. Pt instructed to take routine evening medications unless indicated   4. Stop NSAIDS ( Aspirin Alev Motrin Mobic Diclofenac), herbal supplements , MVI , Vitamin fish oil 7 days prior to surgery  unless   directed by surgeon or cardiologist;   5. Medical Optimization  with Dr   6. EZ wash instructions given & mupirocin instructions given  7. CBC, CMP HgA1C MRSA/MSSA  EKG  done   8. Joint Education Booklet given   9.  PATIENT WILL REQUIRE A ROLLING WALKER AT HOME DUE TO THEIR DIAGNOSIS OF OSTEOARTHRITIS OF HIP OR KNEE TO HELP COMPLETE MRADL'S.      CAPRINI SCORE Version 2013    AGE RELATED RISK FACTORS                                                             [ ] Age 41-60 years             [1 point]  [ ] Age: 61-74 years            [2 points]                 [ ] Age 75 years or over     [3 points]             DISEASE RELATED RISK FACTORS                                                       [ ] Current swollen legs (pitting edema of any level)     [1 point]                     [ ] Varicose veins (visible, bulging vein, not spider veins or surgically removed veins)   [1 point]                                 [ ] BMI > 25 Kg/m2                       [1 point]  [ ] BMI > 40 kg/m2                       [1 point]                                 [ ] Serious infection (within past month, requiring hospitalization and IV antibiotics)    [1 point]                     [ ] Lung disease ( interstitial: COPD, emphysema, sarcoid, 9-11 illness, NOT asthma)       [1 point]                                                                          [ ] Acute myocardial infarction (within past month)      [1 point]                  [ ] Congestive heart failure (episode within past month or taking CHF meds)                   [1 point]         [ ] Inflammatory bowel disease (Crohns disease or ulcerative colitis, not irritable bowel syndrome)   [1 point]                  [ ] Central venous access, PICC line or Port (within past month)     [2 points]                                                             [ ] Stroke (within past month)     [5 points]    [ ] Previous or present malignancy (includes melanoma but not basal cell carcinoma, each incidence of cancer scores 2 points-not metastases)  [2 points]                                                                                                                                                         HEMATOLOGY RELATED FACTORS                                                         [ ] History of DVT or PE (including superficial venous thrombosis)    [3 points]                    [ ] Positive family history for DVT or PE (includes first-, second-, and third-degree relatives)   [3 points]   [ ] Personal or family history of genetic thrombophilia (family history counts only if it has not been confirmed that patient does not have this genetic marker)                       [ ] Prothrombin [id number] mutation                   [3 points]                           [ ] Factor V Leiden                                               [3 points]            [ ] Antithrombin III deficiency                           [3 points]         [ ] Protein C & S deficiency                                [3 points]              [ ] Dysfibrinogenemia                                         [3 points]  [ ] Personal history of acquired thrombophilia                       [ ] Lupus anticoagulant                                       [3 points]                                                                  [ ] Anticardiolipin antibodies                             [3 points]              [ ] Antiphospholipid antibodies                         [3 points]                                                         [ ] High homocysteine in the blood                  [3 points]                                                    [ ] Myeloproliferative disorders (including thrombocytosis)    [3 points]            [ ] HIV                                                                     [3 points]                                                    [ ] Heparin induced thrombocytopenia            [3 points]                                        MOBILITY RELATED FACTORS  [ ] Bed rest or restricted mobility (inability to ambulate 30 feet continuously or removable leg brace) for less than 72 hours    [1 point]  [ ] Nonremovable plaster cast or mold that prevents calf muscle use   [2 points]  [ ] Bed bound  or restricted mobility for more than 72 hours                  [2 points]    GENDER SPECIFIC FACTORS  [ ] Pregnancy or had a baby within the last month   [1 point]  [ ] Hormone therapy  or oral contraception               [1 point]  [ ] Current use of estrogen-like drugs (raloxifine, tamoxifen, anastrozole, letrozole)                                [1 point]  [ ] History of unexplained stillborn infant, premature birth with toxemia or growth-restricted infant   [1 point]  [ ] Recurrent spontaneous abortions (3 or more)      [1 point]    OTHER RISK FACTORS                                         [ ] Current smoker (includes vaping and smoking marijuana)      [1 point]  [ ] Diabetes requiring insulin     [1 point]                   [ ] Chemotherapy (includes methotrexate for rheumatoid arthritis, hydroxyurea for thrombocytosis)    [1 point]  [ ] Blood transfusion(s)               [1 point]    SURGERY RELATED RISK FACTORS  [ ]  section within the last month                    [1 point]  [ ] Minor surgery is planned (less than 45 minutes)     [1 point]  [ ] Past major surgery (longer than 45 minutes) within past month  [2 points]  [ ] Planned major surgery lasting more than 45 minutes (includes laparoscopic and arthroscopic; do not add to the "5" for hip and knee replacement)    [2 points]  [ ] Length of surgery over 2 hours (includes anesthesia time; do not add to the "5" for hip and knee replacement)   [1 point]  [ ] Elective hip or knee joint replacement surgery         [5 points]                                               TRAUMA RELATED RISK FACTORS  [ ] Fracture of the hip, pelvis, or leg                       [5 points]  [ ] Spinal cord injury resulting in paralysis (within the past month)    [5 points]  [ ] Paralysis  (within the past month)                      [5 points]  [ ] Multiple trauma (within the past month)         [5 Points]    Total Score [        ]    Total hip and total knee replacement:  Caprini score 9 or less: LOW risk  Caprini score 10 or highter: HIGH RISK    Caprini score 0-2: Low Risk, NO VTE prophylaxis required for most patients, encourage ambulation  Caprini score 3-6: Moderate Risk , pharmacologic VTE prophylaxis is indicated for most patients (in the absence of contraindications)  Caprini score 7 or higher: High risk, pharmocologic VTE prophylaxis indicated for most patients (in the absence of contraindications)                               69 year old male PMH significant for RA, RLS HTN, HLD, moderate to severe aortic stenosis, hx of cardiomyopathy, cholelithiasis hepatic  DVT on Xarelto CKD, anxiety and depression ; Pre-op diagnosis of OA left knee c/o left knee pain, difficulty walking and unsteady gait; he presents to PST for planned Left TKR           Plan:  1. PST instructions given ; NPO status/  instructions to be given by ASU   2. Pt instructed to take following meds on day of surgery: hydralazine  3. Pt instructed to take routine evening medications unless indicated   4. Stop NSAIDS ( Aspirin Alev Motrin Mobic Diclofenac), herbal supplements , MVI , Vitamin fish oil 7 days prior to surgery  unless   directed by surgeon or cardiologist;   5. Medical Optimization  with Dr Boxer and cardio Dr Flores  6. EZ wash instructions given & mupirocin instructions given  7. CBC, CMP HgA1C MRSA/MSSA  EKG  done   8. Joint Education Booklet given   9.  PATIENT WILL REQUIRE A ROLLING WALKER AT HOME DUE TO THEIR DIAGNOSIS OF OSTEOARTHRITIS OF KNEE TO HELP COMPLETE MRADL'S.      CAPRINI SCORE Version 2013    AGE RELATED RISK FACTORS                                                             [ ] Age 41-60 years             [1 point]  [x ] Age: 61-74 years            [2 points]                 [ ] Age 75 years or over     [3 points]             DISEASE RELATED RISK FACTORS                                                       [ ] Current swollen legs (pitting edema of any level)     [1 point]                     [ ] Varicose veins (visible, bulging vein, not spider veins or surgically removed veins)   [1 point]                                 [x ] BMI > 25 Kg/m2                       [1 point]  [ ] BMI > 40 kg/m2                       [1 point]                                 [ ] Serious infection (within past month, requiring hospitalization and IV antibiotics)    [1 point]                     [ ] Lung disease ( interstitial: COPD, emphysema, sarcoid, 9-11 illness, NOT asthma)       [1 point]                                                                          [ ] Acute myocardial infarction (within past month)      [1 point]                  [ ] Congestive heart failure (episode within past month or taking CHF meds)                   [1 point]         [ ] Inflammatory bowel disease (Crohns disease or ulcerative colitis, not irritable bowel syndrome)   [1 point]                  [ ] Central venous access, PICC line or Port (within past month)     [2 points]                                                             [ ] Stroke (within past month)     [5 points]    [x ] Previous or present malignancy (includes melanoma but not basal cell carcinoma, each incidence of cancer scores 2 points-not metastases)  [2 points]                                                                                                                                                         HEMATOLOGY RELATED FACTORS                                                         [x ] History of DVT or PE (including superficial venous thrombosis)    [3 points]                    [ ] Positive family history for DVT or PE (includes first-, second-, and third-degree relatives)   [3 points]   [ ] Personal or family history of genetic thrombophilia (family history counts only if it has not been confirmed that patient does not have this genetic marker)                       [ ] Prothrombin [id number] mutation                   [3 points]                           [ ] Factor V Leiden                                               [3 points]            [ ] Antithrombin III deficiency                           [3 points]         [ ] Protein C & S deficiency                                [3 points]              [ ] Dysfibrinogenemia                                         [3 points]  [ ] Personal history of acquired thrombophilia                       [ ] Lupus anticoagulant                                       [3 points]                                                                  [ ] Anticardiolipin antibodies                             [3 points]              [ ] Antiphospholipid antibodies                         [3 points]                                                         [ ] High homocysteine in the blood                  [3 points]                                                    [ ] Myeloproliferative disorders (including thrombocytosis)    [3 points]            [ ] HIV                                                                     [3 points]                                                    [ ] Heparin induced thrombocytopenia            [3 points]                                        MOBILITY RELATED FACTORS  [ ] Bed rest or restricted mobility (inability to ambulate 30 feet continuously or removable leg brace) for less than 72 hours    [1 point]  [ ] Nonremovable plaster cast or mold that prevents calf muscle use   [2 points]  [ ] Bed bound  or restricted mobility for more than 72 hours                  [2 points]    GENDER SPECIFIC FACTORS  [ ] Pregnancy or had a baby within the last month   [1 point]  [ ] Hormone therapy  or oral contraception               [1 point]  [ ] Current use of estrogen-like drugs (raloxifine, tamoxifen, anastrozole, letrozole)                                [1 point]  [ ] History of unexplained stillborn infant, premature birth with toxemia or growth-restricted infant   [1 point]  [ ] Recurrent spontaneous abortions (3 or more)      [1 point]    OTHER RISK FACTORS                                         [ ] Current smoker (includes vaping and smoking marijuana)      [1 point]  [ ] Diabetes requiring insulin     [1 point]                   [ ] Chemotherapy (includes methotrexate for rheumatoid arthritis, hydroxyurea for thrombocytosis)    [1 point]  [ ] Blood transfusion(s)               [1 point]    SURGERY RELATED RISK FACTORS  [ ]  section within the last month                    [1 point]  [ ] Minor surgery is planned (less than 45 minutes)     [1 point]  [ ] Past major surgery (longer than 45 minutes) within past month  [2 points]  [ ] Planned major surgery lasting more than 45 minutes (includes laparoscopic and arthroscopic; do not add to the "5" for hip and knee replacement)    [2 points]  [ ] Length of surgery over 2 hours (includes anesthesia time; do not add to the "5" for hip and knee replacement)   [1 point]  [ x] Elective hip or knee joint replacement surgery         [5 points]                                               TRAUMA RELATED RISK FACTORS  [ ] Fracture of the hip, pelvis, or leg                       [5 points]  [ ] Spinal cord injury resulting in paralysis (within the past month)    [5 points]  [ ] Paralysis  (within the past month)                      [5 points]  [ ] Multiple trauma (within the past month)         [5 Points]    Total Score [   13     ]    Total hip and total knee replacement:  Caprini score 9 or less: LOW risk  Caprini score 10 or highter: HIGH RISK    Caprini score 0-2: Low Risk, NO VTE prophylaxis required for most patients, encourage ambulation  Caprini score 3-6: Moderate Risk , pharmacologic VTE prophylaxis is indicated for most patients (in the absence of contraindications)  Caprini score 7 or higher: High risk, pharmocologic VTE prophylaxis indicated for most patients (in the absence of contraindications)

## 2025-07-07 NOTE — H&P PST ADULT - HISTORY OF PRESENT ILLNESS
69 year old male PMH significant for RA, RLS HTN, HLD, moderate to severe aortic stenosis, hx of cardiomyopathy, cholelithiasis hepatic  DVT on Xarelto CKD, anxiety and depression ; Pre-op diagnosis of OA left knee c/o left knee pain, difficulty walking and unsteady gait; he presents to PST for planned Left TKR

## 2025-07-07 NOTE — H&P PST ADULT - NSICDXPASTSURGICALHX_GEN_ALL_CORE_FT
PAST SURGICAL HISTORY:  No significant past surgical history      PAST SURGICAL HISTORY:  H/O arthroscopy of left knee     H/O wrist surgery     History of tonsillectomy

## 2025-07-07 NOTE — H&P PST ADULT - NSICDXPASTMEDICALHX_GEN_ALL_CORE_FT
PAST MEDICAL HISTORY:  ETOH abuse     Hyperlipidemia LDL goal <100     Leukemia     Restless leg syndrome     Rheumatoid arteritis      PAST MEDICAL HISTORY:  Anxiety and depression     Cholelithiases     Chronic kidney disease (CKD)     Deep vein thrombosis (DVT)     Diverticulosis     ETOH abuse     H/O cardiomyopathy     Hepatic steatosis     History of chemotherapy     Hyperlipidemia LDL goal <100     Leukemia     Moderate to severe aortic stenosis     Nonmelanoma skin cancer     Restless leg syndrome     Rheumatoid arteritis      PAST MEDICAL HISTORY:  Anxiety and depression     Cholelithiases     Chronic kidney disease (CKD)     Deep vein thrombosis (DVT)     Diverticulosis     ETOH abuse     H/O cardiomyopathy     Hepatic steatosis     History of chemotherapy     Hyperlipidemia LDL goal <100     Leukemia     Moderate to severe aortic stenosis     Nonmelanoma skin cancer     Osteoarthritis of left knee     Restless leg syndrome     Rheumatoid arteritis

## 2025-07-08 DIAGNOSIS — Z01.818 ENCOUNTER FOR OTHER PREPROCEDURAL EXAMINATION: ICD-10-CM

## 2025-07-08 DIAGNOSIS — M17.12 UNILATERAL PRIMARY OSTEOARTHRITIS, LEFT KNEE: ICD-10-CM

## 2025-07-08 NOTE — CHART NOTE - NSCHARTNOTEFT_GEN_A_CORE
7/8/2025--+staph screen, patient called and result reviewed. Instructed to start Mupirocin ointment today 7/8/2025 , and use as directed, with last dose on Wednesday 7/9/2025 . Instructed to bring remaining ointment and card in on day of surgery. Patient verbalized understanding

## 2025-07-08 NOTE — CHART NOTE - NSCHARTNOTEFT_GEN_A_CORE
7/8/2025- Review of PST labs , Plts- 92, Creat. 2.78 , elevated [id number]. Labs called and faxed to both PCP & surgeon's office. Left messages x2 for surgical coordinator Ayaz 7/8/2025- Review of PST labs , Plts- 92, Creat. 2.78 , elevated [id number]. Labs called and faxed to both PCP & surgeon's office. Spoke with  surgical coordinator Maribel. She will have Dr Sanchez and PCP review labs

## 2025-07-10 ENCOUNTER — APPOINTMENT (OUTPATIENT)
Dept: ORTHOPEDIC SURGERY | Facility: HOSPITAL | Age: 70
End: 2025-07-10

## 2025-08-12 ENCOUNTER — APPOINTMENT (OUTPATIENT)
Facility: CLINIC | Age: 70
End: 2025-08-12